# Patient Record
Sex: MALE | Race: WHITE | NOT HISPANIC OR LATINO | Employment: FULL TIME | URBAN - METROPOLITAN AREA
[De-identification: names, ages, dates, MRNs, and addresses within clinical notes are randomized per-mention and may not be internally consistent; named-entity substitution may affect disease eponyms.]

---

## 2017-01-25 ENCOUNTER — HOSPITAL ENCOUNTER (OUTPATIENT)
Dept: OTHER | Facility: HOSPITAL | Age: 52
Discharge: HOME | End: 2017-01-25
Attending: INTERNAL MEDICINE | Admitting: INTERNAL MEDICINE

## 2017-01-25 LAB
ANION GAP SERPL CALC-SCNC: 11.9 MMOL/L (ref 10–20)
APTT BLD: 28.7 SEC (ref 24–31)
BASOPHILS # BLD AUTO: 0.1 10*3/UL (ref 0–0.2)
BASOPHILS NFR BLD AUTO: 1 % (ref 0–2)
BUN SERPL-MCNC: 11 MG/DL (ref 8–20)
BUN/CREAT SERPL: 13.8 (ref 6.2–20.3)
CALCIUM SERPL-MCNC: 9.4 MG/DL (ref 8.9–10.3)
CHLORIDE SERPL-SCNC: 100 MMOL/L (ref 101–111)
CONV CO2: 27 MMOL/L (ref 22–32)
CREAT UR-MCNC: 0.8 MG/DL (ref 0.7–1.2)
DIFFERENTIAL METHOD BLD: (no result)
EOSINOPHIL # BLD AUTO: 0.3 10*3/UL (ref 0–0.3)
EOSINOPHIL # BLD AUTO: 6 % (ref 0–3)
ERYTHROCYTE [DISTWIDTH] IN BLOOD BY AUTOMATED COUNT: 13.7 % (ref 11.5–14.5)
GLUCOSE SERPL-MCNC: 308 MG/DL (ref 65–99)
HCT VFR BLD AUTO: 44.1 % (ref 40–54)
HGB BLD-MCNC: 15.3 G/DL (ref 14–18)
INR PPP: 0.7
LYMPHOCYTES # BLD AUTO: 2.2 10*3/UL (ref 0.8–4.8)
LYMPHOCYTES NFR BLD AUTO: 35 % (ref 18–42)
MCH RBC QN AUTO: 29.6 PG (ref 26–32)
MCHC RBC AUTO-ENTMCNC: 34.8 G/DL (ref 32–36)
MCV RBC AUTO: 85 FL (ref 80–94)
MONOCYTES # BLD AUTO: 0.5 10*3/UL (ref 0.1–1.3)
MONOCYTES NFR BLD AUTO: 8 % (ref 2–11)
NEUTROPHILS # BLD AUTO: 3.1 10*3/UL (ref 2.3–8.6)
NEUTROPHILS NFR BLD AUTO: 50 % (ref 50–75)
NRBC BLD AUTO-RTO: 0 /100{WBCS}
NRBC/RBC NFR BLD MANUAL: 0 10*3/UL
PLATELET # BLD AUTO: 177 10*3/UL (ref 150–450)
PMV BLD AUTO: 10.1 FL (ref 7.4–10.4)
POTASSIUM SERPL-SCNC: ABNORMAL MMOL/L (ref 3.6–5.1)
PROTHROMBIN TIME: 10.2 SEC (ref 9.6–11.7)
RBC # BLD AUTO: 5.18 10*6/UL (ref 4.6–6)
SODIUM SERPL-SCNC: 135 MMOL/L (ref 136–144)
WBC # BLD AUTO: 6.2 10*3/UL (ref 4.5–11.5)

## 2018-05-21 ENCOUNTER — HOSPITAL ENCOUNTER (OUTPATIENT)
Dept: CARDIOLOGY | Facility: HOSPITAL | Age: 53
Discharge: HOME | End: 2018-05-21
Attending: INTERNAL MEDICINE | Admitting: INTERNAL MEDICINE

## 2019-02-26 ENCOUNTER — HOSPITAL ENCOUNTER (OUTPATIENT)
Dept: OTHER | Facility: HOSPITAL | Age: 54
Setting detail: RECURRING SERIES
Discharge: HOME | End: 2019-04-27
Attending: INTERNAL MEDICINE | Admitting: INTERNAL MEDICINE

## 2019-03-04 ENCOUNTER — HOSPITAL ENCOUNTER (OUTPATIENT)
Dept: OTHER | Facility: HOSPITAL | Age: 54
Discharge: HOME | End: 2019-03-04
Attending: PSYCHIATRY & NEUROLOGY | Admitting: PSYCHIATRY & NEUROLOGY

## 2019-03-04 LAB
ALBUMIN SERPL-MCNC: 3.4 G/DL (ref 3.5–4.8)
ALPHA1 GLOB FLD ELPH-MCNC: 0.2 GM/DL (ref 0.1–0.4)
ALPHA2 GLOB SERPL ELPH-MCNC: 0.9 GM/DL (ref 0.5–1)
B-GLOBULIN SERPL ELPH-MCNC: 1.3 GM/DL (ref 0.7–1.4)
CONV TOTAL PROTEIN: 6.8 G/DL (ref 6.1–7.9)
ERYTHROCYTE [SEDIMENTATION RATE] IN BLOOD BY WESTERGREN METHOD: 28 MM/HR (ref 0–20)
GAMMA GLOB SERPL ELPH-MCNC: 0.9 GM/DL (ref 0.6–1.6)
INSULIN SERPL-ACNC: ABNORMAL U[IU]/ML
INSULIN SERPL-ACNC: ABNORMAL U[IU]/ML

## 2019-03-05 LAB — T PALLIDUM IGG SER QL: NONREACTIVE

## 2019-03-06 LAB
ANA SER QL IA: NORMAL
CHROMATIN AB SERPL-ACNC: <20 [IU]/ML (ref 0–20)

## 2019-04-04 ENCOUNTER — HOSPITAL ENCOUNTER (OUTPATIENT)
Dept: PREADMISSION TESTING | Facility: HOSPITAL | Age: 54
Discharge: HOME | End: 2019-04-04
Attending: INTERNAL MEDICINE | Admitting: INTERNAL MEDICINE

## 2019-04-04 LAB
ANION GAP SERPL CALC-SCNC: 14 MMOL/L (ref 10–20)
APTT BLD: 26.2 SEC (ref 24–31)
BASOPHILS # BLD AUTO: 0.1 10*3/UL (ref 0–0.2)
BASOPHILS NFR BLD AUTO: 1 % (ref 0–2)
BUN SERPL-MCNC: 16 MG/DL (ref 8–20)
BUN/CREAT SERPL: 17.8 (ref 6.2–20.3)
CALCIUM SERPL-MCNC: 9.5 MG/DL (ref 8.9–10.3)
CHLORIDE SERPL-SCNC: 100 MMOL/L (ref 101–111)
CONV CO2: 22 MMOL/L (ref 22–32)
CREAT UR-MCNC: 0.9 MG/DL (ref 0.7–1.2)
DIFFERENTIAL METHOD BLD: (no result)
EOSINOPHIL # BLD AUTO: 0.3 10*3/UL (ref 0–0.3)
EOSINOPHIL # BLD AUTO: 6 % (ref 0–3)
ERYTHROCYTE [DISTWIDTH] IN BLOOD BY AUTOMATED COUNT: 14.1 % (ref 11.5–14.5)
GLUCOSE SERPL-MCNC: 390 MG/DL (ref 65–99)
HCT VFR BLD AUTO: 43.4 % (ref 40–54)
HGB BLD-MCNC: 15.1 G/DL (ref 14–18)
INR PPP: 0.9
LYMPHOCYTES # BLD AUTO: 1.9 10*3/UL (ref 0.8–4.8)
LYMPHOCYTES NFR BLD AUTO: 31 % (ref 18–42)
MCH RBC QN AUTO: 30.1 PG (ref 26–32)
MCHC RBC AUTO-ENTMCNC: 34.8 G/DL (ref 32–36)
MCV RBC AUTO: 86.5 FL (ref 80–94)
MONOCYTES # BLD AUTO: 0.3 10*3/UL (ref 0.1–1.3)
MONOCYTES NFR BLD AUTO: 6 % (ref 2–11)
NEUTROPHILS # BLD AUTO: 3.4 10*3/UL (ref 2.3–8.6)
NEUTROPHILS NFR BLD AUTO: 56 % (ref 50–75)
NRBC BLD AUTO-RTO: 0 /100{WBCS}
NRBC/RBC NFR BLD MANUAL: 0 10*3/UL
PLATELET # BLD AUTO: 216 10*3/UL (ref 150–450)
PMV BLD AUTO: 10.5 FL (ref 7.4–10.4)
POTASSIUM SERPL-SCNC: 4 MMOL/L (ref 3.6–5.1)
PROTHROMBIN TIME: 9.7 SEC (ref 9.6–11.7)
RBC # BLD AUTO: 5.02 10*6/UL (ref 4.6–6)
SODIUM SERPL-SCNC: 132 MMOL/L (ref 136–144)
WBC # BLD AUTO: 6 10*3/UL (ref 4.5–11.5)

## 2019-05-19 ENCOUNTER — HOSPITAL ENCOUNTER (OUTPATIENT)
Dept: URGENT CARE | Facility: CLINIC | Age: 54
Discharge: HOME | End: 2019-05-19
Attending: FAMILY MEDICINE | Admitting: FAMILY MEDICINE

## 2019-10-29 ENCOUNTER — APPOINTMENT (OUTPATIENT)
Dept: CT IMAGING | Facility: HOSPITAL | Age: 54
End: 2019-10-29

## 2019-10-29 ENCOUNTER — HOSPITAL ENCOUNTER (INPATIENT)
Facility: HOSPITAL | Age: 54
LOS: 2 days | Discharge: HOME | End: 2019-10-31
Attending: INTERNAL MEDICINE | Admitting: INTERNAL MEDICINE

## 2019-10-29 ENCOUNTER — APPOINTMENT (OUTPATIENT)
Dept: GENERAL RADIOLOGY | Facility: HOSPITAL | Age: 54
End: 2019-10-29

## 2019-10-29 DIAGNOSIS — I63.512 ARTERIAL ISCHEMIC STROKE, MCA (MIDDLE CEREBRAL ARTERY), LEFT, ACUTE (HCC): Primary | ICD-10-CM

## 2019-10-29 DIAGNOSIS — Z92.89 HISTORY OF THROMBOLYTIC THERAPY: ICD-10-CM

## 2019-10-29 PROBLEM — R94.39 ABNORMAL CARDIOVASCULAR STRESS TEST: Status: RESOLVED | Noted: 2017-01-19 | Resolved: 2019-10-29

## 2019-10-29 PROBLEM — Z01.818 PREOPERATIVE EXAMINATION: Status: ACTIVE | Noted: 2018-05-17

## 2019-10-29 PROBLEM — R07.9 CHEST PAIN: Status: RESOLVED | Noted: 2017-01-05 | Resolved: 2019-10-29

## 2019-10-29 PROBLEM — R29.898 RUE WEAKNESS: Status: ACTIVE | Noted: 2019-10-29

## 2019-10-29 PROBLEM — E78.5 HYPERLIPIDEMIA: Status: ACTIVE | Noted: 2019-10-29

## 2019-10-29 PROBLEM — I63.9 CEREBROVASCULAR ACCIDENT (CVA) (HCC): Status: ACTIVE | Noted: 2019-02-19

## 2019-10-29 PROBLEM — S93.402A SPRAIN OF UNSPECIFIED LIGAMENT OF LEFT ANKLE, INITIAL ENCOUNTER: Status: RESOLVED | Noted: 2019-05-19 | Resolved: 2019-10-29

## 2019-10-29 PROBLEM — I71.9 AORTIC ANEURYSM (HCC): Status: ACTIVE | Noted: 2017-06-22

## 2019-10-29 PROBLEM — Q23.1 BICUSPID AORTIC VALVE: Status: ACTIVE | Noted: 2017-02-16

## 2019-10-29 PROBLEM — R47.81 SLURRED SPEECH: Status: ACTIVE | Noted: 2019-10-29

## 2019-10-29 PROBLEM — R07.9 CHEST PAIN: Status: ACTIVE | Noted: 2017-01-05

## 2019-10-29 PROBLEM — Z01.818 PREOPERATIVE EXAMINATION: Status: RESOLVED | Noted: 2018-05-17 | Resolved: 2019-10-29

## 2019-10-29 PROBLEM — I25.10 CORONARY ARTERY DISEASE: Status: ACTIVE | Noted: 2019-10-29

## 2019-10-29 PROBLEM — S93.402A SPRAIN OF UNSPECIFIED LIGAMENT OF LEFT ANKLE, INITIAL ENCOUNTER: Status: ACTIVE | Noted: 2019-05-19

## 2019-10-29 PROBLEM — S93.699A TRAUMATIC PLANTAR FASCIITIS: Status: ACTIVE | Noted: 2019-05-21

## 2019-10-29 PROBLEM — E11.9 TYPE 2 DIABETES MELLITUS: Status: ACTIVE | Noted: 2019-10-29

## 2019-10-29 PROBLEM — R94.39 ABNORMAL CARDIOVASCULAR STRESS TEST: Status: ACTIVE | Noted: 2017-01-19

## 2019-10-29 PROBLEM — R29.810 FACIAL DROOP: Status: ACTIVE | Noted: 2019-10-29

## 2019-10-29 PROBLEM — S93.699A TRAUMATIC PLANTAR FASCIITIS: Status: RESOLVED | Noted: 2019-05-21 | Resolved: 2019-10-29

## 2019-10-29 PROBLEM — I10 HYPERTENSION: Status: ACTIVE | Noted: 2019-10-29

## 2019-10-29 PROBLEM — I63.9 CEREBROVASCULAR ACCIDENT (CVA) (HCC): Status: RESOLVED | Noted: 2019-02-19 | Resolved: 2019-10-29

## 2019-10-29 LAB
ABO GROUP BLD: NORMAL
ALBUMIN SERPL-MCNC: 4 G/DL (ref 3.5–5.2)
ALBUMIN/GLOB SERPL: 1.5 G/DL
ALP SERPL-CCNC: 82 U/L (ref 39–117)
ALT SERPL W P-5'-P-CCNC: 24 U/L (ref 1–41)
ANION GAP SERPL CALCULATED.3IONS-SCNC: 14 MMOL/L (ref 5–15)
APTT PPP: 25.3 SECONDS (ref 24–31)
AST SERPL-CCNC: 17 U/L (ref 1–40)
BASOPHILS # BLD AUTO: 0.1 10*3/MM3 (ref 0–0.2)
BASOPHILS NFR BLD AUTO: 1.3 % (ref 0–1.5)
BILIRUB SERPL-MCNC: 0.5 MG/DL (ref 0.2–1.2)
BLD GP AB SCN SERPL QL: NEGATIVE
BUN BLD-MCNC: 15 MG/DL (ref 6–20)
BUN/CREAT SERPL: 19.5 (ref 7–25)
CALCIUM SPEC-SCNC: 9 MG/DL (ref 8.6–10.5)
CHLORIDE SERPL-SCNC: 103 MMOL/L (ref 98–107)
CHOLEST SERPL-MCNC: 213 MG/DL (ref 0–200)
CO2 SERPL-SCNC: 21 MMOL/L (ref 22–29)
CREAT BLD-MCNC: 0.77 MG/DL (ref 0.76–1.27)
DEPRECATED RDW RBC AUTO: 42.4 FL (ref 37–54)
EOSINOPHIL # BLD AUTO: 0.2 10*3/MM3 (ref 0–0.4)
EOSINOPHIL NFR BLD AUTO: 3.9 % (ref 0.3–6.2)
ERYTHROCYTE [DISTWIDTH] IN BLOOD BY AUTOMATED COUNT: 13.9 % (ref 12.3–15.4)
GFR SERPL CREATININE-BSD FRML MDRD: 105 ML/MIN/1.73
GLOBULIN UR ELPH-MCNC: 2.6 GM/DL
GLUCOSE BLD-MCNC: 282 MG/DL (ref 65–99)
HCT VFR BLD AUTO: 39.1 % (ref 37.5–51)
HDLC SERPL-MCNC: 24 MG/DL (ref 40–60)
HGB BLD-MCNC: 13.5 G/DL (ref 13–17.7)
HOLD SPECIMEN: NORMAL
HOLD SPECIMEN: NORMAL
INR PPP: 1 (ref 0.9–1.1)
LDLC SERPL CALC-MCNC: ABNORMAL MG/DL
LDLC/HDLC SERPL: ABNORMAL {RATIO}
LYMPHOCYTES # BLD AUTO: 1.8 10*3/MM3 (ref 0.7–3.1)
LYMPHOCYTES NFR BLD AUTO: 33.1 % (ref 19.6–45.3)
MCH RBC QN AUTO: 29.7 PG (ref 26.6–33)
MCHC RBC AUTO-ENTMCNC: 34.6 G/DL (ref 31.5–35.7)
MCV RBC AUTO: 85.7 FL (ref 79–97)
MONOCYTES # BLD AUTO: 0.3 10*3/MM3 (ref 0.1–0.9)
MONOCYTES NFR BLD AUTO: 6.2 % (ref 5–12)
NEUTROPHILS # BLD AUTO: 3 10*3/MM3 (ref 1.7–7)
NEUTROPHILS NFR BLD AUTO: 55.5 % (ref 42.7–76)
NRBC BLD AUTO-RTO: 0.1 /100 WBC (ref 0–0.2)
PLATELET # BLD AUTO: 213 10*3/MM3 (ref 140–450)
PMV BLD AUTO: 9.1 FL (ref 6–12)
POTASSIUM BLD-SCNC: 3.9 MMOL/L (ref 3.5–5.2)
PROT SERPL-MCNC: 6.6 G/DL (ref 6–8.5)
PROTHROMBIN TIME: 10.5 SECONDS (ref 9.6–11.7)
RBC # BLD AUTO: 4.56 10*6/MM3 (ref 4.14–5.8)
RH BLD: POSITIVE
SODIUM BLD-SCNC: 138 MMOL/L (ref 136–145)
T&S EXPIRATION DATE: NORMAL
TRIGL SERPL-MCNC: 775 MG/DL (ref 0–150)
TROPONIN T SERPL-MCNC: 0.01 NG/ML (ref 0–0.03)
TSH SERPL DL<=0.05 MIU/L-ACNC: 1.43 UIU/ML (ref 0.27–4.2)
VLDLC SERPL-MCNC: ABNORMAL MG/DL
WBC NRBC COR # BLD: 5.4 10*3/MM3 (ref 3.4–10.8)
WHOLE BLOOD HOLD SPECIMEN: NORMAL
WHOLE BLOOD HOLD SPECIMEN: NORMAL

## 2019-10-29 PROCEDURE — 84443 ASSAY THYROID STIM HORMONE: CPT | Performed by: NURSE PRACTITIONER

## 2019-10-29 PROCEDURE — 93005 ELECTROCARDIOGRAM TRACING: CPT | Performed by: EMERGENCY MEDICINE

## 2019-10-29 PROCEDURE — 83735 ASSAY OF MAGNESIUM: CPT | Performed by: NURSE PRACTITIONER

## 2019-10-29 PROCEDURE — 70450 CT HEAD/BRAIN W/O DYE: CPT

## 2019-10-29 PROCEDURE — 80061 LIPID PANEL: CPT | Performed by: NURSE PRACTITIONER

## 2019-10-29 PROCEDURE — 86900 BLOOD TYPING SEROLOGIC ABO: CPT | Performed by: EMERGENCY MEDICINE

## 2019-10-29 PROCEDURE — 80053 COMPREHEN METABOLIC PANEL: CPT | Performed by: NURSE PRACTITIONER

## 2019-10-29 PROCEDURE — 25010000002 ALTEPLASE PER 1 MG: Performed by: EMERGENCY MEDICINE

## 2019-10-29 PROCEDURE — 87081 CULTURE SCREEN ONLY: CPT | Performed by: NURSE PRACTITIONER

## 2019-10-29 PROCEDURE — 84484 ASSAY OF TROPONIN QUANT: CPT | Performed by: NURSE PRACTITIONER

## 2019-10-29 PROCEDURE — 70496 CT ANGIOGRAPHY HEAD: CPT

## 2019-10-29 PROCEDURE — 63710000001 INSULIN LISPRO (HUMAN) PER 5 UNITS: Performed by: NURSE PRACTITIONER

## 2019-10-29 PROCEDURE — 85025 COMPLETE CBC W/AUTO DIFF WBC: CPT | Performed by: EMERGENCY MEDICINE

## 2019-10-29 PROCEDURE — 82962 GLUCOSE BLOOD TEST: CPT

## 2019-10-29 PROCEDURE — 25010000002 KETOROLAC TROMETHAMINE PER 15 MG

## 2019-10-29 PROCEDURE — 86901 BLOOD TYPING SEROLOGIC RH(D): CPT

## 2019-10-29 PROCEDURE — 86850 RBC ANTIBODY SCREEN: CPT | Performed by: EMERGENCY MEDICINE

## 2019-10-29 PROCEDURE — 71045 X-RAY EXAM CHEST 1 VIEW: CPT

## 2019-10-29 PROCEDURE — 83721 ASSAY OF BLOOD LIPOPROTEIN: CPT | Performed by: NURSE PRACTITIONER

## 2019-10-29 PROCEDURE — 85610 PROTHROMBIN TIME: CPT | Performed by: EMERGENCY MEDICINE

## 2019-10-29 PROCEDURE — 70498 CT ANGIOGRAPHY NECK: CPT

## 2019-10-29 PROCEDURE — 3E03317 INTRODUCTION OF OTHER THROMBOLYTIC INTO PERIPHERAL VEIN, PERCUTANEOUS APPROACH: ICD-10-PCS | Performed by: PSYCHIATRY & NEUROLOGY

## 2019-10-29 PROCEDURE — 93005 ELECTROCARDIOGRAM TRACING: CPT | Performed by: NURSE PRACTITIONER

## 2019-10-29 PROCEDURE — 84100 ASSAY OF PHOSPHORUS: CPT | Performed by: NURSE PRACTITIONER

## 2019-10-29 PROCEDURE — 93005 ELECTROCARDIOGRAM TRACING: CPT | Performed by: INTERNAL MEDICINE

## 2019-10-29 PROCEDURE — 85730 THROMBOPLASTIN TIME PARTIAL: CPT | Performed by: EMERGENCY MEDICINE

## 2019-10-29 PROCEDURE — 86900 BLOOD TYPING SEROLOGIC ABO: CPT

## 2019-10-29 PROCEDURE — 86901 BLOOD TYPING SEROLOGIC RH(D): CPT | Performed by: EMERGENCY MEDICINE

## 2019-10-29 PROCEDURE — 0 IOPAMIDOL PER 1 ML: Performed by: EMERGENCY MEDICINE

## 2019-10-29 PROCEDURE — 99285 EMERGENCY DEPT VISIT HI MDM: CPT

## 2019-10-29 PROCEDURE — 84484 ASSAY OF TROPONIN QUANT: CPT | Performed by: EMERGENCY MEDICINE

## 2019-10-29 RX ORDER — BISACODYL 10 MG
10 SUPPOSITORY, RECTAL RECTAL DAILY PRN
Status: DISCONTINUED | OUTPATIENT
Start: 2019-10-29 | End: 2019-10-31 | Stop reason: HOSPADM

## 2019-10-29 RX ORDER — ONDANSETRON 2 MG/ML
4 INJECTION INTRAMUSCULAR; INTRAVENOUS EVERY 6 HOURS PRN
Status: DISCONTINUED | OUTPATIENT
Start: 2019-10-29 | End: 2019-10-31 | Stop reason: HOSPADM

## 2019-10-29 RX ORDER — ACETAMINOPHEN 325 MG/1
650 TABLET ORAL EVERY 4 HOURS PRN
Status: DISCONTINUED | OUTPATIENT
Start: 2019-10-29 | End: 2019-10-31 | Stop reason: HOSPADM

## 2019-10-29 RX ORDER — SODIUM CHLORIDE 0.9 % (FLUSH) 0.9 %
10 SYRINGE (ML) INJECTION AS NEEDED
Status: DISCONTINUED | OUTPATIENT
Start: 2019-10-29 | End: 2019-10-31 | Stop reason: HOSPADM

## 2019-10-29 RX ORDER — ACETAMINOPHEN 650 MG/1
650 SUPPOSITORY RECTAL EVERY 4 HOURS PRN
Status: DISCONTINUED | OUTPATIENT
Start: 2019-10-29 | End: 2019-10-31 | Stop reason: HOSPADM

## 2019-10-29 RX ORDER — ATORVASTATIN CALCIUM 40 MG/1
40 TABLET, FILM COATED ORAL DAILY
COMMUNITY
Start: 2017-02-16 | End: 2019-10-31 | Stop reason: HOSPADM

## 2019-10-29 RX ORDER — SODIUM CHLORIDE 9 MG/ML
100 INJECTION, SOLUTION INTRAVENOUS ONCE
Status: DISCONTINUED | OUTPATIENT
Start: 2019-10-29 | End: 2019-10-29

## 2019-10-29 RX ORDER — DEXTROSE MONOHYDRATE 25 G/50ML
25 INJECTION, SOLUTION INTRAVENOUS
Status: DISCONTINUED | OUTPATIENT
Start: 2019-10-29 | End: 2019-10-31 | Stop reason: HOSPADM

## 2019-10-29 RX ORDER — CLOPIDOGREL BISULFATE 75 MG/1
75 TABLET ORAL EVERY 24 HOURS
COMMUNITY
Start: 2017-02-16 | End: 2020-11-04

## 2019-10-29 RX ORDER — INSULIN GLARGINE 100 [IU]/ML
20 INJECTION, SOLUTION SUBCUTANEOUS NIGHTLY PRN
COMMUNITY
End: 2020-11-04

## 2019-10-29 RX ORDER — CHOLECALCIFEROL (VITAMIN D3) 25 MCG
3 CAPSULE ORAL DAILY
COMMUNITY
Start: 2017-06-22

## 2019-10-29 RX ORDER — ASPIRIN 325 MG
325 TABLET ORAL DAILY
Status: DISCONTINUED | OUTPATIENT
Start: 2019-10-30 | End: 2019-10-31

## 2019-10-29 RX ORDER — LISINOPRIL 20 MG/1
40 TABLET ORAL EVERY 24 HOURS
COMMUNITY
Start: 2017-01-05 | End: 2020-12-23

## 2019-10-29 RX ORDER — NICOTINE POLACRILEX 4 MG
15 LOZENGE BUCCAL
Status: DISCONTINUED | OUTPATIENT
Start: 2019-10-29 | End: 2019-10-31 | Stop reason: HOSPADM

## 2019-10-29 RX ORDER — SODIUM CHLORIDE 9 MG/ML
50 INJECTION, SOLUTION INTRAVENOUS CONTINUOUS
Status: DISPENSED | OUTPATIENT
Start: 2019-10-29 | End: 2019-10-30

## 2019-10-29 RX ORDER — SODIUM CHLORIDE 9 MG/ML
INJECTION, SOLUTION INTRAVENOUS
Status: DISPENSED
Start: 2019-10-29 | End: 2019-10-30

## 2019-10-29 RX ORDER — SODIUM CHLORIDE 9 MG/ML
100 INJECTION, SOLUTION INTRAVENOUS ONCE
Status: COMPLETED | OUTPATIENT
Start: 2019-10-29 | End: 2019-10-29

## 2019-10-29 RX ORDER — ONDANSETRON 4 MG/1
4 TABLET, FILM COATED ORAL EVERY 6 HOURS PRN
Status: DISCONTINUED | OUTPATIENT
Start: 2019-10-29 | End: 2019-10-31 | Stop reason: HOSPADM

## 2019-10-29 RX ORDER — LABETALOL HYDROCHLORIDE 5 MG/ML
10 INJECTION, SOLUTION INTRAVENOUS
Status: DISCONTINUED | OUTPATIENT
Start: 2019-10-29 | End: 2019-10-31 | Stop reason: HOSPADM

## 2019-10-29 RX ORDER — ALUMINA, MAGNESIA, AND SIMETHICONE 2400; 2400; 240 MG/30ML; MG/30ML; MG/30ML
15 SUSPENSION ORAL EVERY 6 HOURS PRN
Status: DISCONTINUED | OUTPATIENT
Start: 2019-10-29 | End: 2019-10-31 | Stop reason: HOSPADM

## 2019-10-29 RX ORDER — IPRATROPIUM BROMIDE AND ALBUTEROL SULFATE 2.5; .5 MG/3ML; MG/3ML
3 SOLUTION RESPIRATORY (INHALATION)
Status: DISCONTINUED | OUTPATIENT
Start: 2019-10-29 | End: 2019-10-31 | Stop reason: HOSPADM

## 2019-10-29 RX ORDER — KETOROLAC TROMETHAMINE 30 MG/ML
30 INJECTION, SOLUTION INTRAMUSCULAR; INTRAVENOUS ONCE
Status: COMPLETED | OUTPATIENT
Start: 2019-10-29 | End: 2019-10-29

## 2019-10-29 RX ORDER — NITROGLYCERIN 0.4 MG/1
0.4 TABLET SUBLINGUAL
Status: DISCONTINUED | OUTPATIENT
Start: 2019-10-29 | End: 2019-10-31 | Stop reason: HOSPADM

## 2019-10-29 RX ORDER — KETOROLAC TROMETHAMINE 30 MG/ML
INJECTION, SOLUTION INTRAMUSCULAR; INTRAVENOUS
Status: COMPLETED
Start: 2019-10-29 | End: 2019-10-29

## 2019-10-29 RX ORDER — ASPIRIN 300 MG/1
300 SUPPOSITORY RECTAL DAILY
Status: DISCONTINUED | OUTPATIENT
Start: 2019-10-30 | End: 2019-10-31

## 2019-10-29 RX ADMIN — ALTEPLASE 7.99 MG: KIT at 17:38

## 2019-10-29 RX ADMIN — INSULIN LISPRO 6 UNITS: 100 INJECTION, SOLUTION INTRAVENOUS; SUBCUTANEOUS at 21:55

## 2019-10-29 RX ADMIN — IOPAMIDOL 100 ML: 755 INJECTION, SOLUTION INTRAVENOUS at 17:26

## 2019-10-29 RX ADMIN — KETOROLAC TROMETHAMINE 30 MG: 30 INJECTION, SOLUTION INTRAMUSCULAR; INTRAVENOUS at 18:52

## 2019-10-29 RX ADMIN — ALTEPLASE 71.93 MG: KIT at 17:39

## 2019-10-29 RX ADMIN — LABETALOL 20 MG/4 ML (5 MG/ML) INTRAVENOUS SYRINGE 10 MG: at 21:56

## 2019-10-29 RX ADMIN — SODIUM CHLORIDE 100 ML/HR: 900 INJECTION INTRAVENOUS at 18:30

## 2019-10-29 RX ADMIN — KETOROLAC TROMETHAMINE 30 MG: 30 INJECTION, SOLUTION INTRAMUSCULAR at 18:52

## 2019-10-30 ENCOUNTER — HOSPITAL ENCOUNTER (INPATIENT)
Dept: CARDIOLOGY | Facility: HOSPITAL | Age: 54
Discharge: HOME | End: 2019-10-30

## 2019-10-30 ENCOUNTER — APPOINTMENT (OUTPATIENT)
Dept: MRI IMAGING | Facility: HOSPITAL | Age: 54
End: 2019-10-30

## 2019-10-30 ENCOUNTER — APPOINTMENT (OUTPATIENT)
Dept: CT IMAGING | Facility: HOSPITAL | Age: 54
End: 2019-10-30

## 2019-10-30 LAB
ALBUMIN SERPL-MCNC: 3.6 G/DL (ref 3.5–5.2)
ALBUMIN/GLOB SERPL: 1.4 G/DL
ALP SERPL-CCNC: 62 U/L (ref 39–117)
ALT SERPL W P-5'-P-CCNC: 17 U/L (ref 1–41)
ANION GAP SERPL CALCULATED.3IONS-SCNC: 12 MMOL/L (ref 5–15)
ARTICHOKE IGE QN: 77 MG/DL (ref 0–100)
ARTICHOKE IGE QN: 87 MG/DL (ref 0–100)
AST SERPL-CCNC: 15 U/L (ref 1–40)
BASOPHILS # BLD AUTO: 0 10*3/MM3 (ref 0–0.2)
BASOPHILS NFR BLD AUTO: 1 % (ref 0–1.5)
BH CV XLRA MEAS LEFT CCA RATIO VEL: 98.2 CM/SEC
BH CV XLRA MEAS LEFT DIST CCA EDV: -14.9 CM/SEC
BH CV XLRA MEAS LEFT DIST CCA PSV: -72.9 CM/SEC
BH CV XLRA MEAS LEFT DIST ICA EDV: -12 CM/SEC
BH CV XLRA MEAS LEFT DIST ICA PSV: -37 CM/SEC
BH CV XLRA MEAS LEFT ICA RATIO VEL: -48.9 CM/SEC
BH CV XLRA MEAS LEFT ICA/CCA RATIO: -0.5
BH CV XLRA MEAS LEFT PROX CCA EDV: 14.3 CM/SEC
BH CV XLRA MEAS LEFT PROX CCA PSV: 98.2 CM/SEC
BH CV XLRA MEAS LEFT PROX ECA PSV: -136 CM/SEC
BH CV XLRA MEAS LEFT PROX ICA EDV: -12.5 CM/SEC
BH CV XLRA MEAS LEFT PROX ICA PSV: -48.9 CM/SEC
BH CV XLRA MEAS LEFT PROX SCLA PSV: 174 CM/SEC
BH CV XLRA MEAS LEFT VERTEBRAL A PSV: 46.1 CM/SEC
BH CV XLRA MEAS RIGHT CCA RATIO VEL: 91.3 CM/SEC
BH CV XLRA MEAS RIGHT DIST CCA EDV: -22.7 CM/SEC
BH CV XLRA MEAS RIGHT DIST CCA PSV: -53.7 CM/SEC
BH CV XLRA MEAS RIGHT DIST ICA EDV: -15.2 CM/SEC
BH CV XLRA MEAS RIGHT DIST ICA PSV: -43.9 CM/SEC
BH CV XLRA MEAS RIGHT ICA RATIO VEL: -43.9 CM/SEC
BH CV XLRA MEAS RIGHT ICA/CCA RATIO: -0.48
BH CV XLRA MEAS RIGHT PROX CCA EDV: 14.9 CM/SEC
BH CV XLRA MEAS RIGHT PROX CCA PSV: 91.3 CM/SEC
BH CV XLRA MEAS RIGHT PROX ECA PSV: -97.1 CM/SEC
BH CV XLRA MEAS RIGHT PROX ICA EDV: -10.3 CM/SEC
BH CV XLRA MEAS RIGHT PROX ICA PSV: -33.7 CM/SEC
BH CV XLRA MEAS RIGHT PROX SCLA PSV: 120 CM/SEC
BH CV XLRA MEAS RIGHT VERTEBRAL A PSV: 40 CM/SEC
BILIRUB SERPL-MCNC: 0.5 MG/DL (ref 0.2–1.2)
BUN BLD-MCNC: 17 MG/DL (ref 6–20)
BUN/CREAT SERPL: 26.2 (ref 7–25)
CALCIUM SPEC-SCNC: 8.6 MG/DL (ref 8.6–10.5)
CHLORIDE SERPL-SCNC: 105 MMOL/L (ref 98–107)
CHOLEST SERPL-MCNC: 192 MG/DL (ref 0–200)
CO2 SERPL-SCNC: 23 MMOL/L (ref 22–29)
CREAT BLD-MCNC: 0.65 MG/DL (ref 0.76–1.27)
DEPRECATED RDW RBC AUTO: 42.9 FL (ref 37–54)
EOSINOPHIL # BLD AUTO: 0.2 10*3/MM3 (ref 0–0.4)
EOSINOPHIL NFR BLD AUTO: 5.3 % (ref 0.3–6.2)
ERYTHROCYTE [DISTWIDTH] IN BLOOD BY AUTOMATED COUNT: 13.9 % (ref 12.3–15.4)
GFR SERPL CREATININE-BSD FRML MDRD: 128 ML/MIN/1.73
GLOBULIN UR ELPH-MCNC: 2.5 GM/DL
GLUCOSE BLD-MCNC: 227 MG/DL (ref 65–99)
GLUCOSE BLDC GLUCOMTR-MCNC: 198 MG/DL (ref 70–105)
GLUCOSE BLDC GLUCOMTR-MCNC: 213 MG/DL (ref 70–105)
GLUCOSE BLDC GLUCOMTR-MCNC: 225 MG/DL (ref 70–105)
GLUCOSE BLDC GLUCOMTR-MCNC: 251 MG/DL (ref 70–105)
HBA1C MFR BLD: 10.6 % (ref 3.5–5.6)
HCT VFR BLD AUTO: 37.7 % (ref 37.5–51)
HDLC SERPL-MCNC: 25 MG/DL (ref 40–60)
HGB BLD-MCNC: 13.1 G/DL (ref 13–17.7)
LDLC SERPL CALC-MCNC: ABNORMAL MG/DL
LDLC/HDLC SERPL: ABNORMAL {RATIO}
LYMPHOCYTES # BLD AUTO: 1.5 10*3/MM3 (ref 0.7–3.1)
LYMPHOCYTES NFR BLD AUTO: 32.2 % (ref 19.6–45.3)
MAGNESIUM SERPL-MCNC: 2 MG/DL (ref 1.6–2.6)
MCH RBC QN AUTO: 30.2 PG (ref 26.6–33)
MCHC RBC AUTO-ENTMCNC: 34.8 G/DL (ref 31.5–35.7)
MCV RBC AUTO: 87 FL (ref 79–97)
MONOCYTES # BLD AUTO: 0.3 10*3/MM3 (ref 0.1–0.9)
MONOCYTES NFR BLD AUTO: 6.6 % (ref 5–12)
NEUTROPHILS # BLD AUTO: 2.5 10*3/MM3 (ref 1.7–7)
NEUTROPHILS NFR BLD AUTO: 54.9 % (ref 42.7–76)
NRBC BLD AUTO-RTO: 0.1 /100 WBC (ref 0–0.2)
PHOSPHATE SERPL-MCNC: 3.5 MG/DL (ref 2.5–4.5)
PLATELET # BLD AUTO: 193 10*3/MM3 (ref 140–450)
PMV BLD AUTO: 9.5 FL (ref 6–12)
POTASSIUM BLD-SCNC: 3.6 MMOL/L (ref 3.5–5.2)
PROT SERPL-MCNC: 6.1 G/DL (ref 6–8.5)
RBC # BLD AUTO: 4.34 10*6/MM3 (ref 4.14–5.8)
SODIUM BLD-SCNC: 140 MMOL/L (ref 136–145)
TRIGL SERPL-MCNC: 539 MG/DL (ref 0–150)
TROPONIN T SERPL-MCNC: 0.01 NG/ML (ref 0–0.03)
TROPONIN T SERPL-MCNC: 0.02 NG/ML (ref 0–0.03)
VIT B12 BLD-MCNC: 434 PG/ML (ref 211–946)
VLDLC SERPL-MCNC: ABNORMAL MG/DL
WBC NRBC COR # BLD: 4.6 10*3/MM3 (ref 3.4–10.8)

## 2019-10-30 PROCEDURE — 93880 EXTRACRANIAL BILAT STUDY: CPT

## 2019-10-30 PROCEDURE — 80053 COMPREHEN METABOLIC PANEL: CPT | Performed by: NURSE PRACTITIONER

## 2019-10-30 PROCEDURE — 99253 IP/OBS CNSLTJ NEW/EST LOW 45: CPT | Performed by: PHYSICIAN ASSISTANT

## 2019-10-30 PROCEDURE — 83036 HEMOGLOBIN GLYCOSYLATED A1C: CPT | Performed by: NURSE PRACTITIONER

## 2019-10-30 PROCEDURE — 70450 CT HEAD/BRAIN W/O DYE: CPT

## 2019-10-30 PROCEDURE — 92610 EVALUATE SWALLOWING FUNCTION: CPT

## 2019-10-30 PROCEDURE — 63710000001 INSULIN LISPRO (HUMAN) PER 5 UNITS: Performed by: NURSE PRACTITIONER

## 2019-10-30 PROCEDURE — 70551 MRI BRAIN STEM W/O DYE: CPT

## 2019-10-30 PROCEDURE — 82962 GLUCOSE BLOOD TEST: CPT

## 2019-10-30 PROCEDURE — 82607 VITAMIN B-12: CPT | Performed by: NURSE PRACTITIONER

## 2019-10-30 PROCEDURE — 82607 VITAMIN B-12: CPT | Performed by: PSYCHIATRY & NEUROLOGY

## 2019-10-30 PROCEDURE — 82746 ASSAY OF FOLIC ACID SERUM: CPT | Performed by: PSYCHIATRY & NEUROLOGY

## 2019-10-30 PROCEDURE — 84484 ASSAY OF TROPONIN QUANT: CPT | Performed by: NURSE PRACTITIONER

## 2019-10-30 PROCEDURE — 85025 COMPLETE CBC W/AUTO DIFF WBC: CPT | Performed by: NURSE PRACTITIONER

## 2019-10-30 PROCEDURE — 94660 CPAP INITIATION&MGMT: CPT

## 2019-10-30 PROCEDURE — 94799 UNLISTED PULMONARY SVC/PX: CPT

## 2019-10-30 PROCEDURE — 93306 TTE W/DOPPLER COMPLETE: CPT

## 2019-10-30 PROCEDURE — 99252 IP/OBS CONSLTJ NEW/EST SF 35: CPT | Performed by: PSYCHIATRY & NEUROLOGY

## 2019-10-30 PROCEDURE — 80061 LIPID PANEL: CPT | Performed by: NURSE PRACTITIONER

## 2019-10-30 PROCEDURE — 83721 ASSAY OF BLOOD LIPOPROTEIN: CPT | Performed by: NURSE PRACTITIONER

## 2019-10-30 RX ORDER — ATORVASTATIN CALCIUM 40 MG/1
80 TABLET, FILM COATED ORAL DAILY
Status: DISCONTINUED | OUTPATIENT
Start: 2019-10-31 | End: 2019-10-31 | Stop reason: HOSPADM

## 2019-10-30 RX ORDER — MAGNESIUM SULFATE HEPTAHYDRATE 40 MG/ML
2 INJECTION, SOLUTION INTRAVENOUS AS NEEDED
Status: DISCONTINUED | OUTPATIENT
Start: 2019-10-30 | End: 2019-10-31 | Stop reason: HOSPADM

## 2019-10-30 RX ORDER — ATORVASTATIN CALCIUM 40 MG/1
40 TABLET, FILM COATED ORAL DAILY
Status: DISCONTINUED | OUTPATIENT
Start: 2019-10-30 | End: 2019-10-30

## 2019-10-30 RX ORDER — FAMOTIDINE 10 MG/ML
20 INJECTION, SOLUTION INTRAVENOUS EVERY 12 HOURS SCHEDULED
Status: DISCONTINUED | OUTPATIENT
Start: 2019-10-30 | End: 2019-10-31 | Stop reason: HOSPADM

## 2019-10-30 RX ORDER — MAGNESIUM SULFATE 1 G/100ML
1 INJECTION INTRAVENOUS AS NEEDED
Status: DISCONTINUED | OUTPATIENT
Start: 2019-10-30 | End: 2019-10-31 | Stop reason: HOSPADM

## 2019-10-30 RX ORDER — POTASSIUM CHLORIDE 1.5 G/1.77G
40 POWDER, FOR SOLUTION ORAL AS NEEDED
Status: DISCONTINUED | OUTPATIENT
Start: 2019-10-30 | End: 2019-10-31 | Stop reason: HOSPADM

## 2019-10-30 RX ORDER — POTASSIUM CHLORIDE 20 MEQ/1
40 TABLET, EXTENDED RELEASE ORAL AS NEEDED
Status: DISCONTINUED | OUTPATIENT
Start: 2019-10-30 | End: 2019-10-31 | Stop reason: HOSPADM

## 2019-10-30 RX ADMIN — ASPIRIN 325 MG ORAL TABLET 325 MG: 325 PILL ORAL at 21:02

## 2019-10-30 RX ADMIN — ATORVASTATIN CALCIUM 40 MG: 40 TABLET, FILM COATED ORAL at 11:36

## 2019-10-30 RX ADMIN — Medication 10 ML: at 21:03

## 2019-10-30 RX ADMIN — INSULIN LISPRO 2 UNITS: 100 INJECTION, SOLUTION INTRAVENOUS; SUBCUTANEOUS at 06:23

## 2019-10-30 RX ADMIN — FAMOTIDINE 20 MG: 10 INJECTION, SOLUTION INTRAVENOUS at 21:02

## 2019-10-30 RX ADMIN — INSULIN LISPRO 4 UNITS: 100 INJECTION, SOLUTION INTRAVENOUS; SUBCUTANEOUS at 11:35

## 2019-10-30 RX ADMIN — FAMOTIDINE 20 MG: 10 INJECTION, SOLUTION INTRAVENOUS at 04:39

## 2019-10-30 RX ADMIN — ACETAMINOPHEN 650 MG: 325 TABLET ORAL at 21:02

## 2019-10-30 RX ADMIN — INSULIN LISPRO 4 UNITS: 100 INJECTION, SOLUTION INTRAVENOUS; SUBCUTANEOUS at 18:30

## 2019-10-31 ENCOUNTER — NURSE TRIAGE (OUTPATIENT)
Dept: CALL CENTER | Facility: HOSPITAL | Age: 54
End: 2019-10-31

## 2019-10-31 VITALS
WEIGHT: 198.41 LBS | TEMPERATURE: 98 F | DIASTOLIC BLOOD PRESSURE: 88 MMHG | BODY MASS INDEX: 27.78 KG/M2 | HEIGHT: 71 IN | RESPIRATION RATE: 14 BRPM | HEART RATE: 78 BPM | SYSTOLIC BLOOD PRESSURE: 147 MMHG | OXYGEN SATURATION: 96 %

## 2019-10-31 LAB
ALBUMIN SERPL-MCNC: 3.5 G/DL (ref 3.5–5.2)
ALBUMIN/GLOB SERPL: 1.3 G/DL
ALP SERPL-CCNC: 60 U/L (ref 39–117)
ALT SERPL W P-5'-P-CCNC: 16 U/L (ref 1–41)
ANION GAP SERPL CALCULATED.3IONS-SCNC: 9 MMOL/L (ref 5–15)
AST SERPL-CCNC: 13 U/L (ref 1–40)
BASOPHILS # BLD AUTO: 0 10*3/MM3 (ref 0–0.2)
BASOPHILS NFR BLD AUTO: 1 % (ref 0–1.5)
BH CV ECHO MEAS - ACS: 2.1 CM
BH CV ECHO MEAS - AO MAX PG (FULL): -0.21 MMHG
BH CV ECHO MEAS - AO MAX PG: 3.5 MMHG
BH CV ECHO MEAS - AO MEAN PG (FULL): 0.51 MMHG
BH CV ECHO MEAS - AO MEAN PG: 2.1 MMHG
BH CV ECHO MEAS - AO ROOT AREA (BSA CORRECTED): 1.7
BH CV ECHO MEAS - AO ROOT AREA: 10.2 CM^2
BH CV ECHO MEAS - AO ROOT DIAM: 3.6 CM
BH CV ECHO MEAS - AO V2 MAX: 93.5 CM/SEC
BH CV ECHO MEAS - AO V2 MEAN: 69.4 CM/SEC
BH CV ECHO MEAS - AO V2 VTI: 15.4 CM
BH CV ECHO MEAS - ASC AORTA: 3.6 CM
BH CV ECHO MEAS - AVA(I,A): 5.8 CM^2
BH CV ECHO MEAS - AVA(I,D): 5.8 CM^2
BH CV ECHO MEAS - AVA(V,A): 5.3 CM^2
BH CV ECHO MEAS - AVA(V,D): 5.3 CM^2
BH CV ECHO MEAS - BSA(HAYCOCK): 2.1 M^2
BH CV ECHO MEAS - BSA: 2.1 M^2
BH CV ECHO MEAS - BZI_BMI: 27.1 KILOGRAMS/M^2
BH CV ECHO MEAS - BZI_METRIC_HEIGHT: 180.3 CM
BH CV ECHO MEAS - BZI_METRIC_WEIGHT: 88 KG
BH CV ECHO MEAS - EDV(CUBED): 106.9 ML
BH CV ECHO MEAS - EDV(MOD-SP4): 97.4 ML
BH CV ECHO MEAS - EDV(TEICH): 104.7 ML
BH CV ECHO MEAS - EF(CUBED): 61.8 %
BH CV ECHO MEAS - EF(MOD-BP): 66 %
BH CV ECHO MEAS - EF(MOD-SP4): 65.6 %
BH CV ECHO MEAS - EF(TEICH): 53.3 %
BH CV ECHO MEAS - ESV(CUBED): 40.8 ML
BH CV ECHO MEAS - ESV(MOD-SP4): 33.5 ML
BH CV ECHO MEAS - ESV(TEICH): 48.9 ML
BH CV ECHO MEAS - FS: 27.5 %
BH CV ECHO MEAS - IVS/LVPW: 0.9
BH CV ECHO MEAS - IVSD: 1.1 CM
BH CV ECHO MEAS - LA DIMENSION(2D): 5.1 CM
BH CV ECHO MEAS - LV DIASTOLIC VOL/BSA (35-75): 46.8 ML/M^2
BH CV ECHO MEAS - LV MASS(C)D: 192.9 GRAMS
BH CV ECHO MEAS - LV MASS(C)DI: 92.7 GRAMS/M^2
BH CV ECHO MEAS - LV MAX PG: 3.7 MMHG
BH CV ECHO MEAS - LV MEAN PG: 1.6 MMHG
BH CV ECHO MEAS - LV SYSTOLIC VOL/BSA (12-30): 16.1 ML/M^2
BH CV ECHO MEAS - LV V1 MAX: 96.3 CM/SEC
BH CV ECHO MEAS - LV V1 MEAN: 55.3 CM/SEC
BH CV ECHO MEAS - LV V1 VTI: 17.3 CM
BH CV ECHO MEAS - LVIDD: 4.7 CM
BH CV ECHO MEAS - LVIDS: 3.4 CM
BH CV ECHO MEAS - LVOT AREA: 5.1 CM^2
BH CV ECHO MEAS - LVOT DIAM: 2.5 CM
BH CV ECHO MEAS - LVPWD: 1.2 CM
BH CV ECHO MEAS - MR MAX PG: 59.1 MMHG
BH CV ECHO MEAS - MR MAX VEL: 384.5 CM/SEC
BH CV ECHO MEAS - MV A MAX VEL: 58.9 CM/SEC
BH CV ECHO MEAS - MV DEC SLOPE: 419.5 CM/SEC^2
BH CV ECHO MEAS - MV DEC TIME: 0.18 SEC
BH CV ECHO MEAS - MV E MAX VEL: 76.7 CM/SEC
BH CV ECHO MEAS - MV E/A: 1.3
BH CV ECHO MEAS - MV MAX PG: 3.6 MMHG
BH CV ECHO MEAS - MV MEAN PG: 1.5 MMHG
BH CV ECHO MEAS - MV V2 MAX: 95.2 CM/SEC
BH CV ECHO MEAS - MV V2 MEAN: 58.9 CM/SEC
BH CV ECHO MEAS - MV V2 VTI: 24.7 CM
BH CV ECHO MEAS - MVA(VTI): 3.6 CM^2
BH CV ECHO MEAS - PA ACC TIME: 0.06 SEC
BH CV ECHO MEAS - PA PR(ACCEL): 54.1 MMHG
BH CV ECHO MEAS - QP/QS: 0.99
BH CV ECHO MEAS - RAP SYSTOLE: 3 MMHG
BH CV ECHO MEAS - RV MAX PG: 2.4 MMHG
BH CV ECHO MEAS - RV MEAN PG: 1.1 MMHG
BH CV ECHO MEAS - RV V1 MAX: 77 CM/SEC
BH CV ECHO MEAS - RV V1 MEAN: 47 CM/SEC
BH CV ECHO MEAS - RV V1 VTI: 13.7 CM
BH CV ECHO MEAS - RVDD: 1.7 CM
BH CV ECHO MEAS - RVOT AREA: 6.4 CM^2
BH CV ECHO MEAS - RVOT DIAM: 2.9 CM
BH CV ECHO MEAS - RVSP: 9.8 MMHG
BH CV ECHO MEAS - SI(AO): 75 ML/M^2
BH CV ECHO MEAS - SI(CUBED): 31.7 ML/M^2
BH CV ECHO MEAS - SI(LVOT): 42.5 ML/M^2
BH CV ECHO MEAS - SI(MOD-SP4): 30.7 ML/M^2
BH CV ECHO MEAS - SI(TEICH): 26.8 ML/M^2
BH CV ECHO MEAS - SV(AO): 156.1 ML
BH CV ECHO MEAS - SV(CUBED): 66.1 ML
BH CV ECHO MEAS - SV(LVOT): 88.4 ML
BH CV ECHO MEAS - SV(MOD-SP4): 63.9 ML
BH CV ECHO MEAS - SV(RVOT): 87.6 ML
BH CV ECHO MEAS - SV(TEICH): 55.8 ML
BH CV ECHO MEAS - TR MAX VEL: 130.1 CM/SEC
BILIRUB SERPL-MCNC: 0.4 MG/DL (ref 0.2–1.2)
BUN BLD-MCNC: 14 MG/DL (ref 6–20)
BUN/CREAT SERPL: 18.7 (ref 7–25)
CALCIUM SPEC-SCNC: 8.5 MG/DL (ref 8.6–10.5)
CHLORIDE SERPL-SCNC: 103 MMOL/L (ref 98–107)
CO2 SERPL-SCNC: 24 MMOL/L (ref 22–29)
CREAT BLD-MCNC: 0.75 MG/DL (ref 0.76–1.27)
DEPRECATED RDW RBC AUTO: 42 FL (ref 37–54)
EOSINOPHIL # BLD AUTO: 0.3 10*3/MM3 (ref 0–0.4)
EOSINOPHIL NFR BLD AUTO: 6.8 % (ref 0.3–6.2)
ERYTHROCYTE [DISTWIDTH] IN BLOOD BY AUTOMATED COUNT: 13.8 % (ref 12.3–15.4)
FOLATE SERPL-MCNC: 13.6 NG/ML (ref 4.78–24.2)
GFR SERPL CREATININE-BSD FRML MDRD: 108 ML/MIN/1.73
GLOBULIN UR ELPH-MCNC: 2.6 GM/DL
GLUCOSE BLD-MCNC: 243 MG/DL (ref 65–99)
GLUCOSE BLDC GLUCOMTR-MCNC: 229 MG/DL (ref 70–105)
GLUCOSE BLDC GLUCOMTR-MCNC: 249 MG/DL (ref 70–105)
HCT VFR BLD AUTO: 39.2 % (ref 37.5–51)
HGB BLD-MCNC: 13.5 G/DL (ref 13–17.7)
LV EF 2D ECHO EST: 65 %
LYMPHOCYTES # BLD AUTO: 2 10*3/MM3 (ref 0.7–3.1)
LYMPHOCYTES NFR BLD AUTO: 42.4 % (ref 19.6–45.3)
MAGNESIUM SERPL-MCNC: 1.9 MG/DL (ref 1.6–2.6)
MCH RBC QN AUTO: 30.2 PG (ref 26.6–33)
MCHC RBC AUTO-ENTMCNC: 34.6 G/DL (ref 31.5–35.7)
MCV RBC AUTO: 87.3 FL (ref 79–97)
MONOCYTES # BLD AUTO: 0.3 10*3/MM3 (ref 0.1–0.9)
MONOCYTES NFR BLD AUTO: 6.8 % (ref 5–12)
MRSA SPEC QL CULT: NORMAL
NEUTROPHILS # BLD AUTO: 2 10*3/MM3 (ref 1.7–7)
NEUTROPHILS NFR BLD AUTO: 43 % (ref 42.7–76)
NRBC BLD AUTO-RTO: 0.1 /100 WBC (ref 0–0.2)
PHOSPHATE SERPL-MCNC: 2.5 MG/DL (ref 2.5–4.5)
PLATELET # BLD AUTO: 179 10*3/MM3 (ref 140–450)
PMV BLD AUTO: 8.9 FL (ref 6–12)
POTASSIUM BLD-SCNC: 3.6 MMOL/L (ref 3.5–5.2)
PROT SERPL-MCNC: 6.1 G/DL (ref 6–8.5)
RBC # BLD AUTO: 4.49 10*6/MM3 (ref 4.14–5.8)
SODIUM BLD-SCNC: 136 MMOL/L (ref 136–145)
VIT B12 BLD-MCNC: 479 PG/ML (ref 211–946)
WBC NRBC COR # BLD: 4.7 10*3/MM3 (ref 3.4–10.8)

## 2019-10-31 PROCEDURE — 84100 ASSAY OF PHOSPHORUS: CPT | Performed by: NURSE PRACTITIONER

## 2019-10-31 PROCEDURE — 83735 ASSAY OF MAGNESIUM: CPT | Performed by: NURSE PRACTITIONER

## 2019-10-31 PROCEDURE — 92526 ORAL FUNCTION THERAPY: CPT

## 2019-10-31 PROCEDURE — 92523 SPEECH SOUND LANG COMPREHEN: CPT

## 2019-10-31 PROCEDURE — 99238 HOSP IP/OBS DSCHRG MGMT 30/<: CPT | Performed by: INTERNAL MEDICINE

## 2019-10-31 PROCEDURE — 82962 GLUCOSE BLOOD TEST: CPT

## 2019-10-31 PROCEDURE — 97161 PT EVAL LOW COMPLEX 20 MIN: CPT

## 2019-10-31 PROCEDURE — 93306 TTE W/DOPPLER COMPLETE: CPT | Performed by: INTERNAL MEDICINE

## 2019-10-31 PROCEDURE — 80053 COMPREHEN METABOLIC PANEL: CPT | Performed by: NURSE PRACTITIONER

## 2019-10-31 PROCEDURE — 99231 SBSQ HOSP IP/OBS SF/LOW 25: CPT | Performed by: PSYCHIATRY & NEUROLOGY

## 2019-10-31 PROCEDURE — 85025 COMPLETE CBC W/AUTO DIFF WBC: CPT | Performed by: NURSE PRACTITIONER

## 2019-10-31 PROCEDURE — 63710000001 INSULIN LISPRO (HUMAN) PER 5 UNITS: Performed by: INTERNAL MEDICINE

## 2019-10-31 PROCEDURE — 97116 GAIT TRAINING THERAPY: CPT

## 2019-10-31 RX ORDER — ASPIRIN 325 MG
162 TABLET ORAL DAILY
Status: DISCONTINUED | OUTPATIENT
Start: 2019-11-01 | End: 2019-10-31 | Stop reason: HOSPADM

## 2019-10-31 RX ORDER — INSULIN GLARGINE 100 [IU]/ML
20 INJECTION, SOLUTION SUBCUTANEOUS NIGHTLY
Status: DISCONTINUED | OUTPATIENT
Start: 2019-10-31 | End: 2019-10-31 | Stop reason: HOSPADM

## 2019-10-31 RX ORDER — ATORVASTATIN CALCIUM 80 MG/1
80 TABLET, FILM COATED ORAL DAILY
Qty: 30 TABLET | Refills: 2 | Status: SHIPPED | OUTPATIENT
Start: 2019-11-01 | End: 2020-11-04

## 2019-10-31 RX ORDER — CLOPIDOGREL BISULFATE 75 MG/1
75 TABLET ORAL DAILY
Status: DISCONTINUED | OUTPATIENT
Start: 2019-10-31 | End: 2019-10-31 | Stop reason: HOSPADM

## 2019-10-31 RX ADMIN — POTASSIUM CHLORIDE 40 MEQ: 1500 TABLET, EXTENDED RELEASE ORAL at 10:42

## 2019-10-31 RX ADMIN — ASPIRIN 325 MG ORAL TABLET 325 MG: 325 PILL ORAL at 10:42

## 2019-10-31 RX ADMIN — FAMOTIDINE 20 MG: 10 INJECTION, SOLUTION INTRAVENOUS at 10:42

## 2019-10-31 RX ADMIN — INSULIN LISPRO 4 UNITS: 100 INJECTION, SOLUTION INTRAVENOUS; SUBCUTANEOUS at 13:12

## 2019-10-31 RX ADMIN — ATORVASTATIN CALCIUM 80 MG: 40 TABLET, FILM COATED ORAL at 13:12

## 2019-10-31 RX ADMIN — Medication 10 ML: at 10:42

## 2019-10-31 NOTE — TELEPHONE ENCOUNTER
"Caller from [place]. D/C a few hours ago. I got my discharge papers but they didn't tell me when I could return to work. Recommended to follow up with appt and discuss with MD.    Reason for Disposition  • [1] Caller requesting NON-URGENT health information AND [2] PCP's office is the best resource    Additional Information  • Negative: [1] Caller is not with the adult (patient) AND [2] reporting urgent symptoms  • Negative: Lab result questions  • Negative: Medication questions  • Negative: Caller cannot be reached by phone  • Negative: Caller has already spoken to PCP or another triager  • Negative: RN needs further essential information from caller in order to complete triage  • Negative: Requesting regular office appointment    Answer Assessment - Initial Assessment Questions  1. REASON FOR CALL or QUESTION: \"What is your reason for calling today?\" or \"How can I best help you?\" or \"What question do you have that I can help answer?\"     Does not know when he can return to [facility]    Protocols used: INFORMATION ONLY CALL-ADULT-      "

## 2019-11-01 ENCOUNTER — READMISSION MANAGEMENT (OUTPATIENT)
Dept: CALL CENTER | Facility: HOSPITAL | Age: 54
End: 2019-11-01

## 2019-11-01 NOTE — PROGRESS NOTES
[facility] Pulmonary/Critical Care/SLEEP progress note    PATIENT NAME:  Preet Jones        :  1965           MRN:  [id number]    PRIMARY CARE PHYSICIAN:  Lonnie Contreras MD    SUBJECTIVE    CHIEF COMPLAINT:   Sudden onset of right upper extremity weakness, facial droop, slurred speech    HISTORY OF PRESENT ILLNESS:  Preet Jones is a 54 y.o. male presented to ED with the acute onset of right upper extremity weakness/paresthesia, facial droop, slurred speech that started approximately 1 hour prior to arrival.  Patient reported that prior to this episode, he was of his normal health, denies any recent illness, trauma.  He states that he worked to the preceding evening, slept for a a while, woke up with a slight headache but without any other neurologic issues, laid back down until he needed to get up to take his son to work.  At that time he drove his son to [facility], came home and fixed his supper.  He stated that the dog needed to go outside, and was barking at the door.  He took the dog out, and then noticed these symptoms start acutely.  Patient stated that he called his wife, and then proceeded to come into the emergency department.  Patient reported that he is on Plavix at home, and reported that he took an aspirin prior to arrival.  Also of note, patient has had a previous ischemic stroke with residual of slight weakness in his right upper extremity without paresthesia, and reported that he had slurred speech/dysphagia with that stroke that resolved spontaneously.  During that stroke, and 2019, patient did not receive TPA, as he was outside the window for treatment.  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  Patient reports no associated chest pain, diaphoresis, vomiting, diarrhea, constipation, blood in urine or stool.  Patient also did report  that he had an occurrence at work about a week and a half prior to this event, in which a box with heavy cardboard fell and hit him above his left eye.  Patient was seen by the nurse practitioner of [employer], employee health, and was treated with concussion precautions, but patient stated that he had no issues during that recovery.  He has since that incident returned back to his normal state of health.  Patient reported that at home, when this acute onset started, he had very significant loss of sensation in his right upper and right lower extremity, significant weakness, and very difficult with speech.  He did report a headache earlier in the day, but it time of assessment, denied headache.  He also admitted to some dizziness, but denied vertigo.  He also had some associated nausea.    In the ED, code stroke was initiated upon arrival.  Patient had a CT of the head without contrast that revealed no acute intracranial process.  Patient also had a CTA of the head and neck with contrast, with preliminary results of no acute arterial occlusion of the head or neck; official report is still pending.  On-call neurologist was contacted, and patient was deemed a candidate for TPA administration.  After risks and benefits were discussed with patient, patient was agreeable to proceed with TPA.  Initial NIH was scored as 4, and after TPA administration, patient had improved to a 2.  Additionally of note, patient states that he does have occasional palpitations at time that he reports he has a fast heart rate, but it does not last for very long.  He admits that he has been told that he has a sinus arrhythmia.  Patient reports that since his last admission with the first stroke, he has attempted to take better care of himself, eating better as well as trying to lose some weight.  Following TPA administration, patient reports that he now has increased movement and strength in both his right upper and right lower extremities, but  still does have a great amount of numbness/tingling in his right lower extremity.  He still has slurred speech, but according to patient, it has significantly improved.  Patient converses well, uses complex thoughts, and expresses them fluidly in conversation, however he does have continued slurred speech.    [facility] was contacted for admission to ICU and further evaluation and treatment.  Patient has been reported during a previous admission with probable sleep apnea, referral was made to an outpatient sleep study.  An appointment was made for sleep study, but patient did not go.  He has not officially been diagnosed with ADRIANNA, but is with risk factors.  Patient denies any chronic lung conditions, does not use inhalers or nebulizers, and does not use home oxygen.  When reviewing patient's previous medical record, including recommendations for diabetes management follow-up, patient has a pattern of medical noncompliance.    Review of patient's previous medical record includes multiple admissions this year:  1) Admission from 2/18/2019-2/20/2019, which he was diagnosed with an acute left corona radiata ischemic stroke, likely small vessel ischemic disease.  This was suspected on CT Head and confirm with MRI brain with the impression of acute lacunar infarct involving the mid left corona radiata, chronic microvascular ischemia, no acute hemorrhage or midline.  Additionally, CTA reported an approximate 50% narrowing of the left cavernous carotid.  Additionally patient treated for uncontrolled diabetes mellitus, type 2, with a reported A1c of 11.4.  Patient had been noncompliant with treatment.  2) Admission from 2/22/2019-2/23/2019 for recurrent right sided facial numbness/difficulty with speech, neurology was again consulted.  No further evolution of the stroke was noted and patient was discharged with follow up recommendations.  Patient has since followed up with Dr. Spencer, Neurologist.       INSPECT REPORT:  Completed, no  active prescriptions that patient regularly takes.  He was given a 15 day script for Tramadol 50 mg on 3/11/2019, 30 day script for Fioricet on 3/4/2019, and 5 day script for oxycodone-acetaminophen  mg on 2/2/2019.  Report printed and placed in patient's chart.    Neurologist: Dr. Spencer    10/30/19:  No new issues overnight.  Speech improved.  No CP or SOA      REVIEW OF SYSTEMS:  As above       HISTORY:  Past Medical History:   Diagnosis Date   • Abnormal cardiovascular stress test 1/19/2017   • Acute myocardial infarction (CMS/[facility]) 2019   • Aortic aneurysm (CMS/[facility]) 6/22/2017   • Bicuspid aortic valve 2/16/2017   • Coronary artery disease 10/29/2019   • History of coronary angioplasty with insertion of stent    • Hyperlipidemia    • Hypertension    • Obesity 9/2/2011   • Primary hyperparathyroidism (CMS/[facility]) 9/2/2011   • RUE weakness 10/29/2019    Previous residual from Stroke, but was not work prohibiting.   • Sinus arrhythmia    • Stroke (CMS/[facility])    • Type 2 diabetes mellitus (CMS/[facility]) 10/29/2019     Past Surgical History:   Procedure Laterality Date   • APPENDECTOMY     • BACK SURGERY     • CARDIAC CATHETERIZATION  2010   • CARDIAC CATHETERIZATION  2019   • JOINT REPLACEMENT     • KNEE ARTHROSCOPY       Family History   Problem Relation Age of Onset   • No Known Problems Mother    • No Known Problems Father      Social History     Tobacco Use   • Smoking status: Never Smoker   • Smokeless tobacco: Never Used   Substance Use Topics   • Alcohol use: No     Frequency: Never   • Drug use: No        HOME MEDICATIONS:   Prior to Admission medications    Medication Sig Start Date End Date Taking? Authorizing Provider   atorvastatin (LIPITOR) 40 MG tablet Take 40 mg by mouth Daily. 2/16/17  Yes ProviderRamsey MD   insulin glargine (LANTUS) 100 UNIT/ML injection Inject 20 Units under the skin into the appropriate area as directed At Night As Needed.   Yes ProviderRamsey MD   Cholecalciferol (VITAMIN  D-3) 25 MCG (1000 UT) capsule Take 3 capsules by mouth Daily. 6/22/17   Ramsey Nye MD   clopidogrel (PLAVIX) 75 MG tablet Take 75 mg by mouth Daily. 2/16/17   Ramsey Nye MD   lisinopril (PRINIVIL,ZESTRIL) 20 MG tablet Take 20 mg by mouth Daily. 1/5/17   Ramsey Nye MD   metFORMIN (GLUCOPHAGE) 1000 MG tablet Take 1,000 mg by mouth 2 (Two) Times a Day. 1/5/17   Ramsey Nye MD       ALLERGIES:  Amoxicillin and Morphine    OBJECTIVE    VITAL SIGNS:  Vital Signs (last 24 hours)       10/29 0700  -  10/29 2010   Most Recent    Temp (°F)   98.5     98.5 (36.9)    Heart Rate 70 -  86     70    Resp 16 -  20     16    /78 -  164/95     142/89    SpO2 (%) 95 -  100     96        Wt Readings from Last 3 Encounters:   10/31/19 90 kg (198 lb 6.6 oz)   05/21/19 91.2 kg (200 lb 16 oz)   05/19/19 91.2 kg (200 lb 16 oz)     Body mass index is 27.67 kg/m².    PHYSICAL EXAM:   Constitutional:  Well developed, well nourished, no acute distress, non-toxic appearance   Eyes:  PERRL, conjunctiva normal, EOMI   HENT:  Atraumatic, external ears normal, nose normal, oropharynx moist, no pharyngeal exudates, very slight droop on right side, no palatal droop noted. Neck- normal range of motion, no tenderness, supple, trachea midline  Respiratory:  No respiratory distress, normal breath sounds, no rales, no wheezing, non-labored respirations  Cardiovascular:  Normal rate, sinus rhythm with occasional PACs/PVCs, no murmurs, no gallops, no rubs   GI:  Soft, nondistended, normal bowel sounds, nontender, no organomegaly, no mass, no rebound, no guarding   :  No costovertebral angle tenderness   Musculoskeletal:  No edema, no tenderness, no deformities.  RLE weakness in comparison to LLE--dorsiflexion very weak but plantar flexion closer to equal.  No drift noted in any extremities.      Integument:  Well hydrated, no rash   Lymphatic:  No lymphadenopathy noted   Neurologic:  Alert & oriented x 3,  normal motor function, sensory dysfunction with decreased sensory on RLE  Psychiatric:  Speech and behavior appropriate     RESULTS REVIEW:   LABS:  Lab Results (last 24 hours)     Procedure Component Value Units Date/Time    CBC & Differential [[id number]] Collected:  10/31/19 0406    Specimen:  Blood Updated:  10/31/19 0511    Narrative:       The following orders were created for panel order CBC & Differential.  Procedure                               Abnormality         Status                     ---------                               -----------         ------                     CBC Auto Differential[[id number]]        Abnormal            Final result                 Please view results for these tests on the individual orders.    Magnesium [[id number]]  (Normal) Collected:  10/31/19 0406    Specimen:  Blood Updated:  10/31/19 0617     Magnesium 1.9 mg/dL     Phosphorus [[id number]]  (Normal) Collected:  10/31/19 0406    Specimen:  Blood Updated:  10/31/19 0619     Phosphorus 2.5 mg/dL     Comprehensive Metabolic Panel [[id number]]  (Abnormal) Collected:  10/31/19 0406    Specimen:  Blood Updated:  10/31/19 0617     Glucose 243 mg/dL      BUN 14 mg/dL      Creatinine 0.75 mg/dL      Sodium 136 mmol/L      Potassium 3.6 mmol/L      Chloride 103 mmol/L      CO2 24.0 mmol/L      Calcium 8.5 mg/dL      Total Protein 6.1 g/dL      Albumin 3.50 g/dL      ALT (SGPT) 16 U/L      AST (SGOT) 13 U/L      Alkaline Phosphatase 60 U/L      Total Bilirubin 0.4 mg/dL      eGFR Non African Amer 108 mL/min/1.73      Globulin 2.6 gm/dL      A/G Ratio 1.3 g/dL      BUN/Creatinine Ratio 18.7     Anion Gap 9.0 mmol/L     Narrative:       GFR Normal >60  Chronic Kidney Disease <60  Kidney Failure <15    CBC Auto Differential [[id number]]  (Abnormal) Collected:  10/31/19 0406    Specimen:  Blood Updated:  10/31/19 0511     WBC 4.70 10*3/mm3      RBC 4.49 10*6/mm3      Hemoglobin 13.5 g/dL      Hematocrit 39.2 %      MCV 87.3 fL      MCH  "30.2 pg      MCHC 34.6 g/dL      RDW 13.8 %      RDW-SD 42.0 fl      MPV 8.9 fL      Platelets 179 10*3/mm3      Neutrophil % 43.0 %      Lymphocyte % 42.4 %      Monocyte % 6.8 %      Eosinophil % 6.8 %      Basophil % 1.0 %      Neutrophils, Absolute 2.00 10*3/mm3      Lymphocytes, Absolute 2.00 10*3/mm3      Monocytes, Absolute 0.30 10*3/mm3      Eosinophils, Absolute 0.30 10*3/mm3      Basophils, Absolute 0.00 10*3/mm3      nRBC 0.1 /100 WBC     POC Glucose Once [[id number]]  (Abnormal) Collected:  10/31/19 0716    Specimen:  Blood Updated:  10/31/19 0720     Glucose 229 mg/dL      Comment: Serial Number: [id number]:  [id number]       POC Glucose Once [[id number]]  (Abnormal) Collected:  10/31/19 1119    Specimen:  Blood Updated:  10/31/19 1124     Glucose 249 mg/dL      Comment: Serial Number: [id number]:  [id number]             RADIOLOGY STUDIES:  No Radiology Exams Resulted Within Past 24 Hours  CTA Head and Neck-completed, and per Dr. Tyler Mejia on 10/29/19 05:38 PM, \"No acute arterial occlusion of head or neck.\"  Official report pending.    ECHOCARDIOGRAM:  Results for orders placed during the hospital encounter of 19   Adult Transthoracic Echo Complete W/ Cont if Necessary Per Protocol                             Adult Echocardiogram Report    [facility]  Cardiology Department  [address] [place] [address]      Name: THALIA ROMAN     Study Date: 2019 11:11 AM    BP: 128/88 mmHg  MRN: [id number]              Patient Location:   : 1965             Gender: Male                       Height: 71 in  Age: 53 yrs                 Account#: [id number]              Weight: 200 lb  Reason For Study: STROKE, POORLY CONTROLLED TYPE 2 DIABETES    BSA: 2.1 m2  History: STROKE, POORLY CONTROLLED TYPE 2 DIABETES  Ordering Physician: ALBERT LAUGHLIN  Referring Physician: UNKNOWN, U  Performed By: TANI    M-Mode/2-D Measurements:  LVIDd: 4.1 cm       (3.7-5.7) LVPWd: " 1.2 cm        (0.8-1.2)  LVIDs: 2.8 cm       (2.3-3.9)  ACS: 1.8 cm         (1.6-3.7) IVSd: 0.93 cm        (0.7-1.2)  LA dimension: 4.7 cm(1.9-4.0) RVDd: 2.4 cm         (0.7-2.4)  FS: 30.7 %          (21-40%)  Ao root diam: 3.8 cm (2.0-3.7)    Comments  Technically difficult study.  Mitral, tricuspid and aortic valve appear structure normal.  There is mild tricuspid regurgitation noted.  Left atrium is mildly dilated. Aortic root and right ventricle are normal in size.  LV size and contractility appears normal. EF is about 55-60%.  No pericardial effusion noted.  Bubble contrast study is not adequate.    Interpretation  BUBBLE STUDY LOOPS 2 & 3    MMode/2D Measurements & Calculations  ESV(Teich): 30.5 ml  EF(Teich): 58.8 %                      Ao root area: 11.3 cm2  Asc Aorta Diam: 3.5 cm                 LVOT diam: 2.4 cm    EDV(MOD-sp4): 118.3 ml  ESV(MOD-sp4): 47.5 ml  EF(MOD-sp4): 59.9 %    Doppler Measurements & Calculations  MV E max luciana: 76.6 cm/sec                 MV max P.5 mmHg  MV A max luciana: 58.4 cm/sec                 MV mean P.0 mmHg  MV E/A: 1.3  MV dec slope: 768.3 cm/sec2               Ao V2 max: 80.4 cm/sec  MV dec time: 0.10 sec                     Ao max P.6 mmHg                                            Ao V2 mean: 60.2 cm/sec                                            Ao mean P.6 mmHg                                            Ao V2 VTI: 15.3 cm                                            TEDDY(I,D): 4.7 cm2                                              TEDDY(V,D): 5.2 cm2  LV V1 max PG: 3.1 mmHg                    PA max P.9 mmHg  LV V1 mean P.5 mmHg  LV V1 max: 88.4 cm/sec  LV V1 mean: 54.9 cm/sec  LV V1 VTI: 15.3 cm  TR max luciana: 256.7 cm/sec  TR max P.4 mmHg  RVSP(TR): 36.4 mmHg  _______________________________________________________________________________  Electronically signed by: James Odonnell MD  on 2019 09:21 PM     I reviewed the patient's  new clinical results.      HOSPITAL MEDICATIONS:  PREVIOUSLY RECEIVED MEDICATIONS:  Medications   iopamidol (ISOVUE-370) 76 % injection 100 mL (100 mL Intravenous Given 10/29/19 1726)   alteplase (ACTIVASE) bolus from vial (7.99 mg Intravenous Given 10/29/19 1738)   alteplase (ACTIVASE) 100 mg kit (0 mg/kg × 88.8 kg Intravenous Stopped 10/29/19 1830)   sodium chloride 0.9 % infusion (100 mL/hr Intravenous New Bag 10/29/19 1830)   ketorolac (TORADOL) injection 30 mg (30 mg Intravenous Given 10/29/19 1852)     SCHEDULED MEDICATIONS:         CONTINUOUS INFUSIONS:      No current facility-administered medications for this encounter.      PRN MEDICATIONS:            ASSESSMENT & PLAN:  Acute Ischemic stroke S/P tPA administration; H/O previous left corona radiata ischemic stroke  -Code stroke protocol initiated in ED, decision for tPA administration in ED  -Ischemic Stroke orders initiated.  -CT scan head, CTA head and neck reviewed.  -Repeat CT scan of head without, 24 hours post tPA administration  -Neurology following  -PT/ST/OT evaluation & treatment  -Bilateral carotid duplex, patient with history of left sided ICA >50%    Hypertension, chronic  -Resume home medications when able to take PO medications    Palpitations with sinus arrhythmia and PVCs  -Cardiac monitoring  -EKG reviewed, repeat EKG with rhythm changes  -Consider cardiac consultation, but recommend holter monitor or outpatient follow up with cardiologist    CAD, H/O Stent with PCI & AMI  -Recent cath in 2/2019 which was negative for occlusive CAD  -Resume ASA/Plavix when able as mentioned above    Hyperlipidemia  -Lipid panel reviewed   -statin therapy when able to take po medications    Diabetes mellitus, type 2  -Previous A1c 11.4 (02/2019)--was noncompliant, recheck A1c in AM  -strict glycemic control recommended  -Accuchecks AC/HS or Q6H with sliding scale insulin, based on diet  -Hold oral diabetic medications until out of ICU.  -Diabetes Educator  Consultation    Possible ADRIANNA  -Previously referred to have outpatient sleep study, had an appointment with Dr. Seipel, but didn't complete sleep study  -Counseled on importance of testing to eliminate potential contributors to stroke risk  -Recommend outpatient sleep study and follow up.    Vitamin D Deficiency  -Resume home medication regimen when able to take PO medications    Previous Vitamin B-12 deficiency  -Recheck B12 in AM    Accuchecks with SSI  Code Status: CPR, Full Interventions  VTE Prophylaxis: SCDs  PUD Prophylaxis: Pepcid    Patient is instructed to follow-up in pulmonary office for possible sleep study

## 2019-11-01 NOTE — OUTREACH NOTE
Prep Survey      Responses   Facility patient discharged from?  [facility]   Is patient eligible?  Yes   Discharge diagnosis  Arterial ischemic stroke   Does the patient have one of the following disease processes/diagnoses(primary or secondary)?  Stroke (TIA)   Does the patient have Home health ordered?  No   Is there a DME ordered?  No   Prep survey completed?  Yes          Susie Hu RN

## 2019-11-04 ENCOUNTER — READMISSION MANAGEMENT (OUTPATIENT)
Dept: CALL CENTER | Facility: HOSPITAL | Age: 54
End: 2019-11-04

## 2019-11-05 ENCOUNTER — TELEPHONE (OUTPATIENT)
Dept: NEUROLOGY | Facility: CLINIC | Age: 54
End: 2019-11-05

## 2019-11-05 NOTE — OUTREACH NOTE
Stroke Week 1 Survey      Responses   Facility patient discharged from?  [facility]   Does the patient have one of the following disease processes/diagnoses(primary or secondary)?  Stroke (TIA)   Is there a successful TCM telephone encounter documented?  No   Week 1 attempt successful?  Yes   Call start time  0728   Call end time  0732   Discharge diagnosis  Arterial ischemic stroke   Meds reviewed with patient/caregiver?  Yes   Is the patient having any side effects they believe may be caused by any medication additions or changes?  No   Does the patient have all medications ordered at discharge?  Yes   Is the patient taking all medications as directed (includes completed medication regime)?  Yes   Does the patient have a primary care provider?   Yes   Does the patient have an appointment with their PCP within 7 days of discharge?  Yes   Has the patient kept scheduled appointments due by today?  N/A   Comments  Patient needing follow up with Neuro.  Office currently closed and will call in am to tell them he needs a f/u appt.  Explain to pt they will contact him after neuro  looks over records.   Has home health visited the patient within 72 hours of discharge?  N/A   Did the patient receive a copy of their discharge instructions?  Yes   Nursing interventions  Reviewed instructions with patient   What is the patient's perception of their health status since discharge?  Improving   Is the patient able to teach back FAST for Stroke?  Yes   Is the patient/caregiver able to teach back the risk factors for a stroke?  Diabetes, Physical inactivity and obesity, History of TIAs, HIgh red blood cell count, Illegal drug use, High Cholesterol, Smoking, High blood pressure-goal below 120/80, Carotid or other artery disease, Sleep apnea   Is the patient/caregiver able to teach back signs and symptoms related to disease process for when to call PCP?  Yes   Is the patient/caregiver able to teach back signs and symptoms related to  disease process for when to call 911?  Yes   Is the patient/caregiver able to teach back the hierarchy of who to call/visit for symptoms/problems? PCP, Specialist, Home health nurse, Urgent Care, ED, 911  Yes   Additional teach back comments  Patient states he is still having some headaches but has appt with his PCP.  Taking all medications as prescribed   Week 1 call completed?  Yes   Wrap up additional comments  Will call neuro for a f/u appt in am and they will contact pt with date and time          Latonya Veronica LPN

## 2019-11-11 ENCOUNTER — READMISSION MANAGEMENT (OUTPATIENT)
Dept: CALL CENTER | Facility: HOSPITAL | Age: 54
End: 2019-11-11

## 2019-11-11 NOTE — OUTREACH NOTE
Stroke Week 2 Survey      Responses   Facility patient discharged from?  [facility]   Does the patient have one of the following disease processes/diagnoses(primary or secondary)?  Stroke (TIA)   Week 2 attempt successful?  No   Rescheduled  Revoked   Revoke  Decline to participate [NO ANSWER, LEFT VM]          Merry Cates LPN

## 2019-11-21 PROCEDURE — 93010 ELECTROCARDIOGRAM REPORT: CPT | Performed by: INTERNAL MEDICINE

## 2020-11-04 ENCOUNTER — APPOINTMENT (OUTPATIENT)
Dept: CT IMAGING | Facility: HOSPITAL | Age: 55
End: 2020-11-04

## 2020-11-04 ENCOUNTER — HOSPITAL ENCOUNTER (INPATIENT)
Facility: HOSPITAL | Age: 55
LOS: 2 days | Discharge: HOME | End: 2020-11-06
Attending: EMERGENCY MEDICINE | Admitting: INTERNAL MEDICINE

## 2020-11-04 ENCOUNTER — APPOINTMENT (OUTPATIENT)
Dept: CARDIOLOGY | Facility: HOSPITAL | Age: 55
End: 2020-11-04

## 2020-11-04 ENCOUNTER — APPOINTMENT (OUTPATIENT)
Dept: MRI IMAGING | Facility: HOSPITAL | Age: 55
End: 2020-11-04

## 2020-11-04 ENCOUNTER — APPOINTMENT (OUTPATIENT)
Dept: GENERAL RADIOLOGY | Facility: HOSPITAL | Age: 55
End: 2020-11-04

## 2020-11-04 DIAGNOSIS — I63.9 ACUTE ISCHEMIC STROKE (HCC): Primary | ICD-10-CM

## 2020-11-04 LAB
ABO GROUP BLD: NORMAL
ALBUMIN SERPL-MCNC: 4.1 G/DL (ref 3.5–5.2)
ALBUMIN/GLOB SERPL: 1.9 G/DL
ALP SERPL-CCNC: 93 U/L (ref 39–117)
ALT SERPL W P-5'-P-CCNC: 20 U/L (ref 1–41)
ANION GAP SERPL CALCULATED.3IONS-SCNC: 11 MMOL/L (ref 5–15)
ANION GAP SERPL CALCULATED.3IONS-SCNC: 13 MMOL/L (ref 5–15)
APTT PPP: 25.3 SECONDS (ref 24–31)
AST SERPL-CCNC: 13 U/L (ref 1–40)
B PARAPERT DNA SPEC QL NAA+PROBE: NOT DETECTED
B PERT DNA SPEC QL NAA+PROBE: NOT DETECTED
BASOPHILS # BLD AUTO: 0.1 10*3/MM3 (ref 0–0.2)
BASOPHILS # BLD AUTO: 0.1 10*3/MM3 (ref 0–0.2)
BASOPHILS NFR BLD AUTO: 1.1 % (ref 0–1.5)
BASOPHILS NFR BLD AUTO: 1.1 % (ref 0–1.5)
BH CV ECHO MEAS - ACS: 2.1 CM
BH CV ECHO MEAS - AO MAX PG (FULL): 1.6 MMHG
BH CV ECHO MEAS - AO MAX PG: 4.7 MMHG
BH CV ECHO MEAS - AO MEAN PG (FULL): 1.1 MMHG
BH CV ECHO MEAS - AO MEAN PG: 2.8 MMHG
BH CV ECHO MEAS - AO ROOT AREA (BSA CORRECTED): 1.9
BH CV ECHO MEAS - AO ROOT AREA: 12.9 CM^2
BH CV ECHO MEAS - AO ROOT DIAM: 4 CM
BH CV ECHO MEAS - AO V2 MAX: 108.1 CM/SEC
BH CV ECHO MEAS - AO V2 MEAN: 79 CM/SEC
BH CV ECHO MEAS - AO V2 VTI: 25.6 CM
BH CV ECHO MEAS - AORTIC HR: 66 BPM
BH CV ECHO MEAS - AORTIC R-R: 0.91 SEC
BH CV ECHO MEAS - AVA(I,A): 3.1 CM^2
BH CV ECHO MEAS - AVA(I,D): 3.1 CM^2
BH CV ECHO MEAS - AVA(V,A): 3.6 CM^2
BH CV ECHO MEAS - AVA(V,D): 3.6 CM^2
BH CV ECHO MEAS - BSA(HAYCOCK): 2.1 M^2
BH CV ECHO MEAS - BSA: 2.1 M^2
BH CV ECHO MEAS - BZI_BMI: 27.6 KILOGRAMS/M^2
BH CV ECHO MEAS - BZI_METRIC_HEIGHT: 180.3 CM
BH CV ECHO MEAS - BZI_METRIC_WEIGHT: 89.8 KG
BH CV ECHO MEAS - CI(AO): 10.3 L/MIN/M^2
BH CV ECHO MEAS - CI(LVOT): 2.5 L/MIN/M^2
BH CV ECHO MEAS - CO(AO): 21.7 L/MIN
BH CV ECHO MEAS - CO(LVOT): 5.2 L/MIN
BH CV ECHO MEAS - EDV(CUBED): 101.7 ML
BH CV ECHO MEAS - EDV(MOD-SP4): 124.6 ML
BH CV ECHO MEAS - EDV(TEICH): 100.7 ML
BH CV ECHO MEAS - EF(CUBED): 58.2 %
BH CV ECHO MEAS - EF(MOD-BP): 55 %
BH CV ECHO MEAS - EF(MOD-SP4): 55.2 %
BH CV ECHO MEAS - EF(TEICH): 49.9 %
BH CV ECHO MEAS - ESV(CUBED): 42.5 ML
BH CV ECHO MEAS - ESV(MOD-SP4): 55.8 ML
BH CV ECHO MEAS - ESV(TEICH): 50.5 ML
BH CV ECHO MEAS - FS: 25.2 %
BH CV ECHO MEAS - IVS/LVPW: 0.97
BH CV ECHO MEAS - IVSD: 1.3 CM
BH CV ECHO MEAS - LA DIMENSION(2D): 4.7 CM
BH CV ECHO MEAS - LV DIASTOLIC VOL/BSA (35-75): 59.4 ML/M^2
BH CV ECHO MEAS - LV MASS(C)D: 228.9 GRAMS
BH CV ECHO MEAS - LV MASS(C)DI: 109 GRAMS/M^2
BH CV ECHO MEAS - LV MAX PG: 3.1 MMHG
BH CV ECHO MEAS - LV MEAN PG: 1.6 MMHG
BH CV ECHO MEAS - LV SYSTOLIC VOL/BSA (12-30): 26.6 ML/M^2
BH CV ECHO MEAS - LV V1 MAX: 87.3 CM/SEC
BH CV ECHO MEAS - LV V1 MEAN: 60.6 CM/SEC
BH CV ECHO MEAS - LV V1 VTI: 17.8 CM
BH CV ECHO MEAS - LVIDD: 4.7 CM
BH CV ECHO MEAS - LVIDS: 3.5 CM
BH CV ECHO MEAS - LVOT AREA: 4.4 CM^2
BH CV ECHO MEAS - LVOT DIAM: 2.4 CM
BH CV ECHO MEAS - LVPWD: 1.3 CM
BH CV ECHO MEAS - MR MAX PG: 37 MMHG
BH CV ECHO MEAS - MR MAX VEL: 301.3 CM/SEC
BH CV ECHO MEAS - MV A MAX VEL: 47.2 CM/SEC
BH CV ECHO MEAS - MV DEC SLOPE: 302 CM/SEC^2
BH CV ECHO MEAS - MV DEC TIME: 0.26 SEC
BH CV ECHO MEAS - MV E MAX VEL: 78.5 CM/SEC
BH CV ECHO MEAS - MV E/A: 1.7
BH CV ECHO MEAS - MV MAX PG: 4.2 MMHG
BH CV ECHO MEAS - MV MEAN PG: 1.5 MMHG
BH CV ECHO MEAS - MV V2 MAX: 102 CM/SEC
BH CV ECHO MEAS - MV V2 MEAN: 54.9 CM/SEC
BH CV ECHO MEAS - MV V2 VTI: 29.7 CM
BH CV ECHO MEAS - MVA(VTI): 2.6 CM^2
BH CV ECHO MEAS - PA MAX PG (FULL): 2.6 MMHG
BH CV ECHO MEAS - PA MAX PG: 4.2 MMHG
BH CV ECHO MEAS - PA V2 MAX: 102.1 CM/SEC
BH CV ECHO MEAS - PVA(V,A): 3 CM^2
BH CV ECHO MEAS - PVA(V,D): 3 CM^2
BH CV ECHO MEAS - QP/QS: 0.89
BH CV ECHO MEAS - RV MAX PG: 1.6 MMHG
BH CV ECHO MEAS - RV MEAN PG: 0.96 MMHG
BH CV ECHO MEAS - RV V1 MAX: 64.1 CM/SEC
BH CV ECHO MEAS - RV V1 MEAN: 46.9 CM/SEC
BH CV ECHO MEAS - RV V1 VTI: 14.6 CM
BH CV ECHO MEAS - RVDD: 2.5 CM
BH CV ECHO MEAS - RVOT AREA: 4.8 CM^2
BH CV ECHO MEAS - RVOT DIAM: 2.5 CM
BH CV ECHO MEAS - SI(AO): 156.4 ML/M^2
BH CV ECHO MEAS - SI(CUBED): 28.2 ML/M^2
BH CV ECHO MEAS - SI(LVOT): 37.2 ML/M^2
BH CV ECHO MEAS - SI(MOD-SP4): 32.8 ML/M^2
BH CV ECHO MEAS - SI(TEICH): 23.9 ML/M^2
BH CV ECHO MEAS - SV(AO): 328.5 ML
BH CV ECHO MEAS - SV(CUBED): 59.2 ML
BH CV ECHO MEAS - SV(LVOT): 78.2 ML
BH CV ECHO MEAS - SV(MOD-SP4): 68.9 ML
BH CV ECHO MEAS - SV(RVOT): 69.9 ML
BH CV ECHO MEAS - SV(TEICH): 50.2 ML
BH CV ECHO MEAS - TR MAX VEL: 235.3 CM/SEC
BILIRUB SERPL-MCNC: 0.4 MG/DL (ref 0–1.2)
BLD GP AB SCN SERPL QL: NEGATIVE
BUN SERPL-MCNC: 10 MG/DL (ref 6–20)
BUN SERPL-MCNC: 11 MG/DL (ref 6–20)
BUN/CREAT SERPL: 14.7 (ref 7–25)
BUN/CREAT SERPL: 16.4 (ref 7–25)
C PNEUM DNA NPH QL NAA+NON-PROBE: NOT DETECTED
CALCIUM SPEC-SCNC: 8.4 MG/DL (ref 8.6–10.5)
CALCIUM SPEC-SCNC: 9.5 MG/DL (ref 8.6–10.5)
CHLORIDE SERPL-SCNC: 101 MMOL/L (ref 98–107)
CHLORIDE SERPL-SCNC: 103 MMOL/L (ref 98–107)
CHOLEST SERPL-MCNC: 167 MG/DL (ref 0–200)
CO2 SERPL-SCNC: 23 MMOL/L (ref 22–29)
CO2 SERPL-SCNC: 24 MMOL/L (ref 22–29)
CREAT SERPL-MCNC: 0.61 MG/DL (ref 0.76–1.27)
CREAT SERPL-MCNC: 0.75 MG/DL (ref 0.76–1.27)
DEPRECATED RDW RBC AUTO: 39.4 FL (ref 37–54)
DEPRECATED RDW RBC AUTO: 39.8 FL (ref 37–54)
EOSINOPHIL # BLD AUTO: 0.2 10*3/MM3 (ref 0–0.4)
EOSINOPHIL # BLD AUTO: 0.3 10*3/MM3 (ref 0–0.4)
EOSINOPHIL NFR BLD AUTO: 4 % (ref 0.3–6.2)
EOSINOPHIL NFR BLD AUTO: 4.4 % (ref 0.3–6.2)
ERYTHROCYTE [DISTWIDTH] IN BLOOD BY AUTOMATED COUNT: 13.2 % (ref 12.3–15.4)
ERYTHROCYTE [DISTWIDTH] IN BLOOD BY AUTOMATED COUNT: 13.3 % (ref 12.3–15.4)
FLUAV H1 2009 PAND RNA NPH QL NAA+PROBE: NOT DETECTED
FLUAV H1 HA GENE NPH QL NAA+PROBE: NOT DETECTED
FLUAV H3 RNA NPH QL NAA+PROBE: NOT DETECTED
FLUAV SUBTYP SPEC NAA+PROBE: NOT DETECTED
FLUBV RNA ISLT QL NAA+PROBE: NOT DETECTED
GFR SERPL CREATININE-BSD FRML MDRD: 108 ML/MIN/1.73
GFR SERPL CREATININE-BSD FRML MDRD: 137 ML/MIN/1.73
GLOBULIN UR ELPH-MCNC: 2.2 GM/DL
GLUCOSE BLDC GLUCOMTR-MCNC: 256 MG/DL (ref 70–105)
GLUCOSE BLDC GLUCOMTR-MCNC: 268 MG/DL (ref 70–105)
GLUCOSE BLDC GLUCOMTR-MCNC: 326 MG/DL (ref 70–105)
GLUCOSE BLDC GLUCOMTR-MCNC: 343 MG/DL (ref 70–105)
GLUCOSE BLDC GLUCOMTR-MCNC: 351 MG/DL (ref 70–105)
GLUCOSE SERPL-MCNC: 279 MG/DL (ref 65–99)
GLUCOSE SERPL-MCNC: 389 MG/DL (ref 65–99)
HADV DNA SPEC NAA+PROBE: NOT DETECTED
HCOV 229E RNA SPEC QL NAA+PROBE: NOT DETECTED
HCOV HKU1 RNA SPEC QL NAA+PROBE: NOT DETECTED
HCOV NL63 RNA SPEC QL NAA+PROBE: NOT DETECTED
HCOV OC43 RNA SPEC QL NAA+PROBE: NOT DETECTED
HCT VFR BLD AUTO: 37.2 % (ref 37.5–51)
HCT VFR BLD AUTO: 39.7 % (ref 37.5–51)
HDLC SERPL-MCNC: 26 MG/DL (ref 40–60)
HGB BLD-MCNC: 12.9 G/DL (ref 13–17.7)
HGB BLD-MCNC: 13.8 G/DL (ref 13–17.7)
HMPV RNA NPH QL NAA+NON-PROBE: NOT DETECTED
HOLD SPECIMEN: NORMAL
HOLD SPECIMEN: NORMAL
HPIV1 RNA SPEC QL NAA+PROBE: NOT DETECTED
HPIV2 RNA SPEC QL NAA+PROBE: NOT DETECTED
HPIV3 RNA NPH QL NAA+PROBE: NOT DETECTED
HPIV4 P GENE NPH QL NAA+PROBE: NOT DETECTED
INR PPP: 0.94 (ref 0.93–1.1)
LDLC SERPL CALC-MCNC: 73 MG/DL (ref 0–100)
LDLC/HDLC SERPL: 2.1 {RATIO}
LYMPHOCYTES # BLD AUTO: 2 10*3/MM3 (ref 0.7–3.1)
LYMPHOCYTES # BLD AUTO: 2 10*3/MM3 (ref 0.7–3.1)
LYMPHOCYTES NFR BLD AUTO: 33.4 % (ref 19.6–45.3)
LYMPHOCYTES NFR BLD AUTO: 35.1 % (ref 19.6–45.3)
M PNEUMO IGG SER IA-ACNC: NOT DETECTED
MAGNESIUM SERPL-MCNC: 1.7 MG/DL (ref 1.6–2.6)
MAGNESIUM SERPL-MCNC: 1.7 MG/DL (ref 1.6–2.6)
MCH RBC QN AUTO: 29.6 PG (ref 26.6–33)
MCH RBC QN AUTO: 29.7 PG (ref 26.6–33)
MCHC RBC AUTO-ENTMCNC: 34.6 G/DL (ref 31.5–35.7)
MCHC RBC AUTO-ENTMCNC: 34.8 G/DL (ref 31.5–35.7)
MCV RBC AUTO: 85.3 FL (ref 79–97)
MCV RBC AUTO: 85.6 FL (ref 79–97)
MONOCYTES # BLD AUTO: 0.3 10*3/MM3 (ref 0.1–0.9)
MONOCYTES # BLD AUTO: 0.4 10*3/MM3 (ref 0.1–0.9)
MONOCYTES NFR BLD AUTO: 5.9 % (ref 5–12)
MONOCYTES NFR BLD AUTO: 7.4 % (ref 5–12)
NEUTROPHILS NFR BLD AUTO: 3.1 10*3/MM3 (ref 1.7–7)
NEUTROPHILS NFR BLD AUTO: 3.3 10*3/MM3 (ref 1.7–7)
NEUTROPHILS NFR BLD AUTO: 53.5 % (ref 42.7–76)
NEUTROPHILS NFR BLD AUTO: 54.1 % (ref 42.7–76)
NRBC BLD AUTO-RTO: 0.1 /100 WBC (ref 0–0.2)
NRBC BLD AUTO-RTO: 0.1 /100 WBC (ref 0–0.2)
PHOSPHATE SERPL-MCNC: 3.5 MG/DL (ref 2.5–4.5)
PHOSPHATE SERPL-MCNC: 3.6 MG/DL (ref 2.5–4.5)
PLATELET # BLD AUTO: 207 10*3/MM3 (ref 140–450)
PLATELET # BLD AUTO: 216 10*3/MM3 (ref 140–450)
PMV BLD AUTO: 8.8 FL (ref 6–12)
PMV BLD AUTO: 8.9 FL (ref 6–12)
POTASSIUM SERPL-SCNC: 3.3 MMOL/L (ref 3.5–5.2)
POTASSIUM SERPL-SCNC: 3.7 MMOL/L (ref 3.5–5.2)
PROT SERPL-MCNC: 6.3 G/DL (ref 6–8.5)
PROTHROMBIN TIME: 10.4 SECONDS (ref 9.6–11.7)
RBC # BLD AUTO: 4.35 10*6/MM3 (ref 4.14–5.8)
RBC # BLD AUTO: 4.65 10*6/MM3 (ref 4.14–5.8)
RH BLD: POSITIVE
RHINOVIRUS RNA SPEC NAA+PROBE: NOT DETECTED
RSV RNA NPH QL NAA+NON-PROBE: NOT DETECTED
SARS-COV-2 RNA NPH QL NAA+NON-PROBE: NOT DETECTED
SODIUM SERPL-SCNC: 137 MMOL/L (ref 136–145)
SODIUM SERPL-SCNC: 138 MMOL/L (ref 136–145)
T&S EXPIRATION DATE: NORMAL
TRIGL SERPL-MCNC: 432 MG/DL (ref 0–150)
TROPONIN T SERPL-MCNC: 0.01 NG/ML (ref 0–0.03)
VLDLC SERPL-MCNC: 68 MG/DL (ref 5–40)
WBC # BLD AUTO: 5.8 10*3/MM3 (ref 3.4–10.8)
WBC # BLD AUTO: 6 10*3/MM3 (ref 3.4–10.8)
WHOLE BLOOD HOLD SPECIMEN: NORMAL
WHOLE BLOOD HOLD SPECIMEN: NORMAL

## 2020-11-04 PROCEDURE — 99285 EMERGENCY DEPT VISIT HI MDM: CPT

## 2020-11-04 PROCEDURE — 99252 IP/OBS CONSLTJ NEW/EST SF 35: CPT | Performed by: PSYCHIATRY & NEUROLOGY

## 2020-11-04 PROCEDURE — 0 IOPAMIDOL PER 1 ML: Performed by: EMERGENCY MEDICINE

## 2020-11-04 PROCEDURE — 0202U NFCT DS 22 TRGT SARS-COV-2: CPT | Performed by: NURSE PRACTITIONER

## 2020-11-04 PROCEDURE — 0042T HC CT CEREBRAL PERFUSION W/WO CONTRAST: CPT

## 2020-11-04 PROCEDURE — 86901 BLOOD TYPING SEROLOGIC RH(D): CPT | Performed by: EMERGENCY MEDICINE

## 2020-11-04 PROCEDURE — 92610 EVALUATE SWALLOWING FUNCTION: CPT

## 2020-11-04 PROCEDURE — 80061 LIPID PANEL: CPT | Performed by: NURSE PRACTITIONER

## 2020-11-04 PROCEDURE — 63710000001 INSULIN LISPRO (HUMAN) PER 5 UNITS: Performed by: NURSE PRACTITIONER

## 2020-11-04 PROCEDURE — 70496 CT ANGIOGRAPHY HEAD: CPT

## 2020-11-04 PROCEDURE — 86900 BLOOD TYPING SEROLOGIC ABO: CPT | Performed by: EMERGENCY MEDICINE

## 2020-11-04 PROCEDURE — 85025 COMPLETE CBC W/AUTO DIFF WBC: CPT | Performed by: EMERGENCY MEDICINE

## 2020-11-04 PROCEDURE — 3E03317 INTRODUCTION OF OTHER THROMBOLYTIC INTO PERIPHERAL VEIN, PERCUTANEOUS APPROACH: ICD-10-PCS | Performed by: EMERGENCY MEDICINE

## 2020-11-04 PROCEDURE — 93306 TTE W/DOPPLER COMPLETE: CPT | Performed by: INTERNAL MEDICINE

## 2020-11-04 PROCEDURE — 83735 ASSAY OF MAGNESIUM: CPT | Performed by: NURSE PRACTITIONER

## 2020-11-04 PROCEDURE — 70450 CT HEAD/BRAIN W/O DYE: CPT

## 2020-11-04 PROCEDURE — 84484 ASSAY OF TROPONIN QUANT: CPT | Performed by: EMERGENCY MEDICINE

## 2020-11-04 PROCEDURE — 85610 PROTHROMBIN TIME: CPT | Performed by: EMERGENCY MEDICINE

## 2020-11-04 PROCEDURE — 93306 TTE W/DOPPLER COMPLETE: CPT

## 2020-11-04 PROCEDURE — 92523 SPEECH SOUND LANG COMPREHEN: CPT

## 2020-11-04 PROCEDURE — 80048 BASIC METABOLIC PNL TOTAL CA: CPT | Performed by: NURSE PRACTITIONER

## 2020-11-04 PROCEDURE — 82962 GLUCOSE BLOOD TEST: CPT

## 2020-11-04 PROCEDURE — 70551 MRI BRAIN STEM W/O DYE: CPT

## 2020-11-04 PROCEDURE — 85730 THROMBOPLASTIN TIME PARTIAL: CPT | Performed by: EMERGENCY MEDICINE

## 2020-11-04 PROCEDURE — 25010000002 ALTEPLASE PER 1 MG: Performed by: EMERGENCY MEDICINE

## 2020-11-04 PROCEDURE — 71045 X-RAY EXAM CHEST 1 VIEW: CPT

## 2020-11-04 PROCEDURE — 80053 COMPREHEN METABOLIC PANEL: CPT | Performed by: EMERGENCY MEDICINE

## 2020-11-04 PROCEDURE — 84100 ASSAY OF PHOSPHORUS: CPT | Performed by: NURSE PRACTITIONER

## 2020-11-04 PROCEDURE — 85025 COMPLETE CBC W/AUTO DIFF WBC: CPT | Performed by: NURSE PRACTITIONER

## 2020-11-04 PROCEDURE — 93005 ELECTROCARDIOGRAM TRACING: CPT | Performed by: EMERGENCY MEDICINE

## 2020-11-04 PROCEDURE — 83036 HEMOGLOBIN GLYCOSYLATED A1C: CPT | Performed by: NURSE PRACTITIONER

## 2020-11-04 PROCEDURE — 70498 CT ANGIOGRAPHY NECK: CPT

## 2020-11-04 PROCEDURE — 86850 RBC ANTIBODY SCREEN: CPT | Performed by: EMERGENCY MEDICINE

## 2020-11-04 PROCEDURE — 63710000001 INSULIN LISPRO (HUMAN) PER 5 UNITS: Performed by: INTERNAL MEDICINE

## 2020-11-04 RX ORDER — ONDANSETRON 2 MG/ML
4 INJECTION INTRAMUSCULAR; INTRAVENOUS EVERY 6 HOURS PRN
Status: DISCONTINUED | OUTPATIENT
Start: 2020-11-04 | End: 2020-11-06 | Stop reason: HOSPADM

## 2020-11-04 RX ORDER — SODIUM CHLORIDE 9 MG/ML
75 INJECTION, SOLUTION INTRAVENOUS CONTINUOUS
Status: DISCONTINUED | OUTPATIENT
Start: 2020-11-04 | End: 2020-11-04

## 2020-11-04 RX ORDER — PANTOPRAZOLE SODIUM 40 MG/10ML
40 INJECTION, POWDER, LYOPHILIZED, FOR SOLUTION INTRAVENOUS
Status: DISCONTINUED | OUTPATIENT
Start: 2020-11-04 | End: 2020-11-05

## 2020-11-04 RX ORDER — ATORVASTATIN CALCIUM 40 MG/1
80 TABLET, FILM COATED ORAL NIGHTLY
Status: DISCONTINUED | OUTPATIENT
Start: 2020-11-04 | End: 2020-11-06 | Stop reason: HOSPADM

## 2020-11-04 RX ORDER — INSULIN LISPRO 100 [IU]/ML
0-14 INJECTION, SOLUTION INTRAVENOUS; SUBCUTANEOUS AS NEEDED
Status: DISCONTINUED | OUTPATIENT
Start: 2020-11-04 | End: 2020-11-05

## 2020-11-04 RX ORDER — SODIUM CHLORIDE 0.9 % (FLUSH) 0.9 %
10 SYRINGE (ML) INJECTION AS NEEDED
Status: DISCONTINUED | OUTPATIENT
Start: 2020-11-04 | End: 2020-11-04 | Stop reason: SDUPTHER

## 2020-11-04 RX ORDER — INSULIN LISPRO 100 [IU]/ML
0-14 INJECTION, SOLUTION INTRAVENOUS; SUBCUTANEOUS EVERY 6 HOURS SCHEDULED
Status: DISCONTINUED | OUTPATIENT
Start: 2020-11-04 | End: 2020-11-05

## 2020-11-04 RX ORDER — ASPIRIN 325 MG
325 TABLET ORAL DAILY
Status: DISCONTINUED | OUTPATIENT
Start: 2020-11-05 | End: 2020-11-06 | Stop reason: HOSPADM

## 2020-11-04 RX ORDER — ASPIRIN 300 MG/1
300 SUPPOSITORY RECTAL DAILY
Status: DISCONTINUED | OUTPATIENT
Start: 2020-11-05 | End: 2020-11-06 | Stop reason: HOSPADM

## 2020-11-04 RX ORDER — NICOTINE POLACRILEX 4 MG
15 LOZENGE BUCCAL
Status: DISCONTINUED | OUTPATIENT
Start: 2020-11-04 | End: 2020-11-06 | Stop reason: HOSPADM

## 2020-11-04 RX ORDER — INSULIN LISPRO 100 [IU]/ML
0-7 INJECTION, SOLUTION INTRAVENOUS; SUBCUTANEOUS EVERY 6 HOURS SCHEDULED
Status: DISCONTINUED | OUTPATIENT
Start: 2020-11-04 | End: 2020-11-04

## 2020-11-04 RX ORDER — SODIUM CHLORIDE 0.9 % (FLUSH) 0.9 %
10 SYRINGE (ML) INJECTION EVERY 12 HOURS SCHEDULED
Status: DISCONTINUED | OUTPATIENT
Start: 2020-11-04 | End: 2020-11-06 | Stop reason: HOSPADM

## 2020-11-04 RX ORDER — SODIUM CHLORIDE 0.9 % (FLUSH) 0.9 %
10 SYRINGE (ML) INJECTION AS NEEDED
Status: DISCONTINUED | OUTPATIENT
Start: 2020-11-04 | End: 2020-11-06 | Stop reason: HOSPADM

## 2020-11-04 RX ORDER — SODIUM CHLORIDE 9 MG/ML
100 INJECTION, SOLUTION INTRAVENOUS ONCE
Status: COMPLETED | OUTPATIENT
Start: 2020-11-04 | End: 2020-11-04

## 2020-11-04 RX ORDER — SODIUM CHLORIDE 0.9 % (FLUSH) 0.9 %
10 SYRINGE (ML) INJECTION EVERY 12 HOURS SCHEDULED
Status: DISCONTINUED | OUTPATIENT
Start: 2020-11-04 | End: 2020-11-04 | Stop reason: SDUPTHER

## 2020-11-04 RX ORDER — ONDANSETRON 4 MG/1
4 TABLET, FILM COATED ORAL EVERY 6 HOURS PRN
Status: DISCONTINUED | OUTPATIENT
Start: 2020-11-04 | End: 2020-11-06 | Stop reason: HOSPADM

## 2020-11-04 RX ORDER — DEXTROSE MONOHYDRATE 25 G/50ML
25 INJECTION, SOLUTION INTRAVENOUS
Status: DISCONTINUED | OUTPATIENT
Start: 2020-11-04 | End: 2020-11-06 | Stop reason: HOSPADM

## 2020-11-04 RX ORDER — ACETAMINOPHEN 650 MG/1
650 SUPPOSITORY RECTAL EVERY 4 HOURS PRN
Status: DISCONTINUED | OUTPATIENT
Start: 2020-11-04 | End: 2020-11-06 | Stop reason: HOSPADM

## 2020-11-04 RX ORDER — INSULIN LISPRO 100 [IU]/ML
0-7 INJECTION, SOLUTION INTRAVENOUS; SUBCUTANEOUS AS NEEDED
Status: DISCONTINUED | OUTPATIENT
Start: 2020-11-04 | End: 2020-11-04

## 2020-11-04 RX ORDER — ACETAMINOPHEN 325 MG/1
650 TABLET ORAL EVERY 4 HOURS PRN
Status: DISCONTINUED | OUTPATIENT
Start: 2020-11-04 | End: 2020-11-06 | Stop reason: HOSPADM

## 2020-11-04 RX ADMIN — Medication 10 ML: at 21:02

## 2020-11-04 RX ADMIN — IOPAMIDOL 100 ML: 755 INJECTION, SOLUTION INTRAVENOUS at 01:40

## 2020-11-04 RX ADMIN — SODIUM CHLORIDE 100 ML/HR: 0.9 INJECTION, SOLUTION INTRAVENOUS at 02:53

## 2020-11-04 RX ADMIN — IOPAMIDOL 50 ML: 755 INJECTION, SOLUTION INTRAVENOUS at 01:23

## 2020-11-04 RX ADMIN — SODIUM CHLORIDE 10 MG/HR: 9 INJECTION, SOLUTION INTRAVENOUS at 19:41

## 2020-11-04 RX ADMIN — SODIUM CHLORIDE 5 MG/HR: 9 INJECTION, SOLUTION INTRAVENOUS at 15:10

## 2020-11-04 RX ADMIN — ATORVASTATIN CALCIUM 80 MG: 40 TABLET, FILM COATED ORAL at 21:02

## 2020-11-04 RX ADMIN — INSULIN LISPRO 10 UNITS: 100 INJECTION, SOLUTION INTRAVENOUS; SUBCUTANEOUS at 17:24

## 2020-11-04 RX ADMIN — Medication 10 ML: at 09:32

## 2020-11-04 RX ADMIN — INSULIN LISPRO 3 UNITS: 100 INJECTION, SOLUTION INTRAVENOUS; SUBCUTANEOUS at 13:00

## 2020-11-04 RX ADMIN — INSULIN LISPRO 4 UNITS: 100 INJECTION, SOLUTION INTRAVENOUS; SUBCUTANEOUS at 05:31

## 2020-11-04 RX ADMIN — ALTEPLASE 7.84 MG: KIT at 01:52

## 2020-11-04 RX ADMIN — PANTOPRAZOLE SODIUM 40 MG: 40 INJECTION, POWDER, FOR SOLUTION INTRAVENOUS at 05:34

## 2020-11-04 RX ADMIN — SODIUM CHLORIDE 75 ML/HR: 9 INJECTION, SOLUTION INTRAVENOUS at 05:19

## 2020-11-04 RX ADMIN — ALTEPLASE 70.55 MG: KIT at 01:53

## 2020-11-04 NOTE — PROGRESS NOTES
Case Management/Social Work    Patient Name:  Preet Jones  YOB: 1965  MRN: [id number]  Admit Date:  11/4/2020        Patient is a boarder in ER.  Attempted to contact patient for discharge planning and patient is currently off the unit for testing.  Will need follow up case managing.     Jaz Carlson RN    /Utilization Review  [place] [phone] office  [phone] fax  [email or web address]  [email or web address]    Hospital NPI:   Hospital Tax ID: [id number]       Electronically signed by:  Jaz Carlson RN  11/04/20 07:04 EST

## 2020-11-04 NOTE — ED NOTES
Spoke with patient's wife on the phone and updated her on patient's status.      Crystal Puga RN  11/04/20 0818

## 2020-11-04 NOTE — PLAN OF CARE
Orders received due to patient failing stroke swallow screen.  Upon evaluation, patient's facial droops appears to have since resolved.  Clinical swallow evaluation completed with trials of water by straw, applesauce, peaches and crackers.  Patient independent in feeding himself.  He takes single bites and sips with timely oral transit and no overt s/s of aspiration noted.  Recommend a regular and thin liquid diet.  ST will follow up at a meal to ensure tolerance.    Speech, language and cognitive evaluation completed later in the day.  All skills are WFL and no further skilled speech therapy is indicated at this time.  Patient has occasional baseline slurred speech and knows that if his speech becomes slurred to slow down when talking.

## 2020-11-04 NOTE — H&P
PULMONARY/ CRITICAL CARE ADMISSION H&P NOTE        Patient Name:  Preet Jones    :  1965    Medical Record:  [id number]    PRIMARY CARE PHYSICIAN     Provider, No Known    [place]  Preet Jones is a 55 y.o. 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  Cerebral imaging was negative for any large vessel occlusions, however, CT cerebral perfusion did showed a question of a small mismatch in the left corona radiata.   Initial NIH was 4 per ER report and the patient was given tPA and has had improvement in his symptoms with NIH improving to 2.     Patient is currently taking ASA, Lisinopril and VD3.   He states that he has not been taking Lipitor, Plavix or Metformin.      REVIEW OF SYSTEMS    Constitutional:  Denies fever or chills   Eyes:  Denies change in visual acuity   HENT:  Denies nasal congestion or sore throat   Respiratory:  Denies cough or shortness of breath   Cardiovascular:  Denies chest pain or edema   GI:  Denies abdominal pain, nausea, vomiting, bloody stools or diarrhea   :  Denies dysuria   Musculoskeletal:  Denies back pain or joint pain   Integument:  Denies rash   Neurologic:  Denies headache, focal weakness or sensory changes   Endocrine:  Denies polyuria or polydipsia   Psychiatric:  Denies depression or anxiety     HOME MEDICATIONS  Prior to Admission medications    Medication Sig Start Date End Date Taking? Authorizing Provider   aspirin 81 MG tablet Take 2 tablets by mouth Daily. 10/31/19  Yes Arturo Vergara Jr., MD   Cholecalciferol (VITAMIN D-3) 25 MCG (1000 UT) capsule Take 3 capsules by mouth Daily. 17  Yes ProviderRamsey MD   lisinopril (PRINIVIL,ZESTRIL)  20 MG tablet Take 20 mg by mouth Daily. 1/5/17  Yes Ramsey Nye MD   NON FORMULARY Pt states 1 more, unsure of name   Yes Ramsey Nye MD   atorvastatin (LIPITOR) 80 MG tablet Take 1 tablet by mouth Daily. 11/1/19 11/4/20  Arturo Vergraa Jr., MD   clopidogrel (PLAVIX) 75 MG tablet Take 75 mg by mouth Daily. 2/16/17 11/4/20  Ramsey Nye MD   insulin glargine (LANTUS) 100 UNIT/ML injection Inject 20 Units under the skin into the appropriate area as directed At Night As Needed.  11/4/20  Ramsey Nye MD   insulin lispro (humaLOG) 100 UNIT/ML injection Inject 0-9 Units under the skin into the appropriate area as directed 4 (Four) Times a Day With Meals & at Bedtime. 10/31/19 11/4/20  Arturo Vergara Jr., MD   metFORMIN (GLUCOPHAGE) 1000 MG tablet Take 1,000 mg by mouth 2 (Two) Times a Day. 1/5/17 11/4/20  Ramsey Nye MD       MEDICAL HISTORY    Past Medical History:   Diagnosis Date   • Abnormal cardiovascular stress test 1/19/2017   • Acute myocardial infarction (CMS/HCC) 2019   • Aortic aneurysm (CMS/HCC) 6/22/2017   • Bicuspid aortic valve 2/16/2017   • Coronary artery disease 10/29/2019   • History of coronary angioplasty with insertion of stent    • Hyperlipidemia    • Hypertension    • Obesity 9/2/2011   • Primary hyperparathyroidism (CMS/HCC) 9/2/2011   • RUE weakness 10/29/2019    Previous residual from Stroke, but was not work prohibiting.   • Sinus arrhythmia    • Stroke (CMS/HCC)    • Type 2 diabetes mellitus (CMS/HCC) 10/29/2019        SURGICAL HISTORY    Past Surgical History:   Procedure Laterality Date   • APPENDECTOMY     • BACK SURGERY     • CARDIAC CATHETERIZATION  2010   • CARDIAC CATHETERIZATION  2019   • JOINT REPLACEMENT     • KNEE ARTHROSCOPY          FAMILY HISTORY    Family History   Problem Relation Age of Onset   • No Known Problems Mother    • No Known Problems Father         SOCIAL HISTORY    Social History     Socioeconomic History   •  Marital status:      Spouse name: Not on file   • Number of children: Not on file   • Years of education: Not on file   • Highest education level: Not on file   Tobacco Use   • Smoking status: Never Smoker   • Smokeless tobacco: Never Used   Substance and Sexual Activity   • Alcohol use: No     Frequency: Never   • Drug use: No   • Sexual activity: Yes     Partners: Female        ALLERGIES    Allergies   Allergen Reactions   • Amoxicillin Rash   • Morphine GI Intolerance       PHYSICAL EXAM    tMax 24 hrs:  Temp (24hrs), Av.8 °F (37.1 °C), Min:98.8 °F (37.1 °C), Max:98.8 °F (37.1 °C)    Vitals Ranges:  Temp:  [98.8 °F (37.1 °C)] 98.8 °F (37.1 °C)  Heart Rate:  [63-79] 70  Resp:  [12-19] 16  BP: (149-163)/(88-97) 150/88  Intake and Output Last 3 Shifts:  No intake/output data recorded.    Constitutional:   Alert, no acute respiratory distress   HEENT:   Atraumatic, PERRL, conjunctiva normal, moist oral mucosa, no nasal discharge.  Trachea is midline.  Respiratory:   No respiratory distress, normal breath sounds, no rales, no wheezing   Cardiovascular:   Normal rate, normal rhythm and no murmurs.  Pulses 2+ and equal in all four extremities.    GI:   Soft, nondistended, positive bowel sounds.  :   No costovertebral angle tenderness   Extremities:   No edema, cyanosis or tenderness.  Integument:   No rashes.   Neurologic:   Alert & oriented x 3.    NIH is currently 3.    1A: Level of consciousness --> 0 = Alert; keenly responsive  1B: Ask month and age --> 0 = Both questions right  1C: 'Blink eyes' & 'squeeze hands' --> 0 = Performs both tasks  2: Horizontal extraocular movements --> 0 = Normal  3: Visual fields --> 0 = No visual loss  4: Facial palsy --> 1 = Minor paralysis (flat nasolabial fold, smile asymmetry)  5A: Left arm motor drift --> 0 = No drift for 10 seconds  5B: Right arm motor drift --> 0 = No drift for 10 seconds  6A: Left leg motor drift --> 0 = No drift for 5 seconds  6B: Right leg motor  drift --> 0 = No drift for 5 seconds  7: Limb Ataxia --> 0 = No ataxia  8: Sensation --> 1 = Mild-moderate loss: less sharp/more dull   9: Language/aphasia --> 0 = Normal; no aphasia  10: Dysarthria --> 1 = Mild-moderate dysarthria: slurring but can be understood  11: Extinction/inattention --> 0 = No abnormality    LABS    Lab Results (last 24 hours)     Procedure Component Value Units Date/Time    Magnesium [[id number]] Collected: 11/04/20 0117    Specimen: Blood Updated: 11/04/20 0408    Phosphorus [[id number]] Collected: 11/04/20 0117    Specimen: Blood Updated: 11/04/20 0408    Disney Draw [[id number]] Collected: 11/04/20 0117    Specimen: Blood Updated: 11/04/20 0230    Narrative:      The following orders were created for panel order [facility] Draw.  Procedure                               Abnormality         Status                     ---------                               -----------         ------                     Light Blue Top[[id number]]                                   Final result               Green Top (Gel)[[id number]]                                  Final result               Lavender Top[[id number]]                                     Final result               Gold Top - SST[[id number]]                                   Final result                 Please view results for these tests on the individual orders.    Light Blue Top [[id number]] Collected: 11/04/20 0117    Specimen: Blood Updated: 11/04/20 0230     Extra Tube hold for add-on     Comment: Auto resulted       Green Top (Gel) [[id number]] Collected: 11/04/20 0117    Specimen: Blood Updated: 11/04/20 0230     Extra Tube Hold for add-ons.     Comment: Auto resulted.       Lavender Top [[id number]] Collected: 11/04/20 0117    Specimen: Blood Updated: 11/04/20 0230     Extra Tube hold for add-on     Comment: Auto resulted       Gold Top - SST [[id number]] Collected: 11/04/20 0117    Specimen: Blood Updated: 11/04/20 0230     Extra Tube Hold for  add-ons.     Comment: Auto resulted.       Comprehensive Metabolic Panel [[id number]]  (Abnormal) Collected: 11/04/20 0117    Specimen: Blood Updated: 11/04/20 0144     Glucose 389 mg/dL      BUN 11 mg/dL      Creatinine 0.75 mg/dL      Sodium 138 mmol/L      Potassium 3.7 mmol/L      Chloride 101 mmol/L      CO2 24.0 mmol/L      Calcium 9.5 mg/dL      Total Protein 6.3 g/dL      Albumin 4.10 g/dL      ALT (SGPT) 20 U/L      AST (SGOT) 13 U/L      Alkaline Phosphatase 93 U/L      Total Bilirubin 0.4 mg/dL      eGFR Non African Amer 108 mL/min/1.73      Globulin 2.2 gm/dL      A/G Ratio 1.9 g/dL      BUN/Creatinine Ratio 14.7     Anion Gap 13.0 mmol/L     Narrative:      GFR Normal >60  Chronic Kidney Disease <60  Kidney Failure <15      Troponin [[id number]]  (Normal) Collected: 11/04/20 0117    Specimen: Blood Updated: 11/04/20 0144     Troponin T 0.014 ng/mL     Narrative:      Troponin T Reference Range:  <= 0.03 ng/mL-   Negative for AMI  >0.03 ng/mL-     Abnormal for myocardial necrosis.  Clinicians would have to utilize clinical acumen, EKG, Troponin and serial changes to determine if it is an Acute Myocardial Infarction or myocardial injury due to an underlying chronic condition.       Results may be falsely decreased if patient taking Biotin.      Protime-INR [[id number]]  (Normal) Collected: 11/04/20 0117    Specimen: Blood Updated: 11/04/20 0140     Protime 10.4 Seconds      INR 0.94    aPTT [[id number]]  (Normal) Collected: 11/04/20 0117    Specimen: Blood Updated: 11/04/20 0140     PTT 25.3 seconds     CBC & Differential [[id number]]  (Normal) Collected: 11/04/20 0117    Specimen: Blood Updated: 11/04/20 0125    Narrative:      The following orders were created for panel order CBC & Differential.  Procedure                               Abnormality         Status                     ---------                               -----------         ------                     CBC Auto Differential[[id number]]         Normal              Final result                 Please view results for these tests on the individual orders.    CBC Auto Differential [[id number]]  (Normal) Collected: 11/04/20 0117    Specimen: Blood Updated: 11/04/20 0125     WBC 6.00 10*3/mm3      RBC 4.65 10*6/mm3      Hemoglobin 13.8 g/dL      Hematocrit 39.7 %      MCV 85.3 fL      MCH 29.7 pg      MCHC 34.8 g/dL      RDW 13.3 %      RDW-SD 39.8 fl      MPV 8.9 fL      Platelets 216 10*3/mm3      Neutrophil % 54.1 %      Lymphocyte % 33.4 %      Monocyte % 7.4 %      Eosinophil % 4.0 %      Basophil % 1.1 %      Neutrophils, Absolute 3.30 10*3/mm3      Lymphocytes, Absolute 2.00 10*3/mm3      Monocytes, Absolute 0.40 10*3/mm3      Eosinophils, Absolute 0.20 10*3/mm3      Basophils, Absolute 0.10 10*3/mm3      nRBC 0.1 /100 WBC     POC Glucose Once [[id number]]  (Abnormal) Collected: 11/04/20 0110    Specimen: Blood Updated: 11/04/20 0111     Glucose 351 mg/dL      Comment: Serial Number: [id number]:  [id number]             MICRO:  Microbiology Results (last 10 days)     ** No results found for the last 240 hours. **          IMAGING & OTHER STUDIES    Imaging Results (Last 72 Hours)     Procedure Component Value Units Date/Time    CT Angiogram Head [[id number]] Collected: 11/04/20 0022     Updated: 11/04/20 0230    Narrative:      EXAMINATION: CT ANGIOGRAM HEAD, CT ANGIOGRAM NECK    DATE: 11/4/2020 1:21 AM     HISTORY:  Patient Data: Male, 55 years of age.  Clinical Indication : Stroke alert. Right-sided weakness and slurred speech.      COMPARISON: None available.     TECHNIQUE: CT angiography through the head and neck was performed in the axial plane using 100 mL of Isovue-370 administered intravenously. Coronal and sagittal MIP and MPR images were then created. There were no immediate complications. CT dose lowering   techniques including automated exposure control, adjustment for patient size, and/or use of iterative reconstruction were used.    NOTE:  All arterial stenosis estimates in this report are derived using NASCET criteria.       FINDINGS:         CTA OF THE NECK:    Aorta:    The visualized aortic arch and great vessel origins are patent.    Right Carotid:    The right common carotid artery is patent from its origin to its bifurcation, without hemodynamically significant stenosis or dissection. The right carotid bifurcation is patent, as is the origin of the internal carotid artery. The right internal carotid   artery is then patent through the remainder of its cervical course, with no critical stenosis or dissection.    Left Carotid:    The left common carotid artery is patent from its origin to its bifurcation, without hemodynamically significant stenosis or dissection. The left carotid bifurcation is patent, as is the origin of the internal carotid artery. The left internal carotid   artery is then patent through the remainder of its cervical course, with no critical stenosis or dissection.    Vertebral arteries:    The vertebral arteries are codominant. The vertebral arteries are without occlusion, significant stenosis, or evidence of dissection.        CTA OF THE HEAD:    Anterior circulation:    Each internal carotid artery is widely patent through the skull base and cavernous sinus to its terminus. Each middle and anterior cerebral artery is patent, with no critical arterial stenoses identified. No anterior circulation aneurysm or vascular   malformation is seen.     Posterior circulation:    Each vertebral artery is patent where visualized, from the caudal extent of the exam to the vertebrobasilar junction. The basilar artery is normal. No critical stenoses, aneurysms or vascular malformations are seen in the posterior circulation.    Venous structures:    The major dural sinuses and deep draining veins of the brain are grossly patent on this non-dedicated exam.         Impression:        CTA HEAD:  1.  No evidence of large vessel occlusion or  critical stenosis.    CTA NECK:  1.  No significant extracranial vascular stenosis, occlusion or dissection.              Daniele Powers MD  Neuroradiologist    Thank you for this referral.  This exam was interpreted by a fellowship trained neuroradiologist.       SLOT:  68      Electronically signed by:  Daniele Powers    11/4/2020 12:29 AM    CT Angiogram Neck [[id number]] Collected: 11/04/20 0022     Updated: 11/04/20 0230    Narrative:      EXAMINATION: CT ANGIOGRAM HEAD, CT ANGIOGRAM NECK    DATE: 11/4/2020 1:21 AM     HISTORY:  Patient Data: Male, 55 years of age.  Clinical Indication : Stroke alert. Right-sided weakness and slurred speech.      COMPARISON: None available.     TECHNIQUE: CT angiography through the head and neck was performed in the axial plane using 100 mL of Isovue-370 administered intravenously. Coronal and sagittal MIP and MPR images were then created. There were no immediate complications. CT dose lowering   techniques including automated exposure control, adjustment for patient size, and/or use of iterative reconstruction were used.    NOTE: All arterial stenosis estimates in this report are derived using NASCET criteria.       FINDINGS:         CTA OF THE NECK:    Aorta:    The visualized aortic arch and great vessel origins are patent.    Right Carotid:    The right common carotid artery is patent from its origin to its bifurcation, without hemodynamically significant stenosis or dissection. The right carotid bifurcation is patent, as is the origin of the internal carotid artery. The right internal carotid   artery is then patent through the remainder of its cervical course, with no critical stenosis or dissection.    Left Carotid:    The left common carotid artery is patent from its origin to its bifurcation, without hemodynamically significant stenosis or dissection. The left carotid bifurcation is patent, as is the origin of the internal carotid artery. The left internal carotid    artery is then patent through the remainder of its cervical course, with no critical stenosis or dissection.    Vertebral arteries:    The vertebral arteries are codominant. The vertebral arteries are without occlusion, significant stenosis, or evidence of dissection.        CTA OF THE HEAD:    Anterior circulation:    Each internal carotid artery is widely patent through the skull base and cavernous sinus to its terminus. Each middle and anterior cerebral artery is patent, with no critical arterial stenoses identified. No anterior circulation aneurysm or vascular   malformation is seen.     Posterior circulation:    Each vertebral artery is patent where visualized, from the caudal extent of the exam to the vertebrobasilar junction. The basilar artery is normal. No critical stenoses, aneurysms or vascular malformations are seen in the posterior circulation.    Venous structures:    The major dural sinuses and deep draining veins of the brain are grossly patent on this non-dedicated exam.         Impression:        CTA HEAD:  1.  No evidence of large vessel occlusion or critical stenosis.    CTA NECK:  1.  No significant extracranial vascular stenosis, occlusion or dissection.              Daniele Powers MD  Neuroradiologist    Thank you for this referral.  This exam was interpreted by a fellowship trained neuroradiologist.       SLOT:  68      Electronically signed by:  Daniele Powers    11/4/2020 12:29 AM    CT Cerebral Perfusion With & Without Contrast [[id number]] Collected: 11/03/20 2354     Updated: 11/04/20 0202    Narrative:      EXAMINATION: CT CEREBRAL PERFUSION W WO CONTRAST    DATE: 11/4/2020 1:21 AM     HISTORY:  Patient Data: Male, 55 years of age.  Clinical Indication : Right-sided weakness and slurred speech. Stroke alert.      COMPARISON: None available.     TECHNIQUE: CT perfusion through the head was performed in the axial plane using 50 mL of Isovue-370 administered intravenously, according to  institutional protocol, with postprocessing. CT dose lowering techniques including automated exposure control,   adjustment for patient size, and/or use of iterative reconstruction were used.        FINDINGS:    Mildly limited due to global hypoperfusion versus technical factors related to timing of the contrast bolus.  There is a questionable small area of relatively increased Tmax and TTP without corresponding abnormalities on CBF or CBV in the left parietal   periventricular white matter/corona radiata, which could represent an artifact or small area of viable ischemic penumbra.  This is estimated at 4 cc in size.           Impression:        Questionable small mismatched perfusion defect in the left corona radiata. MRI can be considered to exclude a small vessel lacunar infarct, if clinically warranted.                Daniele Powers MD  Neuroradiologist    Thank you for this referral.  This exam was interpreted by a fellowship trained neuroradiologist.       SLOT:  68      Electronically signed by:  Daniele Powers    11/3/2020 11:59 PM    XR Chest 1 View [[id number]] Resulted: 11/04/20 0158     Updated: 11/04/20 0201    CT Head Without Contrast Stroke Protocol [[id number]] Collected: 11/03/20 2324     Updated: 11/04/20 0131    Narrative:      EXAMINATION: CT HEAD WO CONTRAST STROKE PROTOCOL    DATE: 11/4/2020 1:15 AM     HISTORY:  Patient Data: Male, 55 years of age.  Clinical Indication : Right-sided weakness and slurred speech. Stroke alert.      COMPARISON: 10/29/2019     TECHNIQUE: Thin section noncontrast axial images were obtained through the head. Coronal reformatted images were then created. CT dose lowering techniques including automated exposure control, adjustment for patient size, and/or use of iterative   reconstruction were used.     FINDINGS:    Ventricles and Extra-axial Spaces: Mildly enlarged due to volume loss.     Parenchyma: Old lacunar infarct in the left corona radiata, unchanged. Patchy  periventricular white matter low-attenuation, most likely from small vessel ischemic disease.   No CT evidence of large-vessel territorial infarct.   No mass effect or midline   shift.     Intracranial Hemorrhage: None.     Bones/Extracranial soft tissues: No depressed calvarial fracture.     Visualized Sinuses/Mastoids: Clear.          Impression:         1.  No acute intracranial abnormality.  2.  Volume loss and mild microvascular ischemic changes.  3.  Old lacunar infarct in the left corona radiata, unchanged.             THESE RESULTS WERE DISCUSSED WITH DR. LANDON ON 11/3/2020 AT 11:29 PM, MDT.       Daniele Powers MD  Neuroradiologist    Thank you for this referral.  This exam was interpreted by a fellowship trained neuroradiologist.       SLOT:  68      Electronically signed by:  Daniele Powers    11/3/2020 11:30 PM          ECHOCARDIOGRAM:  Results for orders placed during the hospital encounter of 10/29/19   Adult Transthoracic Echo Complete W/ Cont if Necessary Per Protocol    Narrative · Estimated EF = 65%.  · Left ventricular systolic function is normal.  · Left atrial cavity size is moderately dilated.  · Mild mitral valve regurgitation is present             ASSESSMENT/PLAN  Acute ischemic stroke  -Facial droop, right sided arm and leg weakness and dysarthria  -Status post tPA  -Neurology consulted  -Repeat CT scan 24 hours post tPA  -MRI ordered  -Hemoglobin A1C and lipid panel pending  -Ischemic CVA with tPA order set protocol initiated    H/o CVA  -Residual deficits included limb and dysarthria  -Pt not taking his statin    T2DM  -No longer taking Metformin or insulin - unclear reasons  -Hemoglobin A1C pending  -SSI    CAD  -Previous stent  -On ASA  -Previously on Brilinta switched to Plavix but patient reports he was taken off of Plavix due to side effects    HTN  -Takes Lisinopril  -Blood pressure parameters per order set for CVA with tPA administration    HLD  -Not on statin - unclear reasons  why patient not taking   -Lipid panel pending    PUD: Protonix  Insulin:   Sliding scale  VTE:   SCDs  Nutrition:  NPO              EDWINA Christian    Attending physician statement:  Patient remains critically ill.  Total critical care time spent is 32 minutes which does not include any time for procedures.  Critical care time is exclusive of time spent by the nurse practitioner.  Above note scribed by nurse practitioner for me and later reviewed by me for accuracy . I've examined the patient and reviewed all labs and images.  Continue to monitor closely.  Status post TPA.  Neurology work-up in progress.  We will start Cardene infusion to keep systolic blood pressure less than 130.  I have directly participated in the evaluation and management of this patient.  Robert Alicea MD

## 2020-11-04 NOTE — THERAPY EVALUATION
Acute Care - Speech Language Pathology Initial Evaluation   [facility]     Patient Name: Preet Jones  : 1965  MRN: [id number]  Today's Date: 2020               Admit Date: 2020     Visit Dx:    ICD-10-CM ICD-9-CM   1. Acute ischemic stroke (CMS/HCC)  I63.9 434.91     Patient Active Problem List   Diagnosis   • Arterial ischemic stroke, MCA (middle cerebral artery), left, acute (CMS/[facility])   • Aortic aneurysm (CMS/[facility])   • Bicuspid aortic valve   • Coronary artery disease   • Hyperlipidemia   • Hypertension   • Obesity   • Primary hyperparathyroidism (CMS/[facility])   • Type 2 diabetes mellitus (CMS/[facility])   • RUE weakness   • Facial droop, left sided   • Slurred speech   • Acute ischemic stroke (CMS/[facility])     Past Medical History:   Diagnosis Date   • Abnormal cardiovascular stress test 2017   • Acute myocardial infarction (CMS/[facility])    • Aortic aneurysm (CMS/HCC) 2017   • Bicuspid aortic valve 2017   • Coronary artery disease 10/29/2019   • History of coronary angioplasty with insertion of stent    • Hyperlipidemia    • Hypertension    • Obesity 2011   • Primary hyperparathyroidism (CMS/HCC) 2011   • RUE weakness 10/29/2019    Previous residual from Stroke, but was not work prohibiting.   • Sinus arrhythmia    • Stroke (CMS/[facility])    • Type 2 diabetes mellitus (CMS/HCC) 10/29/2019     Past Surgical History:   Procedure Laterality Date   • APPENDECTOMY     • BACK SURGERY     • CARDIAC CATHETERIZATION     • CARDIAC CATHETERIZATION     • JOINT REPLACEMENT     • KNEE ARTHROSCOPY          SLP EVALUATION (last 72 hours)      SLP SLC Evaluation     Row Name 20 1500       Communication Assessment/Intervention    Document Type  evaluation  -MM    Subjective Information  no complaints  -MM    Patient Observations  alert;cooperative;agree to therapy  -MM    Patient/Family/Caregiver Comments/Observations  PAtient reports he has slurred speech at baseline since previous stroke that  comes and goes.  He knows to slow his speech when it does occur  -MM    Care Plan Review  care plan/treatment goals reviewed  -MM    Patient Effort  good  -MM    Comment  PPE: gloves, mask, goggles  -MM    Symptoms Noted During/After Treatment  none  -MM       General Information    Patient Profile Reviewed  yes  -MM    Pertinent History Of Current Problem  55 y.o. 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 Initial NIH was 4.  Patient given tPA and NIH imrpved to 2.  MRI shows 5mm focal acute or subacute ischemic insult within the left partietal lobe, without hemorrhagic transformation.  -MM    Precautions/Limitations, Vision  WFL  -MM    Precautions/Limitations, Hearing  WFL  -MM    Prior Level of Function-Communication  WFL  -MM    Plans/Goals Discussed with  patient  -MM    Barriers to Rehab  none identified  -MM    Patient's Goals for Discharge  return to home;return to work;return to all previous roles/activities  -MM       Comprehension Assessment/Intervention    Comprehension Assessment/Intervention  Auditory Comprehension  -MM       Auditory Comprehension Assessment/Intervention    Auditory Comprehension (Communication)  WFL  -MM    Able to Identify Objects/Pictures (Communication)  WFL  -MM    Answers Questions (Communication)  concrete  -MM    Able to Follow Commands (Communication)  body part  -MM    Narrative Discourse  WFL  -MM       Expression Assessment/Intervention    Expression Assessment/Intervention  verbal expression  -MM       Verbal Expression Assessment/Intervention    Verbal Expression  WFL  -MM    Verbal Expression, Comment  No anomias, paraphasias or word finding deficits noted  -MM        Oral Motor Structure and Function    Oral Motor Structure and Function  WNL  -MM       Oral Musculature and Cranial Nerve Assessment    Oral Motor General Assessment  WFL  -MM       Motor Speech Assessment/Intervention    Motor Speech Function  WNL  -MM       Cognitive Assessment Intervention- SLP    Cognitive Function (Cognition)  WFL  -MM    Cognition, Comment  Patient answered simple and complex y/n questions, time and money mangagement questions and medication management questions with 100% accuracy.  Patient able to read short passage and answer questiosn related to it.  -MM       SLP Clinical Impressions    SLP Diagnosis  --  -MM    Functional Impact  no impact on function  -MM    Plan for Continued Treatment (SLP)  No deficits requiring skilled ST services for speech or cognition  -MM       Recommendations    Therapy Frequency (SLP SLC)  evaluation only  -MM    Anticipated Discharge Disposition (SLP)  home  -MM       SLP Discharge Summary    Discharge Destination  --  -MM      User Key  (r) = Recorded By, (t) = Taken By, (c) = Cosigned By    Initials Name Effective Dates    Yanna Suárez SLP 03/01/19 -              EDUCATION  The patient has been educated in the following areas:     informed patient that once he returns to work if issues arise that he can be seen for outpatient therapy.    SLP Recommendation and Plan           Anticipated Discharge Disposition (SLP): home     Therapy Frequency (Swallow): evaluation only  Plan for Continued Treatment (SLP): No deficits requiring skilled ST services for speech or cognition                       User Key  (r) = Recorded By, (t) = Taken By, (c) = Cosigned By    Initials Name Provider Type    Yanna Suárez SLP Speech and Language Pathologist                  Time Calculation:         Therapy Charges for Today     Code Description Service Date Service Provider Modifiers Qty    [id number]  ST EVAL ORAL PHARYNG SWALLOW 4 11/4/2020 [place]  FANY Raines GN 1                     FANY Murrell  11/4/2020

## 2020-11-04 NOTE — ED NOTES
Pt in MRI machine having imaging done, unable to assess patient at this time.      Crystal Puga RN  11/04/20 [id number]

## 2020-11-04 NOTE — THERAPY EVALUATION
Acute Care - Speech Language Pathology   Swallow Initial Evaluation  [facility]     Patient Name: Preet Jones  : 1965  MRN: [id number]  Today's Date: 2020               Admit Date: 2020    Visit Dx:     ICD-10-CM ICD-9-CM   1. Acute ischemic stroke (CMS/HCC)  I63.9 434.91     Patient Active Problem List   Diagnosis   • Arterial ischemic stroke, MCA (middle cerebral artery), left, acute (CMS/[facility])   • Aortic aneurysm (CMS/HCC)   • Bicuspid aortic valve   • Coronary artery disease   • Hyperlipidemia   • Hypertension   • Obesity   • Primary hyperparathyroidism (CMS/HCC)   • Type 2 diabetes mellitus (CMS/[facility])   • RUE weakness   • Facial droop, left sided   • Slurred speech   • Acute ischemic stroke (CMS/[facility])     Past Medical History:   Diagnosis Date   • Abnormal cardiovascular stress test 2017   • Acute myocardial infarction (CMS/[facility])    • Aortic aneurysm (CMS/HCC) 2017   • Bicuspid aortic valve 2017   • Coronary artery disease 10/29/2019   • History of coronary angioplasty with insertion of stent    • Hyperlipidemia    • Hypertension    • Obesity 2011   • Primary hyperparathyroidism (CMS/HCC) 2011   • RUE weakness 10/29/2019    Previous residual from Stroke, but was not work prohibiting.   • Sinus arrhythmia    • Stroke (CMS/[facility])    • Type 2 diabetes mellitus (CMS/HCC) 10/29/2019     Past Surgical History:   Procedure Laterality Date   • APPENDECTOMY     • BACK SURGERY     • CARDIAC CATHETERIZATION     • CARDIAC CATHETERIZATION     • JOINT REPLACEMENT     • KNEE ARTHROSCOPY          SWALLOW EVALUATION (last 72 hours)      SLP Adult Swallow Evaluation     Row Name 20 0900       Rehab Evaluation    Document Type  evaluation  -MM    Subjective Information  no complaints  -MM    Patient Observations  alert;cooperative;agree to therapy  -MM    Care Plan Review  care plan/treatment goals reviewed  -MM    Patient Effort  good  -MM    Comment  PPE: gloves, mask,  goggles  -MM    Symptoms Noted During/After Treatment  none  -MM       General Information    Patient Profile Reviewed  yes  -MM    Pertinent History Of Current Problem  55 y.o. 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 Initial NIH was 4.  Patient given tPA and NIH imrpved to 2.  MRI shows 5mm focal acute or subacute ischemic insult within the left partietal lobe, without hemorrhagic transformation.  -MM    Current Method of Nutrition  NPO failed swallow screen  -MM    Prior Level of Function-Swallowing  no diet consistency restrictions  -MM    Plans/Goals Discussed with  patient  -MM       Oral Motor Structure and Function    Dentition Assessment  natural, present and adequate  -MM    Secretion Management  WNL/WFL  -MM    Mucosal Quality  moist, healthy  -MM    Volitional Swallow  WFL  -MM    Volitional Cough  WFL  -MM       Oral Musculature and Cranial Nerve Assessment    Oral Motor General Assessment  WFL  -MM    Oral Motor, Comment  No noticeable weakness or facial asymmetry at time of the evaluation  -MM       General Eating/Swallowing Observations    Respiratory Support Currently in Use  room air  -MM    Eating/Swallowing Skills  self-fed  -MM    Positioning During Eating  upright 90 degree;upright in bed  -MM    Utensils Used  spoon;straw  -MM    Consistencies Trialed  regular textures;chopped;pureed;thin liquids  -MM       Respiratory    Respiratory Status  WFL  -MM       Clinical Swallow Eval    Oral Prep Phase  WFL  -MM    Oral Transit  WFL  -MM    Oral Residue  WFL  -MM    Pharyngeal Phase  no overt signs/symptoms of pharyngeal impairment  -MM    Esophageal Phase  unremarkable  -MM    Clinical  Swallow Evaluation Summary  Patient given trials of water by straw, applesauce, peaches and crackers.  Patient independent in feeding himself small bites and sips.  Functional mastication evident with no oral residue noted.  No overt s/s of aspiration throughout the evaluation.  Patient reports no difficulties with any consistency given.  -MM       Clinical Impression    Daily Summary of Progress (SLP)  progress toward functional goals is good  -MM    SLP Swallowing Diagnosis  swallow WFL  -MM    Functional Impact  no impact on function  -MM    Rehab Potential/Prognosis, Swallowing  good, to achieve stated therapy goals  -MM    Swallow Criteria for Skilled Therapeutic Interventions Met  demonstrates skilled criteria  -MM       Recommendations    Therapy Frequency (Swallow)  PRN  -MM    Predicted Duration Therapy Intervention (Days)  until discharge  -MM    SLP Diet Recommendation  regular textures;thin liquids  -MM    Recommended Precautions and Strategies  upright posture during/after eating;small bites of food and sips of liquid  -MM    Oral Care Recommendations  Oral Care BID/PRN  -MM    SLP Rec. for Method of Medication Administration  as tolerated  -MM    Monitor for Signs of Aspiration  yes;notify SLP if any concerns;cough  -MM       Swallow Goals (SLP)    Oral Nutrition/Hydration Goal Selection (SLP)  oral nutrition/hydration, SLP goal 1;oral nutrition/hydration, SLP goal 2  -MM       Oral Nutrition/Hydration Goal 1 (SLP)    Oral Nutrition/Hydration Goal 1, SLP  Patient will be seen at a meal within 24-48 hours to assess tolerance of current diet with further recommendations to be given as indicated  -MM    Time Frame (Oral Nutrition/Hydration Goal 1, SLP)  2 days  -MM       Oral Nutrition/Hydration Goal 2 (SLP)    Oral Nutrition/Hydration Goal 2, SLP  Patient will tolerate safest and least restrictive diet.  -MM    Time Frame (Oral Nutrition/Hydration Goal 2, SLP)  by discharge  -MM      User Key  (r) =  Recorded By, (t) = Taken By, (c) = Cosigned By    Initials Name Effective Dates    Yanna Suárez SLP 03/01/19 -           EDUCATION  The patient has been educated in the following areas:   safe swallow strategies.    SLP Recommendation and Plan  SLP Swallowing Diagnosis: swallow WFL  SLP Diet Recommendation: regular textures, thin liquids  Recommended Precautions and Strategies: upright posture during/after eating, small bites of food and sips of liquid  SLP Rec. for Method of Medication Administration: as tolerated     Monitor for Signs of Aspiration: yes, notify SLP if any concerns, cough     Swallow Criteria for Skilled Therapeutic Interventions Met: demonstrates skilled criteria     Rehab Potential/Prognosis, Swallowing: good, to achieve stated therapy goals  Therapy Frequency (Swallow): PRN  Predicted Duration Therapy Intervention (Days): until discharge     Daily Summary of Progress (SLP): progress toward functional goals is good                        SLP GOALS     Row Name 11/04/20 0900       Oral Nutrition/Hydration Goal 1 (SLP)    Oral Nutrition/Hydration Goal 1, SLP  Patient will be seen at a meal within 24-48 hours to assess tolerance of current diet with further recommendations to be given as indicated  -MM    Time Frame (Oral Nutrition/Hydration Goal 1, SLP)  2 days  -MM       Oral Nutrition/Hydration Goal 2 (SLP)    Oral Nutrition/Hydration Goal 2, SLP  Patient will tolerate safest and least restrictive diet.  -MM    Time Frame (Oral Nutrition/Hydration Goal 2, SLP)  by discharge  -MM      User Key  (r) = Recorded By, (t) = Taken By, (c) = Cosigned By    Initials Name Provider Type    Yanna Sáurez SLP Speech and Language Pathologist             Time Calculation:                FANY Murrell  11/4/2020

## 2020-11-04 NOTE — ED PROVIDER NOTES
Subjective   History of Present Illness  Slurred speech, facial droop and some right arm and leg weakness  55-year-old male developed some left facial droop and some right arm and leg weakness and slurred speech that started about 1 hour prior to arrival while he was at work on night shift.  He had a similar episode with a stroke he had in 2019.  He reports no headache or chest or abdominal pain or trauma.  He denies any use of anticoagulants or recent surgeries.  Review of Systems   HENT: Negative.    Eyes: Negative.    Respiratory: Negative.    Cardiovascular: Negative.    Gastrointestinal: Negative.    Genitourinary: Negative.    Musculoskeletal: Negative.    Skin: Negative.    Neurological: Positive for facial asymmetry, speech difficulty and weakness.   Hematological: Negative.    Psychiatric/Behavioral: Negative.        Past Medical History:   Diagnosis Date   • Abnormal cardiovascular stress test 1/19/2017   • Acute myocardial infarction (CMS/[facility]) 2019   • Aortic aneurysm (CMS/HCC) 6/22/2017   • Bicuspid aortic valve 2/16/2017   • Coronary artery disease 10/29/2019   • History of coronary angioplasty with insertion of stent    • Hyperlipidemia    • Hypertension    • Obesity 9/2/2011   • Primary hyperparathyroidism (CMS/HCC) 9/2/2011   • RUE weakness 10/29/2019    Previous residual from Stroke, but was not work prohibiting.   • Sinus arrhythmia    • Stroke (CMS/[facility])    • Type 2 diabetes mellitus (CMS/[facility]) 10/29/2019       Allergies   Allergen Reactions   • Amoxicillin Rash   • Morphine GI Intolerance       Past Surgical History:   Procedure Laterality Date   • APPENDECTOMY     • BACK SURGERY     • CARDIAC CATHETERIZATION  2010   • CARDIAC CATHETERIZATION  2019   • JOINT REPLACEMENT     • KNEE ARTHROSCOPY         Family History   Problem Relation Age of Onset   • No Known Problems Mother    • No Known Problems Father        Social History     Socioeconomic History   • Marital status:      Spouse name:  "Not on file   • Number of children: Not on file   • Years of education: Not on file   • Highest education level: Not on file   Tobacco Use   • Smoking status: Never Smoker   • Smokeless tobacco: Never Used   Substance and Sexual Activity   • Alcohol use: No     Frequency: Never   • Drug use: No   • Sexual activity: Yes     Partners: Female       Prior to Admission medications    Medication Sig Start Date End Date Taking? Authorizing Provider   aspirin 81 MG tablet Take 2 tablets by mouth Daily. 10/31/19   Arturo Vergara Jr., MD   atorvastatin (LIPITOR) 80 MG tablet Take 1 tablet by mouth Daily. 11/1/19   Arturo Vergara Jr., MD   Cholecalciferol (VITAMIN D-3) 25 MCG (1000 UT) capsule Take 3 capsules by mouth Daily. 6/22/17   Ramsey Nye MD   clopidogrel (PLAVIX) 75 MG tablet Take 75 mg by mouth Daily. 2/16/17   Ramsey Nye MD   insulin glargine (LANTUS) 100 UNIT/ML injection Inject 20 Units under the skin into the appropriate area as directed At Night As Needed.    Ramsey Nye MD   insulin lispro (humaLOG) 100 UNIT/ML injection Inject 0-9 Units under the skin into the appropriate area as directed 4 (Four) Times a Day With Meals & at Bedtime. 10/31/19   Arturo Vergara Jr., MD   lisinopril (PRINIVIL,ZESTRIL) 20 MG tablet Take 20 mg by mouth Daily. 1/5/17   Ramsey Nye MD   metFORMIN (GLUCOPHAGE) 1000 MG tablet Take 1,000 mg by mouth 2 (Two) Times a Day. 1/5/17   Ramsey Nye MD     /79   Pulse 76   Temp 98.8 °F (37.1 °C) (Oral)   Resp 16   Ht 180.3 cm (71\")   Wt 87.1 kg (192 lb)   SpO2 95%   BMI 26.78 kg/m²   I examined the patient using the appropriate personal protective equipment.        Objective   Physical Exam  General: Well-developed well-appearing, no acute distress, alert and appropriate  Eyes: Pupils round and equal, sclera nonicteric  HEENT: Mucous membranes moist, no mucosal swelling  Neck: Supple, no nuchal rigidity, no " lymphadenopathy  Respirations: Respirations nonlabored, equal breath sounds bilaterally, clear lungs  Heart regular rate and rhythm, no murmurs rubs or gallops,   Abdomen soft nontender nondistended,  Extremities no clubbing cyanosis or edema,   Neuro some left facial droop, speech is slightly slurred, there is some right pronator drift and right leg drift  Psych oriented, pleasant affect  Skin no rash, brisk cap refill  Procedures           ED Course  ED Course as of Nov 04 0725 Wed Nov 04, 2020 0724 Patient is not having any respiratory symptoms or recent illness, no leukocytosis.  His Covid screen was negative.  He was not started on pneumonia treatment the emergency room due to lack of symptoms consistent with pneumonia   XR Chest 1 View [SH]      ED Course User Index  [SH] Fritz Tidwell MD            Results for orders placed or performed during the hospital encounter of 11/04/20   Respiratory Panel PCR w/COVID-19(SARS-CoV-2) [facility]IA/[facility]I In-House, NP Swab in UTM/VTM, 3-4 HR TAT - Swab, Nasopharynx    Specimen: Nasopharynx; Swab   Result Value Ref Range    ADENOVIRUS, PCR Not Detected Not Detected    Coronavirus 229E Not Detected Not Detected    Coronavirus HKU1 Not Detected Not Detected    Coronavirus NL63 Not Detected Not Detected    Coronavirus OC43 Not Detected Not Detected    COVID19 Not Detected Not Detected - Ref. Range    Human Metapneumovirus Not Detected Not Detected    Human Rhinovirus/Enterovirus Not Detected Not Detected    Influenza A PCR Not Detected Not Detected    Influenza A H1 Not Detected Not Detected    Influenza A H1 2009 PCR Not Detected Not Detected    Influenza A H3 Not Detected Not Detected    Influenza B PCR Not Detected Not Detected    Parainfluenza Virus 1 Not Detected Not Detected    Parainfluenza Virus 2 Not Detected Not Detected    Parainfluenza Virus 3 Not Detected Not Detected    Parainfluenza Virus 4 Not Detected Not Detected    RSV, PCR Not Detected  Not Detected    Bordetella pertussis pcr Not Detected Not Detected    Bordetella parapertussis PCR Not Detected Not Detected    Chlamydophila pneumoniae PCR Not Detected Not Detected    Mycoplasma pneumo by PCR Not Detected Not Detected   Comprehensive Metabolic Panel    Specimen: Blood   Result Value Ref Range    Glucose 389 (H) 65 - 99 mg/dL    BUN 11 6 - 20 mg/dL    Creatinine 0.75 (L) 0.76 - 1.27 mg/dL    Sodium 138 136 - 145 mmol/L    Potassium 3.7 3.5 - 5.2 mmol/L    Chloride 101 98 - 107 mmol/L    CO2 24.0 22.0 - 29.0 mmol/L    Calcium 9.5 8.6 - 10.5 mg/dL    Total Protein 6.3 6.0 - 8.5 g/dL    Albumin 4.10 3.50 - 5.20 g/dL    ALT (SGPT) 20 1 - 41 U/L    AST (SGOT) 13 1 - 40 U/L    Alkaline Phosphatase 93 39 - 117 U/L    Total Bilirubin 0.4 0.0 - 1.2 mg/dL    eGFR Non African Amer 108 >60 mL/min/1.73    Globulin 2.2 gm/dL    A/G Ratio 1.9 g/dL    BUN/Creatinine Ratio 14.7 7.0 - 25.0    Anion Gap 13.0 5.0 - 15.0 mmol/L   Protime-INR    Specimen: Blood   Result Value Ref Range    Protime 10.4 9.6 - 11.7 Seconds    INR 0.94 0.93 - 1.10   aPTT    Specimen: Blood   Result Value Ref Range    PTT 25.3 24.0 - 31.0 seconds   Troponin    Specimen: Blood   Result Value Ref Range    Troponin T 0.014 0.000 - 0.030 ng/mL   CBC Auto Differential    Specimen: Blood   Result Value Ref Range    WBC 6.00 3.40 - 10.80 10*3/mm3    RBC 4.65 4.14 - 5.80 10*6/mm3    Hemoglobin 13.8 13.0 - 17.7 g/dL    Hematocrit 39.7 37.5 - 51.0 %    MCV 85.3 79.0 - 97.0 fL    MCH 29.7 26.6 - 33.0 pg    MCHC 34.8 31.5 - 35.7 g/dL    RDW 13.3 12.3 - 15.4 %    RDW-SD 39.8 37.0 - 54.0 fl    MPV 8.9 6.0 - 12.0 fL    Platelets 216 140 - 450 10*3/mm3    Neutrophil % 54.1 42.7 - 76.0 %    Lymphocyte % 33.4 19.6 - 45.3 %    Monocyte % 7.4 5.0 - 12.0 %    Eosinophil % 4.0 0.3 - 6.2 %    Basophil % 1.1 0.0 - 1.5 %    Neutrophils, Absolute 3.30 1.70 - 7.00 10*3/mm3    Lymphocytes, Absolute 2.00 0.70 - 3.10 10*3/mm3    Monocytes, Absolute 0.40 0.10 - 0.90  10*3/mm3    Eosinophils, Absolute 0.20 0.00 - 0.40 10*3/mm3    Basophils, Absolute 0.10 0.00 - 0.20 10*3/mm3    nRBC 0.1 0.0 - 0.2 /100 WBC   Basic Metabolic Panel    Specimen: Blood   Result Value Ref Range    Glucose 279 (H) 65 - 99 mg/dL    BUN 10 6 - 20 mg/dL    Creatinine 0.61 (L) 0.76 - 1.27 mg/dL    Sodium 137 136 - 145 mmol/L    Potassium 3.3 (L) 3.5 - 5.2 mmol/L    Chloride 103 98 - 107 mmol/L    CO2 23.0 22.0 - 29.0 mmol/L    Calcium 8.4 (L) 8.6 - 10.5 mg/dL    eGFR Non African Amer 137 >60 mL/min/1.73    BUN/Creatinine Ratio 16.4 7.0 - 25.0    Anion Gap 11.0 5.0 - 15.0 mmol/L   Phosphorus    Specimen: Blood   Result Value Ref Range    Phosphorus 3.6 2.5 - 4.5 mg/dL   Magnesium    Specimen: Blood   Result Value Ref Range    Magnesium 1.7 1.6 - 2.6 mg/dL   CBC Auto Differential    Specimen: Blood   Result Value Ref Range    WBC 5.80 3.40 - 10.80 10*3/mm3    RBC 4.35 4.14 - 5.80 10*6/mm3    Hemoglobin 12.9 (L) 13.0 - 17.7 g/dL    Hematocrit 37.2 (L) 37.5 - 51.0 %    MCV 85.6 79.0 - 97.0 fL    MCH 29.6 26.6 - 33.0 pg    MCHC 34.6 31.5 - 35.7 g/dL    RDW 13.2 12.3 - 15.4 %    RDW-SD 39.4 37.0 - 54.0 fl    MPV 8.8 6.0 - 12.0 fL    Platelets 207 140 - 450 10*3/mm3    Neutrophil % 53.5 42.7 - 76.0 %    Lymphocyte % 35.1 19.6 - 45.3 %    Monocyte % 5.9 5.0 - 12.0 %    Eosinophil % 4.4 0.3 - 6.2 %    Basophil % 1.1 0.0 - 1.5 %    Neutrophils, Absolute 3.10 1.70 - 7.00 10*3/mm3    Lymphocytes, Absolute 2.00 0.70 - 3.10 10*3/mm3    Monocytes, Absolute 0.30 0.10 - 0.90 10*3/mm3    Eosinophils, Absolute 0.30 0.00 - 0.40 10*3/mm3    Basophils, Absolute 0.10 0.00 - 0.20 10*3/mm3    nRBC 0.1 0.0 - 0.2 /100 WBC   Lipid Panel    Specimen: Blood   Result Value Ref Range    Total Cholesterol 167 0 - 200 mg/dL    Triglycerides 432 (H) 0 - 150 mg/dL    HDL Cholesterol 26 (L) 40 - 60 mg/dL    LDL Cholesterol  73 0 - 100 mg/dL    VLDL Cholesterol 68 (H) 5 - 40 mg/dL    LDL/HDL Ratio 2.10    POC Glucose Once    Specimen: Blood    Result Value Ref Range    Glucose 351 (H) 70 - 105 mg/dL   POC Glucose Once    Specimen: Blood   Result Value Ref Range    Glucose 256 (H) 70 - 105 mg/dL   ECG 12 Lead   Result Value Ref Range    QT Interval 388 ms   Type & Screen    Specimen: Blood   Result Value Ref Range    ABO Type O     RH type Positive     Antibody Screen Negative     T&S Expiration Date 11/7/2020 11:59:59 PM    Light Blue Top   Result Value Ref Range    Extra Tube hold for add-on    Green Top (Gel)   Result Value Ref Range    Extra Tube Hold for add-ons.    Lavender Top   Result Value Ref Range    Extra Tube hold for add-on    Gold Top - SST   Result Value Ref Range    Extra Tube Hold for add-ons.      Ct Angiogram Head    Result Date: 11/4/2020  CTA HEAD: 1.  No evidence of large vessel occlusion or critical stenosis. CTA NECK: 1.  No significant extracranial vascular stenosis, occlusion or dissection. Daniele Powers MD Neuroradiologist Thank you for this referral.  This exam was interpreted by a fellowship trained neuroradiologist.   SLOT:  68  Electronically signed by:  Daniele Powers  11/4/2020 12:29 AM    Ct Angiogram Neck    Result Date: 11/4/2020  CTA HEAD: 1.  No evidence of large vessel occlusion or critical stenosis. CTA NECK: 1.  No significant extracranial vascular stenosis, occlusion or dissection. Daniele Powers MD Neuroradiologist Thank you for this referral.  This exam was interpreted by a fellowship trained neuroradiologist.   SLOT:  68  Electronically signed by:  Daniele Powers  11/4/2020 12:29 AM    Xr Chest 1 View    Result Date: 11/4/2020  Diffuse alveolar opacities consistent with pneumonia.  Electronically Signed By-Tyler Mejia On:11/4/2020 7:11 AM This report was finalized on [id number] by  Tyler Mejia, .    Ct Head Without Contrast Stroke Protocol    Result Date: 11/3/2020   1.  No acute intracranial abnormality. 2.  Volume loss and mild microvascular ischemic changes. 3.  Old lacunar infarct in the left  corona radiata, unchanged.  THESE RESULTS WERE DISCUSSED WITH DR. LANDON ON 11/3/2020 AT 11:29 PM, MDT.  Daniele Powers MD Neuroradiologist Thank you for this referral.  This exam was interpreted by a fellowship trained neuroradiologist.   SLOT:  68  Electronically signed by:  Daniele Powers  11/3/2020 11:30 PM    Ct Cerebral Perfusion With & Without Contrast    Result Date: 11/3/2020  Questionable small mismatched perfusion defect in the left corona radiata. MRI can be considered to exclude a small vessel lacunar infarct, if clinically warranted. Daniele Powers MD Neuroradiologist Thank you for this referral.  This exam was interpreted by a fellowship trained neuroradiologist.   SLOT:  68  Electronically signed by:  Daniele Powers  11/3/2020 11:59 PM      My EKG interpretation sinus rhythm, PAC, nonspecific T wave abnormality, rate of 76                                MDM  Patient presents with acute ischemic stroke symptoms NIH of 8.  Code stroke was initiated and patient found to be a TPA candidate given his time of onset and negative CT scan of his head.  Case discussed with Dr. Marcial who agrees with TPA administration.  Risks and benefits were discussed with the patient and patient is agreeable to the plan of TPA administration.  He does have history of the same and wishes to receive the same treatment as 1 year ago.  He did have some neurologic improvement during the emergency room course.  CTA shows no large vessel occlusion.  Case discussed with the hospitalist service and patient admitted for further care to the ICU.  Final diagnoses:   Acute ischemic stroke (CMS/[facility])     Critical care time 45 minutes       Fritz Landon MD  11/04/20 0243       Fritz Landon MD  11/04/20 0792

## 2020-11-04 NOTE — CONSULTS
Chief Complaint:   Evaluate for stroke.     HPI:  The patient is a 55 year old gentleman with multiple medical problems including CAD, s/p MI, s/p coronary angioplasty with stent in situ, HLD, HTN, primary hyperparathyroidism, DM II, history of stroke about a year ago who was in usual health until yesterday evening.  He developed slurred speech, left facial droop, right arm and leg weakness.  He was brought to ER of [facility].  A code stroke was called.  The patient was thought to a candidate for tPA.  He underwent emergent CT/CTA of Head and Neck.  He was deemed to a candidate for tPA therefore, tPA was given.  His condition has improved significantly.  He denies double vision, speech problems, bowel and bladder incontinence.     ROS: Constitutional: weakness +, CP +, no SOA, no B/B problems. No skin rash, stroke +.      PMH:  Past Medical History:   Diagnosis Date   • Abnormal cardiovascular stress test 1/19/2017   • Acute myocardial infarction (CMS/[facility]) 2019   • Aortic aneurysm (CMS/[facility]) 6/22/2017   • Bicuspid aortic valve 2/16/2017   • Coronary artery disease 10/29/2019   • History of coronary angioplasty with insertion of stent    • Hyperlipidemia    • Hypertension    • Obesity 9/2/2011   • Primary hyperparathyroidism (CMS/HCC) 9/2/2011   • RUE weakness 10/29/2019    Previous residual from Stroke, but was not work prohibiting.   • Sinus arrhythmia    • Stroke (CMS/[facility])    • Type 2 diabetes mellitus (CMS/HCC) 10/29/2019     Social History     Socioeconomic History   • Marital status:      Spouse name: Not on file   • Number of children: Not on file   • Years of education: Not on file   • Highest education level: Not on file   Tobacco Use   • Smoking status: Never Smoker   • Smokeless tobacco: Never Used   Substance and Sexual Activity   • Alcohol use: No     Frequency: Never   • Drug use: No   • Sexual activity: Yes     Partners: Female     Past Surgical History:   Procedure Laterality Date   • APPENDECTOMY      • BACK SURGERY     • CARDIAC CATHETERIZATION  2010   • CARDIAC CATHETERIZATION  2019   • JOINT REPLACEMENT     • KNEE ARTHROSCOPY       Family History   Problem Relation Age of Onset   • No Known Problems Mother    • No Known Problems Father        Labs:  Lab Results (last 24 hours)     Procedure Component Value Units Date/Time    Respiratory Panel PCR w/COVID-19(SARS-CoV-2) TRACIE/AIDA/IDANIA/PAD/COR/MAD/FRANKI In-House, NP Swab in UTM/VTM, 3-4 HR TAT - Swab, Nasopharynx [[id number]]  (Normal) Collected: 11/04/20 0449    Specimen: Swab from Nasopharynx Updated: 11/04/20 0544     ADENOVIRUS, PCR Not Detected     Coronavirus 229E Not Detected     Coronavirus HKU1 Not Detected     Coronavirus NL63 Not Detected     Coronavirus OC43 Not Detected     COVID19 Not Detected     Human Metapneumovirus Not Detected     Human Rhinovirus/Enterovirus Not Detected     Influenza A PCR Not Detected     Influenza A H1 Not Detected     Influenza A H1 2009 PCR Not Detected     Influenza A H3 Not Detected     Influenza B PCR Not Detected     Parainfluenza Virus 1 Not Detected     Parainfluenza Virus 2 Not Detected     Parainfluenza Virus 3 Not Detected     Parainfluenza Virus 4 Not Detected     RSV, PCR Not Detected     Bordetella pertussis pcr Not Detected     Bordetella parapertussis PCR Not Detected     Chlamydophila pneumoniae PCR Not Detected     Mycoplasma pneumo by PCR Not Detected    Narrative:      Fact sheet for providers: https://docs.[employer]/wp-content/uploads/JRV5850-4600-AT8.1-EUA-Provider-Fact-Sheet-3.pdf    Fact sheet for patients: https://docs.[employer]/wp-content/uploads/BWY8588-9853-EO0.1-EUA-Patient-Fact-Sheet-1.pdf    Basic Metabolic Panel [[id number]]  (Abnormal) Collected: 11/04/20 0448    Specimen: Blood Updated: 11/04/20 0532     Glucose 279 mg/dL      BUN 10 mg/dL      Creatinine 0.61 mg/dL      Sodium 137 mmol/L      Potassium 3.3 mmol/L      Chloride 103 mmol/L      CO2 23.0 mmol/L      Calcium 8.4 mg/dL       eGFR Non African Amer 137 mL/min/1.73      BUN/Creatinine Ratio 16.4     Anion Gap 11.0 mmol/L     Narrative:      GFR Normal >60  Chronic Kidney Disease <60  Kidney Failure <15      Phosphorus [[id number]]  (Normal) Collected: 11/04/20 0448    Specimen: Blood Updated: 11/04/20 0532     Phosphorus 3.6 mg/dL     Magnesium [[id number]]  (Normal) Collected: 11/04/20 0448    Specimen: Blood Updated: 11/04/20 0532     Magnesium 1.7 mg/dL     Lipid Panel [[id number]]  (Abnormal) Collected: 11/04/20 0448    Specimen: Blood Updated: 11/04/20 0532     Total Cholesterol 167 mg/dL      Triglycerides 432 mg/dL      HDL Cholesterol 26 mg/dL      LDL Cholesterol  73 mg/dL      VLDL Cholesterol 68 mg/dL      LDL/HDL Ratio 2.10    Narrative:      Cholesterol Reference Ranges  (U.S. Department of Health and Human Services ATP III Classifications)    Desirable          <200 mg/dL  Borderline High    200-239 mg/dL  High Risk          >240 mg/dL      Triglyceride Reference Ranges  (U.S. Department of Health and Human Services ATP III Classifications)    Normal           <150 mg/dL  Borderline High  150-199 mg/dL  High             200-499 mg/dL  Very High        >500 mg/dL    HDL Reference Ranges  (U.S. Department of Health and Human Services ATP III Classifcations)    Low     <40 mg/dl (major risk factor for CHD)  High    >60 mg/dl ('negative' risk factor for CHD)        LDL Reference Ranges  (U.S. Department of Health and Human Services ATP III Classifcations)    Optimal          <100 mg/dL  Near Optimal     100-129 mg/dL  Borderline High  130-159 mg/dL  High             160-189 mg/dL  Very High        >189 mg/dL    POC Glucose Once [[id number]]  (Abnormal) Collected: 11/04/20 0527    Specimen: Blood Updated: 11/04/20 0528     Glucose 256 mg/dL      Comment: Serial Number: [id number]:  [id number]       CBC & Differential [[id number]]  (Abnormal) Collected: 11/04/20 0448    Specimen: Blood Updated: 11/04/20 0455    Narrative:      The  following orders were created for panel order CBC & Differential.  Procedure                               Abnormality         Status                     ---------                               -----------         ------                     CBC Auto Differential[[id number]]        Abnormal            Final result                 Please view results for these tests on the individual orders.    CBC Auto Differential [[id number]]  (Abnormal) Collected: 11/04/20 0448    Specimen: Blood Updated: 11/04/20 0455     WBC 5.80 10*3/mm3      RBC 4.35 10*6/mm3      Hemoglobin 12.9 g/dL      Hematocrit 37.2 %      MCV 85.6 fL      MCH 29.6 pg      MCHC 34.6 g/dL      RDW 13.2 %      RDW-SD 39.4 fl      MPV 8.8 fL      Platelets 207 10*3/mm3      Neutrophil % 53.5 %      Lymphocyte % 35.1 %      Monocyte % 5.9 %      Eosinophil % 4.4 %      Basophil % 1.1 %      Neutrophils, Absolute 3.10 10*3/mm3      Lymphocytes, Absolute 2.00 10*3/mm3      Monocytes, Absolute 0.30 10*3/mm3      Eosinophils, Absolute 0.30 10*3/mm3      Basophils, Absolute 0.10 10*3/mm3      nRBC 0.1 /100 WBC     Magnesium [[id number]] Collected: 11/04/20 0117    Specimen: Blood Updated: 11/04/20 0408    Phosphorus [[id number]] Collected: 11/04/20 0117    Specimen: Blood Updated: 11/04/20 0408    [address] Draw [[id number]] Collected: 11/04/20 0117    Specimen: Blood Updated: 11/04/20 0230    Narrative:      The following orders were created for panel order [address] Draw.  Procedure                               Abnormality         Status                     ---------                               -----------         ------                     Light Blue Top[[id number]]                                   Final result               Green Top (Gel)[[id number]]                                  Final result               Lavender Top[[id number]]                                     Final result               Gold Top - SST[[id number]]                                   Final  result                 Please view results for these tests on the individual orders.    Light Blue Top [[id number]] Collected: 11/04/20 0117    Specimen: Blood Updated: 11/04/20 0230     Extra Tube hold for add-on     Comment: Auto resulted       Green Top (Gel) [[id number]] Collected: 11/04/20 0117    Specimen: Blood Updated: 11/04/20 0230     Extra Tube Hold for add-ons.     Comment: Auto resulted.       Lavender Top [[id number]] Collected: 11/04/20 0117    Specimen: Blood Updated: 11/04/20 0230     Extra Tube hold for add-on     Comment: Auto resulted       Gold Top - SST [[id number]] Collected: 11/04/20 0117    Specimen: Blood Updated: 11/04/20 0230     Extra Tube Hold for add-ons.     Comment: Auto resulted.       Comprehensive Metabolic Panel [[id number]]  (Abnormal) Collected: 11/04/20 0117    Specimen: Blood Updated: 11/04/20 0144     Glucose 389 mg/dL      BUN 11 mg/dL      Creatinine 0.75 mg/dL      Sodium 138 mmol/L      Potassium 3.7 mmol/L      Chloride 101 mmol/L      CO2 24.0 mmol/L      Calcium 9.5 mg/dL      Total Protein 6.3 g/dL      Albumin 4.10 g/dL      ALT (SGPT) 20 U/L      AST (SGOT) 13 U/L      Alkaline Phosphatase 93 U/L      Total Bilirubin 0.4 mg/dL      eGFR Non African Amer 108 mL/min/1.73      Globulin 2.2 gm/dL      A/G Ratio 1.9 g/dL      BUN/Creatinine Ratio 14.7     Anion Gap 13.0 mmol/L     Narrative:      GFR Normal >60  Chronic Kidney Disease <60  Kidney Failure <15      Troponin [[id number]]  (Normal) Collected: 11/04/20 0117    Specimen: Blood Updated: 11/04/20 0144     Troponin T 0.014 ng/mL     Narrative:      Troponin T Reference Range:  <= 0.03 ng/mL-   Negative for AMI  >0.03 ng/mL-     Abnormal for myocardial necrosis.  Clinicians would have to utilize clinical acumen, EKG, Troponin and serial changes to determine if it is an Acute Myocardial Infarction or myocardial injury due to an underlying chronic condition.       Results may be falsely decreased if patient taking  Biotin.      Protime-INR [[id number]]  (Normal) Collected: 11/04/20 0117    Specimen: Blood Updated: 11/04/20 0140     Protime 10.4 Seconds      INR 0.94    aPTT [[id number]]  (Normal) Collected: 11/04/20 0117    Specimen: Blood Updated: 11/04/20 0140     PTT 25.3 seconds     CBC & Differential [[id number]]  (Normal) Collected: 11/04/20 0117    Specimen: Blood Updated: 11/04/20 0125    Narrative:      The following orders were created for panel order CBC & Differential.  Procedure                               Abnormality         Status                     ---------                               -----------         ------                     CBC Auto Differential[[id number]]        Normal              Final result                 Please view results for these tests on the individual orders.    CBC Auto Differential [[id number]]  (Normal) Collected: 11/04/20 0117    Specimen: Blood Updated: 11/04/20 0125     WBC 6.00 10*3/mm3      RBC 4.65 10*6/mm3      Hemoglobin 13.8 g/dL      Hematocrit 39.7 %      MCV 85.3 fL      MCH 29.7 pg      MCHC 34.8 g/dL      RDW 13.3 %      RDW-SD 39.8 fl      MPV 8.9 fL      Platelets 216 10*3/mm3      Neutrophil % 54.1 %      Lymphocyte % 33.4 %      Monocyte % 7.4 %      Eosinophil % 4.0 %      Basophil % 1.1 %      Neutrophils, Absolute 3.30 10*3/mm3      Lymphocytes, Absolute 2.00 10*3/mm3      Monocytes, Absolute 0.40 10*3/mm3      Eosinophils, Absolute 0.20 10*3/mm3      Basophils, Absolute 0.10 10*3/mm3      nRBC 0.1 /100 WBC     POC Glucose Once [[id number]]  (Abnormal) Collected: 11/04/20 0110    Specimen: Blood Updated: 11/04/20 0111     Glucose 351 mg/dL      Comment: Serial Number: [id number]:  [id number]               Medications:  Schedule Meds  [START ON 11/5/2020] aspirin, 325 mg, Oral, Daily    Or  [START ON 11/5/2020] aspirin, 300 mg, Rectal, Daily  atorvastatin, 80 mg, Oral, Nightly  insulin lispro, 0-7 Units, Subcutaneous, Q6H  pantoprazole, 40 mg, Intravenous, Q  AM  sodium chloride, 10 mL, Intravenous, Q12H          MED'S PRN  •  acetaminophen **OR** acetaminophen  •  dextrose  •  dextrose  •  glucagon (human recombinant)  •  insulin lispro **AND** insulin lispro  •  ondansetron **OR** ondansetron  •  sodium chloride    Vitals:  Vitals:    11/04/20 0952   BP: 147/88   Pulse: 77   Resp: 21   Temp:    SpO2: 97%         Physical Exam:  The patient is lying in bed in no apparent distress.  Head NC, Neck supple. Lungs CTA,  CV  S1-S2 no murmur.  Abdomen soft.  Ext no edema.  Neurologic Exam:  The patient is awake, alert, follow commands, oriented x 3.  Speech is fluent with good comprehension.  CN VFFC, EOMI, no facial droop. Motor 5/5.  Sensory light touch intact. Reflexes absent, plantar mute.  Cerebellum coordinated movements noted.  Gait is deferred secondary to patient's condition.      Impression:  The patient is a 55 year old gentleman with multiple medical problems including DM II, s/p stroke in the past, HLD, HTN who developed sudden onset of speech problems, right sided weakness.  He thought to have left sided acute stroke and thought to be a candidate for tPA which was given to him.  His condition has improved.  MRI of Brain showed small left MCA distributed acute stroke.      Recomendations:  Stroke work up in progress.  Will obtain blood work, 2-D Echo. The patient is advised to keep BP < 130/80, HbA1C < 6.5, LDL < 70, Vitamin B12  > 500.  Will follow.

## 2020-11-04 NOTE — PROGRESS NOTES
Discharge Planning Assessment   [facility]     Patient Name: Preet Jones  MRN: [id number]  Today's Date: 11/4/2020    Admit Date: 11/4/2020    Discharge Needs Assessment     Row Name 11/04/20 0918       Living Environment    Lives With  spouse    Current Living Arrangements  home/apartment/condo    Primary Care Provided by  self    Provides Primary Care For  no one    Family Caregiver if Needed  spouse    Able to Return to Prior Arrangements  yes       Resource/Environmental Concerns    Resource/Environmental Concerns  none    Transportation Concerns  car, none       Transition Planning    Patient/Family Anticipates Transition to  home with family;home with help/services    Patient/Family Anticipated Services at Transition      Transportation Anticipated  family or friend will provide       Discharge Needs Assessment    Readmission Within the Last 30 Days  no previous admission in last 30 days    Equipment Currently Used at Home  none    Concerns to be Addressed  denies needs/concerns at this time    Anticipated Changes Related to Illness  inability to care for self        Discharge Plan     Row Name 11/04/20 0919       Plan    Plan  Pending PT/OT eval    Patient/Family in Agreement with Plan  yes    Plan Comments  Spoke to patient via telephone.  Patient is a boarder in ER at this time awaiting inpatient bed.  Patient states he lives with spouse.  Reports he still drives and is typically independent with ADLs.  Denies any issues obtaining medications at this time.  PCP confirmed.  Pharmacy: [facility] in [place]  Discussed deficits with stroke.  Patient states he is unable to get out of bed at this time secondary to TPA administration.  Discussed PT/OT eval and possible needs depending on evaluation.  Patient verbalized understanding.  Will require follow up pending PT/OT eval.          Demographic Summary     Row Name 11/04/20 0917       General Information    Admission Type  inpatient    Arrived From   emergency department    Referral Source  admission list    Reason for Consult  discharge planning     Used During This Interaction  no        Functional Status     Row Name 11/04/20 0918       Functional Status    Usual Activity Tolerance  good       Functional Status, IADL    Medications  independent    Meal Preparation  independent    Housekeeping  independent    Laundry  independent    Shopping  independent        Phone communication only - no physical contact with patient or family.    Jaz CarlsonRN    /Utilization Review  [place] [phone] office  [phone] fax  [email or web address]  [facility] NPI:   Hospital Tax ID: [id number]

## 2020-11-04 NOTE — CONSULTS
"Diabetes Education  Assessment/Teaching    Patient Name:  Preet Jones  YOB: 1965  MRN: [id number]  Admit Date:  11/4/2020      Assessment Date:  11/4/2020    Most Recent Value   General Information    Referral From:  Blood glucose, MD order [MD consult per stroke protocol and admission blood sugar 351.]   Height  180.3 cm (71\")   Weight  89.8 kg (198 lb)   Weight Method  Stated   Pregnancy Assessment   Diabetes History   What type of diabetes do you have?  Type 2   Length of Diabetes Diagnosis  6 - 10 years [Patient stated that he was diagnosed 5-6 years ago.]   Current DM knowledge  fair   Have you had diabetes education/teaching in the past?  yes   When and where was your diabetes education?  Inpatient hospitalization on 10/30/2019 at [facility]   Do you test your blood sugar at home?  no   Have you had high blood sugar? (>140mg/dl)  yes   How often do you have high blood sugar?  unknown   When was your last high blood sugar?  Admissison blood sugar 351   Education Preferences   What areas of diabetes would you like to learn about?  diabetes complications, testing my blood sugar at home, avoiding high blood sugar, medications for diabetes   Nutrition Information   Assessment Topics   Taking Medication - Assessment  Needs education   Problem Solving - Assessment  Needs education   Reducing Risk - Assessment  Needs education   Monitoring - Assessment  Needs education   DM Goals   Problem Solving - Goal  Today   Reducing Risk - Goal  Today   Monitoring - Goal  Today            Most Recent Value   DM Education Needs   Meter  Meter provided   Meter Type  One Touch [One Touch Verio Flex given to patient with return demonstration completed by patient using the lancing device, inserting the test strip into the meter, and applying blood to the test strip. Blood sugar 264.]   Frequency of Testing  Daily [Log book given to patient with discussion on checking blood sugar daily varying the times before meals and " at bedtime.]   Medication  Other (comment) [Patient currently not on any diabetes meds. He was taken off of Metforminn due to recall and has not taken Lantus for about 6 months.]   Reducing Risks  A1C testing [A1c ordered but not resulted.  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  He further stated that he heard that people that work 3rd shift are at higher risk for diabetes and sleep problems. Patient stated that this is his 3rd stroke and he knows he needs to do something.  He wants to try to get on day shift. Patient stated when he was on insulin he had problems remembering to give the shots.  Asked patient if his wife could help him remember and patient stated that she could. Only sliding scale insulin ordered at this time. Will follow to see if insulin ordered. Prescriptions started in discharge orders for lancets, test strips, and alcohol wipes. Patient has no further questions or concerns related to diabetes at this time.        Electronically signed by:  Halima Goldberg RN  11/04/20 15:39 EST

## 2020-11-05 ENCOUNTER — APPOINTMENT (OUTPATIENT)
Dept: CT IMAGING | Facility: HOSPITAL | Age: 55
End: 2020-11-05

## 2020-11-05 PROBLEM — I25.10 CORONARY ARTERY DISEASE: Chronic | Status: ACTIVE | Noted: 2019-10-29

## 2020-11-05 PROBLEM — E55.9 VITAMIN D DEFICIENCY: Chronic | Status: ACTIVE | Noted: 2020-11-05

## 2020-11-05 PROBLEM — E11.9 TYPE 2 DIABETES MELLITUS: Chronic | Status: ACTIVE | Noted: 2019-10-29

## 2020-11-05 PROBLEM — E87.6 HYPOKALEMIA: Status: ACTIVE | Noted: 2020-11-05

## 2020-11-05 PROBLEM — I10 HYPERTENSION: Chronic | Status: ACTIVE | Noted: 2019-10-29

## 2020-11-05 PROBLEM — E78.5 HYPERLIPIDEMIA: Chronic | Status: ACTIVE | Noted: 2019-10-29

## 2020-11-05 LAB
ANION GAP SERPL CALCULATED.3IONS-SCNC: 10 MMOL/L (ref 5–15)
BASOPHILS # BLD AUTO: 0.1 10*3/MM3 (ref 0–0.2)
BASOPHILS NFR BLD AUTO: 0.8 % (ref 0–1.5)
BUN SERPL-MCNC: 10 MG/DL (ref 6–20)
BUN/CREAT SERPL: 13 (ref 7–25)
CALCIUM SPEC-SCNC: 8.5 MG/DL (ref 8.6–10.5)
CHLORIDE SERPL-SCNC: 103 MMOL/L (ref 98–107)
CO2 SERPL-SCNC: 25 MMOL/L (ref 22–29)
CREAT SERPL-MCNC: 0.77 MG/DL (ref 0.76–1.27)
DEPRECATED RDW RBC AUTO: 38.9 FL (ref 37–54)
EOSINOPHIL # BLD AUTO: 0.3 10*3/MM3 (ref 0–0.4)
EOSINOPHIL NFR BLD AUTO: 3.7 % (ref 0.3–6.2)
ERYTHROCYTE [DISTWIDTH] IN BLOOD BY AUTOMATED COUNT: 13.1 % (ref 12.3–15.4)
GFR SERPL CREATININE-BSD FRML MDRD: 105 ML/MIN/1.73
GLUCOSE BLDC GLUCOMTR-MCNC: 222 MG/DL (ref 70–105)
GLUCOSE BLDC GLUCOMTR-MCNC: 253 MG/DL (ref 70–105)
GLUCOSE BLDC GLUCOMTR-MCNC: 272 MG/DL (ref 70–105)
GLUCOSE BLDC GLUCOMTR-MCNC: 285 MG/DL (ref 70–105)
GLUCOSE BLDC GLUCOMTR-MCNC: 299 MG/DL (ref 70–105)
GLUCOSE BLDC GLUCOMTR-MCNC: 342 MG/DL (ref 70–105)
GLUCOSE SERPL-MCNC: 241 MG/DL (ref 65–99)
HBA1C MFR BLD: 11.4 % (ref 3.5–5.6)
HCT VFR BLD AUTO: 39.3 % (ref 37.5–51)
HGB BLD-MCNC: 13.7 G/DL (ref 13–17.7)
LYMPHOCYTES # BLD AUTO: 1.9 10*3/MM3 (ref 0.7–3.1)
LYMPHOCYTES NFR BLD AUTO: 27.4 % (ref 19.6–45.3)
MAGNESIUM SERPL-MCNC: 1.9 MG/DL (ref 1.6–2.6)
MCH RBC QN AUTO: 29.7 PG (ref 26.6–33)
MCHC RBC AUTO-ENTMCNC: 34.9 G/DL (ref 31.5–35.7)
MCV RBC AUTO: 85.1 FL (ref 79–97)
MONOCYTES # BLD AUTO: 0.5 10*3/MM3 (ref 0.1–0.9)
MONOCYTES NFR BLD AUTO: 6.6 % (ref 5–12)
NEUTROPHILS NFR BLD AUTO: 4.3 10*3/MM3 (ref 1.7–7)
NEUTROPHILS NFR BLD AUTO: 61.5 % (ref 42.7–76)
NRBC BLD AUTO-RTO: 0.1 /100 WBC (ref 0–0.2)
PHOSPHATE SERPL-MCNC: 2.7 MG/DL (ref 2.5–4.5)
PLATELET # BLD AUTO: 209 10*3/MM3 (ref 140–450)
PMV BLD AUTO: 9 FL (ref 6–12)
POTASSIUM SERPL-SCNC: 3.2 MMOL/L (ref 3.5–5.2)
RBC # BLD AUTO: 4.62 10*6/MM3 (ref 4.14–5.8)
SODIUM SERPL-SCNC: 138 MMOL/L (ref 136–145)
TSH SERPL DL<=0.05 MIU/L-ACNC: 1.87 UIU/ML (ref 0.27–4.2)
VIT B12 BLD-MCNC: 387 PG/ML (ref 211–946)
WBC # BLD AUTO: 7 10*3/MM3 (ref 3.4–10.8)

## 2020-11-05 PROCEDURE — 63710000001 INSULIN LISPRO (HUMAN) PER 5 UNITS: Performed by: INTERNAL MEDICINE

## 2020-11-05 PROCEDURE — 82962 GLUCOSE BLOOD TEST: CPT

## 2020-11-05 PROCEDURE — 82607 VITAMIN B-12: CPT | Performed by: PSYCHIATRY & NEUROLOGY

## 2020-11-05 PROCEDURE — 99231 SBSQ HOSP IP/OBS SF/LOW 25: CPT | Performed by: PSYCHIATRY & NEUROLOGY

## 2020-11-05 PROCEDURE — 97116 GAIT TRAINING THERAPY: CPT

## 2020-11-05 PROCEDURE — 70450 CT HEAD/BRAIN W/O DYE: CPT

## 2020-11-05 PROCEDURE — 99222 1ST HOSP IP/OBS MODERATE 55: CPT | Performed by: NURSE PRACTITIONER

## 2020-11-05 PROCEDURE — 84443 ASSAY THYROID STIM HORMONE: CPT | Performed by: PSYCHIATRY & NEUROLOGY

## 2020-11-05 PROCEDURE — 63710000001 INSULIN GLARGINE PER 5 UNITS: Performed by: INTERNAL MEDICINE

## 2020-11-05 PROCEDURE — 97161 PT EVAL LOW COMPLEX 20 MIN: CPT

## 2020-11-05 PROCEDURE — 92526 ORAL FUNCTION THERAPY: CPT

## 2020-11-05 PROCEDURE — 80048 BASIC METABOLIC PNL TOTAL CA: CPT | Performed by: NURSE PRACTITIONER

## 2020-11-05 PROCEDURE — 63710000001 INSULIN LISPRO (HUMAN) PER 5 UNITS: Performed by: NURSE PRACTITIONER

## 2020-11-05 PROCEDURE — 84100 ASSAY OF PHOSPHORUS: CPT | Performed by: NURSE PRACTITIONER

## 2020-11-05 PROCEDURE — 83735 ASSAY OF MAGNESIUM: CPT | Performed by: NURSE PRACTITIONER

## 2020-11-05 PROCEDURE — 97165 OT EVAL LOW COMPLEX 30 MIN: CPT

## 2020-11-05 PROCEDURE — 85025 COMPLETE CBC W/AUTO DIFF WBC: CPT | Performed by: NURSE PRACTITIONER

## 2020-11-05 RX ORDER — INSULIN LISPRO 100 [IU]/ML
0-24 INJECTION, SOLUTION INTRAVENOUS; SUBCUTANEOUS AS NEEDED
Status: DISCONTINUED | OUTPATIENT
Start: 2020-11-05 | End: 2020-11-06 | Stop reason: HOSPADM

## 2020-11-05 RX ORDER — INSULIN LISPRO 100 [IU]/ML
0-24 INJECTION, SOLUTION INTRAVENOUS; SUBCUTANEOUS
Status: DISCONTINUED | OUTPATIENT
Start: 2020-11-05 | End: 2020-11-06 | Stop reason: HOSPADM

## 2020-11-05 RX ORDER — POTASSIUM CHLORIDE 1.5 G/1.77G
40 POWDER, FOR SOLUTION ORAL AS NEEDED
Status: DISCONTINUED | OUTPATIENT
Start: 2020-11-05 | End: 2020-11-06 | Stop reason: HOSPADM

## 2020-11-05 RX ORDER — INSULIN GLARGINE 100 [IU]/ML
20 INJECTION, SOLUTION SUBCUTANEOUS NIGHTLY
Status: DISCONTINUED | OUTPATIENT
Start: 2020-11-05 | End: 2020-11-06 | Stop reason: HOSPADM

## 2020-11-05 RX ORDER — POTASSIUM CHLORIDE 20 MEQ/1
40 TABLET, EXTENDED RELEASE ORAL AS NEEDED
Status: DISCONTINUED | OUTPATIENT
Start: 2020-11-05 | End: 2020-11-05 | Stop reason: SDUPTHER

## 2020-11-05 RX ORDER — MAGNESIUM SULFATE HEPTAHYDRATE 40 MG/ML
2 INJECTION, SOLUTION INTRAVENOUS AS NEEDED
Status: DISCONTINUED | OUTPATIENT
Start: 2020-11-05 | End: 2020-11-06 | Stop reason: HOSPADM

## 2020-11-05 RX ORDER — POTASSIUM CHLORIDE 7.45 MG/ML
10 INJECTION INTRAVENOUS
Status: DISCONTINUED | OUTPATIENT
Start: 2020-11-05 | End: 2020-11-06 | Stop reason: HOSPADM

## 2020-11-05 RX ORDER — POTASSIUM CHLORIDE 20 MEQ/1
40 TABLET, EXTENDED RELEASE ORAL AS NEEDED
Status: DISCONTINUED | OUTPATIENT
Start: 2020-11-05 | End: 2020-11-06 | Stop reason: HOSPADM

## 2020-11-05 RX ORDER — MAGNESIUM SULFATE 1 G/100ML
1 INJECTION INTRAVENOUS AS NEEDED
Status: DISCONTINUED | OUTPATIENT
Start: 2020-11-05 | End: 2020-11-06 | Stop reason: HOSPADM

## 2020-11-05 RX ORDER — LISINOPRIL 20 MG/1
20 TABLET ORAL
Status: DISCONTINUED | OUTPATIENT
Start: 2020-11-05 | End: 2020-11-06 | Stop reason: HOSPADM

## 2020-11-05 RX ADMIN — ASPIRIN 325 MG ORAL TABLET 325 MG: 325 PILL ORAL at 08:49

## 2020-11-05 RX ADMIN — Medication 10 ML: at 08:49

## 2020-11-05 RX ADMIN — PANTOPRAZOLE SODIUM 40 MG: 40 INJECTION, POWDER, FOR SOLUTION INTRAVENOUS at 05:47

## 2020-11-05 RX ADMIN — SODIUM CHLORIDE 10 MG/HR: 9 INJECTION, SOLUTION INTRAVENOUS at 06:34

## 2020-11-05 RX ADMIN — INSULIN LISPRO 10 UNITS: 100 INJECTION, SOLUTION INTRAVENOUS; SUBCUTANEOUS at 00:12

## 2020-11-05 RX ADMIN — Medication 10 ML: at 21:10

## 2020-11-05 RX ADMIN — SODIUM CHLORIDE 10 MG/HR: 9 INJECTION, SOLUTION INTRAVENOUS at 01:14

## 2020-11-05 RX ADMIN — INSULIN LISPRO 12 UNITS: 100 INJECTION, SOLUTION INTRAVENOUS; SUBCUTANEOUS at 12:41

## 2020-11-05 RX ADMIN — INSULIN LISPRO 12 UNITS: 100 INJECTION, SOLUTION INTRAVENOUS; SUBCUTANEOUS at 18:07

## 2020-11-05 RX ADMIN — ATORVASTATIN CALCIUM 80 MG: 40 TABLET, FILM COATED ORAL at 21:10

## 2020-11-05 RX ADMIN — LISINOPRIL 20 MG: 20 TABLET ORAL at 12:41

## 2020-11-05 RX ADMIN — INSULIN LISPRO 8 UNITS: 100 INJECTION, SOLUTION INTRAVENOUS; SUBCUTANEOUS at 05:47

## 2020-11-05 RX ADMIN — INSULIN GLARGINE 20 UNITS: 100 INJECTION, SOLUTION SUBCUTANEOUS at 21:11

## 2020-11-05 NOTE — THERAPY EVALUATION
Patient Name: Preet Jones  : 1965    MRN: [id number]                              Today's Date: 2020       Admit Date: 2020    Visit Dx:     ICD-10-CM ICD-9-CM   1. Acute ischemic stroke (CMS/[facility])  I63.9 434.91     Patient Active Problem List   Diagnosis   • Arterial ischemic stroke, MCA (middle cerebral artery), left, acute (CMS/[facility])   • Aortic aneurysm (CMS/[facility])   • Bicuspid aortic valve   • Coronary artery disease   • Hyperlipidemia   • Hypertension   • Obesity   • Primary hyperparathyroidism (CMS/[facility])   • Type 2 diabetes mellitus (CMS/HCC)   • RUE weakness   • Facial droop, left sided   • Slurred speech   • Acute ischemic stroke (CMS/[facility])   • Vitamin D deficiency   • Hypokalemia     Past Medical History:   Diagnosis Date   • Abnormal cardiovascular stress test 2017   • Acute myocardial infarction (CMS/[facility])    • Aortic aneurysm (CMS/HCC) 2017   • Bicuspid aortic valve 2017   • Coronary artery disease 10/29/2019   • History of coronary angioplasty with insertion of stent    • Hyperlipidemia    • Hypertension    • Obesity 2011   • Primary hyperparathyroidism (CMS/HCC) 2011   • RUE weakness 10/29/2019    Previous residual from Stroke, but was not work prohibiting.   • Sinus arrhythmia    • Stroke (CMS/[facility])    • Type 2 diabetes mellitus (CMS/[facility]) 10/29/2019     Past Surgical History:   Procedure Laterality Date   • APPENDECTOMY     • BACK SURGERY     • CARDIAC CATHETERIZATION     • CARDIAC CATHETERIZATION     • JOINT REPLACEMENT     • KNEE ARTHROSCOPY       General Information     Row Name 20 1145          Physical Therapy Time and Intention    Document Type  evaluation  -CM     Mode of Treatment  physical therapy  -     Row Name 20 1145          General Information    Patient Profile Reviewed  yes  -CM     Prior Level of Function  independent:;driving;work;community mobility;gait works as   -CM     Existing  Precautions/Restrictions  no known precautions/restrictions  -     Barriers to Rehab  none identified  -CM     Row Name 11/05/20 1145          Living Environment    Lives With  spouse  -CM     Row Name 11/05/20 1145          Home Main Entrance    Number of Stairs, Main Entrance  other (see comments) twelve steps to enter home from basement garage  -CM     Stair Railings, Main Entrance  railings safe and in good condition  -CM     Row Name 11/05/20 1145          Stairs Within Home, Primary    Number of Stairs, Within Home, Primary  none  -CM     Row Name 11/05/20 1145          Cognition    Orientation Status (Cognition)  oriented x 4 pleasant; no dysarthria noted w/ speech  -CM     Row Name 11/05/20 1145          Safety Issues, Functional Mobility    Impairments Affecting Function (Mobility)  strength  -CM       User Key  (r) = Recorded By, (t) = Taken By, (c) = Cosigned By    Initials Name Provider Type    Susie Andrade, PT Physical Therapist        Mobility     Row Name 11/05/20 1148          Bed Mobility    Bed Mobility  bed mobility (all) activities  -CM     All Activities, [place] (Bed Mobility)  independent  -CM     Row Name 11/05/20 1148          Sit-Stand Transfer    Sit-Stand [place] (Transfers)  independent  -CM     Row Name 11/05/20 1148          Gait/Stairs (Locomotion)    [place] Level (Gait)  independent  -CM     Distance in Feet (Gait)  400 ft w/o significant gait deviations. Mild decrease in stance on RLE.  -CM       User Key  (r) = Recorded By, (t) = Taken By, (c) = Cosigned By    Initials Name Provider Type    Susie Andrade, PT Physical Therapist        Obj/Interventions     Row Name 11/05/20 1149          Range of Motion Comprehensive    General Range of Motion  no range of motion deficits identified  -     Row Name 11/05/20 1149          Strength Comprehensive (MMT)    General Manual Muscle Testing (MMT) Assessment  lower extremity strength deficits  identified;upper extremity strength deficits identified  -CM     Comment, General Manual Muscle Testing (MMT) Assessment  RUE 4-/5; RLE 4-/5; LUE and LLE 5/5  -CM     Row Name 11/05/20 1149          Motor Skills    Motor Skills  coordination;muscle tone  -CM     Coordination  WNL no ataxia noted w/ gait.  -CM     Muscle Tone  hypotonia;mild impairment;right;upper extremity(s);lower extremity(s)  -CM     Row Name 11/05/20 1149          Balance    Balance Assessment  sitting static balance;sitting dynamic balance;standing static balance;standing dynamic balance  -CM     Static Sitting Balance  WNL;unsupported;sitting, edge of bed  -CM     Dynamic Sitting Balance  WNL;unsupported;sitting, edge of bed  -CM     Static Standing Balance  WNL;unsupported;standing  -CM     Dynamic Standing Balance  WNL;unsupported;standing  -CM     Row Name 11/05/20 1149          Sensory Assessment (Somatosensory)    Sensory Assessment (Somatosensory)  sensation intact;other (see comments) no asymmetry or deficits noted  -CM       User Key  (r) = Recorded By, (t) = Taken By, (c) = Cosigned By    Initials Name Provider Type    Susie Andrade, PT Physical Therapist        Goals/Plan     Row Name 11/05/20 1157          Gait Training Goal 1 (PT)    Activity/Assistive Device (Gait Training Goal 1, PT)  gait (walking locomotion)  -CM     [place] Level (Gait Training Goal 1, PT)  independent  -CM     Distance (Gait Training Goal 1, PT)  400 ft, no gait deviations, no loss of balance w/ moderate disturbances in balance given.  -CM     Time Frame (Gait Training Goal 1, PT)  2 weeks  -CM     Row Name 11/05/20 1157          ROM Goal 1 (PT)    ROM Goal 1 (PT)  Completes 25 reps of arom for RUE and RLE w/o mm fasciculation  -CM     Time Frame (ROM Goal 1, PT)  2 weeks  -CM       User Key  (r) = Recorded By, (t) = Taken By, (c) = Cosigned By    Initials Name Provider Type    Susie Andrade, PT Physical Therapist        Clinical Impression      Row Name 11/05/20 1151          Pain    Additional Documentation  Pain Scale: Numbers Pre/Post-Treatment (Group)  -CM     Row Name 11/05/20 1151          Pain Scale: Numbers Pre/Post-Treatment    Pretreatment Pain Rating  0/10 - no pain  -CM     Posttreatment Pain Rating  0/10 - no pain  -CM     Pain Intervention(s)  Repositioned;Emotional support  -     Row Name 11/05/20 1200 11/05/20 1151       Plan of Care Review    Plan of Care Reviewed With  --  patient  -CM    Outcome Summary  54 yo male who was admitted after coworkers noted acute confusion, dysarthria, ataxia. MRI (+) for acute 5 mm L parietal infarct. Was given tPA in ER. Hx of previous L corona radiata infect, for which he also received tPA. Had very little functional loss following first stroke. 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 Today, able to amb 400 ft w/ only slight decrease in stance on RLE. Does show significant R sided weakness (4-/5). Recommend OP PT at d/c to address functional deficits and weakness. Will follow 2xwk to address post cva weakness. PPE: gloves, mask, goggles.  -CM  54 yo male who was admitted after coworkers noted acute confusion, dysarthria, ataxia. MRI (+) for acute 5mm L parietal infarct. Was given tPA in ER.  Hx of previous L corona radiata infarct. Had very little functional loss following first stroke, for which he received tPA. 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 Pt is safe for home w/ family, but he will need OP PT to address R sided weakness which has occurred following this stroke. Will follow 2xwk to address post cva weakness. PPE: gloves, mask, goggles.  -    Row Name 11/05/20 1151          Therapy Assessment/Plan (PT)    Rehab Potential (PT)  good, to achieve stated therapy goals  -CM     Criteria for Skilled Interventions Met (PT)  yes;meets criteria;skilled treatment is necessary  -CM     Predicted Duration of Therapy Intervention (PT)  until d/c  -     Row Name 11/05/20 1151          Vital  Signs    Pre Systolic BP Rehab  150  -CM     Pre Treatment Diastolic BP  61  -CM     Pretreatment Heart Rate (beats/min)  98  -CM     Intratreatment Heart Rate (beats/min)  114  -CM     Posttreatment Heart Rate (beats/min)  101  -CM     Pre SpO2 (%)  94  -CM     O2 Delivery Pre Treatment  room air  -CM     Intra SpO2 (%)  95  -CM     O2 Delivery Intra Treatment  room air  -CM     Post SpO2 (%)  96  -CM     O2 Delivery Post Treatment  room air  -CM     Row Name 11/05/20 1151          Positioning and Restraints    Pre-Treatment Position  in bed  -CM     Post Treatment Position  chair  -CM     In Chair  notified nsg;sitting;call light within reach;encouraged to call for assist  -CM       User Key  (r) = Recorded By, (t) = Taken By, (c) = Cosigned By    Initials Name Provider Type    Susie Andrade, PT Physical Therapist        Outcome Measures     Row Name 11/05/20 1158          Modified [place] Scale    Pre-Stroke Modified [place] Scale  0 - No Symptoms at all.  -CM     Modified [place] Scale  1 - No significant disability despite symptoms.  Able to carry out all usual duties and activities.  -     Row Name 11/05/20 1158          Functional Assessment    Outcome Measure Options  Modified [place]  -CM       User Key  (r) = Recorded By, (t) = Taken By, (c) = Cosigned By    Initials Name Provider Type    Susie Andrade, PT Physical Therapist        Physical Therapy Education                 Title: PT OT SLP Therapies (Done)     Topic: Physical Therapy (Done)     Point: Mobility training (Done)     Learning Progress Summary           Patient Eager, E,TB, VU,DU by  at 11/5/2020 1159                               User Key     Initials Effective Dates Name Provider Type Discipline    [place] 03/01/19 -  Susie Pradhan, PT Physical Therapist PT              PT Recommendation and Plan  Planned Therapy Interventions (PT): balance training, patient/family education, neuromuscular re-education, motor coordination  training, postural re-education, strengthening, gait training  Plan of Care Reviewed With: patient  Outcome Summary: 56 yo male who was admitted after coworkers noted acute confusion, dysarthria, ataxia. MRI (+) for acute 5 mm L parietal infarct. Was given tPA in ER. Hx of previous L corona radiata infect, for which he also received tPA. Had very little functional loss following first stroke. 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 Today, able to amb 400 ft w/ only slight decrease in stance on RLE. Does show significant R sided weakness (4-/5). Recommend OP PT at d/c to address functional deficits and weakness. Will follow 2xwk to address post cva weakness. PPE: gloves, mask, goggles.     Time Calculation:   PT Charges     Row Name 11/05/20 1204             Time Calculation    Start Time  0921  -CM      Stop Time  0949  -CM      Time Calculation (min)  28 min  -CM      PT Received On  11/05/20  -CM      PT - Next Appointment  11/07/20  -CM      PT Goal Re-Cert Due Date  11/19/20  -CM         Time Calculation- PT    Total Timed Code Minutes- PT  8 minute(s)  -CM        User Key  (r) = Recorded By, (t) = Taken By, (c) = Cosigned By    Initials Name Provider Type    Susie Andrade, PT Physical Therapist        Therapy Charges for Today     Code Description Service Date Service Provider Modifiers Qty    37750248943 HC PT EVAL LOW COMPLEXITY 3 11/5/2020 Susie Pradhan, PT GP 1    13452145191 HC GAIT TRAINING EA 15 MIN 11/5/2020 Susie Pradhan, PT GP 1          PT G-Codes  Outcome Measure Options: Modified [place]  Modified [place] Scale: 1 - No significant disability despite symptoms.  Able to carry out all usual duties and activities.    Susie Pradhan PT  11/5/2020

## 2020-11-05 NOTE — PROGRESS NOTES
Cc:  Follow up for stroke.       S:  The patient is lying in bed.  Almost at baseline.     O: vitals stable.  MRI of Brain showed left small parietal stroke. O/E The patient is awake, alert, oriented x 3. Speech is fluent good comprehension, follow commands.  CN EOMI, no facial droop. Motor 5/5.      A:  Acute left MCA stroke, s/p tPA.  Significant improvement almost at baseline.     P:  Will continue same care. Will continue ASA. Will follow.

## 2020-11-05 NOTE — PLAN OF CARE
Problem: Adult Inpatient Plan of Care  Goal: Plan of Care Review  11/5/2020 1202 by Susie Pradhan, PT  Flowsheets  Taken 11/5/2020 1200  Outcome Summary: 54 yo male who was admitted after coworkers noted acute confusion, dysarthria, ataxia. MRI (+) for acute 5 mm L parietal infarct. Was given tPA in ER. Hx of previous L corona radiata infect, for which he also received tPA. Had very little functional loss following first stroke. 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 Today, able to amb 400 ft w/ only slight decrease in stance on RLE. Does show significant R sided weakness (4-/5). Recommend OP PT at d/c to address functional deficits and weakness. Will follow 2xwk to address post cva weakness. PPE: gloves, mask, goggles.  Taken 11/5/2020 1151  Plan of Care Reviewed With: patient  1

## 2020-11-05 NOTE — PLAN OF CARE
Problem: Adult Inpatient Plan of Care  Goal: Plan of Care Review  Recent Flowsheet Documentation  Taken 11/5/2020 4448 by Amirah Miranda, OT  Outcome Summary: 56 yo male who was admitted after coworkers noted acute confusion, dysarthria, ataxia. MRI (+) for acute 5 mm L parietal infarct. Was given tPA in ER. Hx of previous L corona radiata infect, for which he also received tPA with mild speech deficits.  He appears to be functioning back to baseline level of function.  No coordination, sensation, or strength deficits noted.  No further OT needs at this time.  PPE: mask, shield, gloves.

## 2020-11-05 NOTE — CONSULTS
[facility]      Patient Name: Preet Jones  : 1965  MRN: [id number]  Primary Care Physician: Popeye, No Known  Date of admission: 2020    Patient Care Team:  Provider, No Known as PCP - General          Subjective   History Present Illness     Chief Complaint:   Chief Complaint   Patient presents with   • Extremity Weakness     Note taken from intensivist with additional editing:  Preet Jones is a 55 y.o. 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 Code stroke was called in the ED.  CT cerebral perfusion  showed a question of a small mismatch in the left corona radiata. Patient was given tPA and has had improvement in his symptoms. Patient is currently taking ASA, Lisinopril and VD3.   He states that he has not been taking Lipitor, Plavix or Metformin. Patient was admitted to ICU for further care and management.     2020 Patient is now stable for downgrade.  Hospitalist were consulted for medical management. Patient states he just feels tired. He feels like his symptoms have improved and his slurred speech worsens when he talks fast. He denies any chest pain, shortness of breath, or any other complains at this time.       Review of Systems   Constitution: Positive for malaise/fatigue.   HENT: Negative.    Cardiovascular: Negative.    Respiratory: Negative.    Skin: Negative.    Musculoskeletal: Negative.    Gastrointestinal: Negative.    Genitourinary: Negative.    Neurological:        Slurred speech    Psychiatric/Behavioral: Negative.            Personal History     Past Medical History:   Past Medical History:   Diagnosis  Date   • Abnormal cardiovascular stress test 1/19/2017   • Acute myocardial infarction (CMS/[facility]) 2019   • Aortic aneurysm (CMS/[facility]) 6/22/2017   • Bicuspid aortic valve 2/16/2017   • Coronary artery disease 10/29/2019   • History of coronary angioplasty with insertion of stent    • Hyperlipidemia    • Hypertension    • Obesity 9/2/2011   • Primary hyperparathyroidism (CMS/[facility]) 9/2/2011   • RUE weakness 10/29/2019    Previous residual from Stroke, but was not work prohibiting.   • Sinus arrhythmia    • Stroke (CMS/[facility])    • Type 2 diabetes mellitus (CMS/[facility]) 10/29/2019       Surgical History:      Past Surgical History:   Procedure Laterality Date   • APPENDECTOMY     • BACK SURGERY     • CARDIAC CATHETERIZATION  2010   • CARDIAC CATHETERIZATION  2019   • JOINT REPLACEMENT     • KNEE ARTHROSCOPY             Family History: family history includes No Known Problems in his father and mother. Otherwise pertinent FHx was reviewed and unremarkable.     Social History:  reports that he has never smoked. He has never used smokeless tobacco. He reports that he does not drink alcohol or use drugs.      Medications:  Prior to Admission medications    Medication Sig Start Date End Date Taking? Authorizing Provider   aspirin 81 MG tablet Take 2 tablets by mouth Daily. 10/31/19  Yes Arturo Vergara Jr., MD   Cholecalciferol (VITAMIN D-3) 25 MCG (1000 UT) capsule Take 3 capsules by mouth Daily. 6/22/17  Yes Ramsey Nye MD   lisinopril (PRINIVIL,ZESTRIL) 20 MG tablet Take 20 mg by mouth Daily. 1/5/17  Yes Ramsey Nye MD   NON FORMULARY Pt states 1 more, unsure of name   Yes Ramsey Nye MD       Allergies:    Allergies   Allergen Reactions   • Amoxicillin Rash   • Morphine GI Intolerance       Objective   Objective     Vital Signs  Temp:  [96.9 °F (36.1 °C)-98.4 °F (36.9 °C)] 97.6 °F (36.4 °C)  Heart Rate:  [] 88  Resp:  [14-24] 14  BP: (121-167)/() 151/78  SpO2:  [90 %-97 %] 90 %  on   ;    Device (Oxygen Therapy): room air  Body mass index is 27.37 kg/m².    Physical Exam  Vitals signs reviewed.   Constitutional:       Appearance: Normal appearance. He is normal weight.   HENT:      Head: Normocephalic and atraumatic.      Nose: Nose normal.      Mouth/Throat:      Mouth: Mucous membranes are moist.      Pharynx: Oropharynx is clear.   Eyes:      Extraocular Movements: Extraocular movements intact.      Conjunctiva/sclera: Conjunctivae normal.      Pupils: Pupils are equal, round, and reactive to light.   Neck:      Musculoskeletal: Normal range of motion.   Cardiovascular:      Rate and Rhythm: Normal rate and regular rhythm.      Pulses: Normal pulses.      Heart sounds: Normal heart sounds.      Comments: S1, S2 audible   Pulmonary:      Effort: Pulmonary effort is normal.      Breath sounds: Normal breath sounds.      Comments: On room air   Abdominal:      General: Abdomen is flat. Bowel sounds are normal.      Palpations: Abdomen is soft.   Musculoskeletal: Normal range of motion.   Skin:     General: Skin is warm and dry.   Neurological:      General: No focal deficit present.      Mental Status: He is alert and oriented to person, place, and time. Mental status is at baseline.   Psychiatric:         Mood and Affect: Mood normal.         Behavior: Behavior normal.         Thought Content: Thought content normal.         Judgment: Judgment normal.         Results Review:  I have personally reviewed most recent cardiac tracings, lab results and radiology images and interpretations and agree with findings.    Results from last 7 days   Lab Units 11/05/20  0546  11/04/20  0117   WBC 10*3/mm3 7.00   < > 6.00   HEMOGLOBIN g/dL 13.7   < > 13.8   HEMATOCRIT % 39.3   < > 39.7   PLATELETS 10*3/mm3 209   < > 216   INR   --   --  0.94    < > = values in this interval not displayed.     Results from last 7 days   Lab Units 11/05/20  0546  11/04/20  0117   SODIUM mmol/L 138   < > 138   POTASSIUM mmol/L  3.2*   < > 3.7   CHLORIDE mmol/L 103   < > 101   CO2 mmol/L 25.0   < > 24.0   BUN mg/dL 10   < > 11   CREATININE mg/dL 0.77   < > 0.75*   GLUCOSE mg/dL 241*   < > 389*   CALCIUM mg/dL 8.5*   < > 9.5   ALT (SGPT) U/L  --   --  20   AST (SGOT) U/L  --   --  13   TROPONIN T ng/mL  --   --  0.014    < > = values in this interval not displayed.     Estimated Creatinine Clearance: 134.8 mL/min (by C-G formula based on SCr of 0.77 mg/dL).  Brief Urine Lab Results     None          Microbiology Results (last 10 days)     Procedure Component Value - Date/Time    Respiratory Panel PCR w/COVID-19(SARS-CoV-2) TRACIE/AIDA/IDANIA/PAD/COR/MAD/FRANKI In-House, NP Swab in UTM/VTM, 3-4 HR TAT - Swab, Nasopharynx [[id number]]  (Normal) Collected: 11/04/20 0449    Lab Status: Final result Specimen: Swab from Nasopharynx Updated: 11/04/20 0544     ADENOVIRUS, PCR Not Detected     Coronavirus 229E Not Detected     Coronavirus HKU1 Not Detected     Coronavirus NL63 Not Detected     Coronavirus OC43 Not Detected     COVID19 Not Detected     Human Metapneumovirus Not Detected     Human Rhinovirus/Enterovirus Not Detected     Influenza A PCR Not Detected     Influenza A H1 Not Detected     Influenza A H1 2009 PCR Not Detected     Influenza A H3 Not Detected     Influenza B PCR Not Detected     Parainfluenza Virus 1 Not Detected     Parainfluenza Virus 2 Not Detected     Parainfluenza Virus 3 Not Detected     Parainfluenza Virus 4 Not Detected     RSV, PCR Not Detected     Bordetella pertussis pcr Not Detected     Bordetella parapertussis PCR Not Detected     Chlamydophila pneumoniae PCR Not Detected     Mycoplasma pneumo by PCR Not Detected    Narrative:      Fact sheet for providers: https://docs.[employer]/wp-content/uploads/NQP8072-8372-VK7.1-EUA-Provider-Fact-Sheet-3.pdf    Fact sheet for patients: https://docs.[employer]/wp-content/uploads/DZZ3641-3611-EE2.1-EUA-Patient-Fact-Sheet-1.pdf          ECG/EMG Results (most recent)     Procedure  Component Value Units Date/Time    ECG 12 Lead [[id number]] Collected: 11/04/20 0139     Updated: 11/04/20 0143     QT Interval 388 ms     Narrative:      HEART RATE= 76  bpm  RR Interval= 808  ms  DC Interval= 196  ms  P Horizontal Axis= -37  deg  P Front Axis= 38  deg  QRSD Interval= 90  ms  QT Interval= 388  ms  QRS Axis= -31  deg  T Wave Axis= 58  deg  - ABNORMAL ECG -  Sinus rhythm  Atrial premature complex  Left axis deviation  Nonspecific T abnrm, anterolateral leads  Electronically Signed By:   Date and Time of Study: 2020-11-04 01:39:17    Adult Transthoracic Echo Complete W/ Cont if Necessary Per Protocol [[id number]] Collected: 11/04/20 1333     Updated: 11/04/20 1541     BSA 2.1 m^2      RVIDd 2.5 cm      IVSd 1.3 cm      LVIDd 4.7 cm      LVIDs 3.5 cm      LVPWd 1.3 cm      IVS/LVPW 0.97     FS 25.2 %      EDV(Teich) 100.7 ml      ESV(Teich) 50.5 ml      EF(Teich) 49.9 %      EDV(cubed) 101.7 ml      ESV(cubed) 42.5 ml      EF(cubed) 58.2 %      LV mass(C)d 228.9 grams      LV mass(C)dI 109.0 grams/m^2      SV(Teich) 50.2 ml      SI(Teich) 23.9 ml/m^2      SV(cubed) 59.2 ml      SI(cubed) 28.2 ml/m^2      Ao root diam 4.0 cm      Ao root area 12.9 cm^2      ACS 2.1 cm      LVOT diam 2.4 cm      LVOT area 4.4 cm^2      RVOT diam 2.5 cm      RVOT area 4.8 cm^2      EDV(MOD-sp4) 124.6 ml      ESV(MOD-sp4) 55.8 ml      EF(MOD-sp4) 55.2 %      SV(MOD-sp4) 68.9 ml      SI(MOD-sp4) 32.8 ml/m^2      Ao root area (BSA corrected) 1.9     LV [place] Vol (BSA corrected) 59.4 ml/m^2      LV Sys Vol (BSA corrected) 26.6 ml/m^2      Aortic R-R 0.91 sec      Aortic HR 66.0 BPM      MV E max luciana 78.5 cm/sec      MV A max luciana 47.2 cm/sec      MV E/A 1.7     MV V2 max 102.0 cm/sec      MV max PG 4.2 mmHg      MV V2 mean 54.9 cm/sec      MV mean PG 1.5 mmHg      MV V2 VTI 29.7 cm      MVA(VTI) 2.6 cm^2      MV dec slope 302.0 cm/sec^2      MV dec time 0.26 sec      Ao pk luciana 108.1 cm/sec      Ao max PG 4.7 mmHg      Ao max PG  (full) 1.6 mmHg      Ao V2 mean 79.0 cm/sec      Ao mean PG 2.8 mmHg      Ao mean PG (full) 1.1 mmHg      Ao V2 VTI 25.6 cm      TEDDY(I,A) 3.1 cm^2      TEDDY(I,D) 3.1 cm^2      TEDDY(V,A) 3.6 cm^2      TEDDY(V,D) 3.6 cm^2      LV V1 max PG 3.1 mmHg      LV V1 mean PG 1.6 mmHg      LV V1 max 87.3 cm/sec      LV V1 mean 60.6 cm/sec      LV V1 VTI 17.8 cm      MR max luciana 301.3 cm/sec      MR max PG 37.0 mmHg      CO(Ao) 21.7 l/min      CI(Ao) 10.3 l/min/m^2      SV(Ao) 328.5 ml      SI(Ao) 156.4 ml/m^2      CO(LVOT) 5.2 l/min      CI(LVOT) 2.5 l/min/m^2      SV(LVOT) 78.2 ml      SV(RVOT) 69.9 ml      SI(LVOT) 37.2 ml/m^2      PA V2 max 102.1 cm/sec      PA max PG 4.2 mmHg      PA max PG (full) 2.6 mmHg       CV ECHO AMANDA - PVA(V,A) 3.0 cm^2       CV ECHO AMANDA - PVA(V,D) 3.0 cm^2      RV V1 max PG 1.6 mmHg      RV V1 mean PG 0.96 mmHg      RV V1 max 64.1 cm/sec      RV V1 mean 46.9 cm/sec      RV V1 VTI 14.6 cm      TR max luciana 235.3 cm/sec      Qp/Qs 0.89      CV ECHO AMANDA - BZI_BMI 27.6 kilograms/m^2       CV ECHO AMANDA - BSA([facility]) 2.1 m^2       CV ECHO AMANDA - BZI_METRIC_WEIGHT 89.8 kg       CV ECHO AMANDA - BZI_METRIC_HEIGHT 180.3 cm      EF(MOD-bp) 55.0 %      LA dimension(2D) 4.7 cm     Narrative:      Normal LV size with mild LV dysfunction with possible apical hypokinesis,   estimated LV ejection fraction of   50%  Normal RV size  Normal atrial size  Aortic valve is thickened, has adequate cusp separation.    Mitral valve, tricuspid valve appears structurally normal, trace MR TR   seen.  Calculated RV systolic pressure of 32 mm/hg  No pericardial effusion seen.  Proximal aorta appears normal in size.          Results for orders placed during the hospital encounter of 10/29/19   Duplex Carotid Ultrasound CAR    Narrative · Proximal right internal carotid artery plaque without significant   stenosis.  · Proximal left internal carotid artery plaque without significant   stenosis.          Results for orders  placed during the hospital encounter of 11/04/20   Adult Transthoracic Echo Complete W/ Cont if Necessary Per Protocol    Narrative Normal LV size with mild LV dysfunction with possible apical hypokinesis,   estimated LV ejection fraction of   50%  Normal RV size  Normal atrial size  Aortic valve is thickened, has adequate cusp separation.    Mitral valve, tricuspid valve appears structurally normal, trace MR TR   seen.  Calculated RV systolic pressure of 32 mm/hg  No pericardial effusion seen.  Proximal aorta appears normal in size.       Ct Angiogram Head    Result Date: 11/4/2020  CTA HEAD: 1.  No evidence of large vessel occlusion or critical stenosis. CTA NECK: 1.  No significant extracranial vascular stenosis, occlusion or dissection. Daniele Powers MD Neuroradiologist Thank you for this referral.  This exam was interpreted by a fellowship trained neuroradiologist.   SLOT:  68  Electronically signed by:  Daniele Powers  11/4/2020 12:29 AM    Ct Head Without Contrast    Result Date: 11/5/2020  1. No acute intracranial abnormality visible by CT. 2. Chronic lacunar infarct in the left corona radiata.  This examination was interpreted by Misha Mckeon M.D. Electronically signed by:  Misha Mckeon M.D.  11/5/2020 12:42 AM    Ct Angiogram Neck    Result Date: 11/4/2020  CTA HEAD: 1.  No evidence of large vessel occlusion or critical stenosis. CTA NECK: 1.  No significant extracranial vascular stenosis, occlusion or dissection. Daniele Powers MD Neuroradiologist Thank you for this referral.  This exam was interpreted by a fellowship trained neuroradiologist.   SLOT:  68  Electronically signed by:  Daniele Powers  11/4/2020 12:29 AM    Mri Brain Without Contrast    Result Date: 11/4/2020   1. 5 mm focal acute or subacute ischemic insult within the left parietal lobe, without hemorrhagic transformation. 2. Old infarcts within the periventricular left frontal-parietal lobe, chronic lacunar infarct within  the left basal ganglia. 3. Scattered mild chronic microvascular disease changes.   Electronically Signed By-Dr. Meliza Zabala MD On:11/4/2020 8:35 AM This report was finalized on [id number] by Dr. Meliza Zabala MD.    Xr Chest 1 View    Result Date: 11/4/2020  Diffuse alveolar opacities consistent with pneumonia.  Electronically Signed By-Tyler Mejia On:11/4/2020 7:11 AM This report was finalized on [id number] by  Tyler Mejia, .    Ct Head Without Contrast Stroke Protocol    Result Date: 11/3/2020   1.  No acute intracranial abnormality. 2.  Volume loss and mild microvascular ischemic changes. 3.  Old lacunar infarct in the left corona radiata, unchanged.  THESE RESULTS WERE DISCUSSED WITH DR. LANDON ON 11/3/2020 AT 11:29 PM, MDT.  Daniele Powers MD Neuroradiologist Thank you for this referral.  This exam was interpreted by a fellowship trained neuroradiologist.   SLOT:  68  Electronically signed by:  Daniele Powers  11/3/2020 11:30 PM    Ct Cerebral Perfusion With & Without Contrast    Result Date: 11/3/2020  Questionable small mismatched perfusion defect in the left corona radiata. MRI can be considered to exclude a small vessel lacunar infarct, if clinically warranted. Daniele Powers MD Neuroradiologist Thank you for this referral.  This exam was interpreted by a fellowship trained neuroradiologist.   SLOT:  68  Electronically signed by:  Daniele Powers  11/3/2020 11:59 PM        Estimated Creatinine Clearance: 134.8 mL/min (by C-G formula based on SCr of 0.77 mg/dL).    Assessment/Plan   Assessment/Plan       Active Hospital Problems    Diagnosis  POA   • **Acute ischemic stroke (CMS/HCC) [I63.9]  Yes     Priority: High   • Vitamin D deficiency [E55.9]  Yes   • Coronary artery disease [I25.10]  Yes   • Hypertension [I10]  Yes   • Hyperlipidemia [E78.5]  Yes   • Type 2 diabetes mellitus (CMS/HCC) [E11.9]  Yes      Resolved Hospital Problems   No resolved problems to display.     Acute ischemic  stroke  -Facial droop, right sided arm and leg weakness and dysarthria- improvement   -Status post tPA with improved symptoms  -Repeat CT scan reviewed and without new findings   -MRI brain reviewed   -Hemoglobin A1C pending   - Lipid panel reviewed  - Ischemic CVA with tPA order set protocol initiated  - Neurochecks   - PT/OT/ST following   - Neurology following     Acute hypokalemia  - K 3.2, monitor  - Electrolyte protocol ordered      H/o CVA  -Residual deficits included limb and dysarthria  -Patient reported he was not taking statin  - Statin resumed     Type II Diabetes Mellitus   -No longer taking Metformin or insulin - unclear reasons  -Hemoglobin A1C pending  -Continue SSI (high dose) and add Lantus      CAD  -Previous stent  -On ASA  -Previously on Brilinta switched to Plavix but patient reports he was taken off of Plavix due to side effects     Essential HTN  - Moderately controlled   -Monitor blood pressure  - Neurology advised to keep BP < 130/80   - Continue home lisinopril      HLD  -Lipid panel reviewed   - Continue statin     Vitamin D deficiency  - On vitamin D at home            VTE Prophylaxis -   Mechanical Order History:      Ordered        11/04/20 1128  Place Sequential Compression Device  Once         11/04/20 1128  Maintain Sequential Compression Device  Continuous         11/04/20 0403  Place Sequential Compression Device  Once         11/04/20 0403  Maintain Sequential Compression Device  Continuous                 Pharmalogical Order History:     None          CODE STATUS:    Code Status and Medical Interventions:   Ordered at: 11/04/20 0359     Code Status:    CPR     Medical Interventions (Level of Support Prior to Arrest):    Full       This patient has been examined wearing appropriate Personal Protective Equipment. 11/05/20      I discussed the patient's findings and my recommendations with patient and nursing staff.      Signature: Electronically signed by EDWINA Chavez,  11/05/20, 11:22 AM EST.    [facility] Hospitalist Team

## 2020-11-05 NOTE — PROGRESS NOTES
Continued Stay Note  [facility]     Patient Name: Preet Jones  MRN: [id number]  Today's Date: 11/5/2020    Admit Date: 11/4/2020    Discharge Plan     Row Name 11/05/20 1412       Plan    Plan  D/C Plan : Home with outpt PT/OT at [facility] . Outpt [facility] called and orders sent to [phone]    Provided Post Acute Provider List?  Yes    Post Acute Provider List  Outpatient Therapy    Delivered To  Patient    Method of Delivery  In person    Patient/Family in Agreement with Plan  yes        Discharge Codes    No documentation.       Expected Discharge Date and Time     Expected Discharge Date Expected Discharge Time    Nov 9, 2020         Met with patient in room wearing PPE: mask, face shield/goggles,     Maintained distance greater than six feet and spent less than 15 minutes in the room.        Martita Mederos RN

## 2020-11-05 NOTE — THERAPY DISCHARGE NOTE
Acute Care - Speech Language Pathology Discharge Summary   [facility]       Patient Name: Preet Jones  : 1965  MRN: [id number]    Today's Date: 2020                   Admit Date: 2020      SLP Recommendation and Plan    Visit Dx:    ICD-10-CM ICD-9-CM   1. Acute ischemic stroke (CMS/[facility])  I63.9 434.91       PPE: gloves, mask, goggles    Patient seen for a meal assessment.  He is eating mashed potates, green breans and pot roast.  Patient exhibits functional oral and pharyngeal stages of swallow.  Patient reports no difficulty eating or drinking. He reports no complaints with his speech either.  At this time no further skilled ST services indicated at this and patient will be discharged from caseload        SLP GOALS     Row Name 20 1300       Oral Nutrition/Hydration Goal 1 (SLP)    Oral Nutrition/Hydration Goal 1, SLP  Patient will be seen at a meal within 24-48 hours to assess tolerance of current diet with further recommendations to be given as indicated  -MM    Time Frame (Oral Nutrition/Hydration Goal 1, SLP)  2 days  -MM    Barriers (Oral Nutrition/Hydration Goal 1, SLP)  Patient seen at lunch.  He was in bed 90 degrees feeding himself.  Patient takes single bites and sips with no s/s of aspiration  -MM    Progress/Outcomes (Oral Nutrition/Hydration Goal 1, SLP)  goal met  -MM       Oral Nutrition/Hydration Goal 2 (SLP)    Oral Nutrition/Hydration Goal 2, SLP  Patient will tolerate safest and least restrictive diet.  -MM    Time Frame (Oral Nutrition/Hydration Goal 2, SLP)  by discharge  -MM    Barriers (Oral Nutrition/Hydration Goal 2, SLP)  No overt s/s of aspiration noted during the meal  -MM    Progress/Outcomes (Oral Nutrition/Hydration Goal 2, SLP)  goal met  -MM      User Key  (r) = Recorded By, (t) = Taken By, (c) = Cosigned By    Initials Name Provider Type    Yanna Suárez SLP Speech and Language Pathologist            Therapy Charges for Today     Code Description  Service Date Service Provider Modifiers Qty    [id number]  ST EVAL ORAL PHARYNG SWALLOW 4 11/4/2020 Yanna Olivier, SLP GN 1    [id number]  ST EVAL SPEECH AND PROD W LANG  5 11/4/2020 Yanna Olivier SLP GN 1            SLP Discharge Summary  Anticipated Discharge Disposition (SLP): home      FANY Murrell  11/5/2020

## 2020-11-05 NOTE — PAYOR COMM NOTE
"AUTHORIZATION PENDING:   PLEASE CALL OR FAX DETERMINATION TO CONTACT BELOW. THANK YOU.        Deedee Lea RN MSN  /UR  [facility]  [phone] office  [phone] fax  [email or web address]    [facility]  NPI: [id number]  Tax: 192-        Thalia Roman (55 y.o. Male)     Date of Birth Social Security Number Address Home Phone MRN    1965  [id number] [place] [address] [phone] [id number]    Protestant Marital Status          None        Admission Date Admission Type Admitting Provider Attending Provider Department, Room/Bed    11/4/20 Emergency Robert Alicea MD Khan, Zaka Urrehman, MD [facility] INTENSIVE CARE UNIT, 2314/1    Discharge Date Discharge Disposition Discharge Destination                       Attending Provider: Robert Alicea MD    Allergies: Amoxicillin, Morphine    Isolation: None   Infection: None   Code Status: CPR    Ht: 180.3 cm (71\")   Wt: 89 kg (196 lb 3.4 oz)    Admission Cmt: None   Principal Problem: None                Active Insurance as of 11/4/2020     Primary Coverage     Payor Plan Insurance Group Employer/Plan Group    Formerly [facility] PPO [id number]     Payor Plan Address Payor Plan Phone Number Payor Plan Fax Number Effective Dates    [address] [phone]  6/1/2020 - None Entered    [place] [address]       Subscriber Name Subscriber Birth Date Member ID       THALIA ROMAN 1965 [id number]                 Emergency Contacts      (Rel.) Home Phone Work Phone Mobile Phone    JETT ROMAN (Spouse) [phone] -- --        11/04/20 0239  Inpatient Admission Once    Completed   Level of Care: Critical Care    Diagnosis: Acute ischemic stroke (CMS/HCC) [927925]    Admitting Physician: FIORDALIZA SALAZAR [[id number]]    Certification: I certify that inpatient hospital services are medically necessary for " greater than 2 midnights.            DX:  Acute ischemic stroke (CMS/[facility]) ICD-10-CM: I63.9      Criteria Review: [facility]   Stroke: Ischemic (M-83)  Admission is indicated for 1 or more of the following(1)(2)(3)(4)(5):  Acute ischemic stroke[A][B]                History & Physical      Robert Alicea MD at 20 0413          PULMONARY/ CRITICAL CARE ADMISSION H&P NOTE        Patient Name:  Preet Jones    :  1965    Medical Record:  [id number]    PRIMARY CARE PHYSICIAN     Provider, No Known    [place]  Preet Jones is a 55 y.o. 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  Cerebral imaging was negative for any large vessel occlusions, however, CT cerebral perfusion did showed a question of a small mismatch in the left corona radiata.   Initial NIH was 4 per ER report and the patient was given tPA and has had improvement in his symptoms with NIH improving to 2.     Patient is currently taking ASA, Lisinopril and VD3.   He states that he has not been taking Lipitor, Plavix or Metformin.      REVIEW OF SYSTEMS    Constitutional:  Denies fever or chills   Eyes:  Denies change in visual acuity   HENT:  Denies nasal congestion or sore throat   Respiratory:  Denies cough or shortness of breath   Cardiovascular:  Denies chest pain or edema   GI:  Denies abdominal pain, nausea, vomiting, bloody stools or diarrhea   :  Denies dysuria   Musculoskeletal:  Denies back pain or joint pain   Integument:  Denies rash   Neurologic:  Denies headache, focal weakness or sensory changes   Endocrine:  Denies polyuria or polydipsia   Psychiatric:  Denies depression or anxiety     HOME MEDICATIONS  Prior to  Admission medications    Medication Sig Start Date End Date Taking? Authorizing Provider   aspirin 81 MG tablet Take 2 tablets by mouth Daily. 10/31/19  Yes Arturo Vergara Jr., MD   Cholecalciferol (VITAMIN D-3) 25 MCG (1000 UT) capsule Take 3 capsules by mouth Daily. 6/22/17  Yes Ramsey Nye MD   lisinopril (PRINIVIL,ZESTRIL) 20 MG tablet Take 20 mg by mouth Daily. 1/5/17  Yes Ramsey Nye MD   NON FORMULARY Pt states 1 more, unsure of name   Yes Ramsey Nye MD   atorvastatin (LIPITOR) 80 MG tablet Take 1 tablet by mouth Daily. 11/1/19 11/4/20  Arturo Vergara Jr., MD   clopidogrel (PLAVIX) 75 MG tablet Take 75 mg by mouth Daily. 2/16/17 11/4/20  Ramsey Nye MD   insulin glargine (LANTUS) 100 UNIT/ML injection Inject 20 Units under the skin into the appropriate area as directed At Night As Needed.  11/4/20  Ramsey Nye MD   insulin lispro (humaLOG) 100 UNIT/ML injection Inject 0-9 Units under the skin into the appropriate area as directed 4 (Four) Times a Day With Meals & at Bedtime. 10/31/19 11/4/20  Arturo Vergara Jr., MD   metFORMIN (GLUCOPHAGE) 1000 MG tablet Take 1,000 mg by mouth 2 (Two) Times a Day. 1/5/17 11/4/20  Ramsey Nye MD       MEDICAL HISTORY    Past Medical History:   Diagnosis Date   • Abnormal cardiovascular stress test 1/19/2017   • Acute myocardial infarction (CMS/[facility]) 2019   • Aortic aneurysm (CMS/[facility]) 6/22/2017   • Bicuspid aortic valve 2/16/2017   • Coronary artery disease 10/29/2019   • History of coronary angioplasty with insertion of stent    • Hyperlipidemia    • Hypertension    • Obesity 9/2/2011   • Primary hyperparathyroidism (CMS/HCC) 9/2/2011   • RUE weakness 10/29/2019    Previous residual from Stroke, but was not work prohibiting.   • Sinus arrhythmia    • Stroke (CMS/[facility])    • Type 2 diabetes mellitus (CMS/[facility]) 10/29/2019        SURGICAL HISTORY    Past Surgical History:   Procedure Laterality Date   • APPENDECTOMY      • BACK SURGERY     • CARDIAC CATHETERIZATION     • CARDIAC CATHETERIZATION  2019   • JOINT REPLACEMENT     • KNEE ARTHROSCOPY          FAMILY HISTORY    Family History   Problem Relation Age of Onset   • No Known Problems Mother    • No Known Problems Father         SOCIAL HISTORY    Social History     Socioeconomic History   • Marital status:      Spouse name: Not on file   • Number of children: Not on file   • Years of education: Not on file   • Highest education level: Not on file   Tobacco Use   • Smoking status: Never Smoker   • Smokeless tobacco: Never Used   Substance and Sexual Activity   • Alcohol use: No     Frequency: Never   • Drug use: No   • Sexual activity: Yes     Partners: Female        ALLERGIES    Allergies   Allergen Reactions   • Amoxicillin Rash   • Morphine GI Intolerance       PHYSICAL EXAM    tMax 24 hrs:  Temp (24hrs), Av.8 °F (37.1 °C), Min:98.8 °F (37.1 °C), Max:98.8 °F (37.1 °C)    Vitals Ranges:  Temp:  [98.8 °F (37.1 °C)] 98.8 °F (37.1 °C)  Heart Rate:  [63-79] 70  Resp:  [12-19] 16  BP: (149-163)/(88-97) 150/88  Intake and Output Last 3 Shifts:  No intake/output data recorded.    Constitutional:   Alert, no acute respiratory distress   HEENT:   Atraumatic, PERRL, conjunctiva normal, moist oral mucosa, no nasal discharge.  Trachea is midline.  Respiratory:   No respiratory distress, normal breath sounds, no rales, no wheezing   Cardiovascular:   Normal rate, normal rhythm and no murmurs.  Pulses 2+ and equal in all four extremities.    GI:   Soft, nondistended, positive bowel sounds.  :   No costovertebral angle tenderness   Extremities:   No edema, cyanosis or tenderness.  Integument:   No rashes.   Neurologic:   Alert & oriented x 3.    NIH is currently 3.    1A: Level of consciousness --> 0 = Alert; keenly responsive  1B: Ask month and age --> 0 = Both questions right  1C: 'Blink eyes' & 'squeeze hands' --> 0 = Performs both tasks  2: Horizontal extraocular  movements --> 0 = Normal  3: Visual fields --> 0 = No visual loss  4: Facial palsy --> 1 = Minor paralysis (flat nasolabial fold, smile asymmetry)  5A: Left arm motor drift --> 0 = No drift for 10 seconds  5B: Right arm motor drift --> 0 = No drift for 10 seconds  6A: Left leg motor drift --> 0 = No drift for 5 seconds  6B: Right leg motor drift --> 0 = No drift for 5 seconds  7: Limb Ataxia --> 0 = No ataxia  8: Sensation --> 1 = Mild-moderate loss: less sharp/more dull   9: Language/aphasia --> 0 = Normal; no aphasia  10: Dysarthria --> 1 = Mild-moderate dysarthria: slurring but can be understood  11: Extinction/inattention --> 0 = No abnormality    LABS    Lab Results (last 24 hours)     Procedure Component Value Units Date/Time    Magnesium [[id number]] Collected: 11/04/20 0117    Specimen: Blood Updated: 11/04/20 0408    Phosphorus [[id number]] Collected: 11/04/20 0117    Specimen: Blood Updated: 11/04/20 0408    [place] Draw [[id number]] Collected: 11/04/20 0117    Specimen: Blood Updated: 11/04/20 0230    Narrative:      The following orders were created for panel order [place] Draw.  Procedure                               Abnormality         Status                     ---------                               -----------         ------                     Light Blue Top[[id number]]                                   Final result               Green Top (Gel)[[id number]]                                  Final result               Lavender Top[[id number]]                                     Final result               Gold Top - SST[[id number]]                                   Final result                 Please view results for these tests on the individual orders.    Light Blue Top [[id number]] Collected: 11/04/20 0117    Specimen: Blood Updated: 11/04/20 0230     Extra Tube hold for add-on     Comment: Auto resulted       Green Top (Gel) [[id number]] Collected: 11/04/20 0117    Specimen: Blood Updated: 11/04/20 0230      Extra Tube Hold for add-ons.     Comment: Auto resulted.       Lavender Top [[id number]] Collected: 11/04/20 0117    Specimen: Blood Updated: 11/04/20 0230     Extra Tube hold for add-on     Comment: Auto resulted       Gold Top - SST [[id number]] Collected: 11/04/20 0117    Specimen: Blood Updated: 11/04/20 0230     Extra Tube Hold for add-ons.     Comment: Auto resulted.       Comprehensive Metabolic Panel [[id number]]  (Abnormal) Collected: 11/04/20 0117    Specimen: Blood Updated: 11/04/20 0144     Glucose 389 mg/dL      BUN 11 mg/dL      Creatinine 0.75 mg/dL      Sodium 138 mmol/L      Potassium 3.7 mmol/L      Chloride 101 mmol/L      CO2 24.0 mmol/L      Calcium 9.5 mg/dL      Total Protein 6.3 g/dL      Albumin 4.10 g/dL      ALT (SGPT) 20 U/L      AST (SGOT) 13 U/L      Alkaline Phosphatase 93 U/L      Total Bilirubin 0.4 mg/dL      eGFR Non African Amer 108 mL/min/1.73      Globulin 2.2 gm/dL      A/G Ratio 1.9 g/dL      BUN/Creatinine Ratio 14.7     Anion Gap 13.0 mmol/L     Narrative:      GFR Normal >60  Chronic Kidney Disease <60  Kidney Failure <15      Troponin [[id number]]  (Normal) Collected: 11/04/20 0117    Specimen: Blood Updated: 11/04/20 0144     Troponin T 0.014 ng/mL     Narrative:      Troponin T Reference Range:  <= 0.03 ng/mL-   Negative for AMI  >0.03 ng/mL-     Abnormal for myocardial necrosis.  Clinicians would have to utilize clinical acumen, EKG, Troponin and serial changes to determine if it is an Acute Myocardial Infarction or myocardial injury due to an underlying chronic condition.       Results may be falsely decreased if patient taking Biotin.      Protime-INR [[id number]]  (Normal) Collected: 11/04/20 0117    Specimen: Blood Updated: 11/04/20 0140     Protime 10.4 Seconds      INR 0.94    aPTT [[id number]]  (Normal) Collected: 11/04/20 0117    Specimen: Blood Updated: 11/04/20 0140     PTT 25.3 seconds     CBC & Differential [[id number]]  (Normal) Collected: 11/04/20 0117     Specimen: Blood Updated: 11/04/20 0125    Narrative:      The following orders were created for panel order CBC & Differential.  Procedure                               Abnormality         Status                     ---------                               -----------         ------                     CBC Auto Differential[[id number]]        Normal              Final result                 Please view results for these tests on the individual orders.    CBC Auto Differential [[id number]]  (Normal) Collected: 11/04/20 0117    Specimen: Blood Updated: 11/04/20 0125     WBC 6.00 10*3/mm3      RBC 4.65 10*6/mm3      Hemoglobin 13.8 g/dL      Hematocrit 39.7 %      MCV 85.3 fL      MCH 29.7 pg      MCHC 34.8 g/dL      RDW 13.3 %      RDW-SD 39.8 fl      MPV 8.9 fL      Platelets 216 10*3/mm3      Neutrophil % 54.1 %      Lymphocyte % 33.4 %      Monocyte % 7.4 %      Eosinophil % 4.0 %      Basophil % 1.1 %      Neutrophils, Absolute 3.30 10*3/mm3      Lymphocytes, Absolute 2.00 10*3/mm3      Monocytes, Absolute 0.40 10*3/mm3      Eosinophils, Absolute 0.20 10*3/mm3      Basophils, Absolute 0.10 10*3/mm3      nRBC 0.1 /100 WBC     POC Glucose Once [[id number]]  (Abnormal) Collected: 11/04/20 0110    Specimen: Blood Updated: 11/04/20 0111     Glucose 351 mg/dL      Comment: Serial Number: [id number]:  [id number]             MICRO:  Microbiology Results (last 10 days)     ** No results found for the last 240 hours. **          IMAGING & OTHER STUDIES    Imaging Results (Last 72 Hours)     Procedure Component Value Units Date/Time    CT Angiogram Head [[id number]] Collected: 11/04/20 0022     Updated: 11/04/20 0230    Narrative:      EXAMINATION: CT ANGIOGRAM HEAD, CT ANGIOGRAM NECK    DATE: 11/4/2020 1:21 AM     HISTORY:  Patient Data: Male, 55 years of age.  Clinical Indication : Stroke alert. Right-sided weakness and slurred speech.      COMPARISON: None available.     TECHNIQUE: CT angiography through the head and  neck was performed in the axial plane using 100 mL of Isovue-370 administered intravenously. Coronal and sagittal MIP and MPR images were then created. There were no immediate complications. CT dose lowering   techniques including automated exposure control, adjustment for patient size, and/or use of iterative reconstruction were used.    NOTE: All arterial stenosis estimates in this report are derived using NASCET criteria.       FINDINGS:         CTA OF THE NECK:    Aorta:    The visualized aortic arch and great vessel origins are patent.    Right Carotid:    The right common carotid artery is patent from its origin to its bifurcation, without hemodynamically significant stenosis or dissection. The right carotid bifurcation is patent, as is the origin of the internal carotid artery. The right internal carotid   artery is then patent through the remainder of its cervical course, with no critical stenosis or dissection.    Left Carotid:    The left common carotid artery is patent from its origin to its bifurcation, without hemodynamically significant stenosis or dissection. The left carotid bifurcation is patent, as is the origin of the internal carotid artery. The left internal carotid   artery is then patent through the remainder of its cervical course, with no critical stenosis or dissection.    Vertebral arteries:    The vertebral arteries are codominant. The vertebral arteries are without occlusion, significant stenosis, or evidence of dissection.        CTA OF THE HEAD:    Anterior circulation:    Each internal carotid artery is widely patent through the skull base and cavernous sinus to its terminus. Each middle and anterior cerebral artery is patent, with no critical arterial stenoses identified. No anterior circulation aneurysm or vascular   malformation is seen.     Posterior circulation:    Each vertebral artery is patent where visualized, from the caudal extent of the exam to the vertebrobasilar  junction. The basilar artery is normal. No critical stenoses, aneurysms or vascular malformations are seen in the posterior circulation.    Venous structures:    The major dural sinuses and deep draining veins of the brain are grossly patent on this non-dedicated exam.         Impression:        CTA HEAD:  1.  No evidence of large vessel occlusion or critical stenosis.    CTA NECK:  1.  No significant extracranial vascular stenosis, occlusion or dissection.              Daniele Powers MD  Neuroradiologist    Thank you for this referral.  This exam was interpreted by a fellowship trained neuroradiologist.       SLOT:  68      Electronically signed by:  Daniele Powers    11/4/2020 12:29 AM    CT Angiogram Neck [[id number]] Collected: 11/04/20 0022     Updated: 11/04/20 0230    Narrative:      EXAMINATION: CT ANGIOGRAM HEAD, CT ANGIOGRAM NECK    DATE: 11/4/2020 1:21 AM     HISTORY:  Patient Data: Male, 55 years of age.  Clinical Indication : Stroke alert. Right-sided weakness and slurred speech.      COMPARISON: None available.     TECHNIQUE: CT angiography through the head and neck was performed in the axial plane using 100 mL of Isovue-370 administered intravenously. Coronal and sagittal MIP and MPR images were then created. There were no immediate complications. CT dose lowering   techniques including automated exposure control, adjustment for patient size, and/or use of iterative reconstruction were used.    NOTE: All arterial stenosis estimates in this report are derived using NASCET criteria.       FINDINGS:         CTA OF THE NECK:    Aorta:    The visualized aortic arch and great vessel origins are patent.    Right Carotid:    The right common carotid artery is patent from its origin to its bifurcation, without hemodynamically significant stenosis or dissection. The right carotid bifurcation is patent, as is the origin of the internal carotid artery. The right internal carotid   artery is then patent through  the remainder of its cervical course, with no critical stenosis or dissection.    Left Carotid:    The left common carotid artery is patent from its origin to its bifurcation, without hemodynamically significant stenosis or dissection. The left carotid bifurcation is patent, as is the origin of the internal carotid artery. The left internal carotid   artery is then patent through the remainder of its cervical course, with no critical stenosis or dissection.    Vertebral arteries:    The vertebral arteries are codominant. The vertebral arteries are without occlusion, significant stenosis, or evidence of dissection.        CTA OF THE HEAD:    Anterior circulation:    Each internal carotid artery is widely patent through the skull base and cavernous sinus to its terminus. Each middle and anterior cerebral artery is patent, with no critical arterial stenoses identified. No anterior circulation aneurysm or vascular   malformation is seen.     Posterior circulation:    Each vertebral artery is patent where visualized, from the caudal extent of the exam to the vertebrobasilar junction. The basilar artery is normal. No critical stenoses, aneurysms or vascular malformations are seen in the posterior circulation.    Venous structures:    The major dural sinuses and deep draining veins of the brain are grossly patent on this non-dedicated exam.         Impression:        CTA HEAD:  1.  No evidence of large vessel occlusion or critical stenosis.    CTA NECK:  1.  No significant extracranial vascular stenosis, occlusion or dissection.              Daniele Powers MD  Neuroradiologist    Thank you for this referral.  This exam was interpreted by a fellowship trained neuroradiologist.       SLOT:  68      Electronically signed by:  Daniele Powers    11/4/2020 12:29 AM    CT Cerebral Perfusion With & Without Contrast [[id number]] Collected: 11/03/20 2354     Updated: 11/04/20 0202    Narrative:      EXAMINATION: CT CEREBRAL  PERFUSION W WO CONTRAST    DATE: 11/4/2020 1:21 AM     HISTORY:  Patient Data: Male, 55 years of age.  Clinical Indication : Right-sided weakness and slurred speech. Stroke alert.      COMPARISON: None available.     TECHNIQUE: CT perfusion through the head was performed in the axial plane using 50 mL of Isovue-370 administered intravenously, according to institutional protocol, with postprocessing. CT dose lowering techniques including automated exposure control,   adjustment for patient size, and/or use of iterative reconstruction were used.        FINDINGS:    Mildly limited due to global hypoperfusion versus technical factors related to timing of the contrast bolus.  There is a questionable small area of relatively increased Tmax and TTP without corresponding abnormalities on CBF or CBV in the left parietal   periventricular white matter/corona radiata, which could represent an artifact or small area of viable ischemic penumbra.  This is estimated at 4 cc in size.           Impression:        Questionable small mismatched perfusion defect in the left corona radiata. MRI can be considered to exclude a small vessel lacunar infarct, if clinically warranted.                Daniele Powers MD  Neuroradiologist    Thank you for this referral.  This exam was interpreted by a fellowship trained neuroradiologist.       SLOT:  68      Electronically signed by:  Daniele Powers    11/3/2020 11:59 PM    XR Chest 1 View [[id number]] Resulted: 11/04/20 0158     Updated: 11/04/20 0201    CT Head Without Contrast Stroke Protocol [[id number]] Collected: 11/03/20 2324     Updated: 11/04/20 0131    Narrative:      EXAMINATION: CT HEAD WO CONTRAST STROKE PROTOCOL    DATE: 11/4/2020 1:15 AM     HISTORY:  Patient Data: Male, 55 years of age.  Clinical Indication : Right-sided weakness and slurred speech. Stroke alert.      COMPARISON: 10/29/2019     TECHNIQUE: Thin section noncontrast axial images were obtained through the head.  Coronal reformatted images were then created. CT dose lowering techniques including automated exposure control, adjustment for patient size, and/or use of iterative   reconstruction were used.     FINDINGS:    Ventricles and Extra-axial Spaces: Mildly enlarged due to volume loss.     Parenchyma: Old lacunar infarct in the left corona radiata, unchanged. Patchy periventricular white matter low-attenuation, most likely from small vessel ischemic disease.   No CT evidence of large-vessel territorial infarct.   No mass effect or midline   shift.     Intracranial Hemorrhage: None.     Bones/Extracranial soft tissues: No depressed calvarial fracture.     Visualized Sinuses/Mastoids: Clear.          Impression:         1.  No acute intracranial abnormality.  2.  Volume loss and mild microvascular ischemic changes.  3.  Old lacunar infarct in the left corona radiata, unchanged.             THESE RESULTS WERE DISCUSSED WITH DR. LANDON ON 11/3/2020 AT 11:29 PM, MDT.       Daniele Powers MD  Neuroradiologist    Thank you for this referral.  This exam was interpreted by a fellowship trained neuroradiologist.       SLOT:  68      Electronically signed by:  Daniele Powers    11/3/2020 11:30 PM          ECHOCARDIOGRAM:  Results for orders placed during the hospital encounter of 10/29/19   Adult Transthoracic Echo Complete W/ Cont if Necessary Per Protocol    Narrative · Estimated EF = 65%.  · Left ventricular systolic function is normal.  · Left atrial cavity size is moderately dilated.  · Mild mitral valve regurgitation is present             ASSESSMENT/PLAN  Acute ischemic stroke  -Facial droop, right sided arm and leg weakness and dysarthria  -Status post tPA  -Neurology consulted  -Repeat CT scan 24 hours post tPA  -MRI ordered  -Hemoglobin A1C and lipid panel pending  -Ischemic CVA with tPA order set protocol initiated    H/o CVA  -Residual deficits included limb and dysarthria  -Pt not taking his statin    T2DM  -No  longer taking Metformin or insulin - unclear reasons  -Hemoglobin A1C pending  -SSI    CAD  -Previous stent  -On ASA  -Previously on Brilinta switched to Plavix but patient reports he was taken off of Plavix due to side effects    HTN  -Takes Lisinopril  -Blood pressure parameters per order set for CVA with tPA administration    HLD  -Not on statin - unclear reasons why patient not taking   -Lipid panel pending    PUD: Protonix  Insulin:   Sliding scale  VTE:   SCDs  Nutrition:  NPO              EDWINA Christian    Attending physician statement:  Patient remains critically ill.  Total critical care time spent is 32 minutes which does not include any time for procedures.  Critical care time is exclusive of time spent by the nurse practitioner.  Above note scribed by nurse practitioner for me and later reviewed by me for accuracy . I've examined the patient and reviewed all labs and images.  Continue to monitor closely.  Status post TPA.  Neurology work-up in progress.  We will start Cardene infusion to keep systolic blood pressure less than 130.  I have directly participated in the evaluation and management of this patient.  Robert Alicea MD        Electronically signed by Robert Alicea MD at 11/04/20 1440          Emergency Department Notes      Fritz Tidwell MD at 11/04/20 0114          Subjective   History of Present Illness  Slurred speech, facial droop and some right arm and leg weakness  55-year-old male developed some left facial droop and some right arm and leg weakness and slurred speech that started about 1 hour prior to arrival while he was at work on night shift.  He had a similar episode with a stroke he had in 2019.  He reports no headache or chest or abdominal pain or trauma.  He denies any use of anticoagulants or recent surgeries.  Review of Systems   HENT: Negative.    Eyes: Negative.    Respiratory: Negative.    Cardiovascular: Negative.    Gastrointestinal: Negative.     Genitourinary: Negative.    Musculoskeletal: Negative.    Skin: Negative.    Neurological: Positive for facial asymmetry, speech difficulty and weakness.   Hematological: Negative.    Psychiatric/Behavioral: Negative.        Past Medical History:   Diagnosis Date   • Abnormal cardiovascular stress test 1/19/2017   • Acute myocardial infarction (CMS/[facility]) 2019   • Aortic aneurysm (CMS/[facility]) 6/22/2017   • Bicuspid aortic valve 2/16/2017   • Coronary artery disease 10/29/2019   • History of coronary angioplasty with insertion of stent    • Hyperlipidemia    • Hypertension    • Obesity 9/2/2011   • Primary hyperparathyroidism (CMS/[facility]) 9/2/2011   • RUE weakness 10/29/2019    Previous residual from Stroke, but was not work prohibiting.   • Sinus arrhythmia    • Stroke (CMS/[facility])    • Type 2 diabetes mellitus (CMS/[facility]) 10/29/2019       Allergies   Allergen Reactions   • Amoxicillin Rash   • Morphine GI Intolerance       Past Surgical History:   Procedure Laterality Date   • APPENDECTOMY     • BACK SURGERY     • CARDIAC CATHETERIZATION  2010   • CARDIAC CATHETERIZATION  2019   • JOINT REPLACEMENT     • KNEE ARTHROSCOPY         Family History   Problem Relation Age of Onset   • No Known Problems Mother    • No Known Problems Father        Social History     Socioeconomic History   • Marital status:      Spouse name: Not on file   • Number of children: Not on file   • Years of education: Not on file   • Highest education level: Not on file   Tobacco Use   • Smoking status: Never Smoker   • Smokeless tobacco: Never Used   Substance and Sexual Activity   • Alcohol use: No     Frequency: Never   • Drug use: No   • Sexual activity: Yes     Partners: Female       Prior to Admission medications    Medication Sig Start Date End Date Taking? Authorizing Provider   aspirin 81 MG tablet Take 2 tablets by mouth Daily. 10/31/19   Arturo Vergara Jr., MD   atorvastatin (LIPITOR) 80 MG tablet Take 1 tablet by mouth Daily. 11/1/19    "Arturo Vergara Jr., MD   Cholecalciferol (VITAMIN D-3) 25 MCG (1000 UT) capsule Take 3 capsules by mouth Daily. 6/22/17   Ramsey Nye MD   clopidogrel (PLAVIX) 75 MG tablet Take 75 mg by mouth Daily. 2/16/17   Ramsey Nye MD   insulin glargine (LANTUS) 100 UNIT/ML injection Inject 20 Units under the skin into the appropriate area as directed At Night As Needed.    Ramsey Nye MD   insulin lispro (humaLOG) 100 UNIT/ML injection Inject 0-9 Units under the skin into the appropriate area as directed 4 (Four) Times a Day With Meals & at Bedtime. 10/31/19   Arturo Vergara Jr., MD   lisinopril (PRINIVIL,ZESTRIL) 20 MG tablet Take 20 mg by mouth Daily. 1/5/17   Ramsey Nye MD   metFORMIN (GLUCOPHAGE) 1000 MG tablet Take 1,000 mg by mouth 2 (Two) Times a Day. 1/5/17   Ramsey Nye MD     /79   Pulse 76   Temp 98.8 °F (37.1 °C) (Oral)   Resp 16   Ht 180.3 cm (71\")   Wt 87.1 kg (192 lb)   SpO2 95%   BMI 26.78 kg/m²   I examined the patient using the appropriate personal protective equipment.        Objective   Physical Exam  General: Well-developed well-appearing, no acute distress, alert and appropriate  Eyes: Pupils round and equal, sclera nonicteric  HEENT: Mucous membranes moist, no mucosal swelling  Neck: Supple, no nuchal rigidity, no lymphadenopathy  Respirations: Respirations nonlabored, equal breath sounds bilaterally, clear lungs  Heart regular rate and rhythm, no murmurs rubs or gallops,   Abdomen soft nontender nondistended,  Extremities no clubbing cyanosis or edema,   Neuro some left facial droop, speech is slightly slurred, there is some right pronator drift and right leg drift  Psych oriented, pleasant affect  Skin no rash, brisk cap refill  Procedures          ED Course  ED Course as of Nov 04 0725 Wed Nov 04, 2020 0724 Patient is not having any respiratory symptoms or recent illness, no leukocytosis.  His Covid screen was negative.  He was " not started on pneumonia treatment the emergency room due to lack of symptoms consistent with pneumonia   XR Chest 1 View [SH]      ED Course User Index  [SH] Fritz Tidwell MD            Results for orders placed or performed during the hospital encounter of 11/04/20   Respiratory Panel PCR w/COVID-19(SARS-CoV-2) TRACIE/AIDA/IDANIA/PAD/COR/MAD/FRANKI In-House, NP Swab in UTM/VTM, 3-4 HR TAT - Swab, Nasopharynx    Specimen: Nasopharynx; Swab   Result Value Ref Range    ADENOVIRUS, PCR Not Detected Not Detected    Coronavirus 229E Not Detected Not Detected    Coronavirus HKU1 Not Detected Not Detected    Coronavirus NL63 Not Detected Not Detected    Coronavirus OC43 Not Detected Not Detected    COVID19 Not Detected Not Detected - Ref. Range    Human Metapneumovirus Not Detected Not Detected    Human Rhinovirus/Enterovirus Not Detected Not Detected    Influenza A PCR Not Detected Not Detected    Influenza A H1 Not Detected Not Detected    Influenza A H1 2009 PCR Not Detected Not Detected    Influenza A H3 Not Detected Not Detected    Influenza B PCR Not Detected Not Detected    Parainfluenza Virus 1 Not Detected Not Detected    Parainfluenza Virus 2 Not Detected Not Detected    Parainfluenza Virus 3 Not Detected Not Detected    Parainfluenza Virus 4 Not Detected Not Detected    RSV, PCR Not Detected Not Detected    Bordetella pertussis pcr Not Detected Not Detected    Bordetella parapertussis PCR Not Detected Not Detected    Chlamydophila pneumoniae PCR Not Detected Not Detected    Mycoplasma pneumo by PCR Not Detected Not Detected   Comprehensive Metabolic Panel    Specimen: Blood   Result Value Ref Range    Glucose 389 (H) 65 - 99 mg/dL    BUN 11 6 - 20 mg/dL    Creatinine 0.75 (L) 0.76 - 1.27 mg/dL    Sodium 138 136 - 145 mmol/L    Potassium 3.7 3.5 - 5.2 mmol/L    Chloride 101 98 - 107 mmol/L    CO2 24.0 22.0 - 29.0 mmol/L    Calcium 9.5 8.6 - 10.5 mg/dL    Total Protein 6.3 6.0 - 8.5 g/dL    Albumin 4.10 3.50 - 5.20  g/dL    ALT (SGPT) 20 1 - 41 U/L    AST (SGOT) 13 1 - 40 U/L    Alkaline Phosphatase 93 39 - 117 U/L    Total Bilirubin 0.4 0.0 - 1.2 mg/dL    eGFR Non African Amer 108 >60 mL/min/1.73    Globulin 2.2 gm/dL    A/G Ratio 1.9 g/dL    BUN/Creatinine Ratio 14.7 7.0 - 25.0    Anion Gap 13.0 5.0 - 15.0 mmol/L   Protime-INR    Specimen: Blood   Result Value Ref Range    Protime 10.4 9.6 - 11.7 Seconds    INR 0.94 0.93 - 1.10   aPTT    Specimen: Blood   Result Value Ref Range    PTT 25.3 24.0 - 31.0 seconds   Troponin    Specimen: Blood   Result Value Ref Range    Troponin T 0.014 0.000 - 0.030 ng/mL   CBC Auto Differential    Specimen: Blood   Result Value Ref Range    WBC 6.00 3.40 - 10.80 10*3/mm3    RBC 4.65 4.14 - 5.80 10*6/mm3    Hemoglobin 13.8 13.0 - 17.7 g/dL    Hematocrit 39.7 37.5 - 51.0 %    MCV 85.3 79.0 - 97.0 fL    MCH 29.7 26.6 - 33.0 pg    MCHC 34.8 31.5 - 35.7 g/dL    RDW 13.3 12.3 - 15.4 %    RDW-SD 39.8 37.0 - 54.0 fl    MPV 8.9 6.0 - 12.0 fL    Platelets 216 140 - 450 10*3/mm3    Neutrophil % 54.1 42.7 - 76.0 %    Lymphocyte % 33.4 19.6 - 45.3 %    Monocyte % 7.4 5.0 - 12.0 %    Eosinophil % 4.0 0.3 - 6.2 %    Basophil % 1.1 0.0 - 1.5 %    Neutrophils, Absolute 3.30 1.70 - 7.00 10*3/mm3    Lymphocytes, Absolute 2.00 0.70 - 3.10 10*3/mm3    Monocytes, Absolute 0.40 0.10 - 0.90 10*3/mm3    Eosinophils, Absolute 0.20 0.00 - 0.40 10*3/mm3    Basophils, Absolute 0.10 0.00 - 0.20 10*3/mm3    nRBC 0.1 0.0 - 0.2 /100 WBC   Basic Metabolic Panel    Specimen: Blood   Result Value Ref Range    Glucose 279 (H) 65 - 99 mg/dL    BUN 10 6 - 20 mg/dL    Creatinine 0.61 (L) 0.76 - 1.27 mg/dL    Sodium 137 136 - 145 mmol/L    Potassium 3.3 (L) 3.5 - 5.2 mmol/L    Chloride 103 98 - 107 mmol/L    CO2 23.0 22.0 - 29.0 mmol/L    Calcium 8.4 (L) 8.6 - 10.5 mg/dL    eGFR Non African Amer 137 >60 mL/min/1.73    BUN/Creatinine Ratio 16.4 7.0 - 25.0    Anion Gap 11.0 5.0 - 15.0 mmol/L   Phosphorus    Specimen: Blood   Result  Value Ref Range    Phosphorus 3.6 2.5 - 4.5 mg/dL   Magnesium    Specimen: Blood   Result Value Ref Range    Magnesium 1.7 1.6 - 2.6 mg/dL   CBC Auto Differential    Specimen: Blood   Result Value Ref Range    WBC 5.80 3.40 - 10.80 10*3/mm3    RBC 4.35 4.14 - 5.80 10*6/mm3    Hemoglobin 12.9 (L) 13.0 - 17.7 g/dL    Hematocrit 37.2 (L) 37.5 - 51.0 %    MCV 85.6 79.0 - 97.0 fL    MCH 29.6 26.6 - 33.0 pg    MCHC 34.6 31.5 - 35.7 g/dL    RDW 13.2 12.3 - 15.4 %    RDW-SD 39.4 37.0 - 54.0 fl    MPV 8.8 6.0 - 12.0 fL    Platelets 207 140 - 450 10*3/mm3    Neutrophil % 53.5 42.7 - 76.0 %    Lymphocyte % 35.1 19.6 - 45.3 %    Monocyte % 5.9 5.0 - 12.0 %    Eosinophil % 4.4 0.3 - 6.2 %    Basophil % 1.1 0.0 - 1.5 %    Neutrophils, Absolute 3.10 1.70 - 7.00 10*3/mm3    Lymphocytes, Absolute 2.00 0.70 - 3.10 10*3/mm3    Monocytes, Absolute 0.30 0.10 - 0.90 10*3/mm3    Eosinophils, Absolute 0.30 0.00 - 0.40 10*3/mm3    Basophils, Absolute 0.10 0.00 - 0.20 10*3/mm3    nRBC 0.1 0.0 - 0.2 /100 WBC   Lipid Panel    Specimen: Blood   Result Value Ref Range    Total Cholesterol 167 0 - 200 mg/dL    Triglycerides 432 (H) 0 - 150 mg/dL    HDL Cholesterol 26 (L) 40 - 60 mg/dL    LDL Cholesterol  73 0 - 100 mg/dL    VLDL Cholesterol 68 (H) 5 - 40 mg/dL    LDL/HDL Ratio 2.10    POC Glucose Once    Specimen: Blood   Result Value Ref Range    Glucose 351 (H) 70 - 105 mg/dL   POC Glucose Once    Specimen: Blood   Result Value Ref Range    Glucose 256 (H) 70 - 105 mg/dL   ECG 12 Lead   Result Value Ref Range    QT Interval 388 ms   Type & Screen    Specimen: Blood   Result Value Ref Range    ABO Type O     RH type Positive     Antibody Screen Negative     T&S Expiration Date 11/7/2020 11:59:59 PM    Light Blue Top   Result Value Ref Range    Extra Tube hold for add-on    Green Top (Gel)   Result Value Ref Range    Extra Tube Hold for add-ons.    Lavender Top   Result Value Ref Range    Extra Tube hold for add-on    Gold Top - SST   Result  Value Ref Range    Extra Tube Hold for add-ons.      Ct Angiogram Head    Result Date: 11/4/2020  CTA HEAD: 1.  No evidence of large vessel occlusion or critical stenosis. CTA NECK: 1.  No significant extracranial vascular stenosis, occlusion or dissection. Daniele Powers MD Neuroradiologist Thank you for this referral.  This exam was interpreted by a fellowship trained neuroradiologist.   SLOT:  68  Electronically signed by:  Daniele Powers  11/4/2020 12:29 AM    Ct Angiogram Neck    Result Date: 11/4/2020  CTA HEAD: 1.  No evidence of large vessel occlusion or critical stenosis. CTA NECK: 1.  No significant extracranial vascular stenosis, occlusion or dissection. Daniele Powers MD Neuroradiologist Thank you for this referral.  This exam was interpreted by a fellowship trained neuroradiologist.   SLOT:  68  Electronically signed by:  Daniele Powers  11/4/2020 12:29 AM    Xr Chest 1 View    Result Date: 11/4/2020  Diffuse alveolar opacities consistent with pneumonia.  Electronically Signed By-Tyler Mejia On:11/4/2020 7:11 AM This report was finalized on [id number] by  Tyler Mejia, .    Ct Head Without Contrast Stroke Protocol    Result Date: 11/3/2020   1.  No acute intracranial abnormality. 2.  Volume loss and mild microvascular ischemic changes. 3.  Old lacunar infarct in the left corona radiata, unchanged.  THESE RESULTS WERE DISCUSSED WITH DR. LANDON ON 11/3/2020 AT 11:29 PM, MDT.  Daniele Powers MD Neuroradiologist Thank you for this referral.  This exam was interpreted by a fellowship trained neuroradiologist.   SLOT:  68  Electronically signed by:  Daniele Powers  11/3/2020 11:30 PM    Ct Cerebral Perfusion With & Without Contrast    Result Date: 11/3/2020  Questionable small mismatched perfusion defect in the left corona radiata. MRI can be considered to exclude a small vessel lacunar infarct, if clinically warranted. Daniele Powers MD Neuroradiologist Thank you for this referral.  This exam  was interpreted by a fellowship trained neuroradiologist.   SLOT:  68  Electronically signed by:  Daniele Silveriokhris  11/3/2020 11:59 PM      My EKG interpretation sinus rhythm, PAC, nonspecific T wave abnormality, rate of 76                                MDM  Patient presents with acute ischemic stroke symptoms NIH of 8.  Code stroke was initiated and patient found to be a TPA candidate given his time of onset and negative CT scan of his head.  Case discussed with Dr. Marcial who agrees with TPA administration.  Risks and benefits were discussed with the patient and patient is agreeable to the plan of TPA administration.  He does have history of the same and wishes to receive the same treatment as 1 year ago.  He did have some neurologic improvement during the emergency room course.  CTA shows no large vessel occlusion.  Case discussed with the hospitalist service and patient admitted for further care to the ICU.  Final diagnoses:   Acute ischemic stroke (CMS/[facility])     Critical care time 45 minutes       Fritz Tidwell MD  11/04/20 0243       Fritz Tidwell MD  11/04/20 0726      Electronically signed by Fritz Tidwell MD at 11/04/20 0726     Crystal Puga RN at 11/04/20 0813        Spoke with patient's wife on the phone and updated her on patient's status.      Crystal Puga RN  11/04/20 0818      Electronically signed by Crystal Puga RN at 11/04/20 0818     Crystal Puga RN at 11/04/20 0820        Pt in MRI machine having imaging done, unable to assess patient at this time.      Crystal Puga RN  11/04/20 0820      Electronically signed by Crystal Puga RN at 11/04/20 0820     Jadyn Pedraza at 11/04/20 1115        Urine output of 460cc     Jadyn Pedraza  11/04/20 1116      Electronically signed by Jadyn Pedraza at 11/04/20 1116         Physician Progress Notes (last 48 hours) (Notes from 11/03/20 0907 through 11/05/20 0907)    No notes of this type exist for this  encounter.          Consult Notes (last 48 hours) (Notes from 11/03/20 0907 through 11/05/20 0907)      Sybil Montano MD at 11/04/20 1038      Consult Orders    1. Inpatient Neurology Consult Stroke [[id number]] ordered by Daniele Armendariz APRN at 11/04/20 0403               Chief Complaint:   Evaluate for stroke.     HPI:  The patient is a 55 year old gentleman with multiple medical problems including CAD, s/p MI, s/p coronary angioplasty with stent in situ, HLD, HTN, primary hyperparathyroidism, DM II, history of stroke about a year ago who was in usual health until yesterday evening.  He developed slurred speech, left facial droop, right arm and leg weakness.  He was brought to ER of [facility].  A code stroke was called.  The patient was thought to a candidate for tPA.  He underwent emergent CT/CTA of Head and Neck.  He was deemed to a candidate for tPA therefore, tPA was given.  His condition has improved significantly.  He denies double vision, speech problems, bowel and bladder incontinence.     ROS: Constitutional: weakness +, CP +, no SOA, no B/B problems. No skin rash, stroke +.      PMH:  Past Medical History:   Diagnosis Date   • Abnormal cardiovascular stress test 1/19/2017   • Acute myocardial infarction (CMS/HCC) 2019   • Aortic aneurysm (CMS/HCC) 6/22/2017   • Bicuspid aortic valve 2/16/2017   • Coronary artery disease 10/29/2019   • History of coronary angioplasty with insertion of stent    • Hyperlipidemia    • Hypertension    • Obesity 9/2/2011   • Primary hyperparathyroidism (CMS/HCC) 9/2/2011   • RUE weakness 10/29/2019    Previous residual from Stroke, but was not work prohibiting.   • Sinus arrhythmia    • Stroke (CMS/HCC)    • Type 2 diabetes mellitus (CMS/HCC) 10/29/2019     Social History     Socioeconomic History   • Marital status:      Spouse name: Not on file   • Number of children: Not on file   • Years of education: Not on file   • Highest education level: Not on file   Tobacco Use    • Smoking status: Never Smoker   • Smokeless tobacco: Never Used   Substance and Sexual Activity   • Alcohol use: No     Frequency: Never   • Drug use: No   • Sexual activity: Yes     Partners: Female     Past Surgical History:   Procedure Laterality Date   • APPENDECTOMY     • BACK SURGERY     • CARDIAC CATHETERIZATION  2010   • CARDIAC CATHETERIZATION  2019   • JOINT REPLACEMENT     • KNEE ARTHROSCOPY       Family History   Problem Relation Age of Onset   • No Known Problems Mother    • No Known Problems Father        Labs:  Lab Results (last 24 hours)     Procedure Component Value Units Date/Time    Respiratory Panel PCR w/COVID-19(SARS-CoV-2) TRACIE/AIDA/IDANIA/PAD/COR/MAD/FRANKI In-House, NP Swab in UTM/VTM, 3-4 HR TAT - Swab, Nasopharynx [[id number]]  (Normal) Collected: 11/04/20 0449    Specimen: Swab from Nasopharynx Updated: 11/04/20 0544     ADENOVIRUS, PCR Not Detected     Coronavirus 229E Not Detected     Coronavirus HKU1 Not Detected     Coronavirus NL63 Not Detected     Coronavirus OC43 Not Detected     COVID19 Not Detected     Human Metapneumovirus Not Detected     Human Rhinovirus/Enterovirus Not Detected     Influenza A PCR Not Detected     Influenza A H1 Not Detected     Influenza A H1 2009 PCR Not Detected     Influenza A H3 Not Detected     Influenza B PCR Not Detected     Parainfluenza Virus 1 Not Detected     Parainfluenza Virus 2 Not Detected     Parainfluenza Virus 3 Not Detected     Parainfluenza Virus 4 Not Detected     RSV, PCR Not Detected     Bordetella pertussis pcr Not Detected     Bordetella parapertussis PCR Not Detected     Chlamydophila pneumoniae PCR Not Detected     Mycoplasma pneumo by PCR Not Detected    Narrative:      Fact sheet for providers: https://docs.[employer]/wp-content/uploads/IFJ1388-0477-AB5.1-EUA-Provider-Fact-Sheet-3.pdf    Fact sheet for patients: https://docs.[employer]/wp-content/uploads/SNX5110-2479-GA2.1-EUA-Patient-Fact-Sheet-1.pdf    Basic Metabolic Panel  [[id number]]  (Abnormal) Collected: 11/04/20 0448    Specimen: Blood Updated: 11/04/20 0532     Glucose 279 mg/dL      BUN 10 mg/dL      Creatinine 0.61 mg/dL      Sodium 137 mmol/L      Potassium 3.3 mmol/L      Chloride 103 mmol/L      CO2 23.0 mmol/L      Calcium 8.4 mg/dL      eGFR Non African Amer 137 mL/min/1.73      BUN/Creatinine Ratio 16.4     Anion Gap 11.0 mmol/L     Narrative:      GFR Normal >60  Chronic Kidney Disease <60  Kidney Failure <15      Phosphorus [[id number]]  (Normal) Collected: 11/04/20 0448    Specimen: Blood Updated: 11/04/20 0532     Phosphorus 3.6 mg/dL     Magnesium [[id number]]  (Normal) Collected: 11/04/20 0448    Specimen: Blood Updated: 11/04/20 0532     Magnesium 1.7 mg/dL     Lipid Panel [[id number]]  (Abnormal) Collected: 11/04/20 0448    Specimen: Blood Updated: 11/04/20 0532     Total Cholesterol 167 mg/dL      Triglycerides 432 mg/dL      HDL Cholesterol 26 mg/dL      LDL Cholesterol  73 mg/dL      VLDL Cholesterol 68 mg/dL      LDL/HDL Ratio 2.10    Narrative:      Cholesterol Reference Ranges  (U.S. Department of Health and Human Services ATP III Classifications)    Desirable          <200 mg/dL  Borderline High    200-239 mg/dL  High Risk          >240 mg/dL      Triglyceride Reference Ranges  (U.S. Department of Health and Human Services ATP III Classifications)    Normal           <150 mg/dL  Borderline High  150-199 mg/dL  High             200-499 mg/dL  Very High        >500 mg/dL    HDL Reference Ranges  (U.S. Department of Health and Human Services ATP III Classifcations)    Low     <40 mg/dl (major risk factor for CHD)  High    >60 mg/dl ('negative' risk factor for CHD)        LDL Reference Ranges  (U.S. Department of Health and Human Services ATP III Classifcations)    Optimal          <100 mg/dL  Near Optimal     100-129 mg/dL  Borderline High  130-159 mg/dL  High             160-189 mg/dL  Very High        >189 mg/dL    POC Glucose Once [[id number]]  (Abnormal)  Collected: 11/04/20 0527    Specimen: Blood Updated: 11/04/20 0528     Glucose 256 mg/dL      Comment: Serial Number: [id number]:  [id number]       CBC & Differential [[id number]]  (Abnormal) Collected: 11/04/20 0448    Specimen: Blood Updated: 11/04/20 0455    Narrative:      The following orders were created for panel order CBC & Differential.  Procedure                               Abnormality         Status                     ---------                               -----------         ------                     CBC Auto Differential[[id number]]        Abnormal            Final result                 Please view results for these tests on the individual orders.    CBC Auto Differential [[id number]]  (Abnormal) Collected: 11/04/20 0448    Specimen: Blood Updated: 11/04/20 0455     WBC 5.80 10*3/mm3      RBC 4.35 10*6/mm3      Hemoglobin 12.9 g/dL      Hematocrit 37.2 %      MCV 85.6 fL      MCH 29.6 pg      MCHC 34.6 g/dL      RDW 13.2 %      RDW-SD 39.4 fl      MPV 8.8 fL      Platelets 207 10*3/mm3      Neutrophil % 53.5 %      Lymphocyte % 35.1 %      Monocyte % 5.9 %      Eosinophil % 4.4 %      Basophil % 1.1 %      Neutrophils, Absolute 3.10 10*3/mm3      Lymphocytes, Absolute 2.00 10*3/mm3      Monocytes, Absolute 0.30 10*3/mm3      Eosinophils, Absolute 0.30 10*3/mm3      Basophils, Absolute 0.10 10*3/mm3      nRBC 0.1 /100 WBC     Magnesium [[id number]] Collected: 11/04/20 0117    Specimen: Blood Updated: 11/04/20 0408    Phosphorus [[id number]] Collected: 11/04/20 0117    Specimen: Blood Updated: 11/04/20 0408    Marceline Draw [[id number]] Collected: 11/04/20 0117    Specimen: Blood Updated: 11/04/20 0230    Narrative:      The following orders were created for panel order Marceline Draw.  Procedure                               Abnormality         Status                     ---------                               -----------         ------                     Light Blue Top[[id number]]                                    Final result               Green Top (Gel)[[id number]]                                  Final result               Lavender Top[[id number]]                                     Final result               Gold Top - SST[[id number]]                                   Final result                 Please view results for these tests on the individual orders.    Light Blue Top [[id number]] Collected: 11/04/20 0117    Specimen: Blood Updated: 11/04/20 0230     Extra Tube hold for add-on     Comment: Auto resulted       Green Top (Gel) [[id number]] Collected: 11/04/20 0117    Specimen: Blood Updated: 11/04/20 0230     Extra Tube Hold for add-ons.     Comment: Auto resulted.       Lavender Top [[id number]] Collected: 11/04/20 0117    Specimen: Blood Updated: 11/04/20 0230     Extra Tube hold for add-on     Comment: Auto resulted       Gold Top - SST [[id number]] Collected: 11/04/20 0117    Specimen: Blood Updated: 11/04/20 0230     Extra Tube Hold for add-ons.     Comment: Auto resulted.       Comprehensive Metabolic Panel [[id number]]  (Abnormal) Collected: 11/04/20 0117    Specimen: Blood Updated: 11/04/20 0144     Glucose 389 mg/dL      BUN 11 mg/dL      Creatinine 0.75 mg/dL      Sodium 138 mmol/L      Potassium 3.7 mmol/L      Chloride 101 mmol/L      CO2 24.0 mmol/L      Calcium 9.5 mg/dL      Total Protein 6.3 g/dL      Albumin 4.10 g/dL      ALT (SGPT) 20 U/L      AST (SGOT) 13 U/L      Alkaline Phosphatase 93 U/L      Total Bilirubin 0.4 mg/dL      eGFR Non African Amer 108 mL/min/1.73      Globulin 2.2 gm/dL      A/G Ratio 1.9 g/dL      BUN/Creatinine Ratio 14.7     Anion Gap 13.0 mmol/L     Narrative:      GFR Normal >60  Chronic Kidney Disease <60  Kidney Failure <15      Troponin [[id number]]  (Normal) Collected: 11/04/20 0117    Specimen: Blood Updated: 11/04/20 0144     Troponin T 0.014 ng/mL     Narrative:      Troponin T Reference Range:  <= 0.03 ng/mL-   Negative for AMI  >0.03 ng/mL-     Abnormal for  myocardial necrosis.  Clinicians would have to utilize clinical acumen, EKG, Troponin and serial changes to determine if it is an Acute Myocardial Infarction or myocardial injury due to an underlying chronic condition.       Results may be falsely decreased if patient taking Biotin.      Protime-INR [[id number]]  (Normal) Collected: 11/04/20 0117    Specimen: Blood Updated: 11/04/20 0140     Protime 10.4 Seconds      INR 0.94    aPTT [[id number]]  (Normal) Collected: 11/04/20 0117    Specimen: Blood Updated: 11/04/20 0140     PTT 25.3 seconds     CBC & Differential [[id number]]  (Normal) Collected: 11/04/20 0117    Specimen: Blood Updated: 11/04/20 0125    Narrative:      The following orders were created for panel order CBC & Differential.  Procedure                               Abnormality         Status                     ---------                               -----------         ------                     CBC Auto Differential[[id number]]        Normal              Final result                 Please view results for these tests on the individual orders.    CBC Auto Differential [[id number]]  (Normal) Collected: 11/04/20 0117    Specimen: Blood Updated: 11/04/20 0125     WBC 6.00 10*3/mm3      RBC 4.65 10*6/mm3      Hemoglobin 13.8 g/dL      Hematocrit 39.7 %      MCV 85.3 fL      MCH 29.7 pg      MCHC 34.8 g/dL      RDW 13.3 %      RDW-SD 39.8 fl      MPV 8.9 fL      Platelets 216 10*3/mm3      Neutrophil % 54.1 %      Lymphocyte % 33.4 %      Monocyte % 7.4 %      Eosinophil % 4.0 %      Basophil % 1.1 %      Neutrophils, Absolute 3.30 10*3/mm3      Lymphocytes, Absolute 2.00 10*3/mm3      Monocytes, Absolute 0.40 10*3/mm3      Eosinophils, Absolute 0.20 10*3/mm3      Basophils, Absolute 0.10 10*3/mm3      nRBC 0.1 /100 WBC     POC Glucose Once [[id number]]  (Abnormal) Collected: 11/04/20 0110    Specimen: Blood Updated: 11/04/20 0111     Glucose 351 mg/dL      Comment: Serial Number: [id number]:   [id number]               Medications:  Schedule Meds  [START ON 11/5/2020] aspirin, 325 mg, Oral, Daily    Or  [START ON 11/5/2020] aspirin, 300 mg, Rectal, Daily  atorvastatin, 80 mg, Oral, Nightly  insulin lispro, 0-7 Units, Subcutaneous, Q6H  pantoprazole, 40 mg, Intravenous, Q AM  sodium chloride, 10 mL, Intravenous, Q12H          MED'S PRN  •  acetaminophen **OR** acetaminophen  •  dextrose  •  dextrose  •  glucagon (human recombinant)  •  insulin lispro **AND** insulin lispro  •  ondansetron **OR** ondansetron  •  sodium chloride    Vitals:  Vitals:    11/04/20 0952   BP: 147/88   Pulse: 77   Resp: 21   Temp:    SpO2: 97%         Physical Exam:  The patient is lying in bed in no apparent distress.  Head NC, Neck supple. Lungs CTA,  CV  S1-S2 no murmur.  Abdomen soft.  Ext no edema.  Neurologic Exam:  The patient is awake, alert, follow commands, oriented x 3.  Speech is fluent with good comprehension.  CN VFFC, EOMI, no facial droop. Motor 5/5.  Sensory light touch intact. Reflexes absent, plantar mute.  Cerebellum coordinated movements noted.  Gait is deferred secondary to patient's condition.      Impression:  The patient is a 55 year old gentleman with multiple medical problems including DM II, s/p stroke in the past, HLD, HTN who developed sudden onset of speech problems, right sided weakness.  He thought to have left sided acute stroke and thought to be a candidate for tPA which was given to him.  His condition has improved.  MRI of Brain showed small left MCA distributed acute stroke.      Recomendations:  Stroke work up in progress.  Will obtain blood work, 2-D Echo. The patient is advised to keep BP < 130/80, HbA1C < 6.5, LDL < 70, Vitamin B12  > 500.  Will follow.        Electronically signed by Sybil Montano MD at 11/04/20 7357

## 2020-11-05 NOTE — PROGRESS NOTES
PULMONARY/ CRITICAL CARE PROGRESS NOTE        Patient Name:  Preet Jones    :  1965    Medical Record:  [id number]    PRIMARY CARE PHYSICIAN     Provider, Emilee Known    JASPREET  Preet Jones is a 55 y.o. 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  Cerebral imaging was negative for any large vessel occlusions, however, CT cerebral perfusion did showed a question of a small mismatch in the left corona radiata.   Initial NIH was 4 per ER report and the patient was given tPA and has had improvement in his symptoms with NIH improving to 2.     Patient is currently taking ASA, Lisinopril and VD3.   He states that he has not been taking Lipitor, Plavix or Metformin.      :  No new issues overnight.  No CP.  No worsening headaches     REVIEW OF SYSTEMS    As above     HOME MEDICATIONS  Prior to Admission medications    Medication Sig Start Date End Date Taking? Authorizing Provider   aspirin 81 MG tablet Take 2 tablets by mouth Daily. 10/31/19  Yes Arturo Vergara Jr., MD   Cholecalciferol (VITAMIN D-3) 25 MCG (1000 UT) capsule Take 3 capsules by mouth Daily. 17  Yes Ramsey Nye MD   lisinopril (PRINIVIL,ZESTRIL) 20 MG tablet Take 20 mg by mouth Daily. 17  Yes Ramsey Nye MD   NON FORMULARY Pt states 1 more, unsure of name   Yes Ramsey Nye MD   atorvastatin (LIPITOR) 80 MG tablet Take 1 tablet by mouth Daily. 19  Arturo Vergara Jr., MD   clopidogrel (PLAVIX) 75 MG tablet Take 75 mg by mouth Daily. 17  Ramsey Nye MD   insulin glargine (LANTUS) 100 UNIT/ML injection Inject 20 Units under the skin into the appropriate area as  directed At Night As Needed.  11/4/20  Ramsey Nye MD   insulin lispro (humaLOG) 100 UNIT/ML injection Inject 0-9 Units under the skin into the appropriate area as directed 4 (Four) Times a Day With Meals & at Bedtime. 10/31/19 11/4/20  Arturo Vergara Jr., MD   metFORMIN (GLUCOPHAGE) 1000 MG tablet Take 1,000 mg by mouth 2 (Two) Times a Day. 1/5/17 11/4/20  ProviderRamsey MD       MEDICAL HISTORY    Past Medical History:   Diagnosis Date   • Abnormal cardiovascular stress test 1/19/2017   • Acute myocardial infarction (CMS/[facility]) 2019   • Aortic aneurysm (CMS/[facility]) 6/22/2017   • Bicuspid aortic valve 2/16/2017   • Coronary artery disease 10/29/2019   • History of coronary angioplasty with insertion of stent    • Hyperlipidemia    • Hypertension    • Obesity 9/2/2011   • Primary hyperparathyroidism (CMS/[facility]) 9/2/2011   • RUE weakness 10/29/2019    Previous residual from Stroke, but was not work prohibiting.   • Sinus arrhythmia    • Stroke (CMS/[facility])    • Type 2 diabetes mellitus (CMS/[facility]) 10/29/2019        SURGICAL HISTORY    Past Surgical History:   Procedure Laterality Date   • APPENDECTOMY     • BACK SURGERY     • CARDIAC CATHETERIZATION  2010   • CARDIAC CATHETERIZATION  2019   • JOINT REPLACEMENT     • KNEE ARTHROSCOPY          FAMILY HISTORY    Family History   Problem Relation Age of Onset   • No Known Problems Mother    • No Known Problems Father         SOCIAL HISTORY    Social History     Socioeconomic History   • Marital status:      Spouse name: Not on file   • Number of children: Not on file   • Years of education: Not on file   • Highest education level: Not on file   Tobacco Use   • Smoking status: Never Smoker   • Smokeless tobacco: Never Used   Substance and Sexual Activity   • Alcohol use: No     Frequency: Never   • Drug use: No   • Sexual activity: Yes     Partners: Female        ALLERGIES    Allergies   Allergen Reactions   • Amoxicillin Rash   • Morphine GI Intolerance        PHYSICAL EXAM    tMax 24 hrs:  Temp (24hrs), Av.8 °F (36.6 °C), Min:96.9 °F (36.1 °C), Max:98.4 °F (36.9 °C)    Vitals Ranges:  Temp:  [96.9 °F (36.1 °C)-98.4 °F (36.9 °C)] 97.6 °F (36.4 °C)  Heart Rate:  [] 88  Resp:  [14-24] 14  BP: (121-167)/() 151/78  Intake and Output Last 3 Shifts:  I/O last 3 completed shifts:  In: 1498 [P.O.:240; I.V.:1258]  Out: 1450 [Urine:1450]    Constitutional:   Alert, no acute respiratory distress   HEENT:   Atraumatic, PERRL, conjunctiva normal, moist oral mucosa, no nasal discharge.  Trachea is midline.  Respiratory:   No respiratory distress, normal breath sounds, no rales, no wheezing   Cardiovascular:   Normal rate, normal rhythm and no murmurs.  Pulses 2+ and equal in all four extremities.    GI:   Soft, nondistended, positive bowel sounds.  :   No costovertebral angle tenderness   Extremities:   No edema, cyanosis or tenderness.  Integument:   No rashes.   Neurologic:   Alert & oriented x 3.  Some weakness to right side of arm with     LABS    Lab Results (last 24 hours)     Procedure Component Value Units Date/Time    POC Glucose Once [[id number]]  (Abnormal) Collected: 20    Specimen: Blood Updated: 20     Glucose 222 mg/dL      Comment: Serial Number: [id number]:  [id number]       Phosphorus [[id number]]  (Normal) Collected: 20    Specimen: Blood Updated: 20     Phosphorus 2.7 mg/dL     TSH [[id number]]  (Normal) Collected: 20    Specimen: Blood Updated: 2051     TSH 1.870 uIU/mL     Basic Metabolic Panel [[id number]]  (Abnormal) Collected: 20    Specimen: Blood Updated: 20     Glucose 241 mg/dL      BUN 10 mg/dL      Creatinine 0.77 mg/dL      Sodium 138 mmol/L      Potassium 3.2 mmol/L      Comment: Slight hemolysis detected by analyzer. Results may be affected.        Chloride 103 mmol/L      CO2 25.0 mmol/L      Calcium 8.5 mg/dL      eGFR Non African Amer  105 mL/min/1.73      BUN/Creatinine Ratio 13.0     Anion Gap 10.0 mmol/L     Narrative:      GFR Normal >60  Chronic Kidney Disease <60  Kidney Failure <15      Magnesium [[id number]]  (Normal) Collected: 11/05/20 0546    Specimen: Blood Updated: 11/05/20 0647     Magnesium 1.9 mg/dL     CBC & Differential [[id number]]  (Normal) Collected: 11/05/20 0546    Specimen: Blood Updated: 11/05/20 0626    Narrative:      The following orders were created for panel order CBC & Differential.  Procedure                               Abnormality         Status                     ---------                               -----------         ------                     CBC Auto Differential[[id number]]        Normal              Final result                 Please view results for these tests on the individual orders.    CBC Auto Differential [[id number]]  (Normal) Collected: 11/05/20 0546    Specimen: Blood Updated: 11/05/20 0626     WBC 7.00 10*3/mm3      RBC 4.62 10*6/mm3      Hemoglobin 13.7 g/dL      Hematocrit 39.3 %      MCV 85.1 fL      MCH 29.7 pg      MCHC 34.9 g/dL      RDW 13.1 %      RDW-SD 38.9 fl      MPV 9.0 fL      Platelets 209 10*3/mm3      Neutrophil % 61.5 %      Lymphocyte % 27.4 %      Monocyte % 6.6 %      Eosinophil % 3.7 %      Basophil % 0.8 %      Neutrophils, Absolute 4.30 10*3/mm3      Lymphocytes, Absolute 1.90 10*3/mm3      Monocytes, Absolute 0.50 10*3/mm3      Eosinophils, Absolute 0.30 10*3/mm3      Basophils, Absolute 0.10 10*3/mm3      nRBC 0.1 /100 WBC     Vitamin B12 [[id number]] Collected: 11/05/20 0546    Specimen: Blood Updated: 11/05/20 0622    Hemoglobin A1c [[id number]] Collected: 11/04/20 0448    Specimen: Blood Updated: 11/05/20 0542    POC Glucose Once [[id number]]  (Abnormal) Collected: 11/05/20 0533    Specimen: Blood Updated: 11/05/20 0534     Glucose 253 mg/dL      Comment: Serial Number: [id number]:  [id number]       POC Glucose Once [[id number]]  (Abnormal) Collected: 11/05/20  0007    Specimen: Blood Updated: 11/05/20 0012     Glucose 342 mg/dL      Comment: Serial Number: [id number]:  [id number]       Phosphorus [[id number]]  (Normal) Collected: 11/04/20 0117    Specimen: Blood Updated: 11/04/20 2111     Phosphorus 3.5 mg/dL     Magnesium [[id number]]  (Normal) Collected: 11/04/20 0117    Specimen: Blood Updated: 11/04/20 2111     Magnesium 1.7 mg/dL     POC Glucose Once [[id number]]  (Abnormal) Collected: 11/04/20 2052    Specimen: Blood Updated: 11/04/20 2053     Glucose 343 mg/dL      Comment: Serial Number: [id number]:  [id number]       POC Glucose Once [[id number]]  (Abnormal) Collected: 11/04/20 1708    Specimen: Blood Updated: 11/04/20 1710     Glucose 326 mg/dL      Comment: Serial Number: [id number]:  [id number]       POC Glucose Once [[id number]]  (Abnormal) Collected: 11/04/20 1239    Specimen: Blood Updated: 11/04/20 1241     Glucose 268 mg/dL      Comment: Serial Number: [id number]:  [id number]             MICRO:  Microbiology Results (last 10 days)     Procedure Component Value - Date/Time    Respiratory Panel PCR w/COVID-19(SARS-CoV-2) TRACIE/AIDA/IDANIA/[facility]ANKI In-House, NP Swab in UTM/VTM, 3-4 HR TAT - Swab, Nasopharynx [[id number]]  (Normal) Collected: 11/04/20 0449    Lab Status: Final result Specimen: Swab from Nasopharynx Updated: 11/04/20 0544     ADENOVIRUS, PCR Not Detected     Coronavirus 229E Not Detected     Coronavirus HKU1 Not Detected     Coronavirus NL63 Not Detected     Coronavirus OC43 Not Detected     COVID19 Not Detected     Human Metapneumovirus Not Detected     Human Rhinovirus/Enterovirus Not Detected     Influenza A PCR Not Detected     Influenza A H1 Not Detected     Influenza A H1 2009 PCR Not Detected     Influenza A H3 Not Detected     Influenza B PCR Not Detected     Parainfluenza Virus 1 Not Detected     Parainfluenza Virus 2 Not Detected     Parainfluenza Virus 3 Not Detected     Parainfluenza Virus 4 Not Detected     RSV,  PCR Not Detected     Bordetella pertussis pcr Not Detected     Bordetella parapertussis PCR Not Detected     Chlamydophila pneumoniae PCR Not Detected     Mycoplasma pneumo by PCR Not Detected    Narrative:      Fact sheet for providers: https://docs.[employer]/wp-content/uploads/MXJ1839-4025-TP6.1-EUA-Provider-Fact-Sheet-3.pdf    Fact sheet for patients: https://docs.[employer]/wp-content/uploads/LTZ4289-9877-ZJ4.1-EUA-Patient-Fact-Sheet-1.pdf          IMAGING & OTHER STUDIES    Imaging Results (Last 72 Hours)     Procedure Component Value Units Date/Time    CT Head Without Contrast [[id number]] Collected: 11/05/20 0039     Updated: 11/05/20 0244    Narrative:      EXAMINATION: CT HEAD WO CONTRAST    DATE: 11/5/2020 2:20 AM     INDICATION: Follow-up stroke, 24 hours status post TPA     COMPARISON: MRI and CTA head and neck from yesterday.     TECHNIQUE: Thin section noncontrast axial images were obtained through the head. Coronal reformats were created.  CT dose lowering techniques were used, to include: automated exposure control, adjustment for patient size, and or use of iterative   reconstruction.     FINDINGS:     Intracranial contents:    No evidence of acute territorial infarct. No intracranial hemorrhage. No mass effect. Chronic lacunar infarct in the left frontal corona radiata. Mild chronic small vessel ischemic changes in the white matter.    Bones and extracranial soft tissues:     No fracture or focal osseous lesion in the calvarium or skull base. Paranasal sinuses are clear where visualized. Mastoid air cells are clear. Orbits are unremarkable.         Impression:        1. No acute intracranial abnormality visible by CT.  2. Chronic lacunar infarct in the left corona radiata.         This examination was interpreted by Misha Mckeon M.D.     Electronically signed by:  Misha Mckeon M.D.    11/5/2020 12:42 AM    MRI Brain Without Contrast [[id number]] Collected: 11/04/20 0832     Updated:  11/04/20 0837    Narrative:      MRI BRAIN WO CONTRAST-     Date of Exam: 11/4/2020 7:00 AM     Indication: Stroke, follow up; I63.9-Cerebral infarction, unspecified  .  Slurred speech beginning last night. TPA was a . HISTORY of  stroke in 2019.     Comparison: CT angiography of the head, CT perfusion examination, and  noncontrast CT head 11/04/2020. MRI brain 10/30/2019.     Technique: Multiplanar multisequence images of the brain were performed  without contrast according to routine brain MRI protocol.     FINDINGS:  5 mm focus of restricted diffusion in the left parietal lobe, system  with acute or subacute ischemic insult. No hemorrhagic transformation is  seen. Chronic lacunar infarct within the left basal ganglia measures 4  mm. Encephalomalacia with old infarct within the left frontal parietal  lobe in the periventricular distribution measures 12 x 8 mm, unchanged  from prior.     Normal ventricular configuration. Major vascular flow voids appear  preserved. No mass lesion, mass effect or midline shift is identified.  The pituitary gland is not enlarged. Imaged cervical spinal cord is  within normal limits. Scattered FLAIR and T2 signal intensity changes  within the deep white matter of the brain are nonspecific but favored to  reflect changes of chronic microvascular disease. Paranasal sinuses and  mastoid air cells are clear.          Impression:         1. 5 mm focal acute or subacute ischemic insult within the left parietal  lobe, without hemorrhagic transformation.  2. Old infarcts within the periventricular left frontal-parietal lobe,  chronic lacunar infarct within the left basal ganglia.  3. Scattered mild chronic microvascular disease changes.        Electronically Signed By-Dr. Meliza Zabala MD On:11/4/2020 8:35 AM  This report was finalized on [id number] by Dr. Meliza Zabala MD.    XR Chest 1 View [[id number]] Collected: 11/04/20 0711     Updated: 11/04/20 0714    Narrative:       Examination: XR CHEST 1 VW-     Date of Exam: 11/4/2020 1:58 AM     Indication: Acute Stroke Protocol (onset < 12 hrs).     Comparison: 10/29/2019     Technique: 1 view of the chest      Findings:  The heart size is borderline enlarged. The pulmonary vascular markings  are normal. There are bilateral alveolar airspace opacities greatest in  the left lower lobe but seen diffusely and likely secondary to  pneumonia.       Impression:      Diffuse alveolar opacities consistent with pneumonia.     Electronically Signed By-Tyler Mejia On:11/4/2020 7:11 AM  This report was finalized on [id number] by  Tyler Mejia, .    CT Angiogram Head [[id number]] Collected: 11/04/20 0022     Updated: 11/04/20 0230    Narrative:      EXAMINATION: CT ANGIOGRAM HEAD, CT ANGIOGRAM NECK    DATE: 11/4/2020 1:21 AM     HISTORY:  Patient Data: Male, 55 years of age.  Clinical Indication : Stroke alert. Right-sided weakness and slurred speech.      COMPARISON: None available.     TECHNIQUE: CT angiography through the head and neck was performed in the axial plane using 100 mL of Isovue-370 administered intravenously. Coronal and sagittal MIP and MPR images were then created. There were no immediate complications. CT dose lowering   techniques including automated exposure control, adjustment for patient size, and/or use of iterative reconstruction were used.    NOTE: All arterial stenosis estimates in this report are derived using NASCET criteria.       FINDINGS:         CTA OF THE NECK:    Aorta:    The visualized aortic arch and great vessel origins are patent.    Right Carotid:    The right common carotid artery is patent from its origin to its bifurcation, without hemodynamically significant stenosis or dissection. The right carotid bifurcation is patent, as is the origin of the internal carotid artery. The right internal carotid   artery is then patent through the remainder of its cervical course, with no critical stenosis or  dissection.    Left Carotid:    The left common carotid artery is patent from its origin to its bifurcation, without hemodynamically significant stenosis or dissection. The left carotid bifurcation is patent, as is the origin of the internal carotid artery. The left internal carotid   artery is then patent through the remainder of its cervical course, with no critical stenosis or dissection.    Vertebral arteries:    The vertebral arteries are codominant. The vertebral arteries are without occlusion, significant stenosis, or evidence of dissection.        CTA OF THE HEAD:    Anterior circulation:    Each internal carotid artery is widely patent through the skull base and cavernous sinus to its terminus. Each middle and anterior cerebral artery is patent, with no critical arterial stenoses identified. No anterior circulation aneurysm or vascular   malformation is seen.     Posterior circulation:    Each vertebral artery is patent where visualized, from the caudal extent of the exam to the vertebrobasilar junction. The basilar artery is normal. No critical stenoses, aneurysms or vascular malformations are seen in the posterior circulation.    Venous structures:    The major dural sinuses and deep draining veins of the brain are grossly patent on this non-dedicated exam.         Impression:        CTA HEAD:  1.  No evidence of large vessel occlusion or critical stenosis.    CTA NECK:  1.  No significant extracranial vascular stenosis, occlusion or dissection.              Daniele Powers MD  Neuroradiologist    Thank you for this referral.  This exam was interpreted by a fellowship trained neuroradiologist.       SLOT:  68      Electronically signed by:  Daniele Powers    11/4/2020 12:29 AM    CT Angiogram Neck [[id number]] Collected: 11/04/20 0022     Updated: 11/04/20 0230    Narrative:      EXAMINATION: CT ANGIOGRAM HEAD, CT ANGIOGRAM NECK    DATE: 11/4/2020 1:21 AM     HISTORY:  Patient Data: Male, 55 years of  age.  Clinical Indication : Stroke alert. Right-sided weakness and slurred speech.      COMPARISON: None available.     TECHNIQUE: CT angiography through the head and neck was performed in the axial plane using 100 mL of Isovue-370 administered intravenously. Coronal and sagittal MIP and MPR images were then created. There were no immediate complications. CT dose lowering   techniques including automated exposure control, adjustment for patient size, and/or use of iterative reconstruction were used.    NOTE: All arterial stenosis estimates in this report are derived using NASCET criteria.       FINDINGS:         CTA OF THE NECK:    Aorta:    The visualized aortic arch and great vessel origins are patent.    Right Carotid:    The right common carotid artery is patent from its origin to its bifurcation, without hemodynamically significant stenosis or dissection. The right carotid bifurcation is patent, as is the origin of the internal carotid artery. The right internal carotid   artery is then patent through the remainder of its cervical course, with no critical stenosis or dissection.    Left Carotid:    The left common carotid artery is patent from its origin to its bifurcation, without hemodynamically significant stenosis or dissection. The left carotid bifurcation is patent, as is the origin of the internal carotid artery. The left internal carotid   artery is then patent through the remainder of its cervical course, with no critical stenosis or dissection.    Vertebral arteries:    The vertebral arteries are codominant. The vertebral arteries are without occlusion, significant stenosis, or evidence of dissection.        CTA OF THE HEAD:    Anterior circulation:    Each internal carotid artery is widely patent through the skull base and cavernous sinus to its terminus. Each middle and anterior cerebral artery is patent, with no critical arterial stenoses identified. No anterior circulation aneurysm or vascular    malformation is seen.     Posterior circulation:    Each vertebral artery is patent where visualized, from the caudal extent of the exam to the vertebrobasilar junction. The basilar artery is normal. No critical stenoses, aneurysms or vascular malformations are seen in the posterior circulation.    Venous structures:    The major dural sinuses and deep draining veins of the brain are grossly patent on this non-dedicated exam.         Impression:        CTA HEAD:  1.  No evidence of large vessel occlusion or critical stenosis.    CTA NECK:  1.  No significant extracranial vascular stenosis, occlusion or dissection.              Daniele Powers MD  Neuroradiologist    Thank you for this referral.  This exam was interpreted by a fellowship trained neuroradiologist.       SLOT:  68      Electronically signed by:  Daniele Powers    11/4/2020 12:29 AM    CT Cerebral Perfusion With & Without Contrast [[id number]] Collected: 11/03/20 2354     Updated: 11/04/20 0202    Narrative:      EXAMINATION: CT CEREBRAL PERFUSION W WO CONTRAST    DATE: 11/4/2020 1:21 AM     HISTORY:  Patient Data: Male, 55 years of age.  Clinical Indication : Right-sided weakness and slurred speech. Stroke alert.      COMPARISON: None available.     TECHNIQUE: CT perfusion through the head was performed in the axial plane using 50 mL of Isovue-370 administered intravenously, according to institutional protocol, with postprocessing. CT dose lowering techniques including automated exposure control,   adjustment for patient size, and/or use of iterative reconstruction were used.        FINDINGS:    Mildly limited due to global hypoperfusion versus technical factors related to timing of the contrast bolus.  There is a questionable small area of relatively increased Tmax and TTP without corresponding abnormalities on CBF or CBV in the left parietal   periventricular white matter/corona radiata, which could represent an artifact or small area of viable  ischemic penumbra.  This is estimated at 4 cc in size.           Impression:        Questionable small mismatched perfusion defect in the left corona radiata. MRI can be considered to exclude a small vessel lacunar infarct, if clinically warranted.                Daniele Powers MD  Neuroradiologist    Thank you for this referral.  This exam was interpreted by a fellowship trained neuroradiologist.       SLOT:  68      Electronically signed by:  Daniele Powers    11/3/2020 11:59 PM    CT Head Without Contrast Stroke Protocol [[id number]] Collected: 11/03/20 2324     Updated: 11/04/20 0131    Narrative:      EXAMINATION: CT HEAD WO CONTRAST STROKE PROTOCOL    DATE: 11/4/2020 1:15 AM     HISTORY:  Patient Data: Male, 55 years of age.  Clinical Indication : Right-sided weakness and slurred speech. Stroke alert.      COMPARISON: 10/29/2019     TECHNIQUE: Thin section noncontrast axial images were obtained through the head. Coronal reformatted images were then created. CT dose lowering techniques including automated exposure control, adjustment for patient size, and/or use of iterative   reconstruction were used.     FINDINGS:    Ventricles and Extra-axial Spaces: Mildly enlarged due to volume loss.     Parenchyma: Old lacunar infarct in the left corona radiata, unchanged. Patchy periventricular white matter low-attenuation, most likely from small vessel ischemic disease.   No CT evidence of large-vessel territorial infarct.   No mass effect or midline   shift.     Intracranial Hemorrhage: None.     Bones/Extracranial soft tissues: No depressed calvarial fracture.     Visualized Sinuses/Mastoids: Clear.          Impression:         1.  No acute intracranial abnormality.  2.  Volume loss and mild microvascular ischemic changes.  3.  Old lacunar infarct in the left corona radiata, unchanged.             THESE RESULTS WERE DISCUSSED WITH DR. LANDON ON 11/3/2020 AT 11:29 PM, MDT.       Daniele Powers  MD  Neuroradiologist    Thank you for this referral.  This exam was interpreted by a fellowship trained neuroradiologist.       SLOT:  68      Electronically signed by:  Daniele Powers    11/3/2020 11:30 PM          ECHOCARDIOGRAM:  Results for orders placed during the hospital encounter of 11/04/20   Adult Transthoracic Echo Complete W/ Cont if Necessary Per Protocol    Narrative Normal LV size with mild LV dysfunction with possible apical hypokinesis,   estimated LV ejection fraction of   50%  Normal RV size  Normal atrial size  Aortic valve is thickened, has adequate cusp separation.    Mitral valve, tricuspid valve appears structurally normal, trace MR TR   seen.  Calculated RV systolic pressure of 32 mm/hg  No pericardial effusion seen.  Proximal aorta appears normal in size.          ASSESSMENT/PLAN  Acute ischemic stroke  -Facial droop, right sided arm and leg weakness and dysarthria  -Status post tPA with improved symptoms  -Neurology following  -Repeat CT scan reviewed and without new findings   -MRI brain reviewed   -Hemoglobin A1C pending   -lipid panel pending   -Ischemic CVA with tPA order set protocol initiated    H/o CVA  -Residual deficits included limb and dysarthria  -Pt not taking his statin.  Resumed     T2DM  -No longer taking Metformin or insulin - unclear reasons  -Hemoglobin A1C pending  -SSI (high dose) and add Lantus     CAD  -Previous stent  -On ASA  -Previously on Brilinta switched to Plavix but patient reports he was taken off of Plavix due to side effects    HTN  -Cardene gtt  -resume home medications    HLD  -resume statin   -Lipid panel reviewed     PUD: Protonix D/C  Insulin:   Sliding scale  VTE:   SCDs  Nutrition:  Diet ordered      Note scribed by me for Dr. Alvarado  Transfer patient out of ICU.  Consult hospitalist.  We will sign off once out of ICU.    Attending physician statement:  Above note scribed by nurse practitioner for me and later reviewed for accuracy. I've examined  the patient and reviewed all labs and images.   I have directly participated in the evaluation and management of this patient.  Lonnie Alvarado MD

## 2020-11-05 NOTE — THERAPY EVALUATION
Patient Name: Preet Jones  : 1965    MRN: [id number]                              Today's Date: 2020       Admit Date: 2020    Visit Dx:     ICD-10-CM ICD-9-CM   1. Acute ischemic stroke (CMS/HCC)  I63.9 434.91     Patient Active Problem List   Diagnosis   • Arterial ischemic stroke, MCA (middle cerebral artery), left, acute (CMS/HCC)   • Aortic aneurysm (CMS/[facility])   • Bicuspid aortic valve   • Coronary artery disease   • Hyperlipidemia   • Hypertension   • Obesity   • Primary hyperparathyroidism (CMS/HCC)   • Type 2 diabetes mellitus (CMS/HCC)   • RUE weakness   • Facial droop, left sided   • Slurred speech   • Acute ischemic stroke (CMS/[facility])   • Vitamin D deficiency   • Hypokalemia     Past Medical History:   Diagnosis Date   • Abnormal cardiovascular stress test 2017   • Acute myocardial infarction (CMS/[facility])    • Aortic aneurysm (CMS/HCC) 2017   • Bicuspid aortic valve 2017   • Coronary artery disease 10/29/2019   • History of coronary angioplasty with insertion of stent    • Hyperlipidemia    • Hypertension    • Obesity 2011   • Primary hyperparathyroidism (CMS/HCC) 2011   • RUE weakness 10/29/2019    Previous residual from Stroke, but was not work prohibiting.   • Sinus arrhythmia    • Stroke (CMS/[facility])    • Type 2 diabetes mellitus (CMS/HCC) 10/29/2019     Past Surgical History:   Procedure Laterality Date   • APPENDECTOMY     • BACK SURGERY     • CARDIAC CATHETERIZATION     • CARDIAC CATHETERIZATION     • JOINT REPLACEMENT     • KNEE ARTHROSCOPY       General Information     Row Name 20 1637          OT Time and Intention    Document Type  evaluation  -SR     Row Name 20 1637          General Information    Existing Precautions/Restrictions  no known precautions/restrictions  -SR     Row Name 20 1637          Occupational Profile    Reason for Services/Referral (Occupational Profile)  Pt admitted for slurred speech and R sided weakness.   tPA was administered.  He reports resolution of symptoms.  -SR     Successful Occupations (Occupational Profile)  Pt lives at home with family, independent, and works full time.  -SR     Row Name 11/05/20 1637          Living Environment    Lives With  spouse;child(edelmira), adult  -SR     Row Name 11/05/20 1637          Cognition    Orientation Status (Cognition)  oriented x 4  -SR       User Key  (r) = Recorded By, (t) = Taken By, (c) = Cosigned By    Initials Name Provider Type    SR Amirah Miranda, JACOB Occupational Therapist        Mobility/ADL's     Row Name 11/05/20 1640          Bed Mobility    Bed Mobility  bed mobility (all) activities  -SR     All Activities, [place] (Bed Mobility)  independent  -SR     Row Name 11/05/20 1640          Transfers    Sit-Stand [place] (Transfers)  independent  -SR     Row Name 11/05/20 1640          Functional Mobility    Functional Mobility- Ind. Level  independent  -SR     Row Name 11/05/20 1640          Activities of Daily Living    BADL Assessment/Intervention  lower body dressing  -SR     Row Name 11/05/20 1640          Lower Body Dressing Assessment/Training    [place] Level (Lower Body Dressing)  socks;independent  -SR       User Key  (r) = Recorded By, (t) = Taken By, (c) = Cosigned By    Initials Name Provider Type    SR Amirah Miranda, JACOB Occupational Therapist        Obj/Interventions     Row Name 11/05/20 1641          Range of Motion Comprehensive    General Range of Motion  no range of motion deficits identified  -SR     Row Name 11/05/20 1641          Strength Comprehensive (MMT)    General Manual Muscle Testing (MMT) Assessment  no strength deficits identified  -     Row Name 11/05/20 1641          Balance    Static Sitting Balance  WFL  -SR     Dynamic Sitting Balance  WFL  -SR     Static Standing Balance  WFL  -SR     Dynamic Standing Balance  WFL  -SR       User Key  (r) = Recorded By, (t) = Taken By, (c) = Cosigned By     Initials Name Provider Type    Amirah Bustamante, OT Occupational Therapist        Goals/Plan    No documentation.       Clinical Impression     Row Name 11/05/20 1648          Pain Assessment    Additional Documentation  Pain Scale: FACES Pre/Post-Treatment (Group)  -SR     Row Name 11/05/20 1648          Pain Scale: Numbers Pre/Post-Treatment    Pretreatment Pain Rating  0/10 - no pain  -SR     Posttreatment Pain Rating  0/10 - no pain  -SR     Row Name 11/05/20 1648          Plan of Care Review    Outcome Summary  54 yo male who was admitted after coworkers noted acute confusion, dysarthria, ataxia. MRI (+) for acute 5 mm L parietal infarct. Was given tPA in ER. Hx of previous L corona radiata infect, for which he also received tPA with mild speech deficits.  He appears to be functioning back to baseline level of function.  No coordination, sensation, or strength deficits noted.  No further OT needs at this time.  PPE: mask, shield, gloves.  -     Row Name 11/05/20 1648          Therapy Plan Review/Discharge Plan (OT)    Anticipated Discharge Disposition (OT)  home  -SR       User Key  (r) = Recorded By, (t) = Taken By, (c) = Cosigned By    Initials Name Provider Type    SR Amirah Miranda OT Occupational Therapist        Outcome Measures     Row Name 11/05/20 1637          Modified [place] Scale    Pre-Stroke Modified [place] Scale  0 - No Symptoms at all.  -SR     Modified [place] Scale  1 - No significant disability despite symptoms.  Able to carry out all usual duties and activities.  -     Row Name 11/05/20 163          Functional Assessment    Outcome Measure Options  Modified [place]  -SR       User Key  (r) = Recorded By, (t) = Taken By, (c) = Cosigned By    Initials Name Provider Type    Amirah Bustamante, OT Occupational Therapist        Occupational Therapy Education                 Title: PT OT SLP Therapies (Done)     Topic: Occupational Therapy (Done)     Point: Body  mechanics (Done)     Description:   Instruct learner(s) on proper positioning and spine alignment during self-care, functional mobility activities and/or exercises.              Learning Progress Summary           Patient Acceptance, E,TB, VU by  at 11/5/2020 1655    Comment: Importance of quickly coming to the hospital for stroke like symptoms                               User Key     Initials Effective Dates Name Provider Type Discipline     03/01/19 -  Amirah Miranda OT Occupational Therapist OT              OT Recommendation and Plan     Plan of Care Review  Outcome Summary: 54 yo male who was admitted after coworkers noted acute confusion, dysarthria, ataxia. MRI (+) for acute 5 mm L parietal infarct. Was given tPA in ER. Hx of previous L corona radiata infect, for which he also received tPA with mild speech deficits.  He appears to be functioning back to baseline level of function.  No coordination, sensation, or strength deficits noted.  No further OT needs at this time.  PPE: mask, shield, gloves.     Time Calculation:   Time Calculation- OT     Row Name 11/05/20 1656             Time Calculation- OT    OT Start Time  1558  -SR      OT Stop Time  1612  -SR      OT Time Calculation (min)  14 min  -SR      Total Timed Code Minutes- OT  0 minute(s)  -SR      OT Non-Billable Time (min)  14 min  -SR      OT Received On  11/05/20  -        User Key  (r) = Recorded By, (t) = Taken By, (c) = Cosigned By    Initials Name Provider Type     Amirah Miranda OT Occupational Therapist        Therapy Charges for Today     Code Description Service Date Service Provider Modifiers Qty    [id number] HC OT EVAL LOW COMPLEXITY 3 11/5/2020 Amirah Miranda OT GO 1               Amirah Miranda OT  11/5/2020

## 2020-11-06 VITALS
BODY MASS INDEX: 28.15 KG/M2 | HEART RATE: 65 BPM | SYSTOLIC BLOOD PRESSURE: 130 MMHG | RESPIRATION RATE: 19 BRPM | HEIGHT: 71 IN | DIASTOLIC BLOOD PRESSURE: 81 MMHG | WEIGHT: 201.06 LBS | TEMPERATURE: 98.3 F | OXYGEN SATURATION: 92 %

## 2020-11-06 LAB
ALBUMIN SERPL-MCNC: 3.2 G/DL (ref 3.5–5.2)
ALBUMIN/GLOB SERPL: 1.5 G/DL
ALP SERPL-CCNC: 79 U/L (ref 39–117)
ALT SERPL W P-5'-P-CCNC: 15 U/L (ref 1–41)
ANION GAP SERPL CALCULATED.3IONS-SCNC: 8 MMOL/L (ref 5–15)
AST SERPL-CCNC: 10 U/L (ref 1–40)
BASOPHILS # BLD AUTO: 0.1 10*3/MM3 (ref 0–0.2)
BASOPHILS NFR BLD AUTO: 1.2 % (ref 0–1.5)
BILIRUB SERPL-MCNC: 0.4 MG/DL (ref 0–1.2)
BUN SERPL-MCNC: 13 MG/DL (ref 6–20)
BUN/CREAT SERPL: 17.1 (ref 7–25)
CALCIUM SPEC-SCNC: 8.2 MG/DL (ref 8.6–10.5)
CHLORIDE SERPL-SCNC: 104 MMOL/L (ref 98–107)
CO2 SERPL-SCNC: 25 MMOL/L (ref 22–29)
CREAT SERPL-MCNC: 0.76 MG/DL (ref 0.76–1.27)
DEPRECATED RDW RBC AUTO: 40.7 FL (ref 37–54)
EOSINOPHIL # BLD AUTO: 0.4 10*3/MM3 (ref 0–0.4)
EOSINOPHIL NFR BLD AUTO: 5.9 % (ref 0.3–6.2)
ERYTHROCYTE [DISTWIDTH] IN BLOOD BY AUTOMATED COUNT: 13.3 % (ref 12.3–15.4)
GFR SERPL CREATININE-BSD FRML MDRD: 106 ML/MIN/1.73
GLOBULIN UR ELPH-MCNC: 2.2 GM/DL
GLUCOSE BLDC GLUCOMTR-MCNC: 221 MG/DL (ref 70–105)
GLUCOSE BLDC GLUCOMTR-MCNC: 226 MG/DL (ref 70–105)
GLUCOSE SERPL-MCNC: 241 MG/DL (ref 65–99)
HCT VFR BLD AUTO: 37.3 % (ref 37.5–51)
HGB BLD-MCNC: 12.8 G/DL (ref 13–17.7)
LYMPHOCYTES # BLD AUTO: 2.2 10*3/MM3 (ref 0.7–3.1)
LYMPHOCYTES NFR BLD AUTO: 32.3 % (ref 19.6–45.3)
MAGNESIUM SERPL-MCNC: 1.9 MG/DL (ref 1.6–2.6)
MCH RBC QN AUTO: 29.8 PG (ref 26.6–33)
MCHC RBC AUTO-ENTMCNC: 34.2 G/DL (ref 31.5–35.7)
MCV RBC AUTO: 87 FL (ref 79–97)
MONOCYTES # BLD AUTO: 0.6 10*3/MM3 (ref 0.1–0.9)
MONOCYTES NFR BLD AUTO: 9.4 % (ref 5–12)
NEUTROPHILS NFR BLD AUTO: 3.5 10*3/MM3 (ref 1.7–7)
NEUTROPHILS NFR BLD AUTO: 51.2 % (ref 42.7–76)
NRBC BLD AUTO-RTO: 0.1 /100 WBC (ref 0–0.2)
PHOSPHATE SERPL-MCNC: 2.6 MG/DL (ref 2.5–4.5)
PLATELET # BLD AUTO: 206 10*3/MM3 (ref 140–450)
PMV BLD AUTO: 9.5 FL (ref 6–12)
POTASSIUM SERPL-SCNC: 3.4 MMOL/L (ref 3.5–5.2)
PROT SERPL-MCNC: 5.4 G/DL (ref 6–8.5)
QT INTERVAL: 388 MS
RBC # BLD AUTO: 4.29 10*6/MM3 (ref 4.14–5.8)
SODIUM SERPL-SCNC: 137 MMOL/L (ref 136–145)
WBC # BLD AUTO: 6.8 10*3/MM3 (ref 3.4–10.8)

## 2020-11-06 PROCEDURE — 63710000001 INSULIN LISPRO (HUMAN) PER 5 UNITS: Performed by: NURSE PRACTITIONER

## 2020-11-06 PROCEDURE — 90686 IIV4 VACC NO PRSV 0.5 ML IM: CPT | Performed by: NURSE PRACTITIONER

## 2020-11-06 PROCEDURE — 99239 HOSP IP/OBS DSCHRG MGMT >30: CPT | Performed by: INTERNAL MEDICINE

## 2020-11-06 PROCEDURE — 25010000002 INFLUENZA VAC SPLIT QUAD 0.5 ML SUSPENSION PREFILLED SYRINGE: Performed by: NURSE PRACTITIONER

## 2020-11-06 PROCEDURE — 80053 COMPREHEN METABOLIC PANEL: CPT | Performed by: NURSE PRACTITIONER

## 2020-11-06 PROCEDURE — 83735 ASSAY OF MAGNESIUM: CPT | Performed by: NURSE PRACTITIONER

## 2020-11-06 PROCEDURE — 85025 COMPLETE CBC W/AUTO DIFF WBC: CPT | Performed by: NURSE PRACTITIONER

## 2020-11-06 PROCEDURE — 84100 ASSAY OF PHOSPHORUS: CPT | Performed by: NURSE PRACTITIONER

## 2020-11-06 PROCEDURE — G0008 ADMIN INFLUENZA VIRUS VAC: HCPCS | Performed by: NURSE PRACTITIONER

## 2020-11-06 PROCEDURE — 82962 GLUCOSE BLOOD TEST: CPT

## 2020-11-06 PROCEDURE — 99231 SBSQ HOSP IP/OBS SF/LOW 25: CPT | Performed by: PSYCHIATRY & NEUROLOGY

## 2020-11-06 RX ORDER — ASPIRIN 325 MG
325 TABLET ORAL DAILY
Qty: 30 TABLET | Refills: 0 | Status: SHIPPED | OUTPATIENT
Start: 2020-11-07 | End: 2020-11-06 | Stop reason: HOSPADM

## 2020-11-06 RX ORDER — CLOPIDOGREL BISULFATE 75 MG/1
75 TABLET ORAL DAILY
Status: DISCONTINUED | OUTPATIENT
Start: 2020-11-06 | End: 2020-11-06 | Stop reason: HOSPADM

## 2020-11-06 RX ORDER — ASPIRIN 81 MG/1
81 TABLET ORAL DAILY
Qty: 30 TABLET | Refills: 0 | Status: SHIPPED | OUTPATIENT
Start: 2020-11-06 | End: 2020-12-10

## 2020-11-06 RX ORDER — LANCETS 30 GAUGE
1 EACH MISCELLANEOUS
Qty: 200 EACH | Refills: 0 | Status: SHIPPED | OUTPATIENT
Start: 2020-11-06 | End: 2020-12-30

## 2020-11-06 RX ORDER — BLOOD SUGAR DIAGNOSTIC
1 STRIP MISCELLANEOUS
Qty: 200 EACH | Refills: 0 | Status: SHIPPED | OUTPATIENT
Start: 2020-11-06 | End: 2020-12-30

## 2020-11-06 RX ORDER — AMLODIPINE BESYLATE 5 MG/1
5 TABLET ORAL
Status: DISCONTINUED | OUTPATIENT
Start: 2020-11-06 | End: 2020-11-06 | Stop reason: HOSPADM

## 2020-11-06 RX ORDER — CLOPIDOGREL BISULFATE 75 MG/1
75 TABLET ORAL DAILY
Qty: 30 TABLET | Refills: 6 | Status: SHIPPED | OUTPATIENT
Start: 2020-11-06 | End: 2021-02-04 | Stop reason: SDUPTHER

## 2020-11-06 RX ORDER — ATORVASTATIN CALCIUM 80 MG/1
80 TABLET, FILM COATED ORAL NIGHTLY
Qty: 30 TABLET | Refills: 0 | Status: SHIPPED | OUTPATIENT
Start: 2020-11-06 | End: 2021-02-04 | Stop reason: SDUPTHER

## 2020-11-06 RX ORDER — PEN NEEDLE, DIABETIC 30 GX5/16"
15 NEEDLE, DISPOSABLE MISCELLANEOUS 2 TIMES DAILY
Qty: 100 EACH | Refills: 0 | Status: SHIPPED | OUTPATIENT
Start: 2020-11-06 | End: 2020-12-30

## 2020-11-06 RX ADMIN — Medication 10 ML: at 09:03

## 2020-11-06 RX ADMIN — INFLUENZA VIRUS VACCINE 0.5 ML: 15; 15; 15; 15 SUSPENSION INTRAMUSCULAR at 12:33

## 2020-11-06 RX ADMIN — INSULIN LISPRO 8 UNITS: 100 INJECTION, SOLUTION INTRAVENOUS; SUBCUTANEOUS at 09:03

## 2020-11-06 RX ADMIN — INSULIN LISPRO 8 UNITS: 100 INJECTION, SOLUTION INTRAVENOUS; SUBCUTANEOUS at 12:27

## 2020-11-06 RX ADMIN — AMLODIPINE BESYLATE 5 MG: 5 TABLET ORAL at 12:27

## 2020-11-06 RX ADMIN — POTASSIUM CHLORIDE 40 MEQ: 1500 TABLET, EXTENDED RELEASE ORAL at 09:02

## 2020-11-06 RX ADMIN — CLOPIDOGREL BISULFATE 75 MG: 75 TABLET ORAL at 12:27

## 2020-11-06 RX ADMIN — ASPIRIN 325 MG ORAL TABLET 325 MG: 325 PILL ORAL at 09:02

## 2020-11-06 RX ADMIN — LISINOPRIL 20 MG: 20 TABLET ORAL at 09:02

## 2020-11-06 NOTE — PLAN OF CARE
Goal Outcome Evaluation:  Plan of Care Reviewed With: patient  Progress: improving   Patients symptoms improving, outpatient therapy set up per , discharging today to home.

## 2020-11-06 NOTE — PLAN OF CARE
Goal Outcome Evaluation:  Plan of Care Reviewed With: patient   Ad mesfin, All needs met. V/s Stable. RN will continue to monitor pt.

## 2020-11-06 NOTE — THERAPY TREATMENT NOTE
Patient Name: Preet Jones  : 1965    MRN: [id number]                              Today's Date: 2020       Admit Date: 2020        Outcome Measures     Row Name 20 1237          Modified Valery Scale    Modified [place] Scale  1 - No significant disability despite symptoms.  Able to carry out all usual duties and activities.  -[place]       User Key  (r) = Recorded By, (t) = Taken By, (c) = Cosigned By    Initials Name Provider Type    Meliza Nath PTA Physical Therapy Assistant          PT Recommendation and Plan      Pt not seen by PT services this date 2*/2 anticipated d/c home. Charting purposes for Modified [place] only, as evaluated at last therapy session.      Time Calculation:         PT G-Codes  Outcome Measure Options: Modified [place]  Modified Valery Scale: 1 - No significant disability despite symptoms.  Able to carry out all usual duties and activities.    Meliza Guerrero PTA  2020

## 2020-11-06 NOTE — DISCHARGE SUMMARY
Date of Admission: 11/4/2020    Date of Discharge:  11/6/2020    Length of stay:  LOS: 2 days     Presenting Problem:   Acute ischemic stroke (CMS/[facility]) [I63.9]      Principal and Active Diagnosis During Hospital Stay:     Active Hospital Problems    Diagnosis  POA   • **Acute ischemic stroke (CMS/[facility]) [I63.9]  Yes   • Vitamin D deficiency [E55.9]  Yes   • Hypokalemia [E87.6]  Yes   • Coronary artery disease [I25.10]  Yes   • Hypertension [I10]  Yes   • Hyperlipidemia [E78.5]  Yes   • Type 2 diabetes mellitus (CMS/[facility]) [E11.9]  Yes      Resolved Hospital Problems   No resolved problems to display.       -Facial droop, right sided arm and leg weakness and dysarthria- improvement near baseline now  -Status post tPA   -Repeat CT scan reviewed and without new findings   -MRI brain reviewed   -Hemoglobin A1C 11.4  - on statin and aspirin at d/c   - PT/OT/ST outpt  - Neurology follow up      Acute hypokalemia  - stable and improved     H/o CVA  -Residual deficits included limb and dysarthria  -Patient reported he was not taking statin  - Statin      Type II Diabetes Mellitus   - uncontrolled  -Hemoglobin A1C11.4  -home on levemir and metformin      CAD  -Previous stent  -On ASA  -Previously on Brilinta switched to Plavix but patient reports he was taken off of Plavix due to side effects     Essential HTN  - Moderately controlled   -Monitor blood pressure  - Neurology advised to keep BP < 130/80   - Continue home lisinopril      HLD  -Lipid panel reviewed   - Continue statin      Vitamin D deficiency  - On vitamin D at home    Hospital Course  Patient is a 55 y.o. male presented with above was given tPA. Sx improved and he was back to near baseline. PT rec outpt therapy, he was cleared for d/c by specialist but will need close outpt follow up.       Procedures Performed:as noted above         Consults:   Consults     Date and Time Order Name Status Description    11/5/2020 0941 Inpatient Hospitalist Consult      11/4/2020  0403 Inpatient Neurology Consult Stroke Completed     11/4/2020 0237 Intensivist (on-call MD unless specified) Completed     11/4/2020 0113 Inpatient Neurology Consult Stroke Completed     11/4/2020 0113 Inpatient Neurology Consult Stroke Completed           Pertinent Test Results:     Lab Results (last 72 hours)     Procedure Component Value Units Date/Time    POC Glucose Once [[id number]]  (Abnormal) Collected: 11/06/20 1121    Specimen: Blood Updated: 11/06/20 1123     Glucose 221 mg/dL      Comment: Serial Number: [id number]:  [id number]       POC Glucose Once [[id number]]  (Abnormal) Collected: 11/06/20 0721    Specimen: Blood Updated: 11/06/20 0725     Glucose 226 mg/dL      Comment: Serial Number: [id number]:  [id number]       Phosphorus [[id number]]  (Normal) Collected: 11/06/20 0447    Specimen: Blood Updated: 11/06/20 0631     Phosphorus 2.6 mg/dL     Comprehensive Metabolic Panel [[id number]]  (Abnormal) Collected: 11/06/20 0447    Specimen: Blood Updated: 11/06/20 0631     Glucose 241 mg/dL      BUN 13 mg/dL      Creatinine 0.76 mg/dL      Sodium 137 mmol/L      Potassium 3.4 mmol/L      Chloride 104 mmol/L      CO2 25.0 mmol/L      Calcium 8.2 mg/dL      Total Protein 5.4 g/dL      Albumin 3.20 g/dL      ALT (SGPT) 15 U/L      AST (SGOT) 10 U/L      Alkaline Phosphatase 79 U/L      Total Bilirubin 0.4 mg/dL      eGFR Non African Amer 106 mL/min/1.73      Globulin 2.2 gm/dL      A/G Ratio 1.5 g/dL      BUN/Creatinine Ratio 17.1     Anion Gap 8.0 mmol/L     Narrative:      GFR Normal >60  Chronic Kidney Disease <60  Kidney Failure <15      Magnesium [[id number]]  (Normal) Collected: 11/06/20 0447    Specimen: Blood Updated: 11/06/20 0630     Magnesium 1.9 mg/dL     CBC & Differential [[id number]]  (Abnormal) Collected: 11/06/20 0447    Specimen: Blood Updated: 11/06/20 0550    Narrative:      The following orders were created for panel order CBC & Differential.  Procedure                                Abnormality         Status                     ---------                               -----------         ------                     CBC Auto Differential[[id number]]        Abnormal            Final result                 Please view results for these tests on the individual orders.    CBC Auto Differential [[id number]]  (Abnormal) Collected: 11/06/20 0447    Specimen: Blood Updated: 11/06/20 0550     WBC 6.80 10*3/mm3      RBC 4.29 10*6/mm3      Hemoglobin 12.8 g/dL      Hematocrit 37.3 %      MCV 87.0 fL      MCH 29.8 pg      MCHC 34.2 g/dL      RDW 13.3 %      RDW-SD 40.7 fl      MPV 9.5 fL      Platelets 206 10*3/mm3      Neutrophil % 51.2 %      Lymphocyte % 32.3 %      Monocyte % 9.4 %      Eosinophil % 5.9 %      Basophil % 1.2 %      Neutrophils, Absolute 3.50 10*3/mm3      Lymphocytes, Absolute 2.20 10*3/mm3      Monocytes, Absolute 0.60 10*3/mm3      Eosinophils, Absolute 0.40 10*3/mm3      Basophils, Absolute 0.10 10*3/mm3      nRBC 0.1 /100 WBC     POC Glucose Once [[id number]]  (Abnormal) Collected: 11/05/20 1943    Specimen: Blood Updated: 11/05/20 1944     Glucose 272 mg/dL      Comment: Serial Number: [id number]:  [id number]       POC Glucose Once [[id number]]  (Abnormal) Collected: 11/05/20 1632    Specimen: Blood Updated: 11/05/20 1634     Glucose 299 mg/dL      Comment: Serial Number: [id number]:  [id number]       POC Glucose Once [[id number]]  (Abnormal) Collected: 11/05/20 1129    Specimen: Blood Updated: 11/05/20 1133     Glucose 285 mg/dL      Comment: Serial Number: [id number]:  [id number]       Hemoglobin A1c [[id number]]  (Abnormal) Collected: 11/04/20 0448    Specimen: Blood Updated: 11/05/20 1104     Hemoglobin A1C 11.4 %     Narrative:      Hemoglobin A1C Reference Range:    <5.7 %        Normal  5.7-6.4 %     Increased risk for diabetes  > 6.4 %        Diabetes       These guidelines have been recommended by the American Diabetic Association for Hgb A1c.      The  following 2010 guidelines have been recommended by the American Diabetes Association for Hemoglobin A1c.    HBA1c 5.7-6.4% Increased risk for future diabetes (pre-diabetes)  HBA1c     >6.4% Diabetes      Vitamin B12 [[id number]]  (Normal) Collected: 11/05/20 0546    Specimen: Blood Updated: 11/05/20 1055     Vitamin B-12 387 pg/mL     Narrative:      Results may be falsely increased if patient taking Biotin.      POC Glucose Once [[id number]]  (Abnormal) Collected: 11/05/20 0728    Specimen: Blood Updated: 11/05/20 0729     Glucose 222 mg/dL      Comment: Serial Number: [id number]:  [id number]       Phosphorus [[id number]]  (Normal) Collected: 11/05/20 0546    Specimen: Blood Updated: 11/05/20 0653     Phosphorus 2.7 mg/dL     TSH [[id number]]  (Normal) Collected: 11/05/20 0546    Specimen: Blood Updated: 11/05/20 0651     TSH 1.870 uIU/mL     Basic Metabolic Panel [[id number]]  (Abnormal) Collected: 11/05/20 0546    Specimen: Blood Updated: 11/05/20 0647     Glucose 241 mg/dL      BUN 10 mg/dL      Creatinine 0.77 mg/dL      Sodium 138 mmol/L      Potassium 3.2 mmol/L      Comment: Slight hemolysis detected by analyzer. Results may be affected.        Chloride 103 mmol/L      CO2 25.0 mmol/L      Calcium 8.5 mg/dL      eGFR Non African Amer 105 mL/min/1.73      BUN/Creatinine Ratio 13.0     Anion Gap 10.0 mmol/L     Narrative:      GFR Normal >60  Chronic Kidney Disease <60  Kidney Failure <15      Magnesium [[id number]]  (Normal) Collected: 11/05/20 0546    Specimen: Blood Updated: 11/05/20 0647     Magnesium 1.9 mg/dL     CBC & Differential [[id number]]  (Normal) Collected: 11/05/20 0546    Specimen: Blood Updated: 11/05/20 0626    Narrative:      The following orders were created for panel order CBC & Differential.  Procedure                               Abnormality         Status                     ---------                               -----------         ------                     CBC Auto  Differential[[id number]]        Normal              Final result                 Please view results for these tests on the individual orders.    CBC Auto Differential [[id number]]  (Normal) Collected: 11/05/20 0546    Specimen: Blood Updated: 11/05/20 0626     WBC 7.00 10*3/mm3      RBC 4.62 10*6/mm3      Hemoglobin 13.7 g/dL      Hematocrit 39.3 %      MCV 85.1 fL      MCH 29.7 pg      MCHC 34.9 g/dL      RDW 13.1 %      RDW-SD 38.9 fl      MPV 9.0 fL      Platelets 209 10*3/mm3      Neutrophil % 61.5 %      Lymphocyte % 27.4 %      Monocyte % 6.6 %      Eosinophil % 3.7 %      Basophil % 0.8 %      Neutrophils, Absolute 4.30 10*3/mm3      Lymphocytes, Absolute 1.90 10*3/mm3      Monocytes, Absolute 0.50 10*3/mm3      Eosinophils, Absolute 0.30 10*3/mm3      Basophils, Absolute 0.10 10*3/mm3      nRBC 0.1 /100 WBC     POC Glucose Once [[id number]]  (Abnormal) Collected: 11/05/20 0533    Specimen: Blood Updated: 11/05/20 0534     Glucose 253 mg/dL      Comment: Serial Number: [id number]:  [id number]       POC Glucose Once [[id number]]  (Abnormal) Collected: 11/05/20 0007    Specimen: Blood Updated: 11/05/20 0012     Glucose 342 mg/dL      Comment: Serial Number: [id number]:  [id number]       Phosphorus [[id number]]  (Normal) Collected: 11/04/20 0117    Specimen: Blood Updated: 11/04/20 2111     Phosphorus 3.5 mg/dL     Magnesium [[id number]]  (Normal) Collected: 11/04/20 0117    Specimen: Blood Updated: 11/04/20 2111     Magnesium 1.7 mg/dL     POC Glucose Once [[id number]]  (Abnormal) Collected: 11/04/20 2052    Specimen: Blood Updated: 11/04/20 2053     Glucose 343 mg/dL      Comment: Serial Number: [id number]:  [id number]       POC Glucose Once [[id number]]  (Abnormal) Collected: 11/04/20 1708    Specimen: Blood Updated: 11/04/20 1710     Glucose 326 mg/dL      Comment: Serial Number: [id number]:  [id number]       POC Glucose Once [[id number]]  (Abnormal) Collected: 11/04/20 1239    Specimen:  Blood Updated: 11/04/20 1241     Glucose 268 mg/dL      Comment: Serial Number: [id number]:  [id number]       Respiratory Panel PCR w/COVID-19(SARS-CoV-2) TRACIE/AIDA/IDANIA/PAD/COR/MAD/FRANKI In-House, NP Swab in UTM/VTM, 3-4 HR TAT - Swab, Nasopharynx [[id number]]  (Normal) Collected: 11/04/20 0449    Specimen: Swab from Nasopharynx Updated: 11/04/20 0544     ADENOVIRUS, PCR Not Detected     Coronavirus 229E Not Detected     Coronavirus HKU1 Not Detected     Coronavirus NL63 Not Detected     Coronavirus OC43 Not Detected     COVID19 Not Detected     Human Metapneumovirus Not Detected     Human Rhinovirus/Enterovirus Not Detected     Influenza A PCR Not Detected     Influenza A H1 Not Detected     Influenza A H1 2009 PCR Not Detected     Influenza A H3 Not Detected     Influenza B PCR Not Detected     Parainfluenza Virus 1 Not Detected     Parainfluenza Virus 2 Not Detected     Parainfluenza Virus 3 Not Detected     Parainfluenza Virus 4 Not Detected     RSV, PCR Not Detected     Bordetella pertussis pcr Not Detected     Bordetella parapertussis PCR Not Detected     Chlamydophila pneumoniae PCR Not Detected     Mycoplasma pneumo by PCR Not Detected    Narrative:      Fact sheet for providers: https://docs.[employer]/wp-content/uploads/IXK2519-1504-XB6.1-EUA-Provider-Fact-Sheet-3.pdf    Fact sheet for patients: https://docs.[employer]/wp-content/uploads/ANF8635-6114-LK8.1-EUA-Patient-Fact-Sheet-1.pdf    Basic Metabolic Panel [[id number]]  (Abnormal) Collected: 11/04/20 0448    Specimen: Blood Updated: 11/04/20 0532     Glucose 279 mg/dL      BUN 10 mg/dL      Creatinine 0.61 mg/dL      Sodium 137 mmol/L      Potassium 3.3 mmol/L      Chloride 103 mmol/L      CO2 23.0 mmol/L      Calcium 8.4 mg/dL      eGFR Non African Amer 137 mL/min/1.73      BUN/Creatinine Ratio 16.4     Anion Gap 11.0 mmol/L     Narrative:      GFR Normal >60  Chronic Kidney Disease <60  Kidney Failure <15      Phosphorus [[id number]]  (Normal)  Collected: 11/04/20 0448    Specimen: Blood Updated: 11/04/20 0532     Phosphorus 3.6 mg/dL     Magnesium [[id number]]  (Normal) Collected: 11/04/20 0448    Specimen: Blood Updated: 11/04/20 0532     Magnesium 1.7 mg/dL     Lipid Panel [[id number]]  (Abnormal) Collected: 11/04/20 0448    Specimen: Blood Updated: 11/04/20 0532     Total Cholesterol 167 mg/dL      Triglycerides 432 mg/dL      HDL Cholesterol 26 mg/dL      LDL Cholesterol  73 mg/dL      VLDL Cholesterol 68 mg/dL      LDL/HDL Ratio 2.10    Narrative:      Cholesterol Reference Ranges  (U.S. Department of Health and Human Services ATP III Classifications)    Desirable          <200 mg/dL  Borderline High    200-239 mg/dL  High Risk          >240 mg/dL      Triglyceride Reference Ranges  (U.S. Department of Health and Human Services ATP III Classifications)    Normal           <150 mg/dL  Borderline High  150-199 mg/dL  High             200-499 mg/dL  Very High        >500 mg/dL    HDL Reference Ranges  (U.S. Department of Health and Human Services ATP III Classifcations)    Low     <40 mg/dl (major risk factor for CHD)  High    >60 mg/dl ('negative' risk factor for CHD)        LDL Reference Ranges  (U.S. Department of Health and Human Services ATP III Classifcations)    Optimal          <100 mg/dL  Near Optimal     100-129 mg/dL  Borderline High  130-159 mg/dL  High             160-189 mg/dL  Very High        >189 mg/dL    POC Glucose Once [[id number]]  (Abnormal) Collected: 11/04/20 0527    Specimen: Blood Updated: 11/04/20 0528     Glucose 256 mg/dL      Comment: Serial Number: [id number]:  [id number]       CBC & Differential [[id number]]  (Abnormal) Collected: 11/04/20 0448    Specimen: Blood Updated: 11/04/20 0455    Narrative:      The following orders were created for panel order CBC & Differential.  Procedure                               Abnormality         Status                     ---------                               -----------          ------                     CBC Auto Differential[[id number]]        Abnormal            Final result                 Please view results for these tests on the individual orders.    CBC Auto Differential [[id number]]  (Abnormal) Collected: 11/04/20 0448    Specimen: Blood Updated: 11/04/20 0455     WBC 5.80 10*3/mm3      RBC 4.35 10*6/mm3      Hemoglobin 12.9 g/dL      Hematocrit 37.2 %      MCV 85.6 fL      MCH 29.6 pg      MCHC 34.6 g/dL      RDW 13.2 %      RDW-SD 39.4 fl      MPV 8.8 fL      Platelets 207 10*3/mm3      Neutrophil % 53.5 %      Lymphocyte % 35.1 %      Monocyte % 5.9 %      Eosinophil % 4.4 %      Basophil % 1.1 %      Neutrophils, Absolute 3.10 10*3/mm3      Lymphocytes, Absolute 2.00 10*3/mm3      Monocytes, Absolute 0.30 10*3/mm3      Eosinophils, Absolute 0.30 10*3/mm3      Basophils, Absolute 0.10 10*3/mm3      nRBC 0.1 /100 WBC     [place] Draw [[id number]] Collected: 11/04/20 0117    Specimen: Blood Updated: 11/04/20 0230    Narrative:      The following orders were created for panel order [place] Draw.  Procedure                               Abnormality         Status                     ---------                               -----------         ------                     Light Blue Top[[id number]]                                   Final result               Green Top (Gel)[[id number]]                                  Final result               Lavender Top[[id number]]                                     Final result               Gold Top - SST[[id number]]                                   Final result                 Please view results for these tests on the individual orders.    Light Blue Top [[id number]] Collected: 11/04/20 0117    Specimen: Blood Updated: 11/04/20 0230     Extra Tube hold for add-on     Comment: Auto resulted       Green Top (Gel) [[id number]] Collected: 11/04/20 0117    Specimen: Blood Updated: 11/04/20 0230     Extra Tube Hold for add-ons.     Comment: Auto resulted.        Lavender Top [[id number]] Collected: 11/04/20 0117    Specimen: Blood Updated: 11/04/20 0230     Extra Tube hold for add-on     Comment: Auto resulted       Gold Top - SST [[id number]] Collected: 11/04/20 0117    Specimen: Blood Updated: 11/04/20 0230     Extra Tube Hold for add-ons.     Comment: Auto resulted.       Comprehensive Metabolic Panel [[id number]]  (Abnormal) Collected: 11/04/20 0117    Specimen: Blood Updated: 11/04/20 0144     Glucose 389 mg/dL      BUN 11 mg/dL      Creatinine 0.75 mg/dL      Sodium 138 mmol/L      Potassium 3.7 mmol/L      Chloride 101 mmol/L      CO2 24.0 mmol/L      Calcium 9.5 mg/dL      Total Protein 6.3 g/dL      Albumin 4.10 g/dL      ALT (SGPT) 20 U/L      AST (SGOT) 13 U/L      Alkaline Phosphatase 93 U/L      Total Bilirubin 0.4 mg/dL      eGFR Non African Amer 108 mL/min/1.73      Globulin 2.2 gm/dL      A/G Ratio 1.9 g/dL      BUN/Creatinine Ratio 14.7     Anion Gap 13.0 mmol/L     Narrative:      GFR Normal >60  Chronic Kidney Disease <60  Kidney Failure <15      Troponin [[id number]]  (Normal) Collected: 11/04/20 0117    Specimen: Blood Updated: 11/04/20 0144     Troponin T 0.014 ng/mL     Narrative:      Troponin T Reference Range:  <= 0.03 ng/mL-   Negative for AMI  >0.03 ng/mL-     Abnormal for myocardial necrosis.  Clinicians would have to utilize clinical acumen, EKG, Troponin and serial changes to determine if it is an Acute Myocardial Infarction or myocardial injury due to an underlying chronic condition.       Results may be falsely decreased if patient taking Biotin.      Protime-INR [[id number]]  (Normal) Collected: 11/04/20 0117    Specimen: Blood Updated: 11/04/20 0140     Protime 10.4 Seconds      INR 0.94    aPTT [[id number]]  (Normal) Collected: 11/04/20 0117    Specimen: Blood Updated: 11/04/20 0140     PTT 25.3 seconds     CBC & Differential [[id number]]  (Normal) Collected: 11/04/20 0117    Specimen: Blood Updated: 11/04/20 0125    Narrative:      The  following orders were created for panel order CBC & Differential.  Procedure                               Abnormality         Status                     ---------                               -----------         ------                     CBC Auto Differential[[id number]]        Normal              Final result                 Please view results for these tests on the individual orders.    CBC Auto Differential [[id number]]  (Normal) Collected: 11/04/20 0117    Specimen: Blood Updated: 11/04/20 0125     WBC 6.00 10*3/mm3      RBC 4.65 10*6/mm3      Hemoglobin 13.8 g/dL      Hematocrit 39.7 %      MCV 85.3 fL      MCH 29.7 pg      MCHC 34.8 g/dL      RDW 13.3 %      RDW-SD 39.8 fl      MPV 8.9 fL      Platelets 216 10*3/mm3      Neutrophil % 54.1 %      Lymphocyte % 33.4 %      Monocyte % 7.4 %      Eosinophil % 4.0 %      Basophil % 1.1 %      Neutrophils, Absolute 3.30 10*3/mm3      Lymphocytes, Absolute 2.00 10*3/mm3      Monocytes, Absolute 0.40 10*3/mm3      Eosinophils, Absolute 0.20 10*3/mm3      Basophils, Absolute 0.10 10*3/mm3      nRBC 0.1 /100 WBC     POC Glucose Once [[id number]]  (Abnormal) Collected: 11/04/20 0110    Specimen: Blood Updated: 11/04/20 0111     Glucose 351 mg/dL      Comment: Serial Number: [id number]:  [id number]                  Microbiology Results (last 10 days)     Procedure Component Value - Date/Time    Respiratory Panel PCR w/COVID-19(SARS-CoV-2) TRACIE/AIDA/IDANIA/PAD/COR/MAD/FRANKI In-House, NP Swab in UTM/VTM, 3-4 HR TAT - Swab, Nasopharynx [[id number]]  (Normal) Collected: 11/04/20 0449    Lab Status: Final result Specimen: Swab from Nasopharynx Updated: 11/04/20 0544     ADENOVIRUS, PCR Not Detected     Coronavirus 229E Not Detected     Coronavirus HKU1 Not Detected     Coronavirus NL63 Not Detected     Coronavirus OC43 Not Detected     COVID19 Not Detected     Human Metapneumovirus Not Detected     Human Rhinovirus/Enterovirus Not Detected     Influenza A PCR Not Detected      Influenza A H1 Not Detected     Influenza A H1 2009 PCR Not Detected     Influenza A H3 Not Detected     Influenza B PCR Not Detected     Parainfluenza Virus 1 Not Detected     Parainfluenza Virus 2 Not Detected     Parainfluenza Virus 3 Not Detected     Parainfluenza Virus 4 Not Detected     RSV, PCR Not Detected     Bordetella pertussis pcr Not Detected     Bordetella parapertussis PCR Not Detected     Chlamydophila pneumoniae PCR Not Detected     Mycoplasma pneumo by PCR Not Detected    Narrative:      Fact sheet for providers: https://docs.[employer]/wp-content/uploads/VSL3877-1767-TH8.1-EUA-Provider-Fact-Sheet-3.pdf    Fact sheet for patients: https://docs.[employer]/wp-content/uploads/FQO1320-8745-FM2.1-EUA-Patient-Fact-Sheet-1.pdf            Results for orders placed during the hospital encounter of 11/04/20   Adult Transthoracic Echo Complete W/ Cont if Necessary Per Protocol    Narrative Normal LV size with mild LV dysfunction with possible apical hypokinesis,   estimated LV ejection fraction of   50%  Normal RV size  Normal atrial size  Aortic valve is thickened, has adequate cusp separation.    Mitral valve, tricuspid valve appears structurally normal, trace MR TR   seen.  Calculated RV systolic pressure of 32 mm/hg  No pericardial effusion seen.  Proximal aorta appears normal in size.       Imaging Results (All)     Procedure Component Value Units Date/Time    CT Head Without Contrast [[id number]] Collected: 11/05/20 0039     Updated: 11/05/20 0244    Narrative:      EXAMINATION: CT HEAD WO CONTRAST    DATE: 11/5/2020 2:20 AM     INDICATION: Follow-up stroke, 24 hours status post TPA     COMPARISON: MRI and CTA head and neck from yesterday.     TECHNIQUE: Thin section noncontrast axial images were obtained through the head. Coronal reformats were created.  CT dose lowering techniques were used, to include: automated exposure control, adjustment for patient size, and or use of iterative    reconstruction.     FINDINGS:     Intracranial contents:    No evidence of acute territorial infarct. No intracranial hemorrhage. No mass effect. Chronic lacunar infarct in the left frontal corona radiata. Mild chronic small vessel ischemic changes in the white matter.    Bones and extracranial soft tissues:     No fracture or focal osseous lesion in the calvarium or skull base. Paranasal sinuses are clear where visualized. Mastoid air cells are clear. Orbits are unremarkable.         Impression:        1. No acute intracranial abnormality visible by CT.  2. Chronic lacunar infarct in the left corona radiata.         This examination was interpreted by Misha Mckeon M.D.     Electronically signed by:  Misha Mckeon M.D.    11/5/2020 12:42 AM    MRI Brain Without Contrast [[id number]] Collected: 11/04/20 0832     Updated: 11/04/20 0837    Narrative:      MRI BRAIN WO CONTRAST-     Date of Exam: 11/4/2020 7:00 AM     Indication: Stroke, follow up; I63.9-Cerebral infarction, unspecified  .  Slurred speech beginning last night. TPA was a . HISTORY of  stroke in 2019.     Comparison: CT angiography of the head, CT perfusion examination, and  noncontrast CT head 11/04/2020. MRI brain 10/30/2019.     Technique: Multiplanar multisequence images of the brain were performed  without contrast according to routine brain MRI protocol.     FINDINGS:  5 mm focus of restricted diffusion in the left parietal lobe, system  with acute or subacute ischemic insult. No hemorrhagic transformation is  seen. Chronic lacunar infarct within the left basal ganglia measures 4  mm. Encephalomalacia with old infarct within the left frontal parietal  lobe in the periventricular distribution measures 12 x 8 mm, unchanged  from prior.     Normal ventricular configuration. Major vascular flow voids appear  preserved. No mass lesion, mass effect or midline shift is identified.  The pituitary gland is not enlarged. Imaged cervical  spinal cord is  within normal limits. Scattered FLAIR and T2 signal intensity changes  within the deep white matter of the brain are nonspecific but favored to  reflect changes of chronic microvascular disease. Paranasal sinuses and  mastoid air cells are clear.          Impression:         1. 5 mm focal acute or subacute ischemic insult within the left parietal  lobe, without hemorrhagic transformation.  2. Old infarcts within the periventricular left frontal-parietal lobe,  chronic lacunar infarct within the left basal ganglia.  3. Scattered mild chronic microvascular disease changes.        Electronically Signed By-Dr. Meliza Zabala MD On:11/4/2020 8:35 AM  This report was finalized on [id number] by Dr. Meliza Zabala MD.    XR Chest 1 View [[id number]] Collected: 11/04/20 0711     Updated: 11/04/20 0714    Narrative:      Examination: XR CHEST 1 VW-     Date of Exam: 11/4/2020 1:58 AM     Indication: Acute Stroke Protocol (onset < 12 hrs).     Comparison: 10/29/2019     Technique: 1 view of the chest      Findings:  The heart size is borderline enlarged. The pulmonary vascular markings  are normal. There are bilateral alveolar airspace opacities greatest in  the left lower lobe but seen diffusely and likely secondary to  pneumonia.       Impression:      Diffuse alveolar opacities consistent with pneumonia.     Electronically Signed By-Tyler Mejia On:11/4/2020 7:11 AM  This report was finalized on [id number] by  Tyler Mejia, .    CT Angiogram Head [[id number]] Collected: 11/04/20 0022     Updated: 11/04/20 0230    Narrative:      EXAMINATION: CT ANGIOGRAM HEAD, CT ANGIOGRAM NECK    DATE: 11/4/2020 1:21 AM     HISTORY:  Patient Data: Male, 55 years of age.  Clinical Indication : Stroke alert. Right-sided weakness and slurred speech.      COMPARISON: None available.     TECHNIQUE: CT angiography through the head and neck was performed in the axial plane using 100 mL of Isovue-370 administered intravenously.  Coronal and sagittal MIP and MPR images were then created. There were no immediate complications. CT dose lowering   techniques including automated exposure control, adjustment for patient size, and/or use of iterative reconstruction were used.    NOTE: All arterial stenosis estimates in this report are derived using NASCET criteria.       FINDINGS:         CTA OF THE NECK:    Aorta:    The visualized aortic arch and great vessel origins are patent.    Right Carotid:    The right common carotid artery is patent from its origin to its bifurcation, without hemodynamically significant stenosis or dissection. The right carotid bifurcation is patent, as is the origin of the internal carotid artery. The right internal carotid   artery is then patent through the remainder of its cervical course, with no critical stenosis or dissection.    Left Carotid:    The left common carotid artery is patent from its origin to its bifurcation, without hemodynamically significant stenosis or dissection. The left carotid bifurcation is patent, as is the origin of the internal carotid artery. The left internal carotid   artery is then patent through the remainder of its cervical course, with no critical stenosis or dissection.    Vertebral arteries:    The vertebral arteries are codominant. The vertebral arteries are without occlusion, significant stenosis, or evidence of dissection.        CTA OF THE HEAD:    Anterior circulation:    Each internal carotid artery is widely patent through the skull base and cavernous sinus to its terminus. Each middle and anterior cerebral artery is patent, with no critical arterial stenoses identified. No anterior circulation aneurysm or vascular   malformation is seen.     Posterior circulation:    Each vertebral artery is patent where visualized, from the caudal extent of the exam to the vertebrobasilar junction. The basilar artery is normal. No critical stenoses, aneurysms or vascular malformations are  seen in the posterior circulation.    Venous structures:    The major dural sinuses and deep draining veins of the brain are grossly patent on this non-dedicated exam.         Impression:        CTA HEAD:  1.  No evidence of large vessel occlusion or critical stenosis.    CTA NECK:  1.  No significant extracranial vascular stenosis, occlusion or dissection.              Daniele Powers MD  Neuroradiologist    Thank you for this referral.  This exam was interpreted by a fellowship trained neuroradiologist.       SLOT:  68      Electronically signed by:  Daniele Powers    11/4/2020 12:29 AM    CT Angiogram Neck [[id number]] Collected: 11/04/20 0022     Updated: 11/04/20 0230    Narrative:      EXAMINATION: CT ANGIOGRAM HEAD, CT ANGIOGRAM NECK    DATE: 11/4/2020 1:21 AM     HISTORY:  Patient Data: Male, 55 years of age.  Clinical Indication : Stroke alert. Right-sided weakness and slurred speech.      COMPARISON: None available.     TECHNIQUE: CT angiography through the head and neck was performed in the axial plane using 100 mL of Isovue-370 administered intravenously. Coronal and sagittal MIP and MPR images were then created. There were no immediate complications. CT dose lowering   techniques including automated exposure control, adjustment for patient size, and/or use of iterative reconstruction were used.    NOTE: All arterial stenosis estimates in this report are derived using NASCET criteria.       FINDINGS:         CTA OF THE NECK:    Aorta:    The visualized aortic arch and great vessel origins are patent.    Right Carotid:    The right common carotid artery is patent from its origin to its bifurcation, without hemodynamically significant stenosis or dissection. The right carotid bifurcation is patent, as is the origin of the internal carotid artery. The right internal carotid   artery is then patent through the remainder of its cervical course, with no critical stenosis or dissection.    Left Carotid:    The  left common carotid artery is patent from its origin to its bifurcation, without hemodynamically significant stenosis or dissection. The left carotid bifurcation is patent, as is the origin of the internal carotid artery. The left internal carotid   artery is then patent through the remainder of its cervical course, with no critical stenosis or dissection.    Vertebral arteries:    The vertebral arteries are codominant. The vertebral arteries are without occlusion, significant stenosis, or evidence of dissection.        CTA OF THE HEAD:    Anterior circulation:    Each internal carotid artery is widely patent through the skull base and cavernous sinus to its terminus. Each middle and anterior cerebral artery is patent, with no critical arterial stenoses identified. No anterior circulation aneurysm or vascular   malformation is seen.     Posterior circulation:    Each vertebral artery is patent where visualized, from the caudal extent of the exam to the vertebrobasilar junction. The basilar artery is normal. No critical stenoses, aneurysms or vascular malformations are seen in the posterior circulation.    Venous structures:    The major dural sinuses and deep draining veins of the brain are grossly patent on this non-dedicated exam.         Impression:        CTA HEAD:  1.  No evidence of large vessel occlusion or critical stenosis.    CTA NECK:  1.  No significant extracranial vascular stenosis, occlusion or dissection.              Daniele Powers MD  Neuroradiologist    Thank you for this referral.  This exam was interpreted by a fellowship trained neuroradiologist.       SLOT:  68      Electronically signed by:  Daniele Powers    11/4/2020 12:29 AM    CT Cerebral Perfusion With & Without Contrast [[id number]] Collected: 11/03/20 2354     Updated: 11/04/20 0202    Narrative:      EXAMINATION: CT CEREBRAL PERFUSION W WO CONTRAST    DATE: 11/4/2020 1:21 AM     HISTORY:  Patient Data: Male, 55 years of  age.  Clinical Indication : Right-sided weakness and slurred speech. Stroke alert.      COMPARISON: None available.     TECHNIQUE: CT perfusion through the head was performed in the axial plane using 50 mL of Isovue-370 administered intravenously, according to institutional protocol, with postprocessing. CT dose lowering techniques including automated exposure control,   adjustment for patient size, and/or use of iterative reconstruction were used.        FINDINGS:    Mildly limited due to global hypoperfusion versus technical factors related to timing of the contrast bolus.  There is a questionable small area of relatively increased Tmax and TTP without corresponding abnormalities on CBF or CBV in the left parietal   periventricular white matter/corona radiata, which could represent an artifact or small area of viable ischemic penumbra.  This is estimated at 4 cc in size.           Impression:        Questionable small mismatched perfusion defect in the left corona radiata. MRI can be considered to exclude a small vessel lacunar infarct, if clinically warranted.                Daniele Powers MD  Neuroradiologist    Thank you for this referral.  This exam was interpreted by a fellowship trained neuroradiologist.       SLOT:  68      Electronically signed by:  Daniele Powers    11/3/2020 11:59 PM    CT Head Without Contrast Stroke Protocol [[id number]] Collected: 11/03/20 2324     Updated: 11/04/20 0131    Narrative:      EXAMINATION: CT HEAD WO CONTRAST STROKE PROTOCOL    DATE: 11/4/2020 1:15 AM     HISTORY:  Patient Data: Male, 55 years of age.  Clinical Indication : Right-sided weakness and slurred speech. Stroke alert.      COMPARISON: 10/29/2019     TECHNIQUE: Thin section noncontrast axial images were obtained through the head. Coronal reformatted images were then created. CT dose lowering techniques including automated exposure control, adjustment for patient size, and/or use of iterative   reconstruction  were used.     FINDINGS:    Ventricles and Extra-axial Spaces: Mildly enlarged due to volume loss.     Parenchyma: Old lacunar infarct in the left corona radiata, unchanged. Patchy periventricular white matter low-attenuation, most likely from small vessel ischemic disease.   No CT evidence of large-vessel territorial infarct.   No mass effect or midline   shift.     Intracranial Hemorrhage: None.     Bones/Extracranial soft tissues: No depressed calvarial fracture.     Visualized Sinuses/Mastoids: Clear.          Impression:         1.  No acute intracranial abnormality.  2.  Volume loss and mild microvascular ischemic changes.  3.  Old lacunar infarct in the left corona radiata, unchanged.             THESE RESULTS WERE DISCUSSED WITH DR. LANDON ON 11/3/2020 AT 11:29 PM, MDT.       Daniele Powers MD  Neuroradiologist    Thank you for this referral.  This exam was interpreted by a fellowship trained neuroradiologist.       SLOT:  68      Electronically signed by:  Daniele Powers    11/3/2020 11:30 PM            Condition on Discharge:  Stable     Vital Signs  Temp:  [97.4 °F (36.3 °C)-98.5 °F (36.9 °C)] 98.3 °F (36.8 °C)  Heart Rate:  [65-77] 65  Resp:  [18-20] 19  BP: (116-134)/(76-81) 130/81    Physical Exam:  Physical Exam  Eyes:      Pupils: Pupils are equal, round, and reactive to light.   Cardiovascular:      Rate and Rhythm: Normal rate.   Pulmonary:      Effort: No respiratory distress.   Abdominal:      General: There is no distension.   Neurological:      Mental Status: He is alert.   Psychiatric:         Mood and Affect: Mood normal.         Discharge Disposition  Home or Self Care    Discharge Medications     Discharge Medications      New Medications      Instructions Start Date   atorvastatin 80 MG tablet  Commonly known as: LIPITOR   80 mg, Oral, Nightly      insulin detemir 100 UNIT/ML injection  Commonly known as: LEVEMIR   15 Units, Subcutaneous, 2 Times Daily      Isopropyl Alcohol Wipes 70 %  "misc   1 each, Apply externally, 4 Times Daily Before Meals & Nightly      metFORMIN 500 MG tablet  Commonly known as: Glucophage   500 mg, Oral, 2 Times Daily With Meals      OneTouch Delica Plus Nagvos50N misc   1 each, Does not apply, 4 Times Daily Before Meals & Nightly      OneTouch Verio test strip  Generic drug: glucose blood   1 each, Other, 4 Times Daily Before Meals & Nightly, Use as instructed      Pen Needles 3/16\" 31G X 5 MM misc   15 Units, Does not apply, 2 times daily         Changes to Medications      Instructions Start Date   aspirin 325 MG tablet  What changed:   · medication strength  · how much to take   325 mg, Oral, Daily   Start Date: November 7, 2020        Continue These Medications      Instructions Start Date   lisinopril 20 MG tablet  Commonly known as: PRINIVIL,ZESTRIL   20 mg, Oral, Every 24 Hours      NON FORMULARY   Pt states 1 more, unsure of name       Vitamin D-3 25 MCG (1000 UT) capsule   3 capsules, Oral, Daily             Discharge Diet:   Diet Instructions     Diet: Consistent Carbohydrate      Discharge Diet: Consistent Carbohydrate          Activity at Discharge:   Activity Instructions     Gradually Increase Activity Until at Pre-Hospitalization Level            Follow-up Appointments  No future appointments.  Additional Instructions for the Follow-ups that You Need to Schedule     Ambulatory Referral to Physical Therapy Evaluate and treat   As directed      Pt is s/p stroke sirh for outpt PT/OT    Order Comments: Pt is s/p stroke sirh for outpt PT/OT     Specialty needed: Evaluate and treat         Discharge Follow-up with PCP   As directed       Currently Documented PCP:    Provider, No Known    PCP Phone Number:    None     Follow Up Details: one week         Discharge Follow-up with Specified Provider: neurology; 2 Weeks   As directed      To: neurology    Follow Up: 2 Weeks               Test Results Pending at Discharge       Risk for Readmission (LACE) Score: 8 " (11/6/2020  6:00 AM)      This patient has been examined wearing appropriate Personal Protective Equipment . 11/06/20      Time: Discharge 34 min with face-to-face history exam, writing of prescriptions, and documenting discharge data including care coordination with the nursing staff.      Ignacio Erickson DO  11/06/20  11:41 EST

## 2020-11-06 NOTE — PROGRESS NOTES
Continued Stay Note  [facility]OE[facility]     Patient Name: Preet Jones  MRN: [id number]  Today's Date: 11/6/2020    Admit Date: 11/4/2020    Discharge Plan     Row Name 11/06/20 0900       Plan    Plan Comments  D/C Barriers: Confirmed stroke 11/4, patient will return home when medically stable and attend outpt PT/OT at [facility].            Expected Discharge Date and Time     Expected Discharge Date Expected Discharge Time    Nov 7, 2020             Florence Olivier RN

## 2020-11-06 NOTE — PROGRESS NOTES
Cc:  Follow up of stroke.       S:  The patient is doing well.     O:  Vitals stable. O/E the patient is awake, alert, follow commands.  Speech is good.  CN EOMI, no facial droop. Motor 5/5.      A:  Left MCA stroke. S/p tPA responded well, weakness resolved.     P:  Will continue current care.  Will continue ASA and start Plavix.

## 2020-11-07 ENCOUNTER — READMISSION MANAGEMENT (OUTPATIENT)
Dept: CALL CENTER | Facility: HOSPITAL | Age: 55
End: 2020-11-07

## 2020-11-07 NOTE — OUTREACH NOTE
Prep Survey      Responses   [facility] facility patient discharged from?  [facility]   Is LACE score < 7 ?  No   Eligibility  Readm Mgmt   Discharge diagnosis  Acute ischemic stroke   Does the patient have one of the following disease processes/diagnoses(primary or secondary)?  Stroke (TIA)   Does the patient have Home health ordered?  No   Is there a DME ordered?  No   Comments regarding appointments  see AVS   Medication alerts for this patient  see AVS   Prep survey completed?  Yes          Stella Sue RN

## 2020-11-09 NOTE — PROGRESS NOTES
Case Management Discharge Note           Provided Post Acute Provider List?: Yes  Post Acute Provider List: Outpatient Therapy  Delivered To: Patient  Method of Delivery: In person    Selected Continued Care - Discharged on 11/6/2020 Admission date: 11/4/2020 - Discharge disposition: Home or Self Care                 Final Discharge Disposition Code: 01 - home or self-care

## 2020-11-11 ENCOUNTER — READMISSION MANAGEMENT (OUTPATIENT)
Dept: CALL CENTER | Facility: HOSPITAL | Age: 55
End: 2020-11-11

## 2020-11-11 NOTE — OUTREACH NOTE
Stroke Week 1 Survey      Responses   [facility] facility patient discharged from?  [facility]   Does the patient have one of the following disease processes/diagnoses(primary or secondary)?  Stroke (TIA)   Week 1 attempt successful?  No   Unsuccessful attempts  Attempt 1          Misha Gomes RN

## 2020-11-12 ENCOUNTER — TELEPHONE (OUTPATIENT)
Dept: NEUROLOGY | Facility: CLINIC | Age: 55
End: 2020-11-12

## 2020-11-12 NOTE — TELEPHONE ENCOUNTER
Caller: THALIA    Relationship to patient: SELF    Best call back number: [phone]    New or established patient?  [x] New  [] Established    Date of discharge: 11-    Facility discharged from: [facility]    Diagnosis/Symptoms: STROKE    Length of stay (If applicable): 2 DAYS    Specialty Only: Did you see a [facility] health provider?    [x] Yes  [] No  If so, who?      Sybil Montano MD   Physician   Neurology

## 2020-11-13 ENCOUNTER — READMISSION MANAGEMENT (OUTPATIENT)
Dept: CALL CENTER | Facility: HOSPITAL | Age: 55
End: 2020-11-13

## 2020-11-13 NOTE — OUTREACH NOTE
Stroke Week 1 Survey      Responses   [facility] patient discharged from?  [facility]   Does the patient have one of the following disease processes/diagnoses(primary or secondary)?  Stroke (TIA)   Week 1 attempt successful?  Yes   Call start time  1620   Call end time  1627   Discharge diagnosis  Acute ischemic stroke   Meds reviewed with patient/caregiver?  Yes   Is the patient having any side effects they believe may be caused by any medication additions or changes?  No   Does the patient have all medications ordered at discharge?  Yes   Is the patient taking all medications as directed (includes completed medication regime)?  Yes   Does the patient have a primary care provider?   Yes   Does the patient have an appointment with their PCP within 7 days of discharge?  Yes   Has the patient kept scheduled appointments due by today?  Yes   Comments  Pt not happy that his Neuro appt was not able to be until Feb 2021. He is calling two other Neuro practices to inquire about timeframe of appts. Waiting on call back to make those appts.    Psychosocial issues?  No   Does the patient require any assistance with activities of daily living such as eating, bathing, dressing, walking, etc.?  No   Does the patient have any residual symptoms from stroke/TIA?  Yes   Residual symptoms comments  low energy, slight speech slur, right arm/leg weakness, hesitation with movement. Balance is slightly off, PT working with it. Gait is slightly off, but walking independently.    Does the patient understand the diet ordered at discharge?  Yes   Comments  outpatient PT/OT. Glucose slightly high 280, pt reports.    Did the patient receive a copy of their discharge instructions?  Yes   Nursing interventions  Reviewed instructions with patient   What is the patient's perception of their health status since discharge?  Improving   Nursing interventions  Nurse provided patient education   Is the patient/caregiver able to teach back the risk  factors for a stroke?  High blood pressure-goal below 120/80, Diabetes   Is the patient/caregiver able to teach back signs and symptoms related to disease process for when to call PCP?  Yes   Is the patient/caregiver able to teach back signs and symptoms related to disease process for when to call 911?  Yes   If the patient is a current smoker, are they able to teach back resources for cessation?  Not a smoker   Is the patient/caregiver able to teach back the hierarchy of who to call/visit for symptoms/problems? PCP, Specialist, Home health nurse, Urgent Care, ED, 911  Yes   Week 1 call completed?  Yes          Kimberly Almonte, JENNIFER

## 2020-11-23 ENCOUNTER — READMISSION MANAGEMENT (OUTPATIENT)
Dept: CALL CENTER | Facility: HOSPITAL | Age: 55
End: 2020-11-23

## 2020-11-23 NOTE — OUTREACH NOTE
Stroke Week 2 Survey      Responses   [facility] facility patient discharged from?  [facility]   Does the patient have one of the following disease processes/diagnoses(primary or secondary)?  Stroke (TIA)   Week 2 attempt successful?  No   Unsuccessful attempts  Attempt 1          Enedina Haywood RN

## 2020-12-10 RX ORDER — CALCIUM CARBONATE/VITAMIN D3 600 MG-20
325 TABLET ORAL DAILY
Status: ON HOLD | COMMUNITY
Start: 2020-11-06 | End: 2021-01-20

## 2020-12-10 NOTE — PROGRESS NOTES
Subjective: stroke    Patient ID: Preet Jones is a 55 y.o. male.    CHIEF COMPLAINT:stroke    H/O CVA  Patient was admitted on 11-4-2020 previous night he had slurred speech and facial droop, right sided arm and leg weakness and dysarthria. Work up included CT no new findings, MRI was reviewed, Hemoglobin A1C 11.4 on statin and Asprin and Plavix.      History of Present Illness:   Mr. Jones is a 55-year-old  male who presented alone for a follow-up after history of a recent stroke.  He reports that he has had actually 3 strokes: one in February 2019, one in October 2019, and the most recent one November 5, 2020 in which she received TPA at [facility].  He states that he is currently in physical therapy, occupational therapy, and has completed speech therapy.  He reports all symptoms have resolved except for some minimal weakness left face and some trouble with speech speed.  He is currently on amlodipine and atorvastatin.  He reports that his A1c has been ranging at a level of 10 and that his target level is 6. He reports that he is eating a healthy heart diet and is working on controlling A1C.  He states that he has been taking a 325 mg aspirin +81 mg aspirin and Plavix.    The patient states that he has been having some headaches after this last stroke.  He states that he is currently following up with his primary care for this to be evaluated.  He also reports that he snores and his wife has told him that he has breathing problems at night.  He  has not had a sleep test.  The following portions of the patient's history were reviewed and updated as appropriate: allergies, current medications, past family history, past medical history, past social history, past surgical history and problem list.      Family History   Problem Relation Age of Onset   • Heart disease Mother    • Parkinsonism Father    • Cancer Father        Past Medical History:   Diagnosis Date   • Abnormal cardiovascular stress test  1/19/2017   • Acute myocardial infarction (CMS/[facility]) 2019   • Aortic aneurysm (CMS/[facility]) 6/22/2017   • Bicuspid aortic valve 2/16/2017   • Coronary artery disease 10/29/2019   • History of coronary angioplasty with insertion of stent    • Hyperlipidemia    • Hypertension    • Obesity 9/2/2011   • Primary hyperparathyroidism (CMS/[facility]) 9/2/2011   • RUE weakness 10/29/2019    Previous residual from Stroke, but was not work prohibiting.   • Sinus arrhythmia    • Stroke (CMS/[facility])    • Type 2 diabetes mellitus (CMS/[facility]) 10/29/2019       Social History     Socioeconomic History   • Marital status:      Spouse name: Not on file   • Number of children: Not on file   • Years of education: Not on file   • Highest education level: Not on file   Tobacco Use   • Smoking status: Never Smoker   • Smokeless tobacco: Never Used   Substance and Sexual Activity   • Alcohol use: No     Frequency: Never   • Drug use: No   • Sexual activity: Yes     Partners: Female         Current Outpatient Medications:   •  amitriptyline (ELAVIL) 25 MG tablet, Take 25 mg by mouth every night at bedtime., Disp: , Rfl:   •  amLODIPine (NORVASC) 5 MG tablet, , Disp: , Rfl:   •  atorvastatin (LIPITOR) 80 MG tablet, Take 1 tablet by mouth Every Night., Disp: 30 tablet, Rfl: 0  •  Cholecalciferol (VITAMIN D-3) 25 MCG (1000 UT) capsule, Take 3 capsules by mouth Daily., Disp: , Rfl:   •  clopidogrel (PLAVIX) 75 MG tablet, Take 1 tablet by mouth Daily., Disp: 30 tablet, Rfl: 6  •  [employer] Aspirin 325 MG tablet, Take 325 mg by mouth Daily., Disp: , Rfl:   •  glucose blood (OneTouch Verio) test strip, 1 each by Other route 4 (Four) Times a Day Before Meals & at Bedtime. Use as instructed, Disp: 200 each, Rfl: 0  •  insulin detemir (LEVEMIR) 100 UNIT/ML injection, Inject 15 Units under the skin into the appropriate area as directed 2 (Two) Times a Day. (Patient taking differently: Inject 18 Units under the skin into the appropriate area as directed 2 (Two) Times  "a Day.), Disp: 3 pen, Rfl: 1  •  Insulin Pen Needle (Pen Needles 3/16\") 31G X 5 MM misc, 15 Units 2 (two) times a day., Disp: 100 each, Rfl: 0  •  Isopropyl Alcohol Wipes 70 % misc, Apply 1 each topically 4 (Four) Times a Day Before Meals & at Bedtime., Disp: 200 each, Rfl: 0  •  Lancets (OneTouch Delica Plus [id number]) misc, 1 each 4 (Four) Times a Day Before Meals & at Bedtime., Disp: 200 each, Rfl: 0  •  lisinopril (PRINIVIL,ZESTRIL) 20 MG tablet, Take 40 mg by mouth Daily., Disp: , Rfl:   •  metFORMIN (Glucophage) 500 MG tablet, Take 1 tablet by mouth 2 (Two) Times a Day With Meals., Disp: 60 tablet, Rfl: 0  •  metoprolol succinate XL (TOPROL-XL) 50 MG 24 hr tablet, Take 50 mg by mouth Daily., Disp: , Rfl:   •  NON FORMULARY, Pt states 1 more, unsure of name, Disp: , Rfl:     Review of Systems   Constitutional: Positive for fatigue. Negative for fever.   HENT: Negative for ear discharge, ear pain, facial swelling, hearing loss and trouble swallowing.    Eyes: Negative for pain, discharge and itching.   Respiratory: Positive for shortness of breath. Negative for wheezing.    Cardiovascular: Negative for chest pain.   Gastrointestinal: Negative for abdominal distention and nausea.   Endocrine: Positive for cold intolerance. Negative for heat intolerance.   Genitourinary: Negative for urgency.   Musculoskeletal: Negative for back pain and neck pain.   Neurological: Positive for dizziness, light-headedness and headaches.   Psychiatric/Behavioral: Positive for agitation and sleep disturbance. Negative for confusion.        I have reviewed ROS completed by medical assistant.     Objective:    Neurologic Exam     Mental Status   Oriented to person, place, and time.   Oriented to person.   Oriented to place.   Oriented to time.   Registration: recalls 3 of 3 objects. Recall at 5 minutes: recalls 3 of 3 objects. Follows 3 step commands.   Attention: normal. Concentration: normal.   Speech: speech is normal   Level of " consciousness: alert  Knowledge: good and consistent with education.   Able to name object. Able to read. Able to repeat. Able to write. Normal comprehension.     Cranial Nerves     CN II   Visual fields full to confrontation.   Visual acuity: normal  Right visual field deficit: none  Left visual field deficit: none     CN III, IV, VI   Pupils are equal, round, and reactive to light.  Extraocular motions are normal.   Right pupil: Shape: regular. Reactivity: brisk. Consensual response: intact. Accommodation: intact.   Left pupil: Shape: regular. Reactivity: brisk. Consensual response: intact. Accommodation: intact.   CN III: no CN III palsy  CN VI: no CN VI palsy  Nystagmus: none   Diplopia: none  Ophthalmoparesis: none  Upgaze: normal  Downgaze: normal  Conjugate gaze: present  Vestibulo-ocular reflex: present    CN V   Facial sensation intact.     CN VII   Facial expression full, symmetric.   Left facial weakness: peripheral    CN VIII   CN VIII normal.     CN IX, X   CN IX normal.   CN X normal.   Palate: symmetric  Right gag reflex: normal  Left gag reflex: normal    CN XI   CN XI normal.   Right sternocleidomastoid strength: normal  Left sternocleidomastoid strength: normal  Right trapezius strength: normal  Left trapezius strength: normal    CN XII   CN XII normal.   Tongue: not atrophic    Motor Exam   Muscle bulk: normal  Overall muscle tone: normal  Right arm tone: normal  Left arm tone: normal  Right arm pronator drift: absent  Left arm pronator drift: absent  Right leg tone: normal  Left leg tone: normal    Strength   Strength 5/5 throughout.     Sensory Exam   Light touch normal.   Vibration normal.   Proprioception normal.   Pinprick normal.   Graphesthesia: normal  Stereognosis: normal    Gait, Coordination, and Reflexes     Coordination   Romberg: negative  Finger to nose coordination: normal  Heel to shin coordination: normal  Tandem walking coordination: abnormal    Tremor   Resting tremor:  absent  Intention tremor: absent  Action tremor: absent    Reflexes   Right brachioradialis: 2+  Left brachioradialis: 2+  Right biceps: 2+  Left biceps: 2+  Right triceps: 2+  Left triceps: 2+  Right patellar: 2+  Left patellar: 2+  Right achilles: 2+  Left achilles: 2+  Right : 2+  Left : 2+  Right plantar: normal  Left plantar: normal  Right Hartman: absent  Left Hartman: absent  Right ankle clonus: absent  Left ankle clonus: absent  Right pendular knee jerk: absent  Left pendular knee jerk: absent         Physical Exam  Vitals signs reviewed.   Constitutional:       Appearance: Normal appearance. He is normal weight.   HENT:      Head: Normocephalic and atraumatic.      Right Ear: Tympanic membrane normal.      Left Ear: Tympanic membrane normal.      Nose: Nose normal.      Mouth/Throat:      Mouth: Mucous membranes are moist.   Eyes:      Extraocular Movements: EOM normal.      Pupils: Pupils are equal, round, and reactive to light.   Neck:      Musculoskeletal: Normal range of motion.   Cardiovascular:      Rate and Rhythm: Normal rate and regular rhythm.      Pulses: Normal pulses.      Heart sounds: Normal heart sounds.   Pulmonary:      Effort: Pulmonary effort is normal.      Breath sounds: Normal breath sounds.   Abdominal:      General: Abdomen is flat. Bowel sounds are normal.   Musculoskeletal: Normal range of motion.   Skin:     General: Skin is warm and dry.   Neurological:      Mental Status: He is alert and oriented to person, place, and time.      Coordination: Finger-Nose-Finger Test, Heel to Shin Test and Romberg Test normal.      Gait: Tandem walk abnormal.      Deep Tendon Reflexes: Strength normal.      Reflex Scores:       Tricep reflexes are 2+ on the right side and 2+ on the left side.       Bicep reflexes are 2+ on the right side and 2+ on the left side.       Brachioradialis reflexes are 2+ on the right side and 2+ on the left side.       Patellar reflexes are 2+ on the right  side and 2+ on the left side.       Achilles reflexes are 2+ on the right side and 2+ on the left side.  Psychiatric:         Mood and Affect: Mood normal.         Speech: Speech normal.         Behavior: Behavior is cooperative.          EPWORTH SLEEPINESS SCALE  Sitting and reading  3  WatchingTV  3  Sitting, inactive, in a public place  0  As a passenger in a car for 1 hour w/o a break  3  Lying down to rest in the afternoon  3  Sitting and talking to someone  0  Sitting quietly after a lunch  3  In a car, while stopped for traffic or a light  0  Total 15        Assessment/Plan:    Diagnoses and all orders for this visit:    1. Arterial ischemic stroke, MCA (middle cerebral artery), left, acute (CMS/[facility]) (Primary)  Assessment & Plan:  Discontinue 81 mg ASA,  Continue 325 mg ASA and Plavix 75 mg      2. Snoring  Assessment & Plan:  Refer for Sleep Evaluation     Orders:  -     Ambulatory Referral to Sleep Medicine    3. Hyperlipidemia, unspecified hyperlipidemia type  Assessment & Plan:  Continue statin and target A1C <7  Continue Healthy heart Diet      4. Hypertension, unspecified type  Assessment & Plan:  Continue meds, Target BP no greater than 130/90      5. Type 2 diabetes mellitus with hyperglycemia, unspecified whether long term insulin use (CMS/[facility])        This document has been electronically signed by Paula CRESPO on December 15, 2020 09:50 EST

## 2020-12-15 ENCOUNTER — OFFICE VISIT (OUTPATIENT)
Dept: NEUROLOGY | Facility: CLINIC | Age: 55
End: 2020-12-15

## 2020-12-15 VITALS
SYSTOLIC BLOOD PRESSURE: 150 MMHG | HEART RATE: 68 BPM | WEIGHT: 200 LBS | DIASTOLIC BLOOD PRESSURE: 88 MMHG | HEIGHT: 71 IN | TEMPERATURE: 97.1 F | BODY MASS INDEX: 28 KG/M2

## 2020-12-15 DIAGNOSIS — I63.512 ARTERIAL ISCHEMIC STROKE, MCA (MIDDLE CEREBRAL ARTERY), LEFT, ACUTE (HCC): Primary | ICD-10-CM

## 2020-12-15 DIAGNOSIS — E78.5 HYPERLIPIDEMIA, UNSPECIFIED HYPERLIPIDEMIA TYPE: ICD-10-CM

## 2020-12-15 DIAGNOSIS — I10 HYPERTENSION, UNSPECIFIED TYPE: ICD-10-CM

## 2020-12-15 DIAGNOSIS — E11.65 TYPE 2 DIABETES MELLITUS WITH HYPERGLYCEMIA, UNSPECIFIED WHETHER LONG TERM INSULIN USE (HCC): ICD-10-CM

## 2020-12-15 DIAGNOSIS — R06.83 SNORING: ICD-10-CM

## 2020-12-15 PROBLEM — Z87.898 HISTORY OF SNORING: Status: ACTIVE | Noted: 2020-12-15

## 2020-12-15 PROCEDURE — 99204 OFFICE O/P NEW MOD 45 MIN: CPT | Performed by: NURSE PRACTITIONER

## 2020-12-15 RX ORDER — METOPROLOL SUCCINATE 50 MG/1
50 TABLET, EXTENDED RELEASE ORAL DAILY
COMMUNITY
Start: 2020-11-18 | End: 2021-02-04 | Stop reason: SDUPTHER

## 2020-12-15 RX ORDER — AMLODIPINE BESYLATE 5 MG/1
10 TABLET ORAL DAILY
COMMUNITY
Start: 2020-12-14 | End: 2020-12-31 | Stop reason: HOSPADM

## 2020-12-15 RX ORDER — LISINOPRIL 40 MG/1
40 TABLET ORAL DAILY
COMMUNITY
Start: 2020-11-30 | End: 2020-12-15

## 2020-12-15 RX ORDER — AMITRIPTYLINE HYDROCHLORIDE 25 MG/1
25 TABLET, FILM COATED ORAL
COMMUNITY
Start: 2020-11-30 | End: 2021-02-04 | Stop reason: ALTCHOICE

## 2020-12-15 NOTE — PROGRESS NOTES
Subjective:     Patient ID: Preet Jones is a 55 y.o. male.    CHIEF COMPLAINT:    History of Present Illness    The following portions of the patient's history were reviewed and updated as appropriate: allergies, current medications, past family history, past medical history, past social history, past surgical history and problem list.      Family History   Problem Relation Age of Onset   • No Known Problems Mother    • No Known Problems Father        Past Medical History:   Diagnosis Date   • Abnormal cardiovascular stress test 1/19/2017   • Acute myocardial infarction (CMS/[facility]) 2019   • Aortic aneurysm (CMS/[facility]) 6/22/2017   • Bicuspid aortic valve 2/16/2017   • Coronary artery disease 10/29/2019   • History of coronary angioplasty with insertion of stent    • Hyperlipidemia    • Hypertension    • Obesity 9/2/2011   • Primary hyperparathyroidism (CMS/[facility]) 9/2/2011   • RUE weakness 10/29/2019    Previous residual from Stroke, but was not work prohibiting.   • Sinus arrhythmia    • Stroke (CMS/[facility])    • Type 2 diabetes mellitus (CMS/[facility]) 10/29/2019       Social History     Socioeconomic History   • Marital status:      Spouse name: Not on file   • Number of children: Not on file   • Years of education: Not on file   • Highest education level: Not on file   Tobacco Use   • Smoking status: Never Smoker   • Smokeless tobacco: Never Used   Substance and Sexual Activity   • Alcohol use: No     Frequency: Never   • Drug use: No   • Sexual activity: Yes     Partners: Female         Current Outpatient Medications:   •  atorvastatin (LIPITOR) 80 MG tablet, Take 1 tablet by mouth Every Night., Disp: 30 tablet, Rfl: 0  •  Cholecalciferol (VITAMIN D-3) 25 MCG (1000 UT) capsule, Take 3 capsules by mouth Daily., Disp: , Rfl:   •  clopidogrel (PLAVIX) 75 MG tablet, Take 1 tablet by mouth Daily., Disp: 30 tablet, Rfl: 6  •  [employer] Aspirin 325 MG tablet, Take 325 mg by mouth Daily., Disp: , Rfl:   •  glucose blood (OneTouch  "Verio) test strip, 1 each by Other route 4 (Four) Times a Day Before Meals & at Bedtime. Use as instructed, Disp: 200 each, Rfl: 0  •  insulin detemir (LEVEMIR) 100 UNIT/ML injection, Inject 15 Units under the skin into the appropriate area as directed 2 (Two) Times a Day., Disp: 3 pen, Rfl: 1  •  Insulin Pen Needle (Pen Needles 3/16\") 31G X 5 MM misc, 15 Units 2 (two) times a day., Disp: 100 each, Rfl: 0  •  Isopropyl Alcohol Wipes 70 % misc, Apply 1 each topically 4 (Four) Times a Day Before Meals & at Bedtime., Disp: 200 each, Rfl: 0  •  Lancets (OneTouch Delica Plus [id number]) misc, 1 each 4 (Four) Times a Day Before Meals & at Bedtime., Disp: 200 each, Rfl: 0  •  lisinopril (PRINIVIL,ZESTRIL) 20 MG tablet, Take 20 mg by mouth Daily., Disp: , Rfl:   •  metFORMIN (Glucophage) 500 MG tablet, Take 1 tablet by mouth 2 (Two) Times a Day With Meals., Disp: 60 tablet, Rfl: 0  •  NON FORMULARY, Pt states 1 more, unsure of name, Disp: , Rfl:     Review of Systems     I have reviewed ROS completed by medical assistant.     Objective:    Neurologic Exam    Physical Exam    Assessment/Plan:    There are no diagnoses linked to this encounter.      This document has been electronically signed by Paula CRESPO on December 15, 2020 07:38 EST  "

## 2020-12-17 ENCOUNTER — OFFICE VISIT (OUTPATIENT)
Dept: CARDIOLOGY | Facility: CLINIC | Age: 55
End: 2020-12-17

## 2020-12-17 VITALS
DIASTOLIC BLOOD PRESSURE: 90 MMHG | HEIGHT: 70 IN | WEIGHT: 200 LBS | OXYGEN SATURATION: 98 % | HEART RATE: 79 BPM | BODY MASS INDEX: 28.63 KG/M2 | SYSTOLIC BLOOD PRESSURE: 162 MMHG

## 2020-12-17 DIAGNOSIS — E11.65 TYPE 2 DIABETES MELLITUS WITH HYPERGLYCEMIA, UNSPECIFIED WHETHER LONG TERM INSULIN USE (HCC): Chronic | ICD-10-CM

## 2020-12-17 DIAGNOSIS — I10 ESSENTIAL HYPERTENSION: Chronic | ICD-10-CM

## 2020-12-17 DIAGNOSIS — I63.512 ARTERIAL ISCHEMIC STROKE, MCA (MIDDLE CEREBRAL ARTERY), LEFT, ACUTE (HCC): ICD-10-CM

## 2020-12-17 DIAGNOSIS — I25.119 CORONARY ARTERY DISEASE INVOLVING NATIVE CORONARY ARTERY OF NATIVE HEART WITH ANGINA PECTORIS (HCC): Primary | Chronic | ICD-10-CM

## 2020-12-17 DIAGNOSIS — E78.2 MIXED HYPERLIPIDEMIA: Chronic | ICD-10-CM

## 2020-12-17 PROCEDURE — 99214 OFFICE O/P EST MOD 30 MIN: CPT | Performed by: INTERNAL MEDICINE

## 2020-12-17 NOTE — PROGRESS NOTES
Subjective:     Encounter Date:12/17/2020      Patient ID: Preet Jones is a 55 y.o. male.    Chief Complaint HTN - SOB - CVA, Chest pain    :54 -year-old white male patient with known history of hypertension history of CAD Comes back for follow-up   patient was diagnosed with CVA in 2019  Patient had another CVA recently admitted to the hospital and was given TPA  Patient was not seen for almost2 years  His  blood sugar is uncontrolled  Patient recentlyRestartedOn Plavix and also statins  His blood pressure is uncontrolled we will start hydrochlorothiazide 12.5 mg every day  Patient is having headaches chest pain shortness of breath on and off no exacerbation or relieving factors for the last few weeks  I suggested patient to get pharmacological stress Myoview in the near future  Echocardiogram November 2020 EF around 50% questionable apical hypokinesis  I will also order 3 to 4-week monitor to evaluate for any atrial fibrillation     Patient underwent stress Myoview 2017 which showed lateral wall ischemia  Patient underwent cardiac catheterization which showed severe 99% disease of the medium caliber marginal branch and underwent PCI January 2017           Cardiac catheterization was done April 2019   left main no stenosis LAD less than 20% stenosis non dominant RCA less than 10-20% stenosis   dominant circumflex artery marginal branch previously placed stent was open mid circumflex artery up to 40-50%   has problems with elevated triglycerides and uncontrolled diabetes mellitus so I advised him to follow up with Endocrinology       History of Present Illness    The following portions of the patient's history were reviewed and updated as appropriate: Allergies current medications past family history past medical history past social history past surgical history problem list and review of systems  Past Medical History:   Diagnosis Date   • Abnormal cardiovascular stress test 1/19/2017   • Acute myocardial  "infarction (CMS/[facility]) 2019   • Aortic aneurysm (CMS/[facility]) 6/22/2017   • Bicuspid aortic valve 2/16/2017   • Coronary artery disease 10/29/2019   • History of coronary angioplasty with insertion of stent    • Hyperlipidemia    • Hypertension    • Obesity 9/2/2011   • Primary hyperparathyroidism (CMS/[facility]) 9/2/2011   • RUE weakness 10/29/2019    Previous residual from Stroke, but was not work prohibiting.   • Sinus arrhythmia    • Stroke (CMS/[facility])    • Type 2 diabetes mellitus (CMS/[facility]) 10/29/2019     Past Surgical History:   Procedure Laterality Date   • APPENDECTOMY     • BACK SURGERY     • CARDIAC CATHETERIZATION  2010   • CARDIAC CATHETERIZATION  2019   • JOINT REPLACEMENT     • KNEE ARTHROSCOPY       /90 (BP Location: Left arm, Patient Position: Sitting, Cuff Size: Adult)   Pulse 79   Ht 177.8 cm (70\")   Wt 90.7 kg (200 lb)   SpO2 98%   BMI 28.70 kg/m²   Family History   Problem Relation Age of Onset   • Heart disease Mother    • Parkinsonism Father    • Cancer Father        Current Outpatient Medications:   •  amLODIPine (NORVASC) 5 MG tablet, , Disp: , Rfl:   •  atorvastatin (LIPITOR) 80 MG tablet, Take 1 tablet by mouth Every Night., Disp: 30 tablet, Rfl: 0  •  Cholecalciferol (VITAMIN D-3) 25 MCG (1000 UT) capsule, Take 3 capsules by mouth Daily., Disp: , Rfl:   •  clopidogrel (PLAVIX) 75 MG tablet, Take 1 tablet by mouth Daily., Disp: 30 tablet, Rfl: 6  •  [employer] Aspirin 325 MG tablet, Take 325 mg by mouth Daily., Disp: , Rfl:   •  glucose blood (OneTouch Verio) test strip, 1 each by Other route 4 (Four) Times a Day Before Meals & at Bedtime. Use as instructed, Disp: 200 each, Rfl: 0  •  insulin detemir (LEVEMIR) 100 UNIT/ML injection, Inject 15 Units under the skin into the appropriate area as directed 2 (Two) Times a Day. (Patient taking differently: Inject 18 Units under the skin into the appropriate area as directed 2 (Two) Times a Day.), Disp: 3 pen, Rfl: 1  •  Insulin Pen Needle (Pen Needles " "3/16\") 31G X 5 MM misc, 15 Units 2 (two) times a day., Disp: 100 each, Rfl: 0  •  Isopropyl Alcohol Wipes 70 % misc, Apply 1 each topically 4 (Four) Times a Day Before Meals & at Bedtime., Disp: 200 each, Rfl: 0  •  Lancets (OneTouch Delica Plus [id number]) misc, 1 each 4 (Four) Times a Day Before Meals & at Bedtime., Disp: 200 each, Rfl: 0  •  lisinopril (PRINIVIL,ZESTRIL) 20 MG tablet, Take 40 mg by mouth Daily., Disp: , Rfl:   •  metFORMIN (Glucophage) 500 MG tablet, Take 1 tablet by mouth 2 (Two) Times a Day With Meals., Disp: 60 tablet, Rfl: 0  •  metoprolol succinate XL (TOPROL-XL) 50 MG 24 hr tablet, Take 50 mg by mouth Daily., Disp: , Rfl:   •  amitriptyline (ELAVIL) 25 MG tablet, Take 25 mg by mouth every night at bedtime., Disp: , Rfl:   •  NON FORMULARY, Pt states 1 more, unsure of name, Disp: , Rfl:   Social History     Socioeconomic History   • Marital status:      Spouse name: Not on file   • Number of children: Not on file   • Years of education: Not on file   • Highest education level: Not on file   Tobacco Use   • Smoking status: Never Smoker   • Smokeless tobacco: Never Used   Substance and Sexual Activity   • Alcohol use: No     Frequency: Never   • Drug use: No   • Sexual activity: Yes     Partners: Female     Allergies   Allergen Reactions   • Amoxicillin Rash   • Morphine GI Intolerance     Review of Systems   Constitution: Positive for malaise/fatigue. Negative for fever.   HENT: Negative for congestion and hearing loss.    Eyes: Positive for visual disturbance. Negative for double vision.   Cardiovascular: Positive for chest pain, claudication, dyspnea on exertion and leg swelling. Negative for syncope.   Respiratory: Positive for cough and shortness of breath.    Endocrine: Negative for cold intolerance.   Skin: Negative for color change and rash.   Musculoskeletal: Positive for arthritis and joint pain.   Gastrointestinal: Negative for abdominal pain and heartburn.   Genitourinary: " Negative for hematuria.   Neurological: Positive for excessive daytime sleepiness and dizziness.   Psychiatric/Behavioral: Negative for depression. The patient is not nervous/anxious.    All other systems reviewed and are negative.             Objective:     Constitutional:       Appearance: Well-developed.   Eyes:      General: No scleral icterus.     Conjunctiva/sclera: Conjunctivae normal.   HENT:      Head: Normocephalic and atraumatic.    Mouth/Throat:      Mouth: No oral lesions.      Pharynx: Uvula midline.   Neck:      Musculoskeletal: Neck supple.      Thyroid: No thyromegaly.      Vascular: No carotid bruit or JVD.      Trachea: Trachea normal.   Pulmonary:      Effort: Pulmonary effort is normal.      Breath sounds: Normal breath sounds.   Cardiovascular:      Normal rate. Regular rhythm.      No gallop.   Pulses:     Intact distal pulses.   Abdominal:      General: Bowel sounds are normal.      Palpations: Abdomen is soft.   Musculoskeletal: Normal range of motion.   Skin:     General: Skin is warm. There is no cyanosis.   Neurological:      Mental Status: Alert and oriented to person, place, and time.      Comments: No focal deficits   Psychiatric:         Behavior: Behavior is cooperative.         Procedures    Lab Review:       Assessment:          Diagnosis Plan   1. Coronary artery disease involving native coronary artery of native heart with angina pectoris (CMS/HCC)     2. Mixed hyperlipidemia     3. Essential hypertension     4. Arterial ischemic stroke, MCA (middle cerebral artery), left, acute (CMS/HCC)     5. Type 2 diabetes mellitus with hyperglycemia, unspecified whether long term insulin use (CMS/[facility])            Plan:       History of CAD status post PCI with recurrent symptoms we will schedule stress Myoview  Patient had problems with CVA recurrent with multiple risk factors diabetes hypertension dyslipidemia  Patient was advised to, Evaluated by endocrinology  Event monitor to evaluate  for any atrial fibrillation  Echocardiogram results were reviewed  Uncontrolled hypertension we will start low-dose hydrochlorothiazide  Follow-up labs

## 2020-12-23 ENCOUNTER — OFFICE VISIT (OUTPATIENT)
Dept: NEUROLOGY | Facility: CLINIC | Age: 55
End: 2020-12-23

## 2020-12-23 VITALS
TEMPERATURE: 97.7 F | HEART RATE: 81 BPM | DIASTOLIC BLOOD PRESSURE: 99 MMHG | HEIGHT: 70 IN | WEIGHT: 200.2 LBS | SYSTOLIC BLOOD PRESSURE: 190 MMHG | BODY MASS INDEX: 28.66 KG/M2

## 2020-12-23 DIAGNOSIS — Z86.73 HISTORY OF STROKE: ICD-10-CM

## 2020-12-23 DIAGNOSIS — G47.33 OBSTRUCTIVE SLEEP APNEA: Primary | ICD-10-CM

## 2020-12-23 PROBLEM — J30.2 SEASONAL ALLERGIC RHINITIS: Status: ACTIVE | Noted: 2020-12-23

## 2020-12-23 PROBLEM — E78.1 HYPERTRIGLYCERIDEMIA: Status: ACTIVE | Noted: 2020-12-23

## 2020-12-23 PROBLEM — I25.10 CORONARY ARTERIOSCLEROSIS: Status: ACTIVE | Noted: 2020-12-23

## 2020-12-23 PROBLEM — R01.1 HEART MURMUR: Status: ACTIVE | Noted: 2020-12-23

## 2020-12-23 PROCEDURE — 99213 OFFICE O/P EST LOW 20 MIN: CPT | Performed by: PSYCHIATRY & NEUROLOGY

## 2020-12-23 RX ORDER — LISINOPRIL 40 MG/1
40 TABLET ORAL DAILY
COMMUNITY
Start: 2020-12-23 | End: 2022-07-21 | Stop reason: HOSPADM

## 2020-12-23 RX ORDER — ZOLPIDEM TARTRATE 10 MG/1
TABLET ORAL
Qty: 1 TABLET | Refills: 0 | Status: SHIPPED | OUTPATIENT
Start: 2020-12-23 | End: 2020-12-30

## 2020-12-23 RX ORDER — HYDROCHLOROTHIAZIDE 12.5 MG/1
12.5 CAPSULE, GELATIN COATED ORAL EVERY MORNING
COMMUNITY
Start: 2020-12-17 | End: 2020-12-31 | Stop reason: HOSPADM

## 2020-12-23 NOTE — PROGRESS NOTES
Sleep Medicine initial Consultation    Preet Jones  : 1965  55 y.o. male   Date of Service: 2020  Referring provider: Paula Carranza, *    New sleep, neck measures 17 1/2 inches    Patient c/o snoring h/o stroke 3 in the past year and half and headaches with elevated BP.  While in hospital while sleeping he had desaturations so o2 per nc was prescribed   Patient has trouble with staying asleep, wakes up frequently during the night has tried different positions sleeps better on his side.   Patient has h/o working 3rd shift for 14 years and still working that shift.     Onset of headaches since the stroke radiates on the neck up to the head feels pressure h/o epi dural injections. Parkinson's and dementia runs in his family.     Obesity, BMI-28.7    The patient c/o daytime sleepiness issues:  excessive daytime sleepiness, , chronic fatigue and There is no H/O sleep paralysis, hypnagogic hallucinations or cataplexy..      There is no history of hypnagogic hallucinations, sleep paralysis or cataplexy.    The patient complains of snoring loud in all sleeping positions, has witnessed episodes of sleep apnea, awakened gasping for breath, has morning headaches and has dry mouth or sore mouth when he wakes up. Pt snores on side as well as supine     The patient complains of Leg symptoms: leg jerking during sleep.     The patient complains of problems with insomnia:  difficulty falling asleep, difficulty staying asleep, frequent awakenings 2-3, has restless sleep, Does not feel rested even after a long sleep and Still feels sleepy even when increasing sleep time.    The patient reports history of these childhood sleep problems: There is no h/O sleepwalking or bedwetting or nightmares or sleep eating or acting out dreams    Sleep schedule: Bedtime:11pm , gets out of bed at 5:30am, sleep latency: 15 minutes, Gets about 3-4 hours of sleep.    When working sleeps 7am till 10am, then back to bed for  another 2-3 hr getting 4-6 hour     EPWORTH SLEEPINESS SCALE  Sitting and reading 1 WatchingTV 2  Sitting, inactive, in a public place 1  As a passenger in a car for 1 hour w/o a break  2  Lying down to rest in the afternoon  1  Sitting and talking to someone  0  Sitting quietly after a lunch  1  In a car, while stopped for traffic or a light  0  Total 8       Past Medical History:   Diagnosis Date   • Abnormal cardiovascular stress test 1/19/2017   • Acute myocardial infarction (CMS/Formerly [facility]) 2019   • Aortic aneurysm (CMS/Formerly [facility]) 6/22/2017   • Bicuspid aortic valve 2/16/2017   • Coronary artery disease 10/29/2019   • History of coronary angioplasty with insertion of stent    • Hyperlipidemia    • Hypertension    • Obesity 9/2/2011   • Primary hyperparathyroidism (CMS/Formerly [facility]) 9/2/2011   • RUE weakness 10/29/2019    Previous residual from Stroke, but was not work prohibiting.   • Sinus arrhythmia    • Stroke (CMS/[facility]ly [facility])    • Type 2 diabetes mellitus (CMS/Formerly [facility]) 10/29/2019     Past Surgical History:   Procedure Laterality Date   • APPENDECTOMY     • BACK SURGERY     • CARDIAC CATHETERIZATION  2010   • CARDIAC CATHETERIZATION  2019   • JOINT REPLACEMENT     • KNEE ARTHROSCOPY       Current Outpatient Medications on File Prior to Visit   Medication Sig Dispense Refill   • amitriptyline (ELAVIL) 25 MG tablet Take 25 mg by mouth every night at bedtime.     • amLODIPine (NORVASC) 5 MG tablet      • atorvastatin (LIPITOR) 80 MG tablet Take 1 tablet by mouth Every Night. 30 tablet 0   • Cholecalciferol (VITAMIN D-3) 25 MCG (1000 UT) capsule Take 3 capsules by mouth Daily.     • clopidogrel (PLAVIX) 75 MG tablet Take 1 tablet by mouth Daily. 30 tablet 6   • [facility] Aspirin 325 MG tablet Take 325 mg by mouth Daily.     • glucose blood (OneTouch Verio) test strip 1 each by Other route 4 (Four) Times a Day Before Meals & at Bedtime. Use as instructed 200 each 0   • hydroCHLOROthiazide (MICROZIDE) 12.5 MG capsule      • insulin detemir (LEVEMIR)  "100 UNIT/ML injection Inject 15 Units under the skin into the appropriate area as directed 2 (Two) Times a Day. (Patient taking differently: Inject 18 Units under the skin into the appropriate area as directed 2 (Two) Times a Day.) 3 pen 1   • Insulin Pen Needle (Pen Needles 3/16\") 31G X 5 MM misc 15 Units 2 (two) times a day. 100 each 0   • Isopropyl Alcohol Wipes 70 % misc Apply 1 each topically 4 (Four) Times a Day Before Meals & at Bedtime. 200 each 0   • Lancets (OneTouch Delica Plus Squbbk14V) misc 1 each 4 (Four) Times a Day Before Meals & at Bedtime. 200 each 0   • lisinopril (PRINIVIL,ZESTRIL) 40 MG tablet      • metFORMIN (Glucophage) 500 MG tablet Take 1 tablet by mouth 2 (Two) Times a Day With Meals. 60 tablet 0   • metoprolol succinate XL (TOPROL-XL) 50 MG 24 hr tablet Take 50 mg by mouth Daily.     • NON FORMULARY Pt states 1 more, unsure of name     • [DISCONTINUED] lisinopril (PRINIVIL,ZESTRIL) 20 MG tablet Take 40 mg by mouth Daily.       No current facility-administered medications on file prior to visit.      Allergies   Allergen Reactions   • Amoxicillin Rash   • Morphine GI Intolerance     Family History   Problem Relation Age of Onset   • Heart disease Mother    • Parkinsonism Father    • Cancer Father      Social History     Socioeconomic History   • Marital status:      Spouse name: Not on file   • Number of children: Not on file   • Years of education: Not on file   • Highest education level: Not on file   Tobacco Use   • Smoking status: Never Smoker   • Smokeless tobacco: Never Used   Substance and Sexual Activity   • Alcohol use: No     Frequency: Never   • Drug use: No   • Sexual activity: Yes     Partners: Female     Review of Systems   Constitutional: Positive for fatigue. Negative for appetite change.   HENT: Negative for sinus pressure and sinus pain.    Eyes: Negative for pain and itching.   Respiratory: Negative for cough and shortness of breath.    Cardiovascular: Negative " for chest pain and palpitations.   Gastrointestinal: Negative for constipation and diarrhea.   Endocrine: Negative for cold intolerance and heat intolerance.   Genitourinary: Positive for frequency. Negative for difficulty urinating.   Musculoskeletal: Positive for neck pain. Negative for back pain.   Allergic/Immunologic: Negative for environmental allergies.   Neurological: Positive for light-headedness and headaches. Negative for dizziness, tremors, seizures, syncope, facial asymmetry, speech difficulty, weakness and numbness.   Psychiatric/Behavioral: Positive for sleep disturbance. Negative for agitation.       I reviewed and addressed ROS entered by MA.    Patient examination:  Vitals:    12/23/20 1136   BP: (!) 190/99   Pulse: 81   Temp: 97.7 °F (36.5 °C)    Body mass index is 28.73 kg/m².     Physical Exam  Vitals signs reviewed.   Constitutional:       Appearance: Normal appearance.   HENT:      Head: Normocephalic.      Nose: Nose normal.      Mouth/Throat:      Mouth: Mucous membranes are moist.   Eyes:      Pupils: Pupils are equal, round, and reactive to light.   Pulmonary:      Effort: Pulmonary effort is normal. No respiratory distress.      Breath sounds: Normal breath sounds.   Neurological:      General: No focal deficit present.      Mental Status: He is alert.   Psychiatric:         Mood and Affect: Mood normal.         ASSESSMENT AND PLAN:    Diagnoses and all orders for this visit:    1. Obstructive sleep apnea (Primary)  -     Polysomnography 4 or More Parameters; Future    2. History of stroke     probable aurelio,   Will obtain npsg and treat as indicated    Return in about 3 months (around 3/23/2021).    This document has been electronically signed by Joseph Seipel, MD  on December 23, 2020 12:03 EST

## 2020-12-29 ENCOUNTER — HOSPITAL ENCOUNTER (OUTPATIENT)
Dept: SLEEP MEDICINE | Facility: HOSPITAL | Age: 55
Discharge: HOME | End: 2020-12-29
Admitting: PSYCHIATRY & NEUROLOGY

## 2020-12-29 ENCOUNTER — HOSPITAL ENCOUNTER (OUTPATIENT)
Dept: RESPIRATORY THERAPY | Facility: HOSPITAL | Age: 55
Discharge: HOME | End: 2020-12-29
Admitting: INTERNAL MEDICINE

## 2020-12-29 VITALS — WEIGHT: 200.18 LBS | HEIGHT: 70 IN | BODY MASS INDEX: 28.66 KG/M2

## 2020-12-29 DIAGNOSIS — E11.65 TYPE 2 DIABETES MELLITUS WITH HYPERGLYCEMIA, UNSPECIFIED WHETHER LONG TERM INSULIN USE (HCC): ICD-10-CM

## 2020-12-29 DIAGNOSIS — I63.512 ARTERIAL ISCHEMIC STROKE, MCA (MIDDLE CEREBRAL ARTERY), LEFT, ACUTE (HCC): ICD-10-CM

## 2020-12-29 DIAGNOSIS — E78.2 MIXED HYPERLIPIDEMIA: ICD-10-CM

## 2020-12-29 DIAGNOSIS — G47.33 OBSTRUCTIVE SLEEP APNEA: ICD-10-CM

## 2020-12-29 DIAGNOSIS — I10 ESSENTIAL HYPERTENSION: ICD-10-CM

## 2020-12-29 DIAGNOSIS — I25.119 CORONARY ARTERY DISEASE INVOLVING NATIVE CORONARY ARTERY OF NATIVE HEART WITH ANGINA PECTORIS (HCC): ICD-10-CM

## 2020-12-29 PROCEDURE — 95810 POLYSOM 6/> YRS 4/> PARAM: CPT

## 2020-12-29 PROCEDURE — 93270 REMOTE 30 DAY ECG REV/REPORT: CPT

## 2020-12-30 ENCOUNTER — HOSPITAL ENCOUNTER (OUTPATIENT)
Facility: HOSPITAL | Age: 55
Discharge: HOME | End: 2020-12-31
Attending: EMERGENCY MEDICINE | Admitting: INTERNAL MEDICINE

## 2020-12-30 ENCOUNTER — APPOINTMENT (OUTPATIENT)
Dept: GENERAL RADIOLOGY | Facility: HOSPITAL | Age: 55
End: 2020-12-30

## 2020-12-30 DIAGNOSIS — I25.119 CORONARY ARTERY DISEASE INVOLVING NATIVE CORONARY ARTERY OF NATIVE HEART WITH ANGINA PECTORIS (HCC): ICD-10-CM

## 2020-12-30 DIAGNOSIS — R00.2 PALPITATIONS: Primary | ICD-10-CM

## 2020-12-30 DIAGNOSIS — R94.39 ABNORMAL NUCLEAR STRESS TEST: ICD-10-CM

## 2020-12-30 DIAGNOSIS — R06.00 DYSPNEA, UNSPECIFIED TYPE: ICD-10-CM

## 2020-12-30 DIAGNOSIS — I47.1 PSVT (PAROXYSMAL SUPRAVENTRICULAR TACHYCARDIA) (HCC): ICD-10-CM

## 2020-12-30 PROBLEM — I47.10 PSVT (PAROXYSMAL SUPRAVENTRICULAR TACHYCARDIA): Status: ACTIVE | Noted: 2020-12-30

## 2020-12-30 PROBLEM — F32.A DEPRESSION: Chronic | Status: ACTIVE | Noted: 2020-12-30

## 2020-12-30 PROBLEM — I63.512 ARTERIAL ISCHEMIC STROKE, MCA (MIDDLE CEREBRAL ARTERY), LEFT, ACUTE (HCC): Chronic | Status: ACTIVE | Noted: 2019-10-29

## 2020-12-30 PROBLEM — Z86.73 HISTORY OF CEREBROVASCULAR ACCIDENT: Chronic | Status: ACTIVE | Noted: 2019-02-17

## 2020-12-30 LAB
ANION GAP SERPL CALCULATED.3IONS-SCNC: 14 MMOL/L (ref 5–15)
APTT PPP: 24.9 SECONDS (ref 24–31)
BASOPHILS # BLD AUTO: 0.1 10*3/MM3 (ref 0–0.2)
BASOPHILS NFR BLD AUTO: 1.3 % (ref 0–1.5)
BUN SERPL-MCNC: 14 MG/DL (ref 6–20)
BUN/CREAT SERPL: 16.7 (ref 7–25)
CALCIUM SPEC-SCNC: 9.3 MG/DL (ref 8.6–10.5)
CHLORIDE SERPL-SCNC: 99 MMOL/L (ref 98–107)
CO2 SERPL-SCNC: 21 MMOL/L (ref 22–29)
CREAT SERPL-MCNC: 0.84 MG/DL (ref 0.76–1.27)
DEPRECATED RDW RBC AUTO: 41.1 FL (ref 37–54)
EOSINOPHIL # BLD AUTO: 0.2 10*3/MM3 (ref 0–0.4)
EOSINOPHIL NFR BLD AUTO: 2.9 % (ref 0.3–6.2)
ERYTHROCYTE [DISTWIDTH] IN BLOOD BY AUTOMATED COUNT: 13.7 % (ref 12.3–15.4)
GFR SERPL CREATININE-BSD FRML MDRD: 95 ML/MIN/1.73
GLUCOSE BLDC GLUCOMTR-MCNC: 273 MG/DL (ref 70–105)
GLUCOSE BLDC GLUCOMTR-MCNC: 353 MG/DL (ref 70–105)
GLUCOSE SERPL-MCNC: 448 MG/DL (ref 65–99)
HBA1C MFR BLD: 11.7 % (ref 3.5–5.6)
HCT VFR BLD AUTO: 42.1 % (ref 37.5–51)
HGB BLD-MCNC: 14.5 G/DL (ref 13–17.7)
INR PPP: 0.94 (ref 0.93–1.1)
LYMPHOCYTES # BLD AUTO: 1.4 10*3/MM3 (ref 0.7–3.1)
LYMPHOCYTES NFR BLD AUTO: 24.8 % (ref 19.6–45.3)
MAGNESIUM SERPL-MCNC: 2 MG/DL (ref 1.6–2.6)
MCH RBC QN AUTO: 29.2 PG (ref 26.6–33)
MCHC RBC AUTO-ENTMCNC: 34.5 G/DL (ref 31.5–35.7)
MCV RBC AUTO: 84.7 FL (ref 79–97)
MONOCYTES # BLD AUTO: 0.3 10*3/MM3 (ref 0.1–0.9)
MONOCYTES NFR BLD AUTO: 6.2 % (ref 5–12)
NEUTROPHILS NFR BLD AUTO: 3.6 10*3/MM3 (ref 1.7–7)
NEUTROPHILS NFR BLD AUTO: 64.8 % (ref 42.7–76)
NRBC BLD AUTO-RTO: 0.1 /100 WBC (ref 0–0.2)
NT-PROBNP SERPL-MCNC: 117.3 PG/ML (ref 0–900)
NT-PROBNP SERPL-MCNC: 133.6 PG/ML (ref 0–900)
PLATELET # BLD AUTO: 186 10*3/MM3 (ref 140–450)
PMV BLD AUTO: 9.4 FL (ref 6–12)
POTASSIUM SERPL-SCNC: 4.3 MMOL/L (ref 3.5–5.2)
PROTHROMBIN TIME: 10.4 SECONDS (ref 9.6–11.7)
RBC # BLD AUTO: 4.97 10*6/MM3 (ref 4.14–5.8)
SARS-COV-2 ORF1AB RESP QL NAA+PROBE: NOT DETECTED
SODIUM SERPL-SCNC: 134 MMOL/L (ref 136–145)
TROPONIN T SERPL-MCNC: 0.02 NG/ML (ref 0–0.03)
TROPONIN T SERPL-MCNC: 0.02 NG/ML (ref 0–0.03)
TSH SERPL DL<=0.05 MIU/L-ACNC: 0.87 UIU/ML (ref 0.27–4.2)
WBC # BLD AUTO: 5.6 10*3/MM3 (ref 3.4–10.8)

## 2020-12-30 PROCEDURE — 80048 BASIC METABOLIC PNL TOTAL CA: CPT | Performed by: EMERGENCY MEDICINE

## 2020-12-30 PROCEDURE — 85025 COMPLETE CBC W/AUTO DIFF WBC: CPT | Performed by: EMERGENCY MEDICINE

## 2020-12-30 PROCEDURE — 95810 POLYSOM 6/> YRS 4/> PARAM: CPT | Performed by: PSYCHIATRY & NEUROLOGY

## 2020-12-30 PROCEDURE — 83036 HEMOGLOBIN GLYCOSYLATED A1C: CPT | Performed by: NURSE PRACTITIONER

## 2020-12-30 PROCEDURE — 84484 ASSAY OF TROPONIN QUANT: CPT | Performed by: NURSE PRACTITIONER

## 2020-12-30 PROCEDURE — 93005 ELECTROCARDIOGRAM TRACING: CPT

## 2020-12-30 PROCEDURE — 82962 GLUCOSE BLOOD TEST: CPT

## 2020-12-30 PROCEDURE — 25010000002 ENOXAPARIN PER 10 MG: Performed by: NURSE PRACTITIONER

## 2020-12-30 PROCEDURE — 84443 ASSAY THYROID STIM HORMONE: CPT | Performed by: NURSE PRACTITIONER

## 2020-12-30 PROCEDURE — G0378 HOSPITAL OBSERVATION PER HR: HCPCS

## 2020-12-30 PROCEDURE — 96372 THER/PROPH/DIAG INJ SC/IM: CPT

## 2020-12-30 PROCEDURE — 99245 OFF/OP CONSLTJ NEW/EST HI 55: CPT | Performed by: INTERNAL MEDICINE

## 2020-12-30 PROCEDURE — 63710000001 INSULIN GLARGINE PER 5 UNITS: Performed by: NURSE PRACTITIONER

## 2020-12-30 PROCEDURE — 85730 THROMBOPLASTIN TIME PARTIAL: CPT | Performed by: EMERGENCY MEDICINE

## 2020-12-30 PROCEDURE — 83735 ASSAY OF MAGNESIUM: CPT | Performed by: NURSE PRACTITIONER

## 2020-12-30 PROCEDURE — 71045 X-RAY EXAM CHEST 1 VIEW: CPT

## 2020-12-30 PROCEDURE — 85610 PROTHROMBIN TIME: CPT | Performed by: EMERGENCY MEDICINE

## 2020-12-30 PROCEDURE — C9803 HOPD COVID-19 SPEC COLLECT: HCPCS

## 2020-12-30 PROCEDURE — 84484 ASSAY OF TROPONIN QUANT: CPT | Performed by: EMERGENCY MEDICINE

## 2020-12-30 PROCEDURE — U0004 COV-19 TEST NON-CDC HGH THRU: HCPCS | Performed by: EMERGENCY MEDICINE

## 2020-12-30 PROCEDURE — 99284 EMERGENCY DEPT VISIT MOD MDM: CPT

## 2020-12-30 PROCEDURE — 99219 PR INITIAL OBSERVATION CARE/DAY 50 MINUTES: CPT | Performed by: NURSE PRACTITIONER

## 2020-12-30 PROCEDURE — 83880 ASSAY OF NATRIURETIC PEPTIDE: CPT | Performed by: NURSE PRACTITIONER

## 2020-12-30 RX ORDER — HYDRALAZINE HYDROCHLORIDE 20 MG/ML
10 INJECTION INTRAMUSCULAR; INTRAVENOUS EVERY 6 HOURS PRN
Status: DISCONTINUED | OUTPATIENT
Start: 2020-12-30 | End: 2020-12-31 | Stop reason: HOSPADM

## 2020-12-30 RX ORDER — INSULIN GLARGINE 100 [IU]/ML
20 INJECTION, SOLUTION SUBCUTANEOUS NIGHTLY
Status: DISCONTINUED | OUTPATIENT
Start: 2020-12-30 | End: 2020-12-30

## 2020-12-30 RX ORDER — LISINOPRIL 20 MG/1
40 TABLET ORAL DAILY
Status: DISCONTINUED | OUTPATIENT
Start: 2020-12-31 | End: 2020-12-31 | Stop reason: HOSPADM

## 2020-12-30 RX ORDER — SODIUM CHLORIDE 0.9 % (FLUSH) 0.9 %
10 SYRINGE (ML) INJECTION AS NEEDED
Status: DISCONTINUED | OUTPATIENT
Start: 2020-12-30 | End: 2020-12-31 | Stop reason: HOSPADM

## 2020-12-30 RX ORDER — ATORVASTATIN CALCIUM 40 MG/1
80 TABLET, FILM COATED ORAL NIGHTLY
Status: DISCONTINUED | OUTPATIENT
Start: 2020-12-30 | End: 2020-12-31 | Stop reason: HOSPADM

## 2020-12-30 RX ORDER — METOPROLOL SUCCINATE 50 MG/1
50 TABLET, EXTENDED RELEASE ORAL DAILY
Status: DISCONTINUED | OUTPATIENT
Start: 2020-12-31 | End: 2020-12-31 | Stop reason: HOSPADM

## 2020-12-30 RX ORDER — ACETAMINOPHEN 650 MG/1
650 SUPPOSITORY RECTAL EVERY 4 HOURS PRN
Status: DISCONTINUED | OUTPATIENT
Start: 2020-12-30 | End: 2020-12-31 | Stop reason: HOSPADM

## 2020-12-30 RX ORDER — ONDANSETRON 2 MG/ML
4 INJECTION INTRAMUSCULAR; INTRAVENOUS EVERY 6 HOURS PRN
Status: DISCONTINUED | OUTPATIENT
Start: 2020-12-30 | End: 2020-12-31

## 2020-12-30 RX ORDER — NICOTINE POLACRILEX 4 MG
15 LOZENGE BUCCAL
Status: DISCONTINUED | OUTPATIENT
Start: 2020-12-30 | End: 2020-12-31 | Stop reason: HOSPADM

## 2020-12-30 RX ORDER — INSULIN GLARGINE 100 [IU]/ML
15 INJECTION, SOLUTION SUBCUTANEOUS EVERY 12 HOURS SCHEDULED
Status: DISCONTINUED | OUTPATIENT
Start: 2020-12-30 | End: 2020-12-31 | Stop reason: HOSPADM

## 2020-12-30 RX ORDER — MAGNESIUM SULFATE HEPTAHYDRATE 40 MG/ML
2 INJECTION, SOLUTION INTRAVENOUS AS NEEDED
Status: DISCONTINUED | OUTPATIENT
Start: 2020-12-30 | End: 2020-12-31 | Stop reason: HOSPADM

## 2020-12-30 RX ORDER — ONDANSETRON 4 MG/1
4 TABLET, FILM COATED ORAL EVERY 6 HOURS PRN
Status: DISCONTINUED | OUTPATIENT
Start: 2020-12-30 | End: 2020-12-31

## 2020-12-30 RX ORDER — POTASSIUM CHLORIDE 20 MEQ/1
40 TABLET, EXTENDED RELEASE ORAL AS NEEDED
Status: DISCONTINUED | OUTPATIENT
Start: 2020-12-30 | End: 2020-12-31 | Stop reason: HOSPADM

## 2020-12-30 RX ORDER — AMLODIPINE BESYLATE 5 MG/1
10 TABLET ORAL DAILY
Status: DISCONTINUED | OUTPATIENT
Start: 2020-12-31 | End: 2020-12-31

## 2020-12-30 RX ORDER — MAGNESIUM SULFATE 1 G/100ML
1 INJECTION INTRAVENOUS AS NEEDED
Status: DISCONTINUED | OUTPATIENT
Start: 2020-12-30 | End: 2020-12-31 | Stop reason: HOSPADM

## 2020-12-30 RX ORDER — ACETAMINOPHEN 160 MG/5ML
650 SOLUTION ORAL EVERY 4 HOURS PRN
Status: DISCONTINUED | OUTPATIENT
Start: 2020-12-30 | End: 2020-12-31

## 2020-12-30 RX ORDER — SODIUM CHLORIDE 0.9 % (FLUSH) 0.9 %
10 SYRINGE (ML) INJECTION EVERY 12 HOURS SCHEDULED
Status: DISCONTINUED | OUTPATIENT
Start: 2020-12-30 | End: 2020-12-31 | Stop reason: HOSPADM

## 2020-12-30 RX ORDER — CLOPIDOGREL BISULFATE 75 MG/1
75 TABLET ORAL DAILY
Status: DISCONTINUED | OUTPATIENT
Start: 2020-12-31 | End: 2020-12-31 | Stop reason: HOSPADM

## 2020-12-30 RX ORDER — INSULIN LISPRO 100 [IU]/ML
0-9 INJECTION, SOLUTION INTRAVENOUS; SUBCUTANEOUS
Status: DISCONTINUED | OUTPATIENT
Start: 2020-12-30 | End: 2020-12-31 | Stop reason: HOSPADM

## 2020-12-30 RX ORDER — DEXTROSE MONOHYDRATE 25 G/50ML
25 INJECTION, SOLUTION INTRAVENOUS
Status: DISCONTINUED | OUTPATIENT
Start: 2020-12-30 | End: 2020-12-31 | Stop reason: HOSPADM

## 2020-12-30 RX ORDER — ASPIRIN 325 MG
325 TABLET ORAL DAILY
Status: DISCONTINUED | OUTPATIENT
Start: 2020-12-31 | End: 2020-12-31 | Stop reason: HOSPADM

## 2020-12-30 RX ORDER — HYDROCHLOROTHIAZIDE 12.5 MG/1
12.5 TABLET ORAL DAILY
Status: DISCONTINUED | OUTPATIENT
Start: 2020-12-31 | End: 2020-12-31 | Stop reason: HOSPADM

## 2020-12-30 RX ORDER — ACETAMINOPHEN 325 MG/1
650 TABLET ORAL EVERY 4 HOURS PRN
Status: DISCONTINUED | OUTPATIENT
Start: 2020-12-30 | End: 2020-12-31

## 2020-12-30 RX ORDER — INSULIN LISPRO 100 [IU]/ML
0-9 INJECTION, SOLUTION INTRAVENOUS; SUBCUTANEOUS AS NEEDED
Status: DISCONTINUED | OUTPATIENT
Start: 2020-12-30 | End: 2020-12-31 | Stop reason: HOSPADM

## 2020-12-30 RX ORDER — AMITRIPTYLINE HYDROCHLORIDE 25 MG/1
25 TABLET, FILM COATED ORAL NIGHTLY
Status: DISCONTINUED | OUTPATIENT
Start: 2020-12-30 | End: 2020-12-31 | Stop reason: HOSPADM

## 2020-12-30 RX ADMIN — Medication 10 ML: at 16:37

## 2020-12-30 RX ADMIN — INSULIN GLARGINE 15 UNITS: 100 INJECTION, SOLUTION SUBCUTANEOUS at 22:37

## 2020-12-30 RX ADMIN — ENOXAPARIN SODIUM 40 MG: 40 INJECTION SUBCUTANEOUS at 16:37

## 2020-12-30 RX ADMIN — Medication 10 ML: at 20:28

## 2020-12-30 RX ADMIN — ATORVASTATIN CALCIUM 80 MG: 40 TABLET, FILM COATED ORAL at 22:37

## 2020-12-30 RX ADMIN — AMITRIPTYLINE HYDROCHLORIDE 25 MG: 25 TABLET, FILM COATED ORAL at 22:37

## 2020-12-31 ENCOUNTER — APPOINTMENT (OUTPATIENT)
Dept: NUCLEAR MEDICINE | Facility: HOSPITAL | Age: 55
End: 2020-12-31

## 2020-12-31 ENCOUNTER — APPOINTMENT (OUTPATIENT)
Dept: CARDIOLOGY | Facility: HOSPITAL | Age: 55
End: 2020-12-31

## 2020-12-31 VITALS
BODY MASS INDEX: 25.18 KG/M2 | DIASTOLIC BLOOD PRESSURE: 77 MMHG | SYSTOLIC BLOOD PRESSURE: 114 MMHG | HEART RATE: 71 BPM | RESPIRATION RATE: 18 BRPM | WEIGHT: 190 LBS | TEMPERATURE: 97.5 F | OXYGEN SATURATION: 94 % | HEIGHT: 73 IN

## 2020-12-31 PROBLEM — I25.119 CORONARY ARTERY DISEASE INVOLVING NATIVE CORONARY ARTERY OF NATIVE HEART WITH ANGINA PECTORIS (HCC): Status: ACTIVE | Noted: 2020-12-30

## 2020-12-31 PROBLEM — R94.39 ABNORMAL NUCLEAR STRESS TEST: Status: ACTIVE | Noted: 2020-12-30

## 2020-12-31 LAB
ANION GAP SERPL CALCULATED.3IONS-SCNC: 11 MMOL/L (ref 5–15)
BH CV NUCLEAR PRIOR STUDY: 3
BH CV STRESS BP STAGE 1: NORMAL
BH CV STRESS BP STAGE 2: NORMAL
BH CV STRESS BP STAGE 3: NORMAL
BH CV STRESS BP STAGE 4: NORMAL
BH CV STRESS COMMENTS STAGE 1: NORMAL
BH CV STRESS COMMENTS STAGE 2: NORMAL
BH CV STRESS DOSE REGADENOSON STAGE 1: 0.4
BH CV STRESS DURATION MIN STAGE 1: 0
BH CV STRESS DURATION MIN STAGE 2: 4
BH CV STRESS DURATION SEC STAGE 1: 10
BH CV STRESS DURATION SEC STAGE 2: 0
BH CV STRESS HR STAGE 1: 93
BH CV STRESS HR STAGE 2: 91
BH CV STRESS HR STAGE 3: 100
BH CV STRESS HR STAGE 4: 93
BH CV STRESS PROTOCOL 1: NORMAL
BH CV STRESS RECOVERY BP: NORMAL MMHG
BH CV STRESS RECOVERY HR: 86 BPM
BH CV STRESS STAGE 1: 1
BH CV STRESS STAGE 2: 2
BH CV STRESS STAGE 3: 3
BH CV STRESS STAGE 4: 4
BH CV UPPER VENOUS LEFT AXILLARY AUGMENT: NORMAL
BH CV UPPER VENOUS LEFT AXILLARY COMPETENT: NORMAL
BH CV UPPER VENOUS LEFT AXILLARY COMPRESS: NORMAL
BH CV UPPER VENOUS LEFT AXILLARY PHASIC: NORMAL
BH CV UPPER VENOUS LEFT AXILLARY SPONT: NORMAL
BH CV UPPER VENOUS LEFT BASILIC FOREARM COMPRESS: NORMAL
BH CV UPPER VENOUS LEFT BASILIC UPPER COMPRESS: NORMAL
BH CV UPPER VENOUS LEFT BRACHIAL COMPRESS: NORMAL
BH CV UPPER VENOUS LEFT CEPHALIC FOREARM COMPRESS: NORMAL
BH CV UPPER VENOUS LEFT CEPHALIC UPPER COMPRESS: NORMAL
BH CV UPPER VENOUS LEFT INTERNAL JUGULAR AUGMENT: NORMAL
BH CV UPPER VENOUS LEFT INTERNAL JUGULAR COMPETENT: NORMAL
BH CV UPPER VENOUS LEFT INTERNAL JUGULAR COMPRESS: NORMAL
BH CV UPPER VENOUS LEFT INTERNAL JUGULAR PHASIC: NORMAL
BH CV UPPER VENOUS LEFT INTERNAL JUGULAR SPONT: NORMAL
BH CV UPPER VENOUS LEFT RADIAL COMPRESS: NORMAL
BH CV UPPER VENOUS LEFT SUBCLAVIAN AUGMENT: NORMAL
BH CV UPPER VENOUS LEFT SUBCLAVIAN COMPETENT: NORMAL
BH CV UPPER VENOUS LEFT SUBCLAVIAN COMPRESS: NORMAL
BH CV UPPER VENOUS LEFT SUBCLAVIAN PHASIC: NORMAL
BH CV UPPER VENOUS LEFT SUBCLAVIAN SPONT: NORMAL
BH CV UPPER VENOUS LEFT ULNAR COMPRESS: NORMAL
BH CV UPPER VENOUS RIGHT INTERNAL JUGULAR AUGMENT: NORMAL
BH CV UPPER VENOUS RIGHT INTERNAL JUGULAR COMPETENT: NORMAL
BH CV UPPER VENOUS RIGHT INTERNAL JUGULAR COMPRESS: NORMAL
BH CV UPPER VENOUS RIGHT INTERNAL JUGULAR PHASIC: NORMAL
BH CV UPPER VENOUS RIGHT INTERNAL JUGULAR SPONT: NORMAL
BH CV UPPER VENOUS RIGHT SUBCLAVIAN AUGMENT: NORMAL
BH CV UPPER VENOUS RIGHT SUBCLAVIAN COMPETENT: NORMAL
BH CV UPPER VENOUS RIGHT SUBCLAVIAN COMPRESS: NORMAL
BH CV UPPER VENOUS RIGHT SUBCLAVIAN PHASIC: NORMAL
BH CV UPPER VENOUS RIGHT SUBCLAVIAN SPONT: NORMAL
BUN SERPL-MCNC: 17 MG/DL (ref 6–20)
BUN/CREAT SERPL: 18.7 (ref 7–25)
CALCIUM SPEC-SCNC: 8.9 MG/DL (ref 8.6–10.5)
CHLORIDE SERPL-SCNC: 98 MMOL/L (ref 98–107)
CO2 SERPL-SCNC: 22 MMOL/L (ref 22–29)
CREAT SERPL-MCNC: 0.91 MG/DL (ref 0.76–1.27)
DEPRECATED RDW RBC AUTO: 42.9 FL (ref 37–54)
ERYTHROCYTE [DISTWIDTH] IN BLOOD BY AUTOMATED COUNT: 14 % (ref 12.3–15.4)
GFR SERPL CREATININE-BSD FRML MDRD: 86 ML/MIN/1.73
GLUCOSE BLDC GLUCOMTR-MCNC: 297 MG/DL (ref 70–105)
GLUCOSE BLDC GLUCOMTR-MCNC: 325 MG/DL (ref 70–105)
GLUCOSE BLDC GLUCOMTR-MCNC: 331 MG/DL (ref 70–105)
GLUCOSE BLDC GLUCOMTR-MCNC: 350 MG/DL (ref 70–105)
GLUCOSE SERPL-MCNC: 318 MG/DL (ref 65–99)
HCT VFR BLD AUTO: 40.3 % (ref 37.5–51)
HGB BLD-MCNC: 13.9 G/DL (ref 13–17.7)
LV EF NUC BP: 43 %
MAGNESIUM SERPL-MCNC: 2 MG/DL (ref 1.6–2.6)
MAXIMAL PREDICTED HEART RATE: 164 BPM
MCH RBC QN AUTO: 29.8 PG (ref 26.6–33)
MCHC RBC AUTO-ENTMCNC: 34.5 G/DL (ref 31.5–35.7)
MCV RBC AUTO: 86.2 FL (ref 79–97)
PERCENT MAX PREDICTED HR: 60.98 %
PLATELET # BLD AUTO: 199 10*3/MM3 (ref 140–450)
PMV BLD AUTO: 9.8 FL (ref 6–12)
POTASSIUM SERPL-SCNC: 3.7 MMOL/L (ref 3.5–5.2)
RBC # BLD AUTO: 4.68 10*6/MM3 (ref 4.14–5.8)
SODIUM SERPL-SCNC: 131 MMOL/L (ref 136–145)
STRESS BASELINE BP: NORMAL MMHG
STRESS BASELINE HR: 76 BPM
STRESS PERCENT HR: 72 %
STRESS POST PEAK BP: NORMAL MMHG
STRESS POST PEAK HR: 100 BPM
STRESS TARGET HR: 139 BPM
WBC # BLD AUTO: 5.4 10*3/MM3 (ref 3.4–10.8)

## 2020-12-31 PROCEDURE — 99215 OFFICE O/P EST HI 40 MIN: CPT | Performed by: INTERNAL MEDICINE

## 2020-12-31 PROCEDURE — 0 IOPAMIDOL PER 1 ML: Performed by: INTERNAL MEDICINE

## 2020-12-31 PROCEDURE — 99152 MOD SED SAME PHYS/QHP 5/>YRS: CPT | Performed by: INTERNAL MEDICINE

## 2020-12-31 PROCEDURE — 25010000002 FENTANYL CITRATE (PF) 100 MCG/2ML SOLUTION: Performed by: INTERNAL MEDICINE

## 2020-12-31 PROCEDURE — 93971 EXTREMITY STUDY: CPT

## 2020-12-31 PROCEDURE — 93017 CV STRESS TEST TRACING ONLY: CPT

## 2020-12-31 PROCEDURE — 93458 L HRT ARTERY/VENTRICLE ANGIO: CPT | Performed by: INTERNAL MEDICINE

## 2020-12-31 PROCEDURE — 78452 HT MUSCLE IMAGE SPECT MULT: CPT

## 2020-12-31 PROCEDURE — G0378 HOSPITAL OBSERVATION PER HR: HCPCS

## 2020-12-31 PROCEDURE — 85027 COMPLETE CBC AUTOMATED: CPT | Performed by: NURSE PRACTITIONER

## 2020-12-31 PROCEDURE — 0 TECHNETIUM SESTAMIBI: Performed by: INTERNAL MEDICINE

## 2020-12-31 PROCEDURE — C1894 INTRO/SHEATH, NON-LASER: HCPCS | Performed by: INTERNAL MEDICINE

## 2020-12-31 PROCEDURE — 80048 BASIC METABOLIC PNL TOTAL CA: CPT | Performed by: NURSE PRACTITIONER

## 2020-12-31 PROCEDURE — 93018 CV STRESS TEST I&R ONLY: CPT | Performed by: INTERNAL MEDICINE

## 2020-12-31 PROCEDURE — 25010000002 MIDAZOLAM PER 1 MG: Performed by: INTERNAL MEDICINE

## 2020-12-31 PROCEDURE — 63710000001 INSULIN LISPRO (HUMAN) PER 5 UNITS: Performed by: NURSE PRACTITIONER

## 2020-12-31 PROCEDURE — 78452 HT MUSCLE IMAGE SPECT MULT: CPT | Performed by: INTERNAL MEDICINE

## 2020-12-31 PROCEDURE — A9500 TC99M SESTAMIBI: HCPCS | Performed by: INTERNAL MEDICINE

## 2020-12-31 PROCEDURE — 25010000002 REGADENOSON 0.4 MG/5ML SOLUTION: Performed by: INTERNAL MEDICINE

## 2020-12-31 PROCEDURE — 99217 PR OBSERVATION CARE DISCHARGE MANAGEMENT: CPT | Performed by: INTERNAL MEDICINE

## 2020-12-31 PROCEDURE — 63710000001 INSULIN GLARGINE PER 5 UNITS: Performed by: NURSE PRACTITIONER

## 2020-12-31 PROCEDURE — 63710000001 INSULIN LISPRO (HUMAN) PER 5 UNITS: Performed by: INTERNAL MEDICINE

## 2020-12-31 PROCEDURE — 99153 MOD SED SAME PHYS/QHP EA: CPT | Performed by: INTERNAL MEDICINE

## 2020-12-31 PROCEDURE — 93016 CV STRESS TEST SUPVJ ONLY: CPT | Performed by: INTERNAL MEDICINE

## 2020-12-31 PROCEDURE — C1769 GUIDE WIRE: HCPCS | Performed by: INTERNAL MEDICINE

## 2020-12-31 PROCEDURE — 82962 GLUCOSE BLOOD TEST: CPT

## 2020-12-31 RX ORDER — FENTANYL CITRATE 50 UG/ML
INJECTION, SOLUTION INTRAMUSCULAR; INTRAVENOUS AS NEEDED
Status: DISCONTINUED | OUTPATIENT
Start: 2020-12-31 | End: 2020-12-31 | Stop reason: HOSPADM

## 2020-12-31 RX ORDER — DILTIAZEM HYDROCHLORIDE 120 MG/1
120 CAPSULE, COATED, EXTENDED RELEASE ORAL
Status: DISCONTINUED | OUTPATIENT
Start: 2020-12-31 | End: 2020-12-31 | Stop reason: HOSPADM

## 2020-12-31 RX ORDER — DIPHENHYDRAMINE HCL 25 MG
25 CAPSULE ORAL ONCE
Status: DISCONTINUED | OUTPATIENT
Start: 2020-12-31 | End: 2020-12-31 | Stop reason: HOSPADM

## 2020-12-31 RX ORDER — SODIUM CHLORIDE 9 MG/ML
INJECTION, SOLUTION INTRAVENOUS CONTINUOUS PRN
Status: COMPLETED | OUTPATIENT
Start: 2020-12-31 | End: 2020-12-31

## 2020-12-31 RX ORDER — ACETAMINOPHEN 325 MG/1
650 TABLET ORAL EVERY 4 HOURS PRN
Status: DISCONTINUED | OUTPATIENT
Start: 2020-12-31 | End: 2020-12-31 | Stop reason: HOSPADM

## 2020-12-31 RX ORDER — ALPRAZOLAM 0.25 MG/1
0.25 TABLET ORAL ONCE
Status: DISCONTINUED | OUTPATIENT
Start: 2020-12-31 | End: 2020-12-31 | Stop reason: HOSPADM

## 2020-12-31 RX ORDER — ALUMINA, MAGNESIA, AND SIMETHICONE 2400; 2400; 240 MG/30ML; MG/30ML; MG/30ML
15 SUSPENSION ORAL EVERY 6 HOURS PRN
Status: DISCONTINUED | OUTPATIENT
Start: 2020-12-31 | End: 2020-12-31 | Stop reason: HOSPADM

## 2020-12-31 RX ORDER — LIDOCAINE HYDROCHLORIDE 20 MG/ML
INJECTION, SOLUTION INFILTRATION; PERINEURAL AS NEEDED
Status: DISCONTINUED | OUTPATIENT
Start: 2020-12-31 | End: 2020-12-31 | Stop reason: HOSPADM

## 2020-12-31 RX ORDER — ONDANSETRON 2 MG/ML
4 INJECTION INTRAMUSCULAR; INTRAVENOUS EVERY 6 HOURS PRN
Status: DISCONTINUED | OUTPATIENT
Start: 2020-12-31 | End: 2020-12-31 | Stop reason: HOSPADM

## 2020-12-31 RX ORDER — SODIUM CHLORIDE 9 MG/ML
250 INJECTION, SOLUTION INTRAVENOUS ONCE AS NEEDED
Status: DISCONTINUED | OUTPATIENT
Start: 2020-12-31 | End: 2020-12-31 | Stop reason: HOSPADM

## 2020-12-31 RX ORDER — MIDAZOLAM HYDROCHLORIDE 1 MG/ML
INJECTION INTRAMUSCULAR; INTRAVENOUS AS NEEDED
Status: DISCONTINUED | OUTPATIENT
Start: 2020-12-31 | End: 2020-12-31 | Stop reason: HOSPADM

## 2020-12-31 RX ORDER — DILTIAZEM HYDROCHLORIDE 120 MG/1
120 CAPSULE, COATED, EXTENDED RELEASE ORAL
Qty: 30 CAPSULE | Refills: 0 | Status: ON HOLD | OUTPATIENT
Start: 2021-01-01 | End: 2021-01-20

## 2020-12-31 RX ORDER — DIPHENHYDRAMINE HCL 25 MG
25 CAPSULE ORAL EVERY 6 HOURS PRN
Status: DISCONTINUED | OUTPATIENT
Start: 2020-12-31 | End: 2020-12-31 | Stop reason: HOSPADM

## 2020-12-31 RX ORDER — ONDANSETRON 4 MG/1
4 TABLET, FILM COATED ORAL EVERY 6 HOURS PRN
Status: DISCONTINUED | OUTPATIENT
Start: 2020-12-31 | End: 2020-12-31 | Stop reason: HOSPADM

## 2020-12-31 RX ADMIN — INSULIN LISPRO 8 UNITS: 100 INJECTION, SOLUTION INTRAVENOUS; SUBCUTANEOUS at 12:04

## 2020-12-31 RX ADMIN — INSULIN LISPRO 6 UNITS: 100 INJECTION, SOLUTION INTRAVENOUS; SUBCUTANEOUS at 09:39

## 2020-12-31 RX ADMIN — METOPROLOL SUCCINATE 50 MG: 50 TABLET, EXTENDED RELEASE ORAL at 09:43

## 2020-12-31 RX ADMIN — LISINOPRIL 40 MG: 20 TABLET ORAL at 09:43

## 2020-12-31 RX ADMIN — HYDROCHLOROTHIAZIDE 12.5 MG: 12.5 TABLET ORAL at 09:43

## 2020-12-31 RX ADMIN — INSULIN GLARGINE 15 UNITS: 100 INJECTION, SOLUTION SUBCUTANEOUS at 09:48

## 2020-12-31 RX ADMIN — DILTIAZEM HYDROCHLORIDE 120 MG: 120 CAPSULE, COATED, EXTENDED RELEASE ORAL at 09:47

## 2020-12-31 RX ADMIN — Medication 10 ML: at 09:39

## 2020-12-31 RX ADMIN — INSULIN LISPRO 7 UNITS: 100 INJECTION, SOLUTION INTRAVENOUS; SUBCUTANEOUS at 19:12

## 2020-12-31 RX ADMIN — CLOPIDOGREL BISULFATE 75 MG: 75 TABLET ORAL at 09:42

## 2020-12-31 RX ADMIN — TECHNETIUM TC 99M SESTAMIBI 1 DOSE: 1 INJECTION INTRAVENOUS at 06:45

## 2020-12-31 RX ADMIN — TECHNETIUM TC 99M SESTAMIBI 1 DOSE: 1 INJECTION INTRAVENOUS at 08:15

## 2020-12-31 RX ADMIN — ASPIRIN 325 MG ORAL TABLET 325 MG: 325 PILL ORAL at 09:43

## 2020-12-31 RX ADMIN — REGADENOSON 0.4 MG: 0.08 INJECTION, SOLUTION INTRAVENOUS at 08:15

## 2021-01-01 LAB — QT INTERVAL: 358 MS

## 2021-01-01 NOTE — PROGRESS NOTES
Discharge Planning Assessment   [facility]     Patient Name: Preet Jones  MRN: [id number]  Today's Date: 1/1/2021    Admit Date: 12/30/2020          Plan    Final Discharge Disposition Code  01 - home or self-care    Final Note  return home     \  Carol naegele rn  Case management  Office number [phone]  Cell phone [phone]

## 2021-01-03 ENCOUNTER — NURSE TRIAGE (OUTPATIENT)
Dept: CALL CENTER | Facility: HOSPITAL | Age: 56
End: 2021-01-03

## 2021-01-03 NOTE — TELEPHONE ENCOUNTER
"AVS reviewed questions answered.    Reason for Disposition  • Health Information question, no triage required and triager able to answer question    Additional Information  • Negative: [1] Caller is not with the adult (patient) AND [2] reporting urgent symptoms  • Negative: Lab result questions  • Negative: Medication questions  • Negative: Caller can't be reached by phone  • Negative: Caller has already spoken to PCP or another triager  • Negative: RN needs further essential information from caller in order to complete triage  • Negative: Requesting regular office appointment  • Negative: [1] Caller requesting NON-URGENT health information AND [2] PCP's office is the best resource    Answer Assessment - Initial Assessment Questions  1. REASON FOR CALL or QUESTION: \"What is your reason for calling today?\" or \"How can I best help you?\" or \"What question do you have that I can help answer?\"      I some questions about my discharge instructions.    Protocols used: INFORMATION ONLY CALL - NO TRIAGE-ADULT-      "

## 2021-01-04 ENCOUNTER — TELEPHONE (OUTPATIENT)
Dept: CARDIOLOGY | Facility: CLINIC | Age: 56
End: 2021-01-04

## 2021-01-05 ENCOUNTER — TELEPHONE (OUTPATIENT)
Dept: NEUROLOGY | Facility: CLINIC | Age: 56
End: 2021-01-05

## 2021-01-05 DIAGNOSIS — G47.33 OBSTRUCTIVE SLEEP APNEA: Primary | ICD-10-CM

## 2021-01-05 NOTE — TELEPHONE ENCOUNTER
----- Message from Joseph F Seipel, MD sent at 12/30/2020 11:50 AM EST -----  Polysomnography study completed set up on auto CPAP 6-16          Pended order for supplies

## 2021-01-08 ENCOUNTER — PREP FOR SURGERY (OUTPATIENT)
Dept: OTHER | Facility: HOSPITAL | Age: 56
End: 2021-01-08

## 2021-01-08 ENCOUNTER — OFFICE VISIT (OUTPATIENT)
Dept: CARDIOLOGY | Facility: CLINIC | Age: 56
End: 2021-01-08

## 2021-01-08 VITALS
HEART RATE: 92 BPM | SYSTOLIC BLOOD PRESSURE: 149 MMHG | BODY MASS INDEX: 30.94 KG/M2 | HEIGHT: 71 IN | OXYGEN SATURATION: 96 % | WEIGHT: 221 LBS | DIASTOLIC BLOOD PRESSURE: 98 MMHG

## 2021-01-08 DIAGNOSIS — I25.119 CORONARY ARTERY DISEASE INVOLVING NATIVE CORONARY ARTERY OF NATIVE HEART WITH ANGINA PECTORIS (HCC): ICD-10-CM

## 2021-01-08 DIAGNOSIS — R00.2 PALPITATIONS: ICD-10-CM

## 2021-01-08 DIAGNOSIS — I48.92 ATRIAL FLUTTER WITH RAPID VENTRICULAR RESPONSE (HCC): ICD-10-CM

## 2021-01-08 DIAGNOSIS — I10 ESSENTIAL HYPERTENSION: ICD-10-CM

## 2021-01-08 DIAGNOSIS — I47.1 PAROXYSMAL SVT (SUPRAVENTRICULAR TACHYCARDIA) (HCC): Primary | ICD-10-CM

## 2021-01-08 DIAGNOSIS — E78.2 MIXED HYPERLIPIDEMIA: ICD-10-CM

## 2021-01-08 PROBLEM — I47.10 PAROXYSMAL SVT (SUPRAVENTRICULAR TACHYCARDIA): Status: ACTIVE | Noted: 2021-01-08

## 2021-01-08 PROCEDURE — 99205 OFFICE O/P NEW HI 60 MIN: CPT | Performed by: INTERNAL MEDICINE

## 2021-01-08 RX ORDER — SODIUM CHLORIDE 0.9 % (FLUSH) 0.9 %
3-10 SYRINGE (ML) INJECTION AS NEEDED
Status: CANCELLED | OUTPATIENT
Start: 2021-01-08

## 2021-01-08 RX ORDER — SODIUM CHLORIDE 0.9 % (FLUSH) 0.9 %
3 SYRINGE (ML) INJECTION EVERY 12 HOURS SCHEDULED
Status: CANCELLED | OUTPATIENT
Start: 2021-01-08

## 2021-01-08 RX ORDER — SODIUM CHLORIDE 9 MG/ML
80 INJECTION, SOLUTION INTRAVENOUS CONTINUOUS
Status: CANCELLED | OUTPATIENT
Start: 2021-01-08

## 2021-01-08 NOTE — H&P (VIEW-ONLY)
CC--recurrent symptomatic SVT, coronary artery disease and prior history of stroke    Sub--55-year-old male patient was recently admitted to hospital because of recurrent arrhythmia and was found to have SVT and patient being referred to our office because of recurrent symptoms of SVT despite beta-blockers and calcium channel blockers.  Chronic medical problems include coronary artery disease with prior history of PCI, history of cardioembolic stroke x3  He also has history of hyperlipidemia and diabetes  Denies any active angina      Physical Exam    General:      well developed, well nourished, in no acute distress.    Head:      normocephalic and atraumatic.    Eyes:      PERRL/EOM intact, conjunctiva and sclera clear with out nystagmus.    Neck:      no masses, thyromegaly, trachea central with normal respiratory effort  Lungs:      clear bilaterally to auscultation.    Heart:       regular rate and rhythm, S1, S2 without murmurs, rubs, or gallops      Assessment plan    Recurrent symptomatic SVT and cannot exclude accessory pathway cannot exclude AV alvino atrial tachycardia possible atrial flutter--rhythm strips and EKGs reviewed  Schedule patient for FRAN and EP study with ablation and risks and benefits and outcomes educated and orders placed  Recent labs reviewed  Recent cardiac catheterization films reviewed  Recent echocardiography showed EF of 50% which was reviewed   recurrent stroke and FRAN to be done before ablation to exclude any shunt  Diabetes  Hypertension  Hyperlipidemia  Discussed with Dr. Brown      Electronically signed by Nathen Olmstead MD, 01/08/21, 4:22 PM EST.

## 2021-01-11 ENCOUNTER — LAB (OUTPATIENT)
Dept: LAB | Facility: HOSPITAL | Age: 56
End: 2021-01-11

## 2021-01-11 ENCOUNTER — TELEPHONE (OUTPATIENT)
Dept: CARDIOLOGY | Facility: CLINIC | Age: 56
End: 2021-01-11

## 2021-01-11 DIAGNOSIS — Z01.818 PREOP EXAMINATION: Primary | ICD-10-CM

## 2021-01-11 DIAGNOSIS — I48.92 ATRIAL FLUTTER WITH RAPID VENTRICULAR RESPONSE (HCC): ICD-10-CM

## 2021-01-11 DIAGNOSIS — R00.2 PALPITATIONS: ICD-10-CM

## 2021-01-11 DIAGNOSIS — I47.1 PAROXYSMAL SVT (SUPRAVENTRICULAR TACHYCARDIA) (HCC): ICD-10-CM

## 2021-01-11 LAB
ALBUMIN SERPL-MCNC: 3.9 G/DL (ref 3.5–5.2)
ALBUMIN/GLOB SERPL: 1.2 G/DL
ALP SERPL-CCNC: 107 U/L (ref 39–117)
ALT SERPL W P-5'-P-CCNC: 18 U/L (ref 1–41)
ANION GAP SERPL CALCULATED.3IONS-SCNC: 11.4 MMOL/L (ref 5–15)
AST SERPL-CCNC: 16 U/L (ref 1–40)
BASOPHILS # BLD AUTO: 0.06 10*3/MM3 (ref 0–0.2)
BASOPHILS NFR BLD AUTO: 1.1 % (ref 0–1.5)
BILIRUB SERPL-MCNC: 0.4 MG/DL (ref 0–1.2)
BUN SERPL-MCNC: 17 MG/DL (ref 6–20)
BUN/CREAT SERPL: 20.2 (ref 7–25)
CALCIUM SPEC-SCNC: 8.9 MG/DL (ref 8.6–10.5)
CHLORIDE SERPL-SCNC: 98 MMOL/L (ref 98–107)
CO2 SERPL-SCNC: 23.6 MMOL/L (ref 22–29)
CREAT SERPL-MCNC: 0.84 MG/DL (ref 0.76–1.27)
DEPRECATED RDW RBC AUTO: 40.7 FL (ref 37–54)
EOSINOPHIL # BLD AUTO: 0.25 10*3/MM3 (ref 0–0.4)
EOSINOPHIL NFR BLD AUTO: 4.7 % (ref 0.3–6.2)
ERYTHROCYTE [DISTWIDTH] IN BLOOD BY AUTOMATED COUNT: 13.1 % (ref 12.3–15.4)
GFR SERPL CREATININE-BSD FRML MDRD: 95 ML/MIN/1.73
GLOBULIN UR ELPH-MCNC: 3.2 GM/DL
GLUCOSE SERPL-MCNC: 410 MG/DL (ref 65–99)
HCT VFR BLD AUTO: 43.6 % (ref 37.5–51)
HGB BLD-MCNC: 14.8 G/DL (ref 13–17.7)
IMM GRANULOCYTES # BLD AUTO: 0.01 10*3/MM3 (ref 0–0.05)
IMM GRANULOCYTES NFR BLD AUTO: 0.2 % (ref 0–0.5)
LYMPHOCYTES # BLD AUTO: 1.88 10*3/MM3 (ref 0.7–3.1)
LYMPHOCYTES NFR BLD AUTO: 35.3 % (ref 19.6–45.3)
MAGNESIUM SERPL-MCNC: 2.2 MG/DL (ref 1.6–2.6)
MCH RBC QN AUTO: 29.3 PG (ref 26.6–33)
MCHC RBC AUTO-ENTMCNC: 33.9 G/DL (ref 31.5–35.7)
MCV RBC AUTO: 86.3 FL (ref 79–97)
MONOCYTES # BLD AUTO: 0.47 10*3/MM3 (ref 0.1–0.9)
MONOCYTES NFR BLD AUTO: 8.8 % (ref 5–12)
NEUTROPHILS NFR BLD AUTO: 2.65 10*3/MM3 (ref 1.7–7)
NEUTROPHILS NFR BLD AUTO: 49.9 % (ref 42.7–76)
NRBC BLD AUTO-RTO: 0 /100 WBC (ref 0–0.2)
PLATELET # BLD AUTO: 200 10*3/MM3 (ref 140–450)
PMV BLD AUTO: 11.9 FL (ref 6–12)
POTASSIUM SERPL-SCNC: 3.9 MMOL/L (ref 3.5–5.2)
PROT SERPL-MCNC: 7.1 G/DL (ref 6–8.5)
RBC # BLD AUTO: 5.05 10*6/MM3 (ref 4.14–5.8)
SARS-COV-2 ORF1AB RESP QL NAA+PROBE: NOT DETECTED
SODIUM SERPL-SCNC: 133 MMOL/L (ref 136–145)
WBC # BLD AUTO: 5.32 10*3/MM3 (ref 3.4–10.8)

## 2021-01-11 PROCEDURE — 85025 COMPLETE CBC W/AUTO DIFF WBC: CPT

## 2021-01-11 PROCEDURE — 80053 COMPREHEN METABOLIC PANEL: CPT

## 2021-01-11 PROCEDURE — U0004 COV-19 TEST NON-CDC HGH THRU: HCPCS

## 2021-01-11 PROCEDURE — 83735 ASSAY OF MAGNESIUM: CPT

## 2021-01-11 PROCEDURE — C9803 HOPD COVID-19 SPEC COLLECT: HCPCS

## 2021-01-11 PROCEDURE — 36415 COLL VENOUS BLD VENIPUNCTURE: CPT

## 2021-01-11 NOTE — TELEPHONE ENCOUNTER
Call from lab at [facility] reporting high glucose called patient and he stated he hadn't taken his insulin. Said he would recheck it and dose per the glucose schedule his PCP gave him.

## 2021-01-13 ENCOUNTER — HOSPITAL ENCOUNTER (OUTPATIENT)
Dept: CARDIOLOGY | Facility: HOSPITAL | Age: 56
End: 2021-01-13

## 2021-01-18 ENCOUNTER — TRANSCRIBE ORDERS (OUTPATIENT)
Dept: ADMINISTRATIVE | Facility: HOSPITAL | Age: 56
End: 2021-01-18

## 2021-01-18 ENCOUNTER — LAB (OUTPATIENT)
Dept: LAB | Facility: HOSPITAL | Age: 56
End: 2021-01-18

## 2021-01-18 DIAGNOSIS — Z01.818 PRE-OP TESTING: ICD-10-CM

## 2021-01-18 DIAGNOSIS — Z01.818 PRE-OP TESTING: Primary | ICD-10-CM

## 2021-01-18 LAB — SARS-COV-2 ORF1AB RESP QL NAA+PROBE: NOT DETECTED

## 2021-01-18 PROCEDURE — U0004 COV-19 TEST NON-CDC HGH THRU: HCPCS | Performed by: INTERNAL MEDICINE

## 2021-01-18 PROCEDURE — C9803 HOPD COVID-19 SPEC COLLECT: HCPCS | Performed by: INTERNAL MEDICINE

## 2021-01-19 ENCOUNTER — ANESTHESIA EVENT (OUTPATIENT)
Dept: CARDIOLOGY | Facility: HOSPITAL | Age: 56
End: 2021-01-19

## 2021-01-20 ENCOUNTER — ANESTHESIA (OUTPATIENT)
Dept: CARDIOLOGY | Facility: HOSPITAL | Age: 56
End: 2021-01-20

## 2021-01-20 ENCOUNTER — HOSPITAL ENCOUNTER (OUTPATIENT)
Dept: CARDIOLOGY | Facility: HOSPITAL | Age: 56
Discharge: HOME | End: 2021-01-20

## 2021-01-20 ENCOUNTER — HOSPITAL ENCOUNTER (OUTPATIENT)
Facility: HOSPITAL | Age: 56
Setting detail: OBSERVATION
Discharge: HOME | End: 2021-01-21
Attending: INTERNAL MEDICINE | Admitting: INTERNAL MEDICINE

## 2021-01-20 ENCOUNTER — ANESTHESIA EVENT (OUTPATIENT)
Dept: CARDIOLOGY | Facility: HOSPITAL | Age: 56
End: 2021-01-20

## 2021-01-20 VITALS — OXYGEN SATURATION: 99 % | DIASTOLIC BLOOD PRESSURE: 61 MMHG | SYSTOLIC BLOOD PRESSURE: 105 MMHG

## 2021-01-20 DIAGNOSIS — R00.2 PALPITATIONS: ICD-10-CM

## 2021-01-20 DIAGNOSIS — I47.1 PAROXYSMAL SVT (SUPRAVENTRICULAR TACHYCARDIA) (HCC): ICD-10-CM

## 2021-01-20 DIAGNOSIS — I48.92 ATRIAL FLUTTER WITH RAPID VENTRICULAR RESPONSE (HCC): ICD-10-CM

## 2021-01-20 LAB
ACT BLD: 120 SECONDS (ref 89–137)
ACT BLD: 213 SECONDS (ref 89–137)
ACT BLD: 252 SECONDS (ref 89–137)
ACT BLD: 285 SECONDS (ref 89–137)
BH CV ECHO MEAS - EF(MOD-BP): 45 %
BH CV ECHO MEAS - MV MAX PG: 1.8 MMHG
BH CV ECHO MEAS - MV MEAN PG: 1.1 MMHG
BH CV ECHO MEAS - MV V2 MAX: 67.6 CM/SEC
BH CV ECHO MEAS - MV V2 MEAN: 51.4 CM/SEC
BH CV ECHO MEAS - MV V2 VTI: 15.7 CM
BH CV ECHO MEAS - TR MAX VEL: 201.2 CM/SEC
GLUCOSE BLDC GLUCOMTR-MCNC: 193 MG/DL (ref 70–105)
GLUCOSE BLDC GLUCOMTR-MCNC: 292 MG/DL (ref 70–105)
GLUCOSE BLDC GLUCOMTR-MCNC: 312 MG/DL (ref 70–105)
GLUCOSE BLDC GLUCOMTR-MCNC: 331 MG/DL (ref 70–105)
LV EF 2D ECHO EST: 45 %

## 2021-01-20 PROCEDURE — C1733 CATH, EP, OTHR THAN COOL-TIP: HCPCS | Performed by: INTERNAL MEDICINE

## 2021-01-20 PROCEDURE — 25010000002 PROPOFOL 10 MG/ML EMULSION: Performed by: NURSE ANESTHETIST, CERTIFIED REGISTERED

## 2021-01-20 PROCEDURE — G0378 HOSPITAL OBSERVATION PER HR: HCPCS

## 2021-01-20 PROCEDURE — 63710000001 INSULIN REGULAR HUMAN PER 5 UNITS: Performed by: STUDENT IN AN ORGANIZED HEALTH CARE EDUCATION/TRAINING PROGRAM

## 2021-01-20 PROCEDURE — 93320 DOPPLER ECHO COMPLETE: CPT

## 2021-01-20 PROCEDURE — 25010000002 MIDAZOLAM PER 1 MG: Performed by: NURSE ANESTHETIST, CERTIFIED REGISTERED

## 2021-01-20 PROCEDURE — C1894 INTRO/SHEATH, NON-LASER: HCPCS | Performed by: INTERNAL MEDICINE

## 2021-01-20 PROCEDURE — 25010000002 PROTAMINE SULFATE PER 10 MG: Performed by: ANESTHESIOLOGY

## 2021-01-20 PROCEDURE — 85347 COAGULATION TIME ACTIVATED: CPT

## 2021-01-20 PROCEDURE — 93613 INTRACARDIAC EPHYS 3D MAPG: CPT | Performed by: INTERNAL MEDICINE

## 2021-01-20 PROCEDURE — 93657 TX L/R ATRIAL FIB ADDL: CPT | Performed by: INTERNAL MEDICINE

## 2021-01-20 PROCEDURE — 25010000002 FENTANYL CITRATE (PF) 100 MCG/2ML SOLUTION: Performed by: NURSE ANESTHETIST, CERTIFIED REGISTERED

## 2021-01-20 PROCEDURE — 93655 ICAR CATH ABLTJ DSCRT ARRHYT: CPT | Performed by: INTERNAL MEDICINE

## 2021-01-20 PROCEDURE — 25010000002 ONDANSETRON PER 1 MG: Performed by: ANESTHESIOLOGY

## 2021-01-20 PROCEDURE — 93312 ECHO TRANSESOPHAGEAL: CPT | Performed by: INTERNAL MEDICINE

## 2021-01-20 PROCEDURE — 93662 INTRACARDIAC ECG (ICE): CPT | Performed by: INTERNAL MEDICINE

## 2021-01-20 PROCEDURE — 93325 DOPPLER ECHO COLOR FLOW MAPG: CPT

## 2021-01-20 PROCEDURE — C1766 INTRO/SHEATH,STRBLE,NON-PEEL: HCPCS | Performed by: INTERNAL MEDICINE

## 2021-01-20 PROCEDURE — C1730 CATH, EP, 19 OR FEW ELECT: HCPCS | Performed by: INTERNAL MEDICINE

## 2021-01-20 PROCEDURE — 63710000001 INSULIN GLARGINE PER 5 UNITS: Performed by: INTERNAL MEDICINE

## 2021-01-20 PROCEDURE — 93656 COMPRE EP EVAL ABLTJ ATR FIB: CPT | Performed by: INTERNAL MEDICINE

## 2021-01-20 PROCEDURE — C1769 GUIDE WIRE: HCPCS | Performed by: INTERNAL MEDICINE

## 2021-01-20 PROCEDURE — 25010000002 MORPHINE PER 10 MG: Performed by: ANESTHESIOLOGY

## 2021-01-20 PROCEDURE — 25010000002 SUCCINYLCHOLINE PER 20 MG: Performed by: NURSE ANESTHETIST, CERTIFIED REGISTERED

## 2021-01-20 PROCEDURE — 25010000002 HEPARIN (PORCINE) PER 1000 UNITS: Performed by: NURSE ANESTHETIST, CERTIFIED REGISTERED

## 2021-01-20 PROCEDURE — 93320 DOPPLER ECHO COMPLETE: CPT | Performed by: INTERNAL MEDICINE

## 2021-01-20 PROCEDURE — 93325 DOPPLER ECHO COLOR FLOW MAPG: CPT | Performed by: INTERNAL MEDICINE

## 2021-01-20 PROCEDURE — 93623 PRGRMD STIMJ&PACG IV RX NFS: CPT | Performed by: INTERNAL MEDICINE

## 2021-01-20 PROCEDURE — C1732 CATH, EP, DIAG/ABL, 3D/VECT: HCPCS | Performed by: INTERNAL MEDICINE

## 2021-01-20 PROCEDURE — C1759 CATH, INTRA ECHOCARDIOGRAPHY: HCPCS | Performed by: INTERNAL MEDICINE

## 2021-01-20 PROCEDURE — 82962 GLUCOSE BLOOD TEST: CPT

## 2021-01-20 PROCEDURE — C1893 INTRO/SHEATH, FIXED,NON-PEEL: HCPCS | Performed by: INTERNAL MEDICINE

## 2021-01-20 PROCEDURE — 93312 ECHO TRANSESOPHAGEAL: CPT

## 2021-01-20 RX ORDER — OXYCODONE HYDROCHLORIDE 5 MG/1
5 TABLET ORAL ONCE AS NEEDED
Status: DISCONTINUED | OUTPATIENT
Start: 2021-01-20 | End: 2021-01-20 | Stop reason: HOSPADM

## 2021-01-20 RX ORDER — FENTANYL CITRATE 50 UG/ML
25 INJECTION, SOLUTION INTRAMUSCULAR; INTRAVENOUS EVERY 8 HOURS PRN
Status: DISCONTINUED | OUTPATIENT
Start: 2021-01-20 | End: 2021-01-21 | Stop reason: HOSPADM

## 2021-01-20 RX ORDER — PROPOFOL 10 MG/ML
VIAL (ML) INTRAVENOUS AS NEEDED
Status: DISCONTINUED | OUTPATIENT
Start: 2021-01-20 | End: 2021-01-20 | Stop reason: SURG

## 2021-01-20 RX ORDER — CLOPIDOGREL BISULFATE 75 MG/1
75 TABLET ORAL DAILY
Status: DISCONTINUED | OUTPATIENT
Start: 2021-01-20 | End: 2021-01-21 | Stop reason: HOSPADM

## 2021-01-20 RX ORDER — LISINOPRIL 20 MG/1
40 TABLET ORAL DAILY
Status: DISCONTINUED | OUTPATIENT
Start: 2021-01-20 | End: 2021-01-21 | Stop reason: HOSPADM

## 2021-01-20 RX ORDER — LIDOCAINE HYDROCHLORIDE 10 MG/ML
INJECTION, SOLUTION EPIDURAL; INFILTRATION; INTRACAUDAL; PERINEURAL AS NEEDED
Status: DISCONTINUED | OUTPATIENT
Start: 2021-01-20 | End: 2021-01-20 | Stop reason: SURG

## 2021-01-20 RX ORDER — INSULIN GLARGINE 100 [IU]/ML
18 INJECTION, SOLUTION SUBCUTANEOUS EVERY 12 HOURS SCHEDULED
Status: DISCONTINUED | OUTPATIENT
Start: 2021-01-20 | End: 2021-01-21 | Stop reason: HOSPADM

## 2021-01-20 RX ORDER — PHENYLEPHRINE HCL IN 0.9% NACL 0.5 MG/5ML
SYRINGE (ML) INTRAVENOUS AS NEEDED
Status: DISCONTINUED | OUTPATIENT
Start: 2021-01-20 | End: 2021-01-20 | Stop reason: SURG

## 2021-01-20 RX ORDER — AMITRIPTYLINE HYDROCHLORIDE 25 MG/1
25 TABLET, FILM COATED ORAL NIGHTLY
Status: DISCONTINUED | OUTPATIENT
Start: 2021-01-20 | End: 2021-01-21 | Stop reason: HOSPADM

## 2021-01-20 RX ORDER — METOPROLOL SUCCINATE 50 MG/1
50 TABLET, EXTENDED RELEASE ORAL DAILY
Status: DISCONTINUED | OUTPATIENT
Start: 2021-01-20 | End: 2021-01-21 | Stop reason: HOSPADM

## 2021-01-20 RX ORDER — MIDAZOLAM HYDROCHLORIDE 1 MG/ML
INJECTION INTRAMUSCULAR; INTRAVENOUS AS NEEDED
Status: DISCONTINUED | OUTPATIENT
Start: 2021-01-20 | End: 2021-01-20 | Stop reason: SURG

## 2021-01-20 RX ORDER — SODIUM CHLORIDE 9 MG/ML
80 INJECTION, SOLUTION INTRAVENOUS CONTINUOUS
Status: DISCONTINUED | OUTPATIENT
Start: 2021-01-20 | End: 2021-01-21 | Stop reason: HOSPADM

## 2021-01-20 RX ORDER — HEPARIN SODIUM 1000 [USP'U]/ML
INJECTION, SOLUTION INTRAVENOUS; SUBCUTANEOUS AS NEEDED
Status: DISCONTINUED | OUTPATIENT
Start: 2021-01-20 | End: 2021-01-20 | Stop reason: SURG

## 2021-01-20 RX ORDER — ONDANSETRON 2 MG/ML
4 INJECTION INTRAMUSCULAR; INTRAVENOUS EVERY 6 HOURS PRN
Status: DISCONTINUED | OUTPATIENT
Start: 2021-01-20 | End: 2021-01-21 | Stop reason: HOSPADM

## 2021-01-20 RX ORDER — ACETAMINOPHEN 325 MG/1
650 TABLET ORAL EVERY 4 HOURS PRN
Status: DISCONTINUED | OUTPATIENT
Start: 2021-01-20 | End: 2021-01-21 | Stop reason: HOSPADM

## 2021-01-20 RX ORDER — HYDROMORPHONE HCL 110MG/55ML
0.25 PATIENT CONTROLLED ANALGESIA SYRINGE INTRAVENOUS
Status: DISCONTINUED | OUTPATIENT
Start: 2021-01-20 | End: 2021-01-20 | Stop reason: HOSPADM

## 2021-01-20 RX ORDER — MORPHINE SULFATE 4 MG/ML
INJECTION, SOLUTION INTRAMUSCULAR; INTRAVENOUS AS NEEDED
Status: DISCONTINUED | OUTPATIENT
Start: 2021-01-20 | End: 2021-01-20 | Stop reason: SURG

## 2021-01-20 RX ORDER — SODIUM CHLORIDE 0.9 % (FLUSH) 0.9 %
3 SYRINGE (ML) INJECTION EVERY 12 HOURS SCHEDULED
Status: DISCONTINUED | OUTPATIENT
Start: 2021-01-20 | End: 2021-01-20 | Stop reason: HOSPADM

## 2021-01-20 RX ORDER — ATORVASTATIN CALCIUM 40 MG/1
80 TABLET, FILM COATED ORAL NIGHTLY
Status: DISCONTINUED | OUTPATIENT
Start: 2021-01-20 | End: 2021-01-21 | Stop reason: HOSPADM

## 2021-01-20 RX ORDER — FENTANYL CITRATE 50 UG/ML
INJECTION, SOLUTION INTRAMUSCULAR; INTRAVENOUS AS NEEDED
Status: DISCONTINUED | OUTPATIENT
Start: 2021-01-20 | End: 2021-01-20 | Stop reason: SURG

## 2021-01-20 RX ORDER — SODIUM CHLORIDE 0.9 % (FLUSH) 0.9 %
3-10 SYRINGE (ML) INJECTION AS NEEDED
Status: DISCONTINUED | OUTPATIENT
Start: 2021-01-20 | End: 2021-01-20 | Stop reason: HOSPADM

## 2021-01-20 RX ORDER — ACETAMINOPHEN 325 MG/1
650 TABLET ORAL ONCE AS NEEDED
Status: DISCONTINUED | OUTPATIENT
Start: 2021-01-20 | End: 2021-01-20 | Stop reason: HOSPADM

## 2021-01-20 RX ORDER — LIDOCAINE HYDROCHLORIDE 20 MG/ML
INJECTION, SOLUTION INFILTRATION; PERINEURAL AS NEEDED
Status: DISCONTINUED | OUTPATIENT
Start: 2021-01-20 | End: 2021-01-20 | Stop reason: HOSPADM

## 2021-01-20 RX ORDER — ACETAMINOPHEN 650 MG/1
650 SUPPOSITORY RECTAL ONCE AS NEEDED
Status: DISCONTINUED | OUTPATIENT
Start: 2021-01-20 | End: 2021-01-20 | Stop reason: HOSPADM

## 2021-01-20 RX ORDER — SUCCINYLCHOLINE CHLORIDE 20 MG/ML
INJECTION INTRAMUSCULAR; INTRAVENOUS AS NEEDED
Status: DISCONTINUED | OUTPATIENT
Start: 2021-01-20 | End: 2021-01-20 | Stop reason: SURG

## 2021-01-20 RX ORDER — ONDANSETRON 2 MG/ML
4 INJECTION INTRAMUSCULAR; INTRAVENOUS ONCE AS NEEDED
Status: DISCONTINUED | OUTPATIENT
Start: 2021-01-20 | End: 2021-01-20 | Stop reason: HOSPADM

## 2021-01-20 RX ORDER — ONDANSETRON 2 MG/ML
INJECTION INTRAMUSCULAR; INTRAVENOUS AS NEEDED
Status: DISCONTINUED | OUTPATIENT
Start: 2021-01-20 | End: 2021-01-20 | Stop reason: SURG

## 2021-01-20 RX ORDER — ACETAMINOPHEN 650 MG/1
650 SUPPOSITORY RECTAL EVERY 4 HOURS PRN
Status: DISCONTINUED | OUTPATIENT
Start: 2021-01-20 | End: 2021-01-21 | Stop reason: HOSPADM

## 2021-01-20 RX ORDER — PROTAMINE SULFATE 10 MG/ML
INJECTION, SOLUTION INTRAVENOUS AS NEEDED
Status: DISCONTINUED | OUTPATIENT
Start: 2021-01-20 | End: 2021-01-20 | Stop reason: SURG

## 2021-01-20 RX ADMIN — INSULIN GLARGINE 18 UNITS: 100 INJECTION, SOLUTION SUBCUTANEOUS at 21:29

## 2021-01-20 RX ADMIN — PHENYLEPHRINE HYDROCHLORIDE 100 MCG: 10 INJECTION INTRAVENOUS at 15:53

## 2021-01-20 RX ADMIN — PHENYLEPHRINE HYDROCHLORIDE 200 MCG: 10 INJECTION INTRAVENOUS at 15:55

## 2021-01-20 RX ADMIN — PROPOFOL 20 MG: 10 INJECTION, EMULSION INTRAVENOUS at 13:47

## 2021-01-20 RX ADMIN — PHENYLEPHRINE HYDROCHLORIDE 100 MCG: 10 INJECTION INTRAVENOUS at 15:28

## 2021-01-20 RX ADMIN — ONDANSETRON 4 MG: 2 INJECTION INTRAMUSCULAR; INTRAVENOUS at 16:34

## 2021-01-20 RX ADMIN — HEPARIN SODIUM 6000 UNITS: 1000 INJECTION INTRAVENOUS; SUBCUTANEOUS at 15:54

## 2021-01-20 RX ADMIN — MIDAZOLAM 2 MG: 1 INJECTION INTRAMUSCULAR; INTRAVENOUS at 14:00

## 2021-01-20 RX ADMIN — PROPOFOL 20 MG: 10 INJECTION, EMULSION INTRAVENOUS at 13:44

## 2021-01-20 RX ADMIN — PROPOFOL 125 MG: 10 INJECTION, EMULSION INTRAVENOUS at 15:27

## 2021-01-20 RX ADMIN — FENTANYL CITRATE 50 MCG: 50 INJECTION, SOLUTION INTRAMUSCULAR; INTRAVENOUS at 14:03

## 2021-01-20 RX ADMIN — PROPOFOL 10 MG: 10 INJECTION, EMULSION INTRAVENOUS at 14:27

## 2021-01-20 RX ADMIN — METOPROLOL SUCCINATE 50 MG: 50 TABLET, EXTENDED RELEASE ORAL at 22:26

## 2021-01-20 RX ADMIN — SODIUM CHLORIDE: 9 INJECTION, SOLUTION INTRAVENOUS at 13:40

## 2021-01-20 RX ADMIN — FENTANYL CITRATE 50 MCG: 50 INJECTION, SOLUTION INTRAMUSCULAR; INTRAVENOUS at 15:07

## 2021-01-20 RX ADMIN — HEPARIN SODIUM 2000 UNITS: 1000 INJECTION INTRAVENOUS; SUBCUTANEOUS at 14:33

## 2021-01-20 RX ADMIN — LISINOPRIL 40 MG: 20 TABLET ORAL at 23:04

## 2021-01-20 RX ADMIN — SODIUM CHLORIDE: 9 INJECTION, SOLUTION INTRAVENOUS at 15:24

## 2021-01-20 RX ADMIN — PROPOFOL 80 MG: 10 INJECTION, EMULSION INTRAVENOUS at 13:43

## 2021-01-20 RX ADMIN — HEPARIN SODIUM 7000 UNITS: 1000 INJECTION INTRAVENOUS; SUBCUTANEOUS at 15:43

## 2021-01-20 RX ADMIN — HEPARIN SODIUM 5000 UNITS: 1000 INJECTION INTRAVENOUS; SUBCUTANEOUS at 15:40

## 2021-01-20 RX ADMIN — CLOPIDOGREL BISULFATE 75 MG: 75 TABLET ORAL at 21:31

## 2021-01-20 RX ADMIN — FAMOTIDINE 20 MG: 10 INJECTION, SOLUTION INTRAVENOUS at 15:50

## 2021-01-20 RX ADMIN — PROPOFOL 50 MCG/KG/MIN: 10 INJECTION, EMULSION INTRAVENOUS at 14:03

## 2021-01-20 RX ADMIN — SODIUM CHLORIDE 80 ML/HR: 9 INJECTION, SOLUTION INTRAVENOUS at 11:41

## 2021-01-20 RX ADMIN — ISOPROTERENOL HYDROCHLORIDE 1 MCG/MIN: 1 INJECTION, SOLUTION INTRACARDIAC; INTRAMUSCULAR; INTRAVENOUS; SUBCUTANEOUS at 14:49

## 2021-01-20 RX ADMIN — HEPARIN SODIUM 5000 UNITS: 1000 INJECTION INTRAVENOUS; SUBCUTANEOUS at 16:09

## 2021-01-20 RX ADMIN — LIDOCAINE HYDROCHLORIDE 100 MG: 10 INJECTION, SOLUTION EPIDURAL; INFILTRATION; INTRACAUDAL; PERINEURAL at 13:43

## 2021-01-20 RX ADMIN — MORPHINE SULFATE 4 MG: 4 INJECTION INTRAVENOUS at 16:09

## 2021-01-20 RX ADMIN — SODIUM CHLORIDE: 9 INJECTION, SOLUTION INTRAVENOUS at 13:57

## 2021-01-20 RX ADMIN — PROPOFOL 20 MG: 10 INJECTION, EMULSION INTRAVENOUS at 13:45

## 2021-01-20 RX ADMIN — PROPOFOL 50 MG: 10 INJECTION, EMULSION INTRAVENOUS at 14:03

## 2021-01-20 RX ADMIN — PROTAMINE SULFATE 100 MG: 10 INJECTION, SOLUTION INTRAVENOUS at 16:35

## 2021-01-20 RX ADMIN — ATORVASTATIN CALCIUM 80 MG: 40 TABLET, FILM COATED ORAL at 21:30

## 2021-01-20 RX ADMIN — PHENYLEPHRINE HYDROCHLORIDE 150 MCG: 10 INJECTION INTRAVENOUS at 15:50

## 2021-01-20 RX ADMIN — INSULIN HUMAN 10 UNITS: 100 INJECTION, SOLUTION PARENTERAL at 12:38

## 2021-01-20 RX ADMIN — SUCCINYLCHOLINE CHLORIDE 80 MG: 20 INJECTION, SOLUTION INTRAMUSCULAR; INTRAVENOUS at 15:27

## 2021-01-20 RX ADMIN — FENTANYL CITRATE 100 MCG: 50 INJECTION, SOLUTION INTRAMUSCULAR; INTRAVENOUS at 15:34

## 2021-01-20 NOTE — ANESTHESIA PROCEDURE NOTES
Airway  Urgency: elective    Date/Time: 1/20/2021 3:29 PM  Airway not difficult    General Information and Staff    Patient location during procedure: OR  Anesthesiologist: Americo Borjas MD  CRNA: Ruby Borrego CRNA    Indications and Patient Condition  Indications for airway management: airway protection    Preoxygenated: yes  Mask difficulty assessment: 1 - vent by mask    Final Airway Details  Final airway type: endotracheal airway      Successful airway: ETT  Cuffed: yes   Successful intubation technique: direct laryngoscopy  Facilitating devices/methods: intubating stylet  Endotracheal tube insertion site: oral  Blade: Florencia  Blade size: 4  ETT size (mm): 8.0  Cormack-Lehane Classification: grade I - full view of glottis  Placement verified by: chest auscultation and capnometry   Measured from: lips  ETT/EBT  to lips (cm): 24  Number of attempts at approach: 1  Assessment: lips, teeth, and gum same as pre-op and atraumatic intubation

## 2021-01-20 NOTE — ANESTHESIA PREPROCEDURE EVALUATION
Anesthesia Evaluation     Patient summary reviewed and Nursing notes reviewed   NPO Solid Status: > 8 hours  NPO Liquid Status: > 8 hours           Airway   Mallampati: II  TM distance: >3 FB  Neck ROM: full  No difficulty expected  Dental - normal exam     Pulmonary - normal exam   Cardiovascular - normal exam    ECG reviewed    (+) hypertension, valvular problems/murmurs, past MI , CAD, cardiac stents dysrhythmias Atrial Fib, angina, hyperlipidemia,       Neuro/Psych  (+) CVA residual symptoms, psychiatric history,     GI/Hepatic/Renal/Endo    (+) obesity,   diabetes mellitus,     Musculoskeletal     Abdominal  - normal exam    Bowel sounds: normal.   Substance History      OB/GYN          Other        ROS/Med Hx Other: Bicuspid av   uncontrolled dm     Non obst CAD     Stress  · Findings consistent with a normal ECG stress test.  · Left ventricular ejection fraction is mildly reduced. (Calculated EF = 43%).  · Impressions are consistent with an intermediate risk study.  · Cardiomyopathy with medium to large inferolateral fixed defect clinical correlation is recommended.                  Anesthesia Plan    ASA 3     general     intravenous induction     Anesthetic plan, all risks, benefits, and alternatives have been provided, discussed and informed consent has been obtained with: patient.

## 2021-01-20 NOTE — ANESTHESIA POSTPROCEDURE EVALUATION
Patient: Preet Jones    Procedure Summary     Date: 01/20/21 Room / Location: [facility]ATH LAB 3 / [facility] CATH INVASIVE LOCATION    Anesthesia Start: 1357 Anesthesia Stop: 1701    Procedure: EP/Ablation (N/A ) Diagnosis:       Paroxysmal SVT (supraventricular tachycardia) (CMS/HCC)      Palpitations      Atrial flutter with rapid ventricular response (CMS/HCC)      Paroxysmal atrial fibrillation (CMS/HCC)      Cardioembolic stroke (CMS/HCC)      (Post atrial arrhythmia ablation without complications)    Provider: Nathen Olmstead MD Provider: Americo Borjas MD    Anesthesia Type: general ASA Status: 3          Anesthesia Type: general    Vitals  Vitals Value Taken Time   /82 01/20/21 1732   Temp 97.3 °F (36.3 °C) 01/20/21 1701   Pulse 65 01/20/21 1732   Resp 14 01/20/21 1725   SpO2 93 % 01/20/21 1732   Vitals shown include unvalidated device data.        Post Anesthesia Care and Evaluation    Patient location during evaluation: PACU  Patient participation: complete - patient participated  Level of consciousness: awake  Pain scale: See nurse's notes for pain score.  Pain management: adequate  Airway patency: patent  Anesthetic complications: No anesthetic complications  PONV Status: none  Cardiovascular status: acceptable  Respiratory status: acceptable  Hydration status: acceptable    Comments: Patient seen and examined postoperatively; vital signs stable; SpO2 greater than or equal to 90%; cardiopulmonary status stable; nausea/vomiting adequately controlled; pain adequately controlled; no apparent anesthesia complications; patient discharged from anesthesia care when discharge criteria were met

## 2021-01-21 VITALS
SYSTOLIC BLOOD PRESSURE: 107 MMHG | OXYGEN SATURATION: 96 % | HEART RATE: 82 BPM | DIASTOLIC BLOOD PRESSURE: 68 MMHG | RESPIRATION RATE: 15 BRPM | HEIGHT: 70 IN | BODY MASS INDEX: 28.69 KG/M2 | TEMPERATURE: 97.7 F | WEIGHT: 200.4 LBS

## 2021-01-21 LAB
GLUCOSE BLDC GLUCOMTR-MCNC: 232 MG/DL (ref 70–105)
QT INTERVAL: 370 MS

## 2021-01-21 PROCEDURE — 93005 ELECTROCARDIOGRAM TRACING: CPT | Performed by: INTERNAL MEDICINE

## 2021-01-21 PROCEDURE — 99217 PR OBSERVATION CARE DISCHARGE MANAGEMENT: CPT | Performed by: NURSE PRACTITIONER

## 2021-01-21 PROCEDURE — G0378 HOSPITAL OBSERVATION PER HR: HCPCS

## 2021-01-21 PROCEDURE — 82962 GLUCOSE BLOOD TEST: CPT

## 2021-01-21 PROCEDURE — 63710000001 INSULIN GLARGINE PER 5 UNITS: Performed by: INTERNAL MEDICINE

## 2021-01-21 RX ADMIN — CLOPIDOGREL BISULFATE 75 MG: 75 TABLET ORAL at 08:28

## 2021-01-21 RX ADMIN — INSULIN GLARGINE 18 UNITS: 100 INJECTION, SOLUTION SUBCUTANEOUS at 08:29

## 2021-01-21 RX ADMIN — METOPROLOL SUCCINATE 50 MG: 50 TABLET, EXTENDED RELEASE ORAL at 08:28

## 2021-01-21 RX ADMIN — LISINOPRIL 40 MG: 20 TABLET ORAL at 08:28

## 2021-01-21 RX ADMIN — APIXABAN 5 MG: 5 TABLET, FILM COATED ORAL at 08:29

## 2021-01-21 NOTE — PLAN OF CARE
Problem: Adult Inpatient Plan of Care  Goal: Plan of Care Review  Outcome: Ongoing, Progressing  Flowsheets (Taken 1/20/2021 2225)  Plan of Care Reviewed With: patient  Goal: Patient-Specific Goal (Individualized)  Outcome: Ongoing, Progressing  Goal: Absence of Hospital-Acquired Illness or Injury  Outcome: Ongoing, Progressing  Intervention: Identify and Manage Fall Risk  Recent Flowsheet Documentation  Taken 1/20/2021 2315 by Adele Abarca RN  Safety Promotion/Fall Prevention:   assistive device/personal items within reach   clutter free environment maintained   lighting adjusted   nonskid shoes/slippers when out of bed   safety round/check completed   room organization consistent  Taken 1/20/2021 2220 by Adele Abarca RN  Safety Promotion/Fall Prevention:   safety round/check completed   room organization consistent   nonskid shoes/slippers when out of bed   lighting adjusted   assistive device/personal items within reach   clutter free environment maintained  Intervention: Prevent Skin Injury  Recent Flowsheet Documentation  Taken 1/20/2021 2315 by Adele Abarca RN  Body Position: position changed independently  Taken 1/20/2021 2220 by Adele Abarca RN  Body Position: position changed independently  Intervention: Prevent and Manage VTE (venous thromboembolism) Risk  Recent Flowsheet Documentation  Taken 1/20/2021 2220 by Adele Abarca RN  VTE Prevention/Management: dorsiflexion/plantar flexion performed  Intervention: Prevent Infection  Recent Flowsheet Documentation  Taken 1/20/2021 2315 by Adele Abarca RN  Infection Prevention: personal protective equipment utilized  Taken 1/20/2021 2220 by Adele Abarca RN  Infection Prevention: personal protective equipment utilized  Goal: Optimal Comfort and Wellbeing  Outcome: Ongoing, Progressing  Intervention: Provide Person-Centered Care  Recent Flowsheet Documentation  Taken 1/20/2021 2220 by Adele Abarca RN  Trust Relationship/Rapport:    care explained   empathic listening provided   questions answered   questions encouraged  Goal: Readiness for Transition of Care  Outcome: Ongoing, Progressing     Problem: Arrhythmia/Dysrhythmia (Cardiac Catheterization)  Goal: Stable Heart Rate and Rhythm  Outcome: Ongoing, Progressing     Problem: Bleeding (Cardiac Catheterization)  Goal: Absence of Bleeding  Outcome: Ongoing, Progressing     Problem: Contrast-Induced Injury Risk (Cardiac Catheterization)  Goal: Absence of Contrast-Induced Injury  Outcome: Ongoing, Progressing     Problem: Embolism (Cardiac Catheterization)  Goal: Absence of Embolism Signs and Symptoms  Outcome: Ongoing, Progressing  Intervention: Prevent or Manage Embolism  Recent Flowsheet Documentation  Taken 1/20/2021 2220 by Adele Abarca, RN  VTE Prevention/Management: dorsiflexion/plantar flexion performed     Problem: Ongoing Anesthesia/Sedation Effects (Cardiac Catheterization)  Goal: Anesthesia/Sedation Recovery  Outcome: Ongoing, Progressing  Intervention: Optimize Anesthesia Recovery  Recent Flowsheet Documentation  Taken 1/20/2021 2315 by Adele Abarca, RN  Safety Promotion/Fall Prevention:   assistive device/personal items within reach   clutter free environment maintained   lighting adjusted   nonskid shoes/slippers when out of bed   safety round/check completed   room organization consistent  Taken 1/20/2021 2220 by Adele Abarca, RN  Safety Promotion/Fall Prevention:   safety round/check completed   room organization consistent   nonskid shoes/slippers when out of bed   lighting adjusted   assistive device/personal items within reach   clutter free environment maintained     Problem: Pain (Cardiac Catheterization)  Goal: Acceptable Pain Control  Outcome: Ongoing, Progressing     Problem: Vascular Access Protection (Cardiac Catheterization)  Goal: Absence of Vascular Access Complication  Outcome: Ongoing, Progressing  Intervention: Prevent Vascular Access Site  Complication  Recent Flowsheet Documentation  Taken 1/20/2021 2315 by Adele Abarca, RN  Activity Management: up ad mesfin  Taken 1/20/2021 2220 by Adele Abarca, RN  Activity Management:   ambulated to bathroom   ambulated in room   Goal Outcome Evaluation:  Plan of Care Reviewed With: patient

## 2021-01-21 NOTE — DISCHARGE INSTRUCTIONS
[place] diet x3 days then regular  Monitor groin site for any swelling or bleeding  Plavix and Eliquis as prescribed  No aspirin  Follow-up Dr. Olmstead 1-2 weeks

## 2021-01-21 NOTE — DISCHARGE SUMMARY
Cardiology Short Stay Note      Patient Care Team:  Celine Garcia MD as PCP - General (Internal Medicine)  Moris Pedro MD as Consulting Physician (Cardiology)    CHIEF COMPLAINT:     PATIENT IDENTIFICATION  Name: Preet Jones  Age: 55 y.o.  Sex: male  :  1965  MRN: [id number]             HISTORY OF PRESENT ILLNESS:   55-year-old male patient was recently admitted to hospital because of recurrent arrhythmia and was found to have SVT.  He was referred to our office because of recurrent symptoms of SVT despite beta-blockers and calcium channel blockers.  Chronic medical problems include coronary artery disease with prior history of PCI, history of cardioembolic stroke x3, hyperlipidemia and diabetes    ADMISSION  DIAGNOSIS  Recurrent, symptomatic SVT      Past Medical History:   Diagnosis Date   • Abnormal cardiovascular stress test 2017   • Acute myocardial infarction (CMS/Formerly [facility])    • Aortic aneurysm (CMS/HCC) 2017   • Bicuspid aortic valve 2017   • Coronary artery disease 10/29/2019   • History of coronary angioplasty with insertion of stent    • Hyperlipidemia    • Hypertension    • Obesity 2011   • Primary hyperparathyroidism (CMS/HCC) 2011   • RUE weakness 10/29/2019    Previous residual from Stroke, but was not work prohibiting.   • Sinus arrhythmia    • Stroke (CMS/[facility])    • Type 2 diabetes mellitus (CMS/[facility]) 10/29/2019     Past Surgical History:   Procedure Laterality Date   • APPENDECTOMY     • BACK SURGERY     • CARDIAC CATHETERIZATION     • CARDIAC CATHETERIZATION     • CARDIAC CATHETERIZATION N/A 2020    Procedure: Cardiac Catheterization/Vascular Study;  Surgeon: Moris Pedro MD;  Location: [facility] INVASIVE LOCATION;  Service: Cardiovascular;  Laterality: N/A;   • JOINT REPLACEMENT     • KNEE ARTHROSCOPY       Family History   Problem Relation Age of Onset   • Heart disease Mother    • Parkinsonism  "Father    • Cancer Father      Social History     Tobacco Use   • Smoking status: Never Smoker   • Smokeless tobacco: Never Used   Substance Use Topics   • Alcohol use: No     Frequency: Never   • Drug use: No     Medications Prior to Admission   Medication Sig Dispense Refill Last Dose   • amitriptyline (ELAVIL) 25 MG tablet Take 25 mg by mouth every night at bedtime.   1/19/2021 at Unknown time   • atorvastatin (LIPITOR) 80 MG tablet Take 1 tablet by mouth Every Night. 30 tablet 0 1/19/2021 at Unknown time   • Cholecalciferol (VITAMIN D-3) 25 MCG (1000 UT) capsule Take 3 capsules by mouth Daily.   1/19/2021 at Unknown time   • clopidogrel (PLAVIX) 75 MG tablet Take 1 tablet by mouth Daily. 30 tablet 6 1/19/2021 at Unknown time   • insulin detemir (LEVEMIR) 100 UNIT/ML injection Inject 18 Units under the skin into the appropriate area as directed 2 (Two) Times a Day. He adjusts it up 2 units if blood sugar is above 280.   1/19/2021 at Unknown time   • lisinopril (PRINIVIL,ZESTRIL) 40 MG tablet Take 40 mg by mouth Daily.   1/19/2021 at Unknown time   • metFORMIN (Glucophage) 500 MG tablet Take 1 tablet by mouth 2 (Two) Times a Day With Meals. 60 tablet 0 1/18/2021 at Unknown time   • metoprolol succinate XL (TOPROL-XL) 50 MG 24 hr tablet Take 50 mg by mouth Daily.   1/19/2021 at Unknown time     Allergies:  Morphine    Immunization History   Administered Date(s) Administered   • Flulaval/Fluarix/Fluzone Quad 11/06/2020           REVIEW OF SYSTEMS:  Pertinent items are noted in HPI, all other systems reviewed and negative    Vital Signs  Temp:  [97.2 °F (36.2 °C)-97.9 °F (36.6 °C)] 97.7 °F (36.5 °C)  Heart Rate:  [65-89] 82  Resp:  [10-18] 15  BP: (102-158)/(61-99) 107/68    Flowsheet Rows      First Filed Value   Admission Height  176.5 cm (69.5\") Documented at 01/20/2021 1113   Admission Weight  90.9 kg (200 lb 6.4 oz) Documented at 01/20/2021 1113           Physical Exam:    General: Alert, cooperative, no " distress, appears stated age  Head:  Normocephalic, atraumatic, mucous membranes moist  Eyes:  Conjunctiva/corneas clear, EOM's intact     Neck:  Supple,  no adenopathy;      Lungs: Clear to auscultation bilaterally, no wheezes rhonchi rales are noted  Chest wall: No tenderness  Heart::  Regular rate and rhythm, S1 and S2 normal, no murmur, rub or gallop.  Groin site very tender to palpate, no hematoma or bruising noted.  Abdomen: Soft, non-tender, nondistended bowel sounds active  Extremities: No cyanosis, clubbing, or edema groin soft no hematoma.  Pulses: 2+ and symmetric all extremities  Skin:  No rashes or lesions  Neuro/psych: A&O x3. CN II through XII are grossly intact with appropriate affect      Results Review:      I reviewed the patient's new clinical results.    CBC      Cr Clearance Estimated Creatinine Clearance: 110.4 mL/min (by C-G formula based on SCr of 0.84 mg/dL).  Coag     HbA1C   Lab Results   Component Value Date    HGBA1C 11.7 (H) 12/30/2020    HGBA1C 11.4 (H) 11/04/2020    HGBA1C 10.6 (H) 10/30/2019     Blood Glucose   Glucose   Date/Time Value Ref Range Status   01/21/2021 0749 232 (H) 70 - 105 mg/dL Final     Comment:     Serial Number: [id number]:  [id number]   01/20/2021 2128 312 (H) 70 - 105 mg/dL Final     Comment:     Serial Number: [id number]:  [id number]   01/20/2021 1711 193 (H) 70 - 105 mg/dL Final     Comment:     Serial Number: [id number]:  [id number]   01/20/2021 1323 292 (H) 70 - 105 mg/dL Final     Comment:     Serial Number: [id number]:  [id number]   01/20/2021 1118 331 (H) 70 - 105 mg/dL Final     Comment:     Serial Number: [id number]:  [id number]     Infection     CMP     ABG      UA      TAMMY  No results found for: POCMETH, POCAMPHET, POCBARBITUR, POCBENZO, POCCOCAINE, POCOPIATES, POCOXYCODO, POCPHENCYC, POCPROPOXY, POCTHC, POCTRICYC  [facility]     Radiology(recent) No radiology results for the last day            Assessment/Plan      Palpitations    Paroxysmal SVT (supraventricular tachycardia) (CMS/HCC)    Atrial flutter with rapid ventricular response (CMS/HCC)            I discussed the patients findings and my recommendations with patient and attending nurse.     DISCHARGE DIAGNOSIS    Palpitations    Paroxysmal SVT (supraventricular tachycardia) (CMS/HCC)    Atrial flutter with rapid ventricular response (CMS/HCC)        Hospital Course  Patient is a 55 y.o. male presented with recurrent, symptomatic supraventricular tachycardia.  He presented for SVT ablation per Dr. Olmstead on 1/20/2021.  Patient also underwent transesophageal echocardiogram that showed borderline concentric LVH, EF 41-45%, RA mildly dilated with centrally directed jet noted.  Normal RVSP.  No evidence of left atrial shunting or clot.  EP study and ablation was then performed.  Please see full procedure note for additional details.  Patient was observed overnight in the OP CV.  No events noted.  Groin site is very tender to palpate but soft with no hematoma.  Patient has maintained normal sinus rhythm and is stable for discharge home today.      Past Medical History:     Past Medical History:   Diagnosis Date   • Abnormal cardiovascular stress test 1/19/2017   • Acute myocardial infarction (CMS/HCC) 2019   • Aortic aneurysm (CMS/HCC) 6/22/2017   • Bicuspid aortic valve 2/16/2017   • Coronary artery disease 10/29/2019   • History of coronary angioplasty with insertion of stent    • Hyperlipidemia    • Hypertension    • Obesity 9/2/2011   • Primary hyperparathyroidism (CMS/HCC) 9/2/2011   • RUE weakness 10/29/2019    Previous residual from Stroke, but was not work prohibiting.   • Sinus arrhythmia    • Stroke (CMS/HCC)    • Type 2 diabetes mellitus (CMS/[facility]) 10/29/2019       Past Surgical History:     Past Surgical History:   Procedure Laterality Date   • APPENDECTOMY     • BACK SURGERY     • CARDIAC CATHETERIZATION  2010   • CARDIAC CATHETERIZATION  2019   • CARDIAC  CATHETERIZATION N/A 12/31/2020    Procedure: Cardiac Catheterization/Vascular Study;  Surgeon: Moris Pedro MD;  Location: [facility] INVASIVE LOCATION;  Service: Cardiovascular;  Laterality: N/A;   • JOINT REPLACEMENT     • KNEE ARTHROSCOPY         Social History:   Social History     Socioeconomic History   • Marital status:      Spouse name: Not on file   • Number of children: Not on file   • Years of education: Not on file   • Highest education level: Not on file   Tobacco Use   • Smoking status: Never Smoker   • Smokeless tobacco: Never Used   Substance and Sexual Activity   • Alcohol use: No     Frequency: Never   • Drug use: No   • Sexual activity: Yes     Partners: Female       Procedures Performed    Procedure(s):  EP/Ablation  -------------------   Transesophageal echocardiogram    Consults:   Consults     Date and Time Order Name Status Description    12/30/2020 1143 Hospitalist (on-call MD unless specified) Completed     12/30/2020 1043 Cardiology (on-call MD unless specified) Completed           Condition on Discharge: Stable    Discharge Disposition Home      Discharge Medications     Discharge Medications      ASK your doctor about these medications      Instructions Start Date   amitriptyline 25 MG tablet  Commonly known as: ELAVIL   25 mg, Oral, Every Night at Bedtime      atorvastatin 80 MG tablet  Commonly known as: LIPITOR   80 mg, Oral, Nightly      clopidogrel 75 MG tablet  Commonly known as: PLAVIX   75 mg, Oral, Daily      insulin detemir 100 UNIT/ML injection  Commonly known as: LEVEMIR  Ask about: Which instructions should I use?   18 Units, Subcutaneous, 2 Times Daily, He adjusts it up 2 units if blood sugar is above 280.       lisinopril 40 MG tablet  Commonly known as: PRINIVIL,ZESTRIL   40 mg, Oral, Daily      metFORMIN 500 MG tablet  Commonly known as: Glucophage   500 mg, Oral, 2 Times Daily With Meals      metoprolol succinate XL 50 MG 24 hr tablet  Commonly  "known as: TOPROL-XL   50 mg, Oral, Daily      Vitamin D-3 25 MCG (1000 UT) capsule   3 capsules, Oral, Daily             Discharge Diet: Magnolia diet x3 days then regular    Activity at Discharge: No lifting greater than 10 pounds x 1 week    Follow-up Appointments  No future appointments.  [unfilled]    Test Results Pending at Discharge  [unfilled]     Risk for Readmission (LACE) Score: 6 (1/21/2021  6:00 AM)          EDWINA Pina  01/21/21  07:59 EST    Time spent for discharging patient and education: 15      EMR Dragon/Transcription:   \"Dictated utilizing Dragon dictation\".     Disclaimer: Please note that areas of this note were completed with computer voice recognition software.  Quite often unanticipated grammatical, syntax, homophones, and other interpretive errors are inadvertently transcribed by the computer software. Please excuse any errors that have escaped final proofreading    Electronically signed by EDWINA iPna, 01/21/21, 7:59 AM EST.    "

## 2021-01-21 NOTE — PAYOR COMM NOTE
"Observation order 1/20/21    Overnight cardiac monitoring s/p scheduled ablation  ---------  [facility];  Electrophysiologic Study and Intracardiac Catheter Ablation (M-154)  Goal Length of Stay: Ambulatory  Extended Stay  Minimal (a few hours to 1 day), Brief (1 to 3 days), Moderate (4 to 7 days), and Prolonged (more than 7 days).  Inpatient stay may be needed for(1)(14)(31)(32)(68)(69)(70):  Extended (ie, more than 24 hours) monitoring of patient on telemetry needed (eg, discontinuing antiarrhythmic agents before electrophysiologic study, dangerous arrhythmia or conduction defect detected or suspected)  --------  AUTHORIZATION PENDING:   PLEASE FAX OR CALL DETERMINATION TO CONTACT BELOW:       THANK YOU,    SHANIQUE Morejon, RN  Utilization Review  [facility]  Phone: [phone]  Fax: [phone]      NPI: [id number]  Tax ID: [id number]        JonesPreet (55 y.o. Male)     Date of Birth Social Security Number Address Home Phone MRN    1965  [address] [place] [facility] [phone] [id number]    Gnosticism Marital Status          None        Admission Date Admission Type Admitting Provider Attending Provider Department, Room/Bed    1/20/21 Elective Nathen Olmstead MD Garimella, Satya V, MD [facility], 1104/1    Discharge Date Discharge Disposition Discharge Destination                       Attending Provider: Nathen Olmstead MD    Allergies: Morphine    Isolation: None   Infection: None   Code Status: Prior    Ht: 176.5 cm (69.5\")   Wt: 90.9 kg (200 lb 6.4 oz)    Admission Cmt: None   Principal Problem: None                Active Insurance as of 1/20/2021     Primary Coverage     Payor Plan Insurance Group Employer/Plan Group    [facility] [employer] [facility] PPO [id number]     Payor Plan Address Payor Plan Phone Number Payor Plan Fax Number Effective Dates    [address] [phone]  1/1/2021 - None Entered    " [facility] [id number]       Subscriber Name Subscriber Birth Date Member ID       THALIA ROMAN 1965 [id number]                 Emergency Contacts      (Rel.) Home Phone Work Phone Mobile Phone    JETT ROMAN (Spouse) [phone] -- --               History & Physical      Nathen Olmstead MD at 01/20/21 1225          H&P reviewed. The patient was examined and there are no changes to the H&P.          Electronically signed by Nathen Olmstead MD at 01/20/21 1225   Source Note        CC--recurrent symptomatic SVT, coronary artery disease and prior history of stroke    Sub--55-year-old male patient was recently admitted to hospital because of recurrent arrhythmia and was found to have SVT and patient being referred to our office because of recurrent symptoms of SVT despite beta-blockers and calcium channel blockers.  Chronic medical problems include coronary artery disease with prior history of PCI, history of cardioembolic stroke x3  He also has history of hyperlipidemia and diabetes  Denies any active angina      Physical Exam    General:      well developed, well nourished, in no acute distress.    Head:      normocephalic and atraumatic.    Eyes:      PERRL/EOM intact, conjunctiva and sclera clear with out nystagmus.    Neck:      no masses, thyromegaly, trachea central with normal respiratory effort  Lungs:      clear bilaterally to auscultation.    Heart:       regular rate and rhythm, S1, S2 without murmurs, rubs, or gallops      Assessment plan    Recurrent symptomatic SVT and cannot exclude accessory pathway cannot exclude AV alvino atrial tachycardia possible atrial flutter--rhythm strips and EKGs reviewed  Schedule patient for FRAN and EP study with ablation and risks and benefits and outcomes educated and orders placed  Recent labs reviewed  Recent cardiac catheterization films reviewed  Recent echocardiography showed EF of 50% which was reviewed   recurrent stroke and FRAN to  be done before ablation to exclude any shunt  Diabetes  Hypertension  Hyperlipidemia  Discussed with Dr. Brown      Electronically signed by Nathen Olmstead MD, 01/08/21, 4:22 PM EST.        Electronically signed by Nathen Olmstead MD at 01/08/21 7250             Nathen Olmstead MD at 01/08/21 1515        CC--recurrent symptomatic SVT, coronary artery disease and prior history of stroke    Sub--55-year-old male patient was recently admitted to hospital because of recurrent arrhythmia and was found to have SVT and patient being referred to our office because of recurrent symptoms of SVT despite beta-blockers and calcium channel blockers.  Chronic medical problems include coronary artery disease with prior history of PCI, history of cardioembolic stroke x3  He also has history of hyperlipidemia and diabetes  Denies any active angina      Physical Exam    General:      well developed, well nourished, in no acute distress.    Head:      normocephalic and atraumatic.    Eyes:      PERRL/EOM intact, conjunctiva and sclera clear with out nystagmus.    Neck:      no masses, thyromegaly, trachea central with normal respiratory effort  Lungs:      clear bilaterally to auscultation.    Heart:       regular rate and rhythm, S1, S2 without murmurs, rubs, or gallops      Assessment plan    Recurrent symptomatic SVT and cannot exclude accessory pathway cannot exclude AV alvino atrial tachycardia possible atrial flutter--rhythm strips and EKGs reviewed  Schedule patient for FRAN and EP study with ablation and risks and benefits and outcomes educated and orders placed  Recent labs reviewed  Recent cardiac catheterization films reviewed  Recent echocardiography showed EF of 50% which was reviewed   recurrent stroke and FRAN to be done before ablation to exclude any shunt  Diabetes  Hypertension  Hyperlipidemia  Discussed with Dr. Brown      Electronically signed by Nathen Olmstead MD, 01/08/21, 4:22 PM  EST.        Electronically signed by Nathen Olmstead MD at 21 1622          Discharge Summary      Holly Rubio APRN at 21 [phone]                        Cardiology Short Stay Note      Patient Care Team:  Celine Garcia MD as PCP - General (Internal Medicine)  Moris Pedro MD as Consulting Physician (Cardiology)    CHIEF COMPLAINT:     PATIENT IDENTIFICATION  Name: Preet Jones  Age: 55 y.o.  Sex: male  :  1965  MRN: [id number]             HISTORY OF PRESENT ILLNESS:   55-year-old male patient was recently admitted to hospital because of recurrent arrhythmia and was found to have SVT.  He was referred to our office because of recurrent symptoms of SVT despite beta-blockers and calcium channel blockers.  Chronic medical problems include coronary artery disease with prior history of PCI, history of cardioembolic stroke x3, hyperlipidemia and diabetes    ADMISSION  DIAGNOSIS  Recurrent, symptomatic SVT      Past Medical History:   Diagnosis Date   • Abnormal cardiovascular stress test 2017   • Acute myocardial infarction (CMS/HCC)    • Aortic aneurysm (CMS/HCC) 2017   • Bicuspid aortic valve 2017   • Coronary artery disease 10/29/2019   • History of coronary angioplasty with insertion of stent    • Hyperlipidemia    • Hypertension    • Obesity 2011   • Primary hyperparathyroidism (CMS/HCC) 2011   • RUE weakness 10/29/2019    Previous residual from Stroke, but was not work prohibiting.   • Sinus arrhythmia    • Stroke (CMS/[facility])    • Type 2 diabetes mellitus (CMS/HCC) 10/29/2019     Past Surgical History:   Procedure Laterality Date   • APPENDECTOMY     • BACK SURGERY     • CARDIAC CATHETERIZATION     • CARDIAC CATHETERIZATION     • CARDIAC CATHETERIZATION N/A 2020    Procedure: Cardiac Catheterization/Vascular Study;  Surgeon: Moris Pedro MD;  Location: [facility] CATH INVASIVE LOCATION;  Service: Cardiovascular;   "Laterality: N/A;   • JOINT REPLACEMENT     • KNEE ARTHROSCOPY       Family History   Problem Relation Age of Onset   • Heart disease Mother    • Parkinsonism Father    • Cancer Father      Social History     Tobacco Use   • Smoking status: Never Smoker   • Smokeless tobacco: Never Used   Substance Use Topics   • Alcohol use: No     Frequency: Never   • Drug use: No     Medications Prior to Admission   Medication Sig Dispense Refill Last Dose   • amitriptyline (ELAVIL) 25 MG tablet Take 25 mg by mouth every night at bedtime.   1/19/2021 at Unknown time   • atorvastatin (LIPITOR) 80 MG tablet Take 1 tablet by mouth Every Night. 30 tablet 0 1/19/2021 at Unknown time   • Cholecalciferol (VITAMIN D-3) 25 MCG (1000 UT) capsule Take 3 capsules by mouth Daily.   1/19/2021 at Unknown time   • clopidogrel (PLAVIX) 75 MG tablet Take 1 tablet by mouth Daily. 30 tablet 6 1/19/2021 at Unknown time   • insulin detemir (LEVEMIR) 100 UNIT/ML injection Inject 18 Units under the skin into the appropriate area as directed 2 (Two) Times a Day. He adjusts it up 2 units if blood sugar is above 280.   1/19/2021 at Unknown time   • lisinopril (PRINIVIL,ZESTRIL) 40 MG tablet Take 40 mg by mouth Daily.   1/19/2021 at Unknown time   • metFORMIN (Glucophage) 500 MG tablet Take 1 tablet by mouth 2 (Two) Times a Day With Meals. 60 tablet 0 1/18/2021 at Unknown time   • metoprolol succinate XL (TOPROL-XL) 50 MG 24 hr tablet Take 50 mg by mouth Daily.   1/19/2021 at Unknown time     Allergies:  Morphine    Immunization History   Administered Date(s) Administered   • Flulaval/Fluarix/Fluzone Quad 11/06/2020           REVIEW OF SYSTEMS:  Pertinent items are noted in HPI, all other systems reviewed and negative    Vital Signs  Temp:  [97.2 °F (36.2 °C)-97.9 °F (36.6 °C)] 97.7 °F (36.5 °C)  Heart Rate:  [65-89] 82  Resp:  [10-18] 15  BP: (102-158)/(61-99) 107/68    Flowsheet Rows      First Filed Value   Admission Height  176.5 cm (69.5\") Documented at " 01/20/2021 1113   Admission Weight  90.9 kg (200 lb 6.4 oz) Documented at 01/20/2021 1113           Physical Exam:    General: Alert, cooperative, no distress, appears stated age  Head:  Normocephalic, atraumatic, mucous membranes moist  Eyes:  Conjunctiva/corneas clear, EOM's intact     Neck:  Supple,  no adenopathy;      Lungs: Clear to auscultation bilaterally, no wheezes rhonchi rales are noted  Chest wall: No tenderness  Heart::  Regular rate and rhythm, S1 and S2 normal, no murmur, rub or gallop  Abdomen: Soft, non-tender, nondistended bowel sounds active  Extremities: No cyanosis, clubbing, or edema groin soft no hematoma.  Pulses: 2+ and symmetric all extremities  Skin:  No rashes or lesions  Neuro/psych: A&O x3. CN II through XII are grossly intact with appropriate affect      Results Review:      I reviewed the patient's new clinical results.    CBC      Cr Clearance Estimated Creatinine Clearance: 110.4 mL/min (by C-G formula based on SCr of 0.84 mg/dL).  [facility]     HbA1C   Lab Results   Component Value Date    HGBA1C 11.7 (H) 12/30/2020    HGBA1C 11.4 (H) 11/04/2020    HGBA1C 10.6 (H) 10/30/2019     Blood Glucose   Glucose   Date/Time Value Ref Range Status   01/21/2021 0749 232 (H) 70 - 105 mg/dL Final     Comment:     Serial Number: [id number]:  [id number]   01/20/2021 2128 312 (H) 70 - 105 mg/dL Final     Comment:     Serial Number: [id number]:  [id number]   01/20/2021 1711 193 (H) 70 - 105 mg/dL Final     Comment:     Serial Number: [id number]:  [id number]   01/20/2021 1323 292 (H) 70 - 105 mg/dL Final     Comment:     Serial Number: [id number]:  [id number]   01/20/2021 1118 331 (H) 70 - 105 mg/dL Final     Comment:     Serial Number: [id number]:  [id number]     Infection     CMP     ABG      UA      TAMMY  No results found for: POCMETH, POCAMPHET, POCBARBITUR, POCBENZO, POCCOCAINE, POCOPIATES, POCOXYCODO, POCPHENCYC, POCPROPOXY, POCTHC, POCTRICYC  [facility]      Radiology(recent) No radiology results for the last day            Assessment/Plan     Palpitations    Paroxysmal SVT (supraventricular tachycardia) (CMS/HCC)    Atrial flutter with rapid ventricular response (CMS/HCC)            I discussed the patients findings and my recommendations with patient and attending nurse.     DISCHARGE DIAGNOSIS    Palpitations    Paroxysmal SVT (supraventricular tachycardia) (CMS/HCC)    Atrial flutter with rapid ventricular response (CMS/HCC)        Hospital Course  Patient is a 55 y.o. male presented with recurrent, symptomatic supraventricular tachycardia.  He presented for SVT ablation per Dr. Olmstead on 1/20/2021.  Patient also underwent transesophageal echocardiogram that showed borderline concentric LVH, EF 41-45%, RA mildly dilated with centrally directed jet noted.  Normal RVSP.  No evidence of left atrial shunting or clot.  EP study and ablation was then performed.  Please see full procedure note for additional details.  Patient was observed overnight in the OP CV.  No events noted.  Patient has maintained normal sinus rhythm and is stable for discharge home today.      Past Medical History:     Past Medical History:   Diagnosis Date   • Abnormal cardiovascular stress test 1/19/2017   • Acute myocardial infarction (CMS/HCC) 2019   • Aortic aneurysm (CMS/HCC) 6/22/2017   • Bicuspid aortic valve 2/16/2017   • Coronary artery disease 10/29/2019   • History of coronary angioplasty with insertion of stent    • Hyperlipidemia    • Hypertension    • Obesity 9/2/2011   • Primary hyperparathyroidism (CMS/HCC) 9/2/2011   • RUE weakness 10/29/2019    Previous residual from Stroke, but was not work prohibiting.   • Sinus arrhythmia    • Stroke (CMS/HCC)    • Type 2 diabetes mellitus (CMS/[facility]) 10/29/2019       Past Surgical History:     Past Surgical History:   Procedure Laterality Date   • APPENDECTOMY     • BACK SURGERY     • CARDIAC CATHETERIZATION  2010   • CARDIAC  CATHETERIZATION  2019   • CARDIAC CATHETERIZATION N/A 12/31/2020    Procedure: Cardiac Catheterization/Vascular Study;  Surgeon: Moris Pedro MD;  Location: [facility] INVASIVE LOCATION;  Service: Cardiovascular;  Laterality: N/A;   • JOINT REPLACEMENT     • KNEE ARTHROSCOPY         Social History:   Social History     Socioeconomic History   • Marital status:      Spouse name: Not on file   • Number of children: Not on file   • Years of education: Not on file   • Highest education level: Not on file   Tobacco Use   • Smoking status: Never Smoker   • Smokeless tobacco: Never Used   Substance and Sexual Activity   • Alcohol use: No     Frequency: Never   • Drug use: No   • Sexual activity: Yes     Partners: Female       Procedures Performed    Procedure(s):  EP/Ablation  -------------------   Transesophageal echocardiogram    Consults:   Consults     Date and Time Order Name Status Description    12/30/2020 1143 Hospitalist (on-call MD unless specified) Completed     12/30/2020 1043 Cardiology (on-call MD unless specified) Completed           Condition on Discharge: Stable    Discharge Disposition Home      Discharge Medications     Discharge Medications      ASK your doctor about these medications      Instructions Start Date   amitriptyline 25 MG tablet  Commonly known as: ELAVIL   25 mg, Oral, Every Night at Bedtime      atorvastatin 80 MG tablet  Commonly known as: LIPITOR   80 mg, Oral, Nightly      clopidogrel 75 MG tablet  Commonly known as: PLAVIX   75 mg, Oral, Daily      insulin detemir 100 UNIT/ML injection  Commonly known as: LEVEMIR  Ask about: Which instructions should I use?   18 Units, Subcutaneous, 2 Times Daily, He adjusts it up 2 units if blood sugar is above 280.       lisinopril 40 MG tablet  Commonly known as: PRINIVIL,ZESTRIL   40 mg, Oral, Daily      metFORMIN 500 MG tablet  Commonly known as: Glucophage   500 mg, Oral, 2 Times Daily With Meals      metoprolol succinate  "XL 50 MG 24 hr tablet  Commonly known as: TOPROL-XL   50 mg, Oral, Daily      Vitamin D-3 25 MCG (1000 UT) capsule   3 capsules, Oral, Daily             Discharge Diet: Regular    Activity at Discharge: No lifting greater than 10 pounds x 1 week    Follow-up Appointments  No future appointments.  [unfilled]    Test Results Pending at Discharge  [unfilled]     Risk for Readmission (LACE) Score: 6 (1/21/2021  6:00 AM)          EDWINA Pina  01/21/21  07:59 EST    Time spent for discharging patient and education: 15      EMR Dragon/Transcription:   \"Dictated utilizing Dragon dictation\".     Disclaimer: Please note that areas of this note were completed with computer voice recognition software.  Quite often unanticipated grammatical, syntax, homophones, and other interpretive errors are inadvertently transcribed by the computer software. Please excuse any errors that have escaped final proofreading    Electronically signed by EDWINA Pina, 01/21/21, 7:59 AM EST.      Electronically signed by Holly Rubio APRN at 01/21/21 0804       "

## 2021-01-21 NOTE — NURSING NOTE
Called pts wife and updated pt has been discharged.  Updated on new medication, Eliquis.  Also, updated pt and wife that co-pay card for Eliquis and free 30 trial card have been activated.  Educated pt not to lift for the next two weeks per MD.  Questions anwered and support provided.

## 2021-01-23 ENCOUNTER — NURSE TRIAGE (OUTPATIENT)
Dept: CALL CENTER | Facility: HOSPITAL | Age: 56
End: 2021-01-23

## 2021-01-24 NOTE — TELEPHONE ENCOUNTER
States FRAN and ablation on Wednesday. States he was discharged Thursday morning. States yesterday and today he has been coughing and when he takes a deep breath it hurts. States dry cough.     Denies site where went in for ablation tender to touch but no hematoma. States he does not have any palpitations, etc.     States he thinks cough is d/t FRAN. States voice is deeper and throat is irritated. Discussed warm shower - states this helped some earlier. Discussed plain mucinex OTC okay to take but do not take DM or with anything else in it. Discussed s/s that require evaluation in ER. Denies chest pain. Denies pain on palpation, shortness of breath, etc.     Reason for Disposition  • ALSO, mild central chest pain occurs only when coughing    Additional Information  • Negative: Severe difficulty breathing (e.g., struggling for each breath, speaks in single words)  • Negative: Bluish (or gray) lips or face now  • Negative: [1] Rapid onset of cough AND [2] has hives  • Negative: Coughing started suddenly after medicine, an allergic food or bee sting  • Negative: [1] Difficulty breathing AND [2] exposure to flames, smoke, or fumes  • Negative: [1] Stridor AND [2] difficulty breathing  • Negative: Sounds like a life-threatening emergency to the triager  • Negative: Choked on object of food that could be caught in the throat  • Negative: Chest pain is main symptom  • Negative: [1] Previous asthma attacks AND [2] this feels like asthma attack  • Negative: Cough lasts > 3 weeks  • Negative: Wet (productive) cough (i.e., white-yellow, yellow, green, or samuel colored sputum)  • Negative: [1] Dry (non-productive) cough AND [2] within 14 days of COVID-19 Exposure  • Negative: Chest pain  (Exception: MILD central chest pain, present only when coughing)  • Negative: Difficulty breathing  • Negative: Patient sounds very sick or weak to the triager  • Negative: [1] Coughed up blood AND [2] > 1 tablespoon (15 ml) (Exception:  "blood-tinged sputum)  • Negative: Fever > 103 F (39.4 C)  • Negative: [1] Fever > 101 F (38.3 C) AND [2] age > 60  • Negative: [1] Fever > 100.0 F (37.8 C) AND [2] bedridden (e.g., nursing home patient, CVA, chronic illness, recovering from surgery)  • Negative: [1] Fever > 100.0 F (37.8 C) AND [2] diabetes mellitus or weak immune system (e.g., HIV positive, cancer chemo, splenectomy, organ transplant, chronic steroids)  • Negative: Wheezing is present  • Negative: [1] Ankle swelling AND [2] swelling is increasing  • Negative: SEVERE coughing spells (e.g., whooping sound after coughing, vomiting after coughing)  • Negative: [1] Continuous (nonstop) coughing interferes with work or school AND [2] no improvement using cough treatment per protocol  • Negative: Fever present > 3 days (72 hours)  • Negative: [1] Fever returns after gone for over 24 hours AND [2] symptoms worse or not improved  • Negative: [1] Using nasal washes and pain medicine > 24 hours AND [2] sinus pain (around cheekbone or eye) persists  • Negative: Earache is present  • Negative: Cough has been present for > 3 weeks  • Negative: [1] Nasal discharge AND [2] present > 10 days  • Negative: [1] Coughed up blood-tinged sputum AND [2] more than once  • Negative: [1] Patient also has allergy symptoms (e.g., itchy eyes, clear nasal discharge, postnasal drip) AND [2] they are acting up  • Negative: Taking an ACE Inhibitor medication  (e.g., benazepril/LOTENSIN, captopril/CAPOTEN, enalapril/VASOTEC, lisinopril/ZESTRIL)  • Negative: Exposure to TB (Tuberculosis)  • Negative: Cough  • Negative: Cough with cold symptoms (e.g., runny nose, postnasal drip, throat clearing)    Answer Assessment - Initial Assessment Questions  1. ONSET: \"When did the cough begin?\"       See note  2. SEVERITY: \"How bad is the cough today?\"       n/a  3. RESPIRATORY DISTRESS: \"Describe your breathing.\"       n/a  4. FEVER: \"Do you have a fever?\" If so, ask: \"What is your " "temperature, how was it measured, and when did it start?\"      n/a  5. HEMOPTYSIS: \"Are you coughing up any blood?\" If so ask: \"How much?\" (flecks, streaks, tablespoons, etc.)      n/a  6. TREATMENT: \"What have you done so far to treat the cough?\" (e.g., meds, fluids, humidifier)      n/a  7. CARDIAC HISTORY: \"Do you have any history of heart disease?\" (e.g., heart attack, congestive heart failure)       n/a  8. LUNG HISTORY: \"Do you have any history of lung disease?\"  (e.g., pulmonary embolus, asthma, emphysema)      n/a  9. PE RISK FACTORS: \"Do you have a history of blood clots?\" (or: recent major surgery, recent prolonged travel, bedridden)      n/a  10. OTHER SYMPTOMS: \"Do you have any other symptoms? (e.g., runny nose, wheezing, chest pain)        n/a  11. PREGNANCY: \"Is there any chance you are pregnant?\" \"When was your last menstrual period?\"        n/a  12. TRAVEL: \"Have you traveled out of the country in the last month?\" (e.g., travel history, exposures)        N/a    Protocols used: COUGH - ACUTE NON-PRODUCTIVE-ADULT-AH      "

## 2021-01-26 NOTE — ANESTHESIA PREPROCEDURE EVALUATION
Anesthesia Evaluation     Patient summary reviewed and Nursing notes reviewed   NPO Solid Status: > 8 hours  NPO Liquid Status: > 8 hours           Airway   Mallampati: II  TM distance: >3 FB  Neck ROM: full  No difficulty expected  Dental - normal exam     Pulmonary - normal exam   Cardiovascular - normal exam    (+) hypertension, valvular problems/murmurs, past MI , CAD, dysrhythmias Atrial Fib, angina, hyperlipidemia,       Neuro/Psych  (+) CVA,     GI/Hepatic/Renal/Endo    (+) morbid obesity,  diabetes mellitus,     Musculoskeletal     Abdominal  - normal exam    Bowel sounds: normal.   Substance History      OB/GYN          Other                        Anesthesia Plan    ASA 4     MAC     intravenous induction     Anesthetic plan, all risks, benefits, and alternatives have been provided, discussed and informed consent has been obtained with: patient.

## 2021-01-26 NOTE — ADDENDUM NOTE
Addendum  created 01/26/21 1137 by Americo Borjas MD    Clinical Note Signed, Review and Sign - Ready for Procedure

## 2021-01-29 LAB
Lab: 30
TOAL ENROLLMENT DAYS: 30

## 2021-01-29 PROCEDURE — 93272 ECG/REVIEW INTERPRET ONLY: CPT | Performed by: INTERNAL MEDICINE

## 2021-02-04 ENCOUNTER — TELEPHONE (OUTPATIENT)
Dept: CARDIOLOGY | Facility: CLINIC | Age: 56
End: 2021-02-04

## 2021-02-04 ENCOUNTER — OFFICE VISIT (OUTPATIENT)
Dept: CARDIOLOGY | Facility: CLINIC | Age: 56
End: 2021-02-04

## 2021-02-04 VITALS
HEIGHT: 70 IN | SYSTOLIC BLOOD PRESSURE: 165 MMHG | WEIGHT: 199 LBS | HEART RATE: 88 BPM | BODY MASS INDEX: 28.49 KG/M2 | OXYGEN SATURATION: 95 % | DIASTOLIC BLOOD PRESSURE: 94 MMHG

## 2021-02-04 DIAGNOSIS — I63.512 ARTERIAL ISCHEMIC STROKE, MCA (MIDDLE CEREBRAL ARTERY), LEFT, ACUTE (HCC): Primary | Chronic | ICD-10-CM

## 2021-02-04 DIAGNOSIS — I48.92 ATRIAL FLUTTER WITH RAPID VENTRICULAR RESPONSE (HCC): ICD-10-CM

## 2021-02-04 DIAGNOSIS — E78.2 MIXED HYPERLIPIDEMIA: Chronic | ICD-10-CM

## 2021-02-04 DIAGNOSIS — I10 ESSENTIAL HYPERTENSION: Chronic | ICD-10-CM

## 2021-02-04 DIAGNOSIS — I25.119 CORONARY ARTERY DISEASE INVOLVING NATIVE CORONARY ARTERY OF NATIVE HEART WITH ANGINA PECTORIS (HCC): Chronic | ICD-10-CM

## 2021-02-04 DIAGNOSIS — E11.65 TYPE 2 DIABETES MELLITUS WITH HYPERGLYCEMIA, UNSPECIFIED WHETHER LONG TERM INSULIN USE (HCC): Chronic | ICD-10-CM

## 2021-02-04 PROCEDURE — 99214 OFFICE O/P EST MOD 30 MIN: CPT | Performed by: INTERNAL MEDICINE

## 2021-02-04 RX ORDER — CLOPIDOGREL BISULFATE 75 MG/1
75 TABLET ORAL DAILY
Qty: 90 TABLET | Refills: 1 | Status: SHIPPED | OUTPATIENT
Start: 2021-02-04 | End: 2022-02-07

## 2021-02-04 RX ORDER — ATORVASTATIN CALCIUM 80 MG/1
80 TABLET, FILM COATED ORAL NIGHTLY
Qty: 90 TABLET | Refills: 1 | Status: SHIPPED | OUTPATIENT
Start: 2021-02-04

## 2021-02-04 RX ORDER — AMLODIPINE BESYLATE 10 MG/1
TABLET ORAL
COMMUNITY
Start: 2021-01-22 | End: 2021-02-04 | Stop reason: ALTCHOICE

## 2021-02-04 RX ORDER — METOPROLOL SUCCINATE 50 MG/1
50 TABLET, EXTENDED RELEASE ORAL DAILY
Qty: 90 TABLET | Refills: 1 | Status: ON HOLD | OUTPATIENT
Start: 2021-02-04 | End: 2022-02-24

## 2021-02-04 NOTE — TELEPHONE ENCOUNTER
Paperwork received yesterday. Faxed to Gisele/ Dr. Brown in [facility] today as the patient is scheduled there this afternoon.     Restriction Form

## 2021-02-04 NOTE — PROGRESS NOTES
Subjective:     Encounter Date:02/04/2021      Patient ID: Preet Jones is a 55 y.o. male.    Chief Complaint: 4 week follow-up from Cardiac Cath & several other tests  History of Present Illness     55-year-old white male patient with known history of hypertension history of CAD Comes back for follow-up   patient was diagnosed with CVA in 2019  Patient had another CVA recently admitted to the hospital and was given TPA      Patient recentlyRestartedOn Plavix and also statins      Echocardiogram November 2020 EF around 50% questionable apical hypokinesis       Patient underwent stress Myoview 2017 which showed lateral wall ischemia  Patient underwent cardiac catheterization which showed severe 99% disease of the medium caliber marginal branch and underwent PCI January 2017    Repeat cardiac catheter December 2020  Left main 0%The diagonal artery stent was open LAD itself has up to 50% disease circumflex artery mid circumflex artery up to 50% stenosis RCA nondominant artery  Patient had a event monitor which showed PSVT paroxysmal atrial flutter so he underwent ablation recently  Patient started anticoagulation  His blood pressure at times elevated previously advised him to start hydrochlorothiazide  Advised him to monitor blood pressure closely  Follow-up in 3 months with EKG and labs  Cardiac wise patient is very stable okay to restart work without any restrictions  The following portions of the patient's history were reviewed and updated as appropriate: Allergies current medications past family history past medical history past social history past surgical history problem list and review of systems  Past Medical History:   Diagnosis Date   • Abnormal cardiovascular stress test 1/19/2017   • Acute myocardial infarction (CMS/[facility]) 2019   • Aortic aneurysm (CMS/HCC) 6/22/2017   • Bicuspid aortic valve 2/16/2017   • Coronary artery disease 10/29/2019   • History of coronary angioplasty with insertion of stent  "   • Hyperlipidemia    • Hypertension    • Obesity 9/2/2011   • Primary hyperparathyroidism (CMS/HCC) 9/2/2011   • RUE weakness 10/29/2019    Previous residual from Stroke, but was not work prohibiting.   • Sinus arrhythmia    • Stroke (CMS/HCC)    • Type 2 diabetes mellitus (CMS/HCC) 10/29/2019     Past Surgical History:   Procedure Laterality Date   • APPENDECTOMY     • BACK SURGERY     • CARDIAC CATHETERIZATION  2010   • CARDIAC CATHETERIZATION  2019   • CARDIAC CATHETERIZATION N/A 12/31/2020    Procedure: Cardiac Catheterization/Vascular Study;  Surgeon: Moris Pedro MD;  Location:  [facility];  Service: Cardiovascular;  Laterality: N/A;   • CARDIAC ELECTROPHYSIOLOGY PROCEDURE N/A 1/20/2021    Procedure: EP/Ablation;  Surgeon: Nathen Olmstead MD;  Location:  [facility];  Service: Cardiovascular;  Laterality: N/A;   • JOINT REPLACEMENT     • KNEE ARTHROSCOPY       /94 (BP Location: Left arm, Patient Position: Sitting, Cuff Size: Adult)   Pulse 88   Ht 176.5 cm (69.5\")   Wt 90.3 kg (199 lb)   SpO2 95%   BMI 28.97 kg/m²   Family History   Problem Relation Age of Onset   • Heart disease Mother    • Parkinsonism Father    • Cancer Father        Current Outpatient Medications:   •  apixaban (ELIQUIS) 5 MG tablet tablet, Take 1 tablet by mouth Every 12 (Twelve) Hours. Indications: Atrial Fibrillation, Disp: 60 tablet, Rfl: 5  •  atorvastatin (LIPITOR) 80 MG tablet, Take 1 tablet by mouth Every Night., Disp: 90 tablet, Rfl: 1  •  Cholecalciferol (VITAMIN D-3) 25 MCG (1000 UT) capsule, Take 3 capsules by mouth Daily., Disp: , Rfl:   •  clopidogrel (PLAVIX) 75 MG tablet, Take 1 tablet by mouth Daily., Disp: 90 tablet, Rfl: 1  •  insulin detemir (LEVEMIR) 100 UNIT/ML injection, Inject 18 Units under the skin into the appropriate area as directed 2 (Two) Times a Day. He adjusts it up 2 units if blood sugar is above 280., Disp: , Rfl:   •  lisinopril " (PRINIVIL,ZESTRIL) 40 MG tablet, Take 40 mg by mouth Daily., Disp: , Rfl:   •  metFORMIN (Glucophage) 500 MG tablet, Take 1 tablet by mouth 2 (Two) Times a Day With Meals., Disp: 60 tablet, Rfl: 0  •  metoprolol succinate XL (TOPROL-XL) 50 MG 24 hr tablet, Take 1 tablet by mouth Daily., Disp: 90 tablet, Rfl: 1  Social History     Socioeconomic History   • Marital status:      Spouse name: Not on file   • Number of children: Not on file   • Years of education: Not on file   • Highest education level: Not on file   Tobacco Use   • Smoking status: Never Smoker   • Smokeless tobacco: Never Used   Substance and Sexual Activity   • Alcohol use: No     Frequency: Never   • Drug use: No   • Sexual activity: Yes     Partners: Female     Allergies   Allergen Reactions   • Morphine GI Intolerance     Review of Systems   Constitution: Negative for fever and malaise/fatigue.   HENT: Negative for congestion and hearing loss.    Eyes: Negative for double vision and visual disturbance.   Cardiovascular: Negative for chest pain, claudication, dyspnea on exertion, leg swelling and syncope.   Respiratory: Negative for cough and shortness of breath.    Endocrine: Negative for cold intolerance.   Skin: Negative for color change and rash.   Musculoskeletal: Negative for arthritis and joint pain.   Gastrointestinal: Negative for abdominal pain and heartburn.   Genitourinary: Negative for hematuria.   Neurological: Negative for excessive daytime sleepiness and dizziness.   Psychiatric/Behavioral: Negative for depression. The patient is not nervous/anxious.    All other systems reviewed and are negative.             Objective:     Physical Exam  Blood pressure elevated otherwise alert not in any acute distress neck no JVP elevation lungs clear heart sounds S1-S2 regular extremities no edema bilateral pulses present equal  Procedures    Lab Review:       Assessment:          Diagnosis Plan   1. Arterial ischemic stroke, MCA (middle  cerebral artery), left, acute (CMS/HCC)     2. Coronary artery disease involving native coronary artery of native heart with angina pectoris (CMS/[facility])     3. Mixed hyperlipidemia     4. Essential hypertension     5. Type 2 diabetes mellitus with hyperglycemia, unspecified whether long term insulin use (CMS/[facility])     6. Atrial flutter with rapid ventricular response (CMS/HCC)            Plan:       MDM  Number of Diagnoses or Management Options  Arterial ischemic stroke, MCA (middle cerebral artery), left, acute (CMS/HCC): established, improving  Atrial flutter with rapid ventricular response (CMS/HCC): established, improving  Coronary artery disease involving native coronary artery of native heart with angina pectoris (CMS/HCC): established, improving  Essential hypertension: established, worsening  Mixed hyperlipidemia: established, improving  Type 2 diabetes mellitus with hyperglycemia, unspecified whether long term insulin use (CMS/[facility]): established, improving     Amount and/or Complexity of Data Reviewed  Clinical lab tests: reviewed and ordered    Risk of Complications, Morbidity, and/or Mortality  Presenting problems: moderate  Management options: moderate    Patient Progress  Patient progress: stable  Blood pressure is uncontrolled discussed with the patient regarding restarting hydrochlorothiazide follow-up in 3 months with EKG and labs

## 2021-02-08 ENCOUNTER — OFFICE VISIT (OUTPATIENT)
Dept: CARDIOLOGY | Facility: CLINIC | Age: 56
End: 2021-02-08

## 2021-02-08 VITALS
OXYGEN SATURATION: 97 % | HEIGHT: 69 IN | SYSTOLIC BLOOD PRESSURE: 128 MMHG | WEIGHT: 203 LBS | DIASTOLIC BLOOD PRESSURE: 86 MMHG | RESPIRATION RATE: 18 BRPM | BODY MASS INDEX: 30.07 KG/M2 | HEART RATE: 88 BPM

## 2021-02-08 DIAGNOSIS — Z98.890 STATUS POST ABLATION OF ATRIAL FIBRILLATION: ICD-10-CM

## 2021-02-08 DIAGNOSIS — I48.0 PAROXYSMAL ATRIAL FIBRILLATION (HCC): ICD-10-CM

## 2021-02-08 DIAGNOSIS — I48.92 ATRIAL FLUTTER WITH RAPID VENTRICULAR RESPONSE (HCC): Primary | ICD-10-CM

## 2021-02-08 DIAGNOSIS — E78.2 MIXED HYPERLIPIDEMIA: ICD-10-CM

## 2021-02-08 DIAGNOSIS — Z86.79 STATUS POST ABLATION OF ATRIAL FIBRILLATION: ICD-10-CM

## 2021-02-08 DIAGNOSIS — I25.119 CORONARY ARTERY DISEASE INVOLVING NATIVE CORONARY ARTERY OF NATIVE HEART WITH ANGINA PECTORIS (HCC): ICD-10-CM

## 2021-02-08 DIAGNOSIS — I10 ESSENTIAL HYPERTENSION: ICD-10-CM

## 2021-02-08 PROCEDURE — 99213 OFFICE O/P EST LOW 20 MIN: CPT | Performed by: INTERNAL MEDICINE

## 2021-02-08 PROCEDURE — 93000 ELECTROCARDIOGRAM COMPLETE: CPT | Performed by: INTERNAL MEDICINE

## 2021-02-08 NOTE — PROGRESS NOTES
CC--recurrent symptomatic SVT, coronary artery disease and prior history of stroke    Sub--55-year-old male patient was recently admitted to hospital because of recurrent arrhythmia and was found to have SVT and patient underwent EP study and ablation of AF and flutter and comes for follow up--doing remarkably well  Chronic medical problems include coronary artery disease with prior history of PCI, history of cardioembolic stroke x3  He also has history of hyperlipidemia and diabetes  Denies any active angina      Physical Exam    General:      well developed, well nourished, in no acute distress.    Head:      normocephalic and atraumatic.    Eyes:      PERRL/EOM intact, conjunctiva and sclera clear with out nystagmus.    Neck:      no masses, thyromegaly, trachea central with normal respiratory effort  Lungs:      clear bilaterally to auscultation.    Heart:       regular rate and rhythm, S1, S2 without murmurs, rubs, or gallops      Assessment plan    Recurrent symptomatic SVT//AF/FLUTTER--POST CRYO AND FLUTTER ABLATION--DOING VERY WELL   recurrent stroke and FRAN without  shunt  Diabetes  Hypertension  Hyperlipidemia  Follow up in 3 months   meds reviewed.        ECG 12 Lead    Date/Time: 2/8/2021 2:04 PM  Performed by: Nathen Olmstead MD  Authorized by: Nathen Olmstead MD   Comparison: compared with previous ECG   Similar to previous ECG  Rhythm: sinus rhythm  Ectopy: atrial premature contractions  Rate: normal  Conduction: conduction normal  QRS axis: left  Other findings: non-specific ST-T wave changes and left ventricular hypertrophy          Electronically signed by Nathen Olmstead MD, 02/08/21, 2:03 PM EST.

## 2021-11-23 NOTE — TELEPHONE ENCOUNTER
[facility]  History and Physical     Bev Ramey Patient Status:  Emergency    3/9/1969 MRN [id number]   Location [address] [facility] Attending Bi Bautista MD   Hosp Day # 0 PCP No primary care provider on file.      Lisa Patient called into [facility] office very upset per Claudette. I called patient he was totally fine and did not seem upset at all. Patient and I went over all the recent events he's had under his most recent hospital stay and we made him an appt this upcoming Friday. Patient was very thankful and verbalized understanding of all instructions.    organomegaly. Neurologic: No focal neurological deficits. Musculoskeletal: Moves all extremities. Extremities: No edema or cyanosis. Integument: No rashes or lesions. Psychiatric: Appropriate mood and affect.       Diagnostic Data:      Labs:  Recent

## 2022-01-19 ENCOUNTER — OFFICE VISIT (OUTPATIENT)
Dept: CARDIOLOGY | Facility: CLINIC | Age: 57
End: 2022-01-19

## 2022-01-19 VITALS
OXYGEN SATURATION: 93 % | HEIGHT: 69 IN | WEIGHT: 188.2 LBS | SYSTOLIC BLOOD PRESSURE: 139 MMHG | HEART RATE: 87 BPM | BODY MASS INDEX: 27.88 KG/M2 | DIASTOLIC BLOOD PRESSURE: 80 MMHG

## 2022-01-19 DIAGNOSIS — I48.92 ATRIAL FLUTTER WITH RAPID VENTRICULAR RESPONSE: ICD-10-CM

## 2022-01-19 DIAGNOSIS — R00.2 PALPITATIONS: Primary | ICD-10-CM

## 2022-01-19 DIAGNOSIS — Z86.73 HISTORY OF CEREBROVASCULAR ACCIDENT: Chronic | ICD-10-CM

## 2022-01-19 DIAGNOSIS — I25.119 CORONARY ARTERY DISEASE INVOLVING NATIVE CORONARY ARTERY OF NATIVE HEART WITH ANGINA PECTORIS: Chronic | ICD-10-CM

## 2022-01-19 PROBLEM — Q23.1 BICUSPID AORTIC VALVE: Status: RESOLVED | Noted: 2017-02-16 | Resolved: 2022-01-19

## 2022-01-19 PROCEDURE — 99214 OFFICE O/P EST MOD 30 MIN: CPT | Performed by: INTERNAL MEDICINE

## 2022-01-19 NOTE — PROGRESS NOTES
HP      Name: Preet Jones ADMIT: (Not on file)   : 1965  PCP: Celine Garcia MD    MRN: [id number] LOS: 0 days   AGE/SEX: 56 y.o. male  ROOM: Room/bed info not found     Chief Complaint   Patient presents with   • Atrial Flutter     1 yr f/u       Subjective         History of present illness  Preet Jones is a 56-year-old male patient with history of coronary artery disease status post PCI to the marginal branch on 2017, also history of atrial fibrillation status post cryoablation and CTI ablation on 2021, also hypertension, dyslipidemia diabetes and history of stroke, is here for follow-up.  Patient denies having any chest pain however here lately he has been having palpitations and is concerned about recurrence of atrial fibrillation.  No lower extremity edema no syncopal episodes.  Previously had been on Eliquis but that was discontinued due to side effects.    Past Medical History:   Diagnosis Date   • Abnormal cardiovascular stress test 2017   • Acute myocardial infarction (HCC)    • Aortic aneurysm ([facility]) 2017   • Bicuspid aortic valve 2017   • Coronary artery disease 10/29/2019   • History of coronary angioplasty with insertion of stent    • Hyperlipidemia    • Hypertension    • Obesity 2011   • Primary hyperparathyroidism ([facility]) 2011   • RUE weakness 10/29/2019    Previous residual from Stroke, but was not work prohibiting.   • Sinus arrhythmia    • Stroke ([facility])    • Type 2 diabetes mellitus ([facility]) 10/29/2019     Past Surgical History:   Procedure Laterality Date   • APPENDECTOMY     • BACK SURGERY     • CARDIAC CATHETERIZATION     • CARDIAC CATHETERIZATION     • CARDIAC CATHETERIZATION N/A 2020    Procedure: Cardiac Catheterization/Vascular Study;  Surgeon: Moris Pedro MD;  Location: [facility] CATH INVASIVE LOCATION;  Service: Cardiovascular;  Laterality: N/A;   • CARDIAC ELECTROPHYSIOLOGY PROCEDURE N/A 2021     Procedure: EP/Ablation;  Surgeon: Nathen Olmstead MD;  Location: [facility] INVASIVE LOC[facility];  Service: Cardiovascular;  Laterality: N/A;   • JOINT REPLACEMENT     • KNEE ARTHROSCOPY       Family History   Problem Relation Age of Onset   • Heart disease Mother    • Parkinsonism Father    • Cancer Father      Social History     Tobacco Use   • Smoking status: Never Smoker   • Smokeless tobacco: Never Used   Vaping Use   • Vaping Use: Never used   Substance Use Topics   • Alcohol use: No   • Drug use: No     (Not in a hospital admission)    Allergies:  Morphine    Review of systems    Constitutional: Negative.    Respiratory and cardiovascular: As detailed in HPI section.  Gastrointestinal: Negative for constipation, nausea and vomiting negative for abdominal distention, abdominal pain and diarrhea.   Genitourinary: Negative for difficulty urinating and flank pain.   Musculoskeletal: Negative for arthralgias, joint swelling and myalgias.   Skin: Negative for color change, rash and wound.   Neurological: Negative for dizziness, syncope, weakness and headaches.   Hematological: Negative for adenopathy.   Psychiatric/Behavioral: Negative for confusion.   All other systems reviewed and are negative.    Physical Exam  VITALS REVIEWED    General:      well developed, in no acute distress.    Head:      normocephalic and atraumatic.    Eyes:      PERRL/EOM intact, conjunctiva and sclera clear with out nystagmus.    Neck:      no masses, thyromegaly,  trachea central with normal respiratory effort and PMI displaced laterally  Lungs:      Clear to auscultation bilaterally  Heart:       Irregular rhythm  Msk:      no deformity or scoliosis noted of thoracic or lumbar spine.    Pulses:      pulses normal in all 4 extremities.    Extremities:       No lower extremity edema  Neurologic:      no focal deficits.   alert oriented x3  Skin:      intact without lesions or rashes.    Psych:      alert and cooperative; normal mood  and affect; normal attention span and concentration.      Result Review :                Pertinent cardiac workup    1. EKG 1/19/2022 sinus rhythm with PACs  2. EKG 1/17/2022 sinus rhythm with PACs  3.   Stress test on 12/31/2020 inferolateral fixed defect, ejection fraction 43%.    Procedures        Assessment and Plan      Preet Jones is a 56-year-old male patient who has a history of atrial fibrillation and had ablation on January 2021, he also has a history of coronary artery disease status post PCI to the marginal branch.  Patient is experiencing palpitations however EKG today showed sinus with PACs, I reviewed multiple other EKGs in the chart and they all have been in sinus rhythm.  Previously the patient had been on Eliquis but that was discontinued due to side effects.  Ideally he does need to be on blood thinner due to high [id number] score of 4, given his diabetes previous history of stroke and hypertension.  For now I would like to get an Holter monitor and will see him in the clinic afterwards most likely I will restart anticoagulation whether A. fib is recurring or not but I will discuss this with him during his next visit.    Diagnoses and all orders for this visit:    1. Palpitations (Primary)  -     Holter Monitor - 72 Hour Up To 15 Days; Future    2. Coronary artery disease involving native coronary artery of native heart with angina pectoris (HCC)    3. History of cerebrovascular accident    4. Atrial flutter with rapid ventricular response (HCC)  Overview:  Added automatically from request for surgery [id number]           No follow-ups on file.  Patient was given instructions and counseling regarding his condition or for health maintenance advice. Please see specific information pulled into the AVS if appropriate.

## 2022-01-31 ENCOUNTER — TELEPHONE (OUTPATIENT)
Dept: CARDIOLOGY | Facility: CLINIC | Age: 57
End: 2022-01-31

## 2022-01-31 ENCOUNTER — APPOINTMENT (OUTPATIENT)
Dept: GENERAL RADIOLOGY | Facility: HOSPITAL | Age: 57
End: 2022-01-31

## 2022-01-31 ENCOUNTER — APPOINTMENT (OUTPATIENT)
Dept: CT IMAGING | Facility: HOSPITAL | Age: 57
End: 2022-01-31

## 2022-01-31 ENCOUNTER — HOSPITAL ENCOUNTER (OUTPATIENT)
Facility: HOSPITAL | Age: 57
Discharge: HOME | End: 2022-02-02
Attending: EMERGENCY MEDICINE | Admitting: HOSPITALIST

## 2022-01-31 DIAGNOSIS — I25.119 CORONARY ARTERY DISEASE INVOLVING NATIVE CORONARY ARTERY OF NATIVE HEART WITH ANGINA PECTORIS: ICD-10-CM

## 2022-01-31 DIAGNOSIS — E78.2 MIXED HYPERLIPIDEMIA: ICD-10-CM

## 2022-01-31 DIAGNOSIS — R94.39 ABNORMAL STRESS TEST: ICD-10-CM

## 2022-01-31 DIAGNOSIS — I47.1 PAROXYSMAL SVT (SUPRAVENTRICULAR TACHYCARDIA): ICD-10-CM

## 2022-01-31 DIAGNOSIS — R07.9 CHEST PAIN, UNSPECIFIED TYPE: Primary | ICD-10-CM

## 2022-01-31 DIAGNOSIS — R00.2 PALPITATIONS: ICD-10-CM

## 2022-01-31 DIAGNOSIS — I10 PRIMARY HYPERTENSION: ICD-10-CM

## 2022-01-31 LAB
ANION GAP SERPL CALCULATED.3IONS-SCNC: 12 MMOL/L (ref 5–15)
BASOPHILS # BLD AUTO: 0.1 10*3/MM3 (ref 0–0.2)
BASOPHILS NFR BLD AUTO: 1.5 % (ref 0–1.5)
BUN SERPL-MCNC: 15 MG/DL (ref 6–20)
BUN/CREAT SERPL: 16.5 (ref 7–25)
CALCIUM SPEC-SCNC: 9.7 MG/DL (ref 8.6–10.5)
CHLORIDE SERPL-SCNC: 99 MMOL/L (ref 98–107)
CO2 SERPL-SCNC: 25 MMOL/L (ref 22–29)
CREAT SERPL-MCNC: 0.91 MG/DL (ref 0.76–1.27)
D DIMER PPP FEU-MCNC: 1.08 MG/L (FEU) (ref 0–0.59)
DEPRECATED RDW RBC AUTO: 40.7 FL (ref 37–54)
EOSINOPHIL # BLD AUTO: 0.8 10*3/MM3 (ref 0–0.4)
EOSINOPHIL NFR BLD AUTO: 9.3 % (ref 0.3–6.2)
ERYTHROCYTE [DISTWIDTH] IN BLOOD BY AUTOMATED COUNT: 14.1 % (ref 12.3–15.4)
GFR SERPL CREATININE-BSD FRML MDRD: 86 ML/MIN/1.73
GLUCOSE BLDC GLUCOMTR-MCNC: 226 MG/DL (ref 70–105)
GLUCOSE SERPL-MCNC: 254 MG/DL (ref 65–99)
HCT VFR BLD AUTO: 40.6 % (ref 37.5–51)
HGB BLD-MCNC: 14 G/DL (ref 13–17.7)
LYMPHOCYTES # BLD AUTO: 2 10*3/MM3 (ref 0.7–3.1)
LYMPHOCYTES NFR BLD AUTO: 24.2 % (ref 19.6–45.3)
MCH RBC QN AUTO: 28.3 PG (ref 26.6–33)
MCHC RBC AUTO-ENTMCNC: 34.4 G/DL (ref 31.5–35.7)
MCV RBC AUTO: 82.1 FL (ref 79–97)
MONOCYTES # BLD AUTO: 0.6 10*3/MM3 (ref 0.1–0.9)
MONOCYTES NFR BLD AUTO: 7.6 % (ref 5–12)
NEUTROPHILS NFR BLD AUTO: 4.8 10*3/MM3 (ref 1.7–7)
NEUTROPHILS NFR BLD AUTO: 57.4 % (ref 42.7–76)
NRBC BLD AUTO-RTO: 0.1 /100 WBC (ref 0–0.2)
PLATELET # BLD AUTO: 195 10*3/MM3 (ref 140–450)
PMV BLD AUTO: 9.5 FL (ref 6–12)
POTASSIUM SERPL-SCNC: 4 MMOL/L (ref 3.5–5.2)
RBC # BLD AUTO: 4.95 10*6/MM3 (ref 4.14–5.8)
SARS-COV-2 RNA PNL SPEC NAA+PROBE: NOT DETECTED
SODIUM SERPL-SCNC: 136 MMOL/L (ref 136–145)
TROPONIN T SERPL-MCNC: 0.01 NG/ML (ref 0–0.03)
TROPONIN T SERPL-MCNC: 0.02 NG/ML (ref 0–0.03)
WBC NRBC COR # BLD: 8.3 10*3/MM3 (ref 3.4–10.8)

## 2022-01-31 PROCEDURE — 82962 GLUCOSE BLOOD TEST: CPT

## 2022-01-31 PROCEDURE — 63710000001 INSULIN GLARGINE PER 5 UNITS: Performed by: EMERGENCY MEDICINE

## 2022-01-31 PROCEDURE — 93005 ELECTROCARDIOGRAM TRACING: CPT

## 2022-01-31 PROCEDURE — 25010000002 ENOXAPARIN PER 10 MG: Performed by: NURSE PRACTITIONER

## 2022-01-31 PROCEDURE — 87635 SARS-COV-2 COVID-19 AMP PRB: CPT | Performed by: EMERGENCY MEDICINE

## 2022-01-31 PROCEDURE — G0378 HOSPITAL OBSERVATION PER HR: HCPCS

## 2022-01-31 PROCEDURE — 84484 ASSAY OF TROPONIN QUANT: CPT | Performed by: INTERNAL MEDICINE

## 2022-01-31 PROCEDURE — 96372 THER/PROPH/DIAG INJ SC/IM: CPT

## 2022-01-31 PROCEDURE — 85379 FIBRIN DEGRADATION QUANT: CPT | Performed by: EMERGENCY MEDICINE

## 2022-01-31 PROCEDURE — 85025 COMPLETE CBC W/AUTO DIFF WBC: CPT | Performed by: EMERGENCY MEDICINE

## 2022-01-31 PROCEDURE — 84484 ASSAY OF TROPONIN QUANT: CPT | Performed by: EMERGENCY MEDICINE

## 2022-01-31 PROCEDURE — 0 IOPAMIDOL PER 1 ML: Performed by: EMERGENCY MEDICINE

## 2022-01-31 PROCEDURE — 99214 OFFICE O/P EST MOD 30 MIN: CPT | Performed by: INTERNAL MEDICINE

## 2022-01-31 PROCEDURE — 99284 EMERGENCY DEPT VISIT MOD MDM: CPT

## 2022-01-31 PROCEDURE — 71045 X-RAY EXAM CHEST 1 VIEW: CPT

## 2022-01-31 PROCEDURE — 80048 BASIC METABOLIC PNL TOTAL CA: CPT | Performed by: EMERGENCY MEDICINE

## 2022-01-31 PROCEDURE — C9803 HOPD COVID-19 SPEC COLLECT: HCPCS

## 2022-01-31 PROCEDURE — 71275 CT ANGIOGRAPHY CHEST: CPT

## 2022-01-31 PROCEDURE — 36415 COLL VENOUS BLD VENIPUNCTURE: CPT

## 2022-01-31 RX ORDER — ONDANSETRON 2 MG/ML
4 INJECTION INTRAMUSCULAR; INTRAVENOUS EVERY 6 HOURS PRN
Status: DISCONTINUED | OUTPATIENT
Start: 2022-01-31 | End: 2022-02-02 | Stop reason: HOSPADM

## 2022-01-31 RX ORDER — ACETAMINOPHEN 650 MG/1
650 SUPPOSITORY RECTAL EVERY 4 HOURS PRN
Status: DISCONTINUED | OUTPATIENT
Start: 2022-01-31 | End: 2022-02-02 | Stop reason: HOSPADM

## 2022-01-31 RX ORDER — ASPIRIN 81 MG/1
81 TABLET, CHEWABLE ORAL DAILY
COMMUNITY

## 2022-01-31 RX ORDER — ONDANSETRON 4 MG/1
4 TABLET, FILM COATED ORAL EVERY 6 HOURS PRN
Status: DISCONTINUED | OUTPATIENT
Start: 2022-01-31 | End: 2022-02-02 | Stop reason: HOSPADM

## 2022-01-31 RX ORDER — SODIUM CHLORIDE 0.9 % (FLUSH) 0.9 %
10 SYRINGE (ML) INJECTION EVERY 12 HOURS SCHEDULED
Status: DISCONTINUED | OUTPATIENT
Start: 2022-01-31 | End: 2022-02-02 | Stop reason: HOSPADM

## 2022-01-31 RX ORDER — SODIUM CHLORIDE 0.9 % (FLUSH) 0.9 %
10 SYRINGE (ML) INJECTION AS NEEDED
Status: DISCONTINUED | OUTPATIENT
Start: 2022-01-31 | End: 2022-02-02 | Stop reason: HOSPADM

## 2022-01-31 RX ORDER — ACETAMINOPHEN 160 MG/5ML
650 SOLUTION ORAL EVERY 4 HOURS PRN
Status: DISCONTINUED | OUTPATIENT
Start: 2022-01-31 | End: 2022-02-02 | Stop reason: HOSPADM

## 2022-01-31 RX ORDER — ACETAMINOPHEN 325 MG/1
650 TABLET ORAL EVERY 4 HOURS PRN
Status: DISCONTINUED | OUTPATIENT
Start: 2022-01-31 | End: 2022-02-02 | Stop reason: HOSPADM

## 2022-01-31 RX ADMIN — INSULIN GLARGINE 20 UNITS: 100 INJECTION, SOLUTION SUBCUTANEOUS at 20:49

## 2022-01-31 RX ADMIN — SODIUM CHLORIDE, PRESERVATIVE FREE 10 ML: 5 INJECTION INTRAVENOUS at 20:48

## 2022-01-31 RX ADMIN — ENOXAPARIN SODIUM 40 MG: 40 INJECTION SUBCUTANEOUS at 18:57

## 2022-01-31 RX ADMIN — IOPAMIDOL 100 ML: 755 INJECTION, SOLUTION INTRAVENOUS at 17:03

## 2022-02-01 ENCOUNTER — APPOINTMENT (OUTPATIENT)
Dept: NUCLEAR MEDICINE | Facility: HOSPITAL | Age: 57
End: 2022-02-01

## 2022-02-01 PROBLEM — I25.700 CORONARY ARTERY DISEASE INVOLVING CORONARY BYPASS GRAFT OF NATIVE HEART WITH UNSTABLE ANGINA PECTORIS: Status: ACTIVE | Noted: 2020-12-23

## 2022-02-01 LAB
BH CV REST NUCLEAR ISOTOPE DOSE: 7.9 MCI
BH CV STRESS BP STAGE 2: NORMAL
BH CV STRESS BP STAGE 3: NORMAL
BH CV STRESS DURATION MIN STAGE 1: 3
BH CV STRESS DURATION MIN STAGE 2: 3
BH CV STRESS DURATION MIN STAGE 3: 3
BH CV STRESS DURATION SEC STAGE 1: 0
BH CV STRESS DURATION SEC STAGE 2: 0
BH CV STRESS DURATION SEC STAGE 3: 0
BH CV STRESS GRADE STAGE 1: 10
BH CV STRESS GRADE STAGE 2: 12
BH CV STRESS GRADE STAGE 3: 14
BH CV STRESS HR STAGE 1: 117
BH CV STRESS HR STAGE 2: 132
BH CV STRESS HR STAGE 3: 142
BH CV STRESS METS STAGE 1: 5
BH CV STRESS METS STAGE 2: 7.5
BH CV STRESS METS STAGE 3: 10
BH CV STRESS NUCLEAR ISOTOPE DOSE: 19.9 MCI
BH CV STRESS PROTOCOL 1: NORMAL
BH CV STRESS RECOVERY BP: NORMAL MMHG
BH CV STRESS RECOVERY HR: 84 BPM
BH CV STRESS SPEED STAGE 1: 1.7
BH CV STRESS SPEED STAGE 2: 2.5
BH CV STRESS SPEED STAGE 3: 3.4
BH CV STRESS STAGE 1: 1
BH CV STRESS STAGE 2: 2
BH CV STRESS STAGE 3: 3
GLUCOSE BLDC GLUCOMTR-MCNC: 166 MG/DL (ref 70–105)
GLUCOSE BLDC GLUCOMTR-MCNC: 175 MG/DL (ref 70–105)
GLUCOSE BLDC GLUCOMTR-MCNC: 227 MG/DL (ref 70–105)
HBA1C MFR BLD: 12.2 % (ref 3.5–5.6)
LV EF NUC BP: 47 %
MAXIMAL PREDICTED HEART RATE: 163 BPM
PERCENT MAX PREDICTED HR: 87.12 %
STRESS BASELINE BP: NORMAL MMHG
STRESS BASELINE HR: 84 BPM
STRESS PERCENT HR: 102 %
STRESS POST PEAK BP: NORMAL MMHG
STRESS POST PEAK HR: 142 BPM
STRESS TARGET HR: 139 BPM

## 2022-02-01 PROCEDURE — A9502 TC99M TETROFOSMIN: HCPCS | Performed by: EMERGENCY MEDICINE

## 2022-02-01 PROCEDURE — 82962 GLUCOSE BLOOD TEST: CPT

## 2022-02-01 PROCEDURE — 83036 HEMOGLOBIN GLYCOSYLATED A1C: CPT | Performed by: NURSE PRACTITIONER

## 2022-02-01 PROCEDURE — 78452 HT MUSCLE IMAGE SPECT MULT: CPT

## 2022-02-01 PROCEDURE — 93458 L HRT ARTERY/VENTRICLE ANGIO: CPT | Performed by: INTERNAL MEDICINE

## 2022-02-01 PROCEDURE — 0 IOPAMIDOL PER 1 ML: Performed by: INTERNAL MEDICINE

## 2022-02-01 PROCEDURE — 99152 MOD SED SAME PHYS/QHP 5/>YRS: CPT | Performed by: INTERNAL MEDICINE

## 2022-02-01 PROCEDURE — G0378 HOSPITAL OBSERVATION PER HR: HCPCS

## 2022-02-01 PROCEDURE — 80048 BASIC METABOLIC PNL TOTAL CA: CPT | Performed by: INTERNAL MEDICINE

## 2022-02-01 PROCEDURE — 63710000001 INSULIN GLARGINE PER 5 UNITS: Performed by: INTERNAL MEDICINE

## 2022-02-01 PROCEDURE — 63710000001 INSULIN LISPRO (HUMAN) PER 5 UNITS: Performed by: NURSE PRACTITIONER

## 2022-02-01 PROCEDURE — 99214 OFFICE O/P EST MOD 30 MIN: CPT | Performed by: INTERNAL MEDICINE

## 2022-02-01 PROCEDURE — 25010000002 FENTANYL CITRATE (PF) 100 MCG/2ML SOLUTION: Performed by: INTERNAL MEDICINE

## 2022-02-01 PROCEDURE — 93018 CV STRESS TEST I&R ONLY: CPT | Performed by: INTERNAL MEDICINE

## 2022-02-01 PROCEDURE — 25010000002 MIDAZOLAM PER 1 MG: Performed by: INTERNAL MEDICINE

## 2022-02-01 PROCEDURE — 63710000001 INSULIN GLARGINE PER 5 UNITS: Performed by: EMERGENCY MEDICINE

## 2022-02-01 PROCEDURE — 85027 COMPLETE CBC AUTOMATED: CPT | Performed by: INTERNAL MEDICINE

## 2022-02-01 PROCEDURE — C1894 INTRO/SHEATH, NON-LASER: HCPCS | Performed by: INTERNAL MEDICINE

## 2022-02-01 PROCEDURE — 0 TECHNETIUM TETROFOSMIN KIT: Performed by: EMERGENCY MEDICINE

## 2022-02-01 PROCEDURE — 78452 HT MUSCLE IMAGE SPECT MULT: CPT | Performed by: INTERNAL MEDICINE

## 2022-02-01 PROCEDURE — 99219 PR INITIAL OBSERVATION CARE/DAY 50 MINUTES: CPT | Performed by: NURSE PRACTITIONER

## 2022-02-01 PROCEDURE — C1769 GUIDE WIRE: HCPCS | Performed by: INTERNAL MEDICINE

## 2022-02-01 PROCEDURE — 93017 CV STRESS TEST TRACING ONLY: CPT

## 2022-02-01 RX ORDER — OLANZAPINE 10 MG/2ML
1 INJECTION, POWDER, LYOPHILIZED, FOR SOLUTION INTRAMUSCULAR
Status: DISCONTINUED | OUTPATIENT
Start: 2022-02-01 | End: 2022-02-02 | Stop reason: HOSPADM

## 2022-02-01 RX ORDER — LIDOCAINE HYDROCHLORIDE 20 MG/ML
INJECTION, SOLUTION INFILTRATION; PERINEURAL AS NEEDED
Status: DISCONTINUED | OUTPATIENT
Start: 2022-02-01 | End: 2022-02-01 | Stop reason: HOSPADM

## 2022-02-01 RX ORDER — NICOTINE POLACRILEX 4 MG
15 LOZENGE BUCCAL
Status: DISCONTINUED | OUTPATIENT
Start: 2022-02-01 | End: 2022-02-02 | Stop reason: HOSPADM

## 2022-02-01 RX ORDER — ATORVASTATIN CALCIUM 40 MG/1
80 TABLET, FILM COATED ORAL NIGHTLY
Status: DISCONTINUED | OUTPATIENT
Start: 2022-02-01 | End: 2022-02-02 | Stop reason: HOSPADM

## 2022-02-01 RX ORDER — ASPIRIN 81 MG/1
81 TABLET, CHEWABLE ORAL DAILY
Status: DISCONTINUED | OUTPATIENT
Start: 2022-02-01 | End: 2022-02-02 | Stop reason: HOSPADM

## 2022-02-01 RX ORDER — SODIUM CHLORIDE 9 MG/ML
INJECTION, SOLUTION INTRAVENOUS CONTINUOUS PRN
Status: COMPLETED | OUTPATIENT
Start: 2022-02-01 | End: 2022-02-01

## 2022-02-01 RX ORDER — MIDAZOLAM HYDROCHLORIDE 1 MG/ML
INJECTION INTRAMUSCULAR; INTRAVENOUS AS NEEDED
Status: DISCONTINUED | OUTPATIENT
Start: 2022-02-01 | End: 2022-02-01 | Stop reason: HOSPADM

## 2022-02-01 RX ORDER — METOPROLOL SUCCINATE 50 MG/1
50 TABLET, EXTENDED RELEASE ORAL DAILY
Status: DISCONTINUED | OUTPATIENT
Start: 2022-02-01 | End: 2022-02-02 | Stop reason: HOSPADM

## 2022-02-01 RX ORDER — FENTANYL CITRATE 50 UG/ML
INJECTION, SOLUTION INTRAMUSCULAR; INTRAVENOUS AS NEEDED
Status: DISCONTINUED | OUTPATIENT
Start: 2022-02-01 | End: 2022-02-01 | Stop reason: HOSPADM

## 2022-02-01 RX ORDER — SODIUM CHLORIDE 9 MG/ML
100 INJECTION, SOLUTION INTRAVENOUS CONTINUOUS
Status: DISCONTINUED | OUTPATIENT
Start: 2022-02-01 | End: 2022-02-02

## 2022-02-01 RX ORDER — INSULIN LISPRO 100 [IU]/ML
0-9 INJECTION, SOLUTION INTRAVENOUS; SUBCUTANEOUS
Status: DISCONTINUED | OUTPATIENT
Start: 2022-02-01 | End: 2022-02-02 | Stop reason: HOSPADM

## 2022-02-01 RX ORDER — LISINOPRIL 20 MG/1
40 TABLET ORAL DAILY
Status: DISCONTINUED | OUTPATIENT
Start: 2022-02-01 | End: 2022-02-02 | Stop reason: HOSPADM

## 2022-02-01 RX ORDER — ACETAMINOPHEN 325 MG/1
650 TABLET ORAL EVERY 4 HOURS PRN
Status: DISCONTINUED | OUTPATIENT
Start: 2022-02-01 | End: 2022-02-01 | Stop reason: SDUPTHER

## 2022-02-01 RX ORDER — INSULIN LISPRO 100 [IU]/ML
0-9 INJECTION, SOLUTION INTRAVENOUS; SUBCUTANEOUS AS NEEDED
Status: DISCONTINUED | OUTPATIENT
Start: 2022-02-01 | End: 2022-02-02

## 2022-02-01 RX ORDER — DEXTROSE MONOHYDRATE 25 G/50ML
25 INJECTION, SOLUTION INTRAVENOUS
Status: DISCONTINUED | OUTPATIENT
Start: 2022-02-01 | End: 2022-02-02 | Stop reason: HOSPADM

## 2022-02-01 RX ADMIN — LISINOPRIL 40 MG: 20 TABLET ORAL at 11:09

## 2022-02-01 RX ADMIN — ATORVASTATIN CALCIUM 80 MG: 40 TABLET, FILM COATED ORAL at 21:41

## 2022-02-01 RX ADMIN — INSULIN GLARGINE 20 UNITS: 100 INJECTION, SOLUTION SUBCUTANEOUS at 11:09

## 2022-02-01 RX ADMIN — INSULIN GLARGINE 20 UNITS: 100 INJECTION, SOLUTION SUBCUTANEOUS at 21:42

## 2022-02-01 RX ADMIN — INSULIN LISPRO 2 UNITS: 100 INJECTION, SOLUTION INTRAVENOUS; SUBCUTANEOUS at 11:09

## 2022-02-01 RX ADMIN — ASPIRIN 81 MG: 81 TABLET, CHEWABLE ORAL at 11:09

## 2022-02-01 RX ADMIN — TETROFOSMIN 1 DOSE: 1.38 INJECTION, POWDER, LYOPHILIZED, FOR SOLUTION INTRAVENOUS at 07:31

## 2022-02-01 RX ADMIN — ACETAMINOPHEN 650 MG: 325 TABLET, FILM COATED ORAL at 21:45

## 2022-02-01 RX ADMIN — METOPROLOL SUCCINATE 50 MG: 50 TABLET, EXTENDED RELEASE ORAL at 11:09

## 2022-02-01 RX ADMIN — SODIUM CHLORIDE, PRESERVATIVE FREE 10 ML: 5 INJECTION INTRAVENOUS at 11:12

## 2022-02-01 RX ADMIN — TETROFOSMIN 1 DOSE: 1.38 INJECTION, POWDER, LYOPHILIZED, FOR SOLUTION INTRAVENOUS at 08:43

## 2022-02-01 NOTE — H&P
[facility] Observation Unit H&P    Patient Name: Preet Jones  : 1965  MRN: [id number]  Primary Care Physician: Celine Garcia MD  Date of admission: 2022     Patient Care Team:  Celine Garcia MD as PCP - General (Internal Medicine)  Moris Pedro MD as Consulting Physician (Cardiology)          Subjective   History Present Illness     Chief Complaint:   Chief Complaint   Patient presents with   • Chest Pain     chest pain for few days, worse last night unable to rest.       Mr. Jones is a 56 y.o.  presents to [facility] complaining of chest pain      56-year-old male presents to the ER with a chief complaint of chest pain and palpitations withdizziness but without near syncope or syncope. The patient has had intermittent symptoms over the last several week but worsening today.  The patient also reports increased cough and shortness of breath.  He denies any lower extremity edema.  Patient denies any associated nausea or diaphoresis.  Patient denies recent subjective fever, chills, loss of taste or smell, or headache.  The patient recently saw his cardiologist with placement of event monitor.    Review of records with summary: 2022 patient saw Dr. Montana, history of atrial fibrillation with ablation 2021.  CAD with PCI to the marginal branch.  Had been on Eliquis which has been discontinued secondary to side effects but has a high [id number] score of 4 with history of prior stroke, hypertension, and diabetes.      Review of Systems   Constitutional: Negative for chills, diaphoresis and fever.   Cardiovascular: Positive for chest pain and dyspnea on exertion.   Respiratory: Positive for cough and shortness of breath.    All other systems reviewed and are negative.          Personal History     Past Medical History:   Past Medical History:   Diagnosis Date   • Abnormal cardiovascular stress test 2017   • Acute myocardial infarction (HCC) 2019    • Aortic aneurysm (HCC) 6/22/2017   • Bicuspid aortic valve 2/16/2017   • Coronary artery disease 10/29/2019   • History of coronary angioplasty with insertion of stent    • Hyperlipidemia    • Hypertension    • Obesity 9/2/2011   • Primary hyperparathyroidism (HCC) 9/2/2011   • RUE weakness 10/29/2019    Previous residual from Stroke, but was not work prohibiting.   • Sinus arrhythmia    • Stroke ([facility])    • Type 2 diabetes mellitus ([facility]) 10/29/2019       Surgical History:      Past Surgical History:   Procedure Laterality Date   • APPENDECTOMY     • BACK SURGERY     • CARDIAC CATHETERIZATION  2010   • CARDIAC CATHETERIZATION  2019   • CARDIAC CATHETERIZATION N/A 12/31/2020    Procedure: Cardiac Catheterization/Vascular Study;  Surgeon: Moris Pedro MD;  Location: [facility] CATH INVASIVE LOCATION;  Service: Cardiovascular;  Laterality: N/A;   • CARDIAC ELECTROPHYSIOLOGY PROCEDURE N/A 1/20/2021    Procedure: EP/Ablation;  Surgeon: Nathen Olmstead MD;  Location: [facility] CATH INVASIVE LOCATION;  Service: Cardiovascular;  Laterality: N/A;   • JOINT REPLACEMENT     • KNEE ARTHROSCOPY             Family History: family history includes Cancer in his father; Heart disease in his mother; Parkinsonism in his father. Otherwise pertinent FHx was reviewed and unremarkable.     Social History:  reports that he has never smoked. He has never used smokeless tobacco. He reports that he does not drink alcohol and does not use drugs.      Medications:  Prior to Admission medications    Medication Sig Start Date End Date Taking? Authorizing Provider   aspirin 81 MG chewable tablet Chew 81 mg Daily.   Yes Ramsey Nye MD   Cholecalciferol (VITAMIN D-3) 25 MCG (1000 UT) capsule Take 3 capsules by mouth Daily. 6/22/17  Yes Ramsey Nye MD   insulin detemir (LEVEMIR) 100 UNIT/ML injection Inject 20 Units under the skin into the appropriate area as directed 2 (Two) Times a Day. He adjusts it up 2 units if  blood sugar is above 280.    Yes Provider, MD Ramsey   lisinopril (PRINIVIL,ZESTRIL) 40 MG tablet Take 40 mg by mouth Daily. 12/23/20  Yes Ramsey Nye MD   metFORMIN (GLUCOPHAGE) 1000 MG tablet 2 (Two) Times a Day With Meals. 10/26/21  Yes ProviderRamsey MD   metoprolol succinate XL (TOPROL-XL) 50 MG 24 hr tablet Take 1 tablet by mouth Daily. 2/4/21  Yes Moris Pedro MD   atorvastatin (LIPITOR) 80 MG tablet Take 1 tablet by mouth Every Night.  Patient not taking: Reported on 1/31/2022 2/4/21   Moris Pedro MD   clopidogrel (PLAVIX) 75 MG tablet Take 1 tablet by mouth Daily. 2/4/21   Moris Pedro MD       Allergies:    Allergies   Allergen Reactions   • Morphine GI Intolerance       Objective   Objective     Vital Signs  Temp:  [97.7 °F (36.5 °C)-98.1 °F (36.7 °C)] 98.1 °F (36.7 °C)  Heart Rate:  [78-89] 82  Resp:  [18-20] 18  BP: (122-169)/() 152/83  SpO2:  [95 %-99 %] 96 %  on   ;   Device (Oxygen Therapy): room air  Body mass index is 27.81 kg/m².    Physical Exam  Vitals and nursing note reviewed.   Constitutional:       Appearance: Normal appearance. He is not ill-appearing.   HENT:      Head: Normocephalic and atraumatic.      Right Ear: External ear normal.      Left Ear: External ear normal.      Nose: Nose normal.      Mouth/Throat:      Mouth: Mucous membranes are moist.   Eyes:      General: No scleral icterus.        Right eye: No discharge.         Left eye: No discharge.      Extraocular Movements: Extraocular movements intact.      Conjunctiva/sclera: Conjunctivae normal.      Pupils: Pupils are equal, round, and reactive to light.   Cardiovascular:      Rate and Rhythm: Normal rate and regular rhythm.      Pulses: Normal pulses.      Heart sounds: Normal heart sounds. No murmur heard.      Pulmonary:      Effort: Pulmonary effort is normal.      Breath sounds: Normal breath sounds.   Abdominal:      General: Bowel sounds are  normal.      Palpations: Abdomen is soft.   Musculoskeletal:         General: Normal range of motion.      Cervical back: Normal range of motion and neck supple.      Right lower leg: No edema.      Left lower leg: No edema.   Skin:     General: Skin is warm and dry.      Capillary Refill: Capillary refill takes less than 2 seconds.   Neurological:      General: No focal deficit present.      Mental Status: He is alert and oriented to person, place, and time.   Psychiatric:         Mood and Affect: Mood normal.         Behavior: Behavior normal.         Thought Content: Thought content normal.         Judgment: Judgment normal.           Results Review:  I have personally reviewed most recent cardiac tracings, lab results, microbiology results and radiology images and interpretations and agree with findings.    Results from last 7 days   Lab Units 01/31/22  1459   WBC 10*3/mm3 8.30   HEMOGLOBIN g/dL 14.0   HEMATOCRIT % 40.6   PLATELETS 10*3/mm3 195     Results from last 7 days   Lab Units 01/31/22  2030 01/31/22  1459   SODIUM mmol/L  --  136   POTASSIUM mmol/L  --  4.0   CHLORIDE mmol/L  --  99   CO2 mmol/L  --  25.0   BUN mg/dL  --  15   CREATININE mg/dL  --  0.91   GLUCOSE mg/dL  --  254*   CALCIUM mg/dL  --  9.7   TROPONIN T ng/mL 0.013 0.019     Estimated Creatinine Clearance: 100.1 mL/min (by C-G formula based on SCr of 0.91 mg/dL).  Brief Urine Lab Results     None          Microbiology Results (last 10 days)     Procedure Component Value - Date/Time    COVID-19,CEPHEID/WALTER,COR/IDANIA/PAD/LYDIA IN-HOUSE(OR EMERGENT/ADD-ON),NP SWAB IN TRANSPORT MEDIA 3-4 HR TAT, RT-PCR - Swab, Nasopharynx [[id number]]  (Normal) Collected: 01/31/22 1714    Lab Status: Final result Specimen: Swab from Nasopharynx Updated: 01/31/22 1741     COVID19 Not Detected    Narrative:      Fact sheet for providers: https://www.fda.gov/media/541745/download     Fact sheet for patients: https://www.fda.gov/media/235458/download  Fact sheet for  providers: https://www.fda.gov/media/007310/download    Fact sheet for patients: https://www.fda.gov/media/278422/download    Test performed by PCR.          ECG/EMG Results (most recent)     Procedure Component Value Units Date/Time    ECG 12 Lead [[id number]] Collected: 01/31/22 1245     Updated: 01/31/22 1247     QT Interval 415 ms     Narrative:      HEART RATE= 79  bpm  RR Interval= 772  ms  PA Interval= 211  ms  P Horizontal Axis= -33  deg  P Front Axis= 22  deg  QRSD Interval= 83  ms  QT Interval= 415  ms  QRS Axis= -21  deg  T Wave Axis= 16  deg  - ABNORMAL ECG -  Sinus rhythm  Atrial premature complex  Prolonged PA interval  Electronically Signed By:   Date and Time of Study: 2022-01-31 12:45:58          Results for orders placed during the hospital encounter of 12/30/20    Duplex Venous Upper Extremity - Left CAR    Interpretation Summary  · Normal left upper extremity venous duplex scan.      Results for orders placed during the hospital encounter of 01/20/21    Adult Transesophageal Echo (FRAN) W/ Cont if Necessary Per Protocol    Interpretation Summary  · Left ventricular wall thickness is consistent with concentric hypertrophy.  · Estimated left ventricular EF = 45% Estimated left ventricular EF was in agreement with the calculated left ventricular EF. Left ventricular ejection fraction appears to be 41 - 45%. Left ventricular systolic function is low normal.  · The right atrial cavity is mildly dilated.  · With a centrally-directed jet noted.  · Estimated right ventricular systolic pressure from tricuspid regurgitation is normal (<35 mmHg).    Procedure indication  Recurrent cardioembolic stroke and history of atrial arrhythmias and patient brought in for FRAN prior to atrial arrhythmia ablation      conscious sedation administered by anesthesia    consent obtained before procedure      Procedure note  after obtaining a valid consent patient was sedated by Anesthesia and a FRAN probe was easily placed  into esophagus with multiplane imaging with 2D, color and Doppler followed by bubble study with agitated saline without any complications    FRAN  Findings    Moderate left atrial enlargement without any shunt or clot  LV ejection fraction of 45%  No thrombus  Mild MR and TR and trivial effusion    Plan  Proceed with EP study and ablation      Procedures done  Transesophageal echocardiography        Electronically signed by Nathen Olmstead MD, 01/20/21, 5:08 PM EST.      XR Chest 1 View    Result Date: 1/31/2022  No acute cardiopulmonary process identified.  Electronically Signed By-Misha Antonio MD On:1/31/2022 2:56 PM This report was finalized on [id number] by  Misha Antonio MD.    CT Chest Pulmonary Embolism    Result Date: 1/31/2022  1.No evidence for pulmonary embolism. 2.Findings suggestive of CHF and mild to moderate pulmonary edema. Changes secondary to atypical/viral infection or multifocal pneumonia are felt less likely.  Electronically Signed By-Misha Coronel MD On:1/31/2022 5:11 PM This report was finalized on [id number] by  iMsha Coronel MD.        Estimated Creatinine Clearance: 100.1 mL/min (by C-G formula based on SCr of 0.91 mg/dL).    Assessment/Plan   Assessment/Plan       Active Hospital Problems    Diagnosis  POA   • Chest pain [R07.9]  Yes   • Palpitations [R00.2]  Yes   • Coronary artery disease [I25.10]  Yes      Resolved Hospital Problems   No resolved problems to display.     Chest pain, uncertain etiology--cardiac cause needs to be evaluated: Serial troponin; stress Myoview; cardiology consult  -CT chest PE protocol negative for pulmonary embolism but did show evidence of CHF with less likely viral pneumonia    CAD, chronic: Continue aspirin; continue metoprolol; continue lisinopril  -Patient reports he is no longer on Plavix, prior stent placed January 2021 to the marginal branch    Diabetes type 2, chronic, uncontrolled with elevated blood glucose: Continue Levemir  with formulary substitution Lantus; add sliding scale; hold Metformin; check hemoglobin A1c    Hypertension, chronic: Continue metoprolol; continue lisinopril    HLD, chronic: Continue atorvastatin  -Patient had recent increase of atorvastatin to 80 mg from 40 secondary to elevated cholesterol      VTE Prophylaxis -   Mechanical Order History:     None      Pharmalogical Order History:      Ordered     Dose Route Frequency Stop    01/31/22 1704  enoxaparin (LOVENOX) syringe 40 mg         40 mg SC Every 24 Hours --    01/31/22 1709  enoxaparin (LOVENOX) syringe 40 mg         40 mg SC Once 01/31/22 7346                CODE STATUS:    Code Status and Medical Interventions:   Ordered at: 01/31/22 1704     Code Status (Patient has no pulse and is not breathing):    CPR (Attempt to Resuscitate)     Medical Interventions (Patient has pulse or is breathing):    Full Support       This patient has been examined wearing personal protective equipment.     I discussed the patient's findings and my recommendations with patient.      Signature:Electronically signed by EDWINA Quintero, 02/01/22, 7:53 AM EST.

## 2022-02-01 NOTE — PROGRESS NOTES
[facility] Medicine Services Daily Progress Note    Patient Name: Preet Jones  : 1965  MRN: [id number]  Primary Care Physician:  Celine Garcia MD  Date of admission: 2022      Subjective      Chief Complaint: chest pain    HPI taken from medical chart review and edited:   Mr. Jones is a 56 year old male with PMH of CAD s/p PCI, left MCA CVA 2019, hypertension, hyperlipidemia, diabetes mellitus type 2, atrial flutter not on anticoagulation due to side effects who presented to [facility] 2022 with complaints of chest pain associated with shortness of breath, palpitations, and dizziness.  He reported intermittent symptoms over the last several weeks with increased worsening.  In the ED he had EKG that showed no acute ST changes and had normal troponin level.  He was admitted to the observation unit for further work-up of chest pain.    Since admission the patient had abnormal stress test.  He was seen by Dr. Montana and needs heart catheterization. He was admitted to the hospitalist group. Pt continues to have chest pain.    Left heart cath 2021: No obstructive coronary artery disease previously placed LAD stent was open.  Mild mid anterolateral wall hypokinesis.    Review of Systems   Constitutional: Negative.   HENT: Negative.    Eyes: Negative.    Cardiovascular: Positive for chest pain.   Respiratory: Negative.    Endocrine: Negative.    Hematologic/Lymphatic: Negative.    Skin: Negative.    Musculoskeletal: Negative.    Gastrointestinal: Negative.    Genitourinary: Negative.    Neurological: Negative.    Psychiatric/Behavioral: Negative.    Allergic/Immunologic: Negative.    All other systems reviewed and are negative.         Objective      Vitals:   Temp:  [97.2 °F (36.2 °C)-98.2 °F (36.8 °C)] 98.2 °F (36.8 °C)  Heart Rate:  [79-89] 80  Resp:  [18] 18  BP: (122-169)/() 162/103  Flow (L/min):  [3] 3    Physical Exam  Vitals and  nursing note reviewed.   HENT:      Head: Normocephalic and atraumatic.   Eyes:      Extraocular Movements: Extraocular movements intact.      Pupils: Pupils are equal, round, and reactive to light.   Cardiovascular:      Rate and Rhythm: Normal rate and regular rhythm.      Pulses: Normal pulses.      Heart sounds: Normal heart sounds.   Pulmonary:      Effort: Pulmonary effort is normal.      Breath sounds: Normal breath sounds.   Abdominal:      General: Bowel sounds are normal.      Palpations: Abdomen is soft.      Tenderness: There is no abdominal tenderness.   Musculoskeletal:         General: Normal range of motion.   Skin:     General: Skin is warm and dry.   Neurological:      General: No focal deficit present.      Mental Status: He is alert and oriented to person, place, and time.             Result Review    Result Review:  I have personally reviewed the results from the time of this admission to 2/1/2022 18:05 EST and agree with these findings:  [x]  Laboratory  []  Microbiology  [x]  Radiology  [x]  EKG/Telemetry   []  Cardiology/Vascular   []  Pathology  [x]  Old records        Assessment/Plan      Brief Patient Summary:  Preet Jones is a 56 y.o. male with PMH of CAD s/p PCI, left MCA CVA 2019, hypertension, hyperlipidemia, diabetes mellitus type 2, atrial flutter not on anticoagulation due to side effects who presented to [facility]/31/2022 with complaints of chest pain associated with shortness of breath, palpitations, and dizziness.  He reported intermittent symptoms over the last several weeks with increased worsening.  In the ED he had EKG that showed no acute ST changes and had normal troponin level.  He was admitted to the observation unit for further work-up of chest pain.    Since admission the patient had abnormal stress test.  He was seen by Dr. Montana and needs heart catheterization. He was admitted to the hospitalist group. Pt continues to have chest pain.    Left  heart cath 12/31/2021: No obstructive coronary artery disease previously placed LAD stent was open.  Mild mid anterolateral wall hypokinesis.      [MAR Hold] aspirin, 81 mg, Oral, Daily  [MAR Hold] atorvastatin, 80 mg, Oral, Nightly  [MAR Hold] enoxaparin, 40 mg, Subcutaneous, Q24H  [MAR Hold] insulin glargine, 20 Units, Subcutaneous, Q12H  [MAR Hold] insulin lispro, 0-9 Units, Subcutaneous, TID AC  lisinopril, 40 mg, Oral, Daily  metoprolol succinate XL, 50 mg, Oral, Daily  [MAR Hold] sodium chloride, 10 mL, Intravenous, Q12H       sodium chloride, , Last Rate: 75 mL/hr (02/01/22 3106)         Active Hospital Problems:  Active Hospital Problems    Diagnosis    • Chest pain    • Palpitations    • Abnormal nuclear stress test      Added automatically from request for surgery [id number]     • Paroxysmal SVT (supraventricular tachycardia) (HCC)      Added automatically from request for surgery [id number]     • Coronary artery disease involving native coronary artery of native heart with angina pectoris (HCC)      Added automatically from request for surgery [id number]     • Coronary artery disease    • Hypertension    • Hyperlipidemia    • Type 2 diabetes mellitus (HCC)      Plan:     Chest pain in patient with CAD s/p PCI  Palpitations  -EKG: Normal sinus rhythm rate 79, PAC, prolonged [place] interval.  No acute ST changes  -Troponin negative x2  -Stress test 2/1/2022 abnormal: Moderately severe area of ischemia located in the anterior wall, apex, and lateral wall.  -Cardiology consulted and patient will undergo cardiac catheterization  -On aspirin, Plavix, beta-blocker, statin    History of afib/a flutter with previous ablation  -On metoprolol  -Not on anticoagulation Eliquis due to side effects    Hypertension  Hyperlipidemia  -Uncontrolled Continue home lisinopril, metoprolol.  May need increased    Diabetes mellitus type 2  -Uncontrolled  -Hemoglobin A1c 12.2%  -Patient on 20 units Lantus and sliding scale insulin  -Endocrine  and diabetes educator consulted    H/o Left MCA CVA  -on asa, plavix, statin       DVT prophylaxis:  Medical DVT prophylaxis orders are present.    CODE STATUS:    Code Status (Patient has no pulse and is not breathing): CPR (Attempt to Resuscitate)  Medical Interventions (Patient has pulse or is breathing): Full Support      Disposition:  I expect patient to be discharged in 24-48 hours.    This patient has been examined wearing appropriate Personal Protective Equipment. 02/01/22      Electronically signed by EDWINA Hartmann, 02/01/22, 18:05 EST.  [facility] Hospitalist Team

## 2022-02-01 NOTE — PROGRESS NOTES
[facility] Daily Progress Note        LOS 0 days      Patient Care Team:  Celine Garcia MD as PCP - General (Internal Medicine)  Moris Pedro MD as Consulting Physician (Cardiology)    Patient Location: 105/1      Subjective   Subjective     Chief Complaint / Subjective  Chief Complaint   Patient presents with   • Chest Pain     chest pain for few days, worse last night unable to rest.         Brief Synopsis of Hospital Course/HPI  56-year-old male presents to the ER with a chief complaint of chest pain and palpitations withdizziness but without near syncope or syncope. The patient has had intermittent symptoms over the last several week but worsening today.  The patient also reports increased cough and shortness of breath.  He denies any lower extremity edema.  Patient denies any associated nausea or diaphoresis.  Patient denies recent subjective fever, chills, loss of taste or smell, or headache.  The patient recently saw his cardiologist with placement of event monitor.     Review of records with summary: 1/19/2022 patient saw Dr. Montana, history of atrial fibrillation with ablation January 2021.  CAD with PCI to the marginal branch.  Had been on Eliquis which has been discontinued secondary to side effects but has a high Kigf0CS8-AMRa score of 4 with history of prior stroke, hypertension, and diabetes.         Date:: 2/1/2022: The patient's stress Myoview was abnormal.  The patient will have heart cath likely today by Dr. Richardson.  Patient is aware and agreeable to the plan.  The patient denies any chest pain or palpitations at this time.  Patient hemoglobin A1c was 12.6, will request endocrinology consult.        Review of Systems   All other systems reviewed and are negative.    Physical Exam  Vitals and nursing note reviewed.   Constitutional:       Appearance: Normal appearance. He is not ill-appearing.   HENT:      Head: Normocephalic and atraumatic.      Right Ear:  "External ear normal.      Left Ear: External ear normal.      Nose: Nose normal.      Mouth/Throat:      Mouth: Mucous membranes are moist.   Eyes:      General: No scleral icterus.        Right eye: No discharge.         Left eye: No discharge.      Extraocular Movements: Extraocular movements intact.      Conjunctiva/sclera: Conjunctivae normal.      Pupils: Pupils are equal, round, and reactive to light.   Cardiovascular:      Rate and Rhythm: Normal rate and regular rhythm.      Pulses: Normal pulses.      Heart sounds: Normal heart sounds. No murmur heard.       Pulmonary:      Effort: Pulmonary effort is normal.      Breath sounds: Normal breath sounds.   Abdominal:      General: Bowel sounds are normal.      Palpations: Abdomen is soft.   Musculoskeletal:         General: Normal range of motion.      Cervical back: Normal range of motion and neck supple.      Right lower leg: No edema.      Left lower leg: No edema.   Skin:     General: Skin is warm and dry.      Capillary Refill: Capillary refill takes less than 2 seconds.   Neurological:      General: No focal deficit present.      Mental Status: He is alert and oriented to person, place, and time.   Psychiatric:         Mood and Affect: Mood normal.         Behavior: Behavior normal.         Thought Content: Thought content normal.         Judgment: Judgment normal.        Objective   Objective      Vital Signs  Temp:  [97.2 °F (36.2 °C)-98.2 °F (36.8 °C)] 98.2 °F (36.8 °C)  Heart Rate:  [79-89] 81  Resp:  [18] 18  BP: (122-169)/(75-99) 129/84  Oxygen Therapy  SpO2: 94 %  Device (Oxygen Therapy): room air  Flowsheet Rows      First Filed Value   Admission Height 177.8 cm (70\") Documented at 01/31/2022 1257   Admission Weight 88.1 kg (194 lb 3.6 oz) Documented at 01/31/2022 1257        Intake & Output (last 3 days)       01/29 0701 01/30 0700 01/30 0701 01/31 0700 01/31 0701  02/01 0700 02/01 0701  02/02 0700    P.O.    444    Total Intake(mL/kg)    444 " (5.1)    Net    +444                Lines, Drains & Airways     Active LDAs     Name Placement date Placement time Site Days    Peripheral IV 01/31/22 1413 Right Forearm 01/31/22  1413  Forearm  1                          Procedures:              Results Review:     I reviewed the patient's new clinical results.      Lab Results (last 24 hours)     Procedure Component Value Units Date/Time    POC Glucose Once [[id number]]  (Abnormal) Collected: 02/01/22 1223    Specimen: Blood Updated: 02/01/22 1225     Glucose 227 mg/dL      Comment: Serial Number: [id number]:  [id number]       Hemoglobin A1c [[id number]]  (Abnormal) Collected: 02/01/22 0923    Specimen: Blood Updated: 02/01/22 1021     Hemoglobin A1C 12.2 %     Narrative:      Hemoglobin A1C Reference Range:    <5.7 %        Normal  5.7-6.4 %     Increased risk for diabetes  > 6.4 %        Diabetes       These guidelines have been recommended by the American Diabetic Association for Hgb A1c.      The following 2010 guidelines have been recommended by the American Diabetes Association for Hemoglobin A1c.    HBA1c 5.7-6.4% Increased risk for future diabetes (pre-diabetes)  HBA1c     >6.4% Diabetes      POC Glucose Once [[id number]]  (Abnormal) Collected: 02/01/22 0714    Specimen: Blood Updated: 02/01/22 0716     Glucose 175 mg/dL      Comment: Serial Number: [id number]:  [id number]       Troponin [[id number]]  (Normal) Collected: 01/31/22 2030    Specimen: Blood Updated: 01/31/22 2111     Troponin T 0.013 ng/mL     Narrative:      Troponin T Reference Range:  <= 0.03 ng/mL-   Negative for AMI  >0.03 ng/mL-     Abnormal for myocardial necrosis.  Clinicians would have to utilize clinical acumen, EKG, Troponin and serial changes to determine if it is an Acute Myocardial Infarction or myocardial injury due to an underlying chronic condition.       Results may be falsely decreased if patient taking Biotin.      POC Glucose Once [[id number]]  (Abnormal) Collected:  01/31/22 2044    Specimen: Blood Updated: 01/31/22 2044     Glucose 226 mg/dL      Comment: Serial Number: [id number]:  [id number]       COVID-19,CEPHEID/WALTER,COR/IDANIA/PAD/LYDIA IN-HOUSE(OR EMERGENT/ADD-ON),NP SWAB IN TRANSPORT MEDIA 3-4 HR TAT, RT-PCR - Swab, Nasopharynx [[id number]]  (Normal) Collected: 01/31/22 1714    Specimen: Swab from Nasopharynx Updated: 01/31/22 1741     COVID19 Not Detected    Narrative:      Fact sheet for providers: https://www.fda.gov/media/723006/download     Fact sheet for patients: https://www.fda.gov/media/667772/download  Fact sheet for providers: https://www.fda.gov/media/912641/download    Fact sheet for patients: https://www.fda.gov/media/264929/download    Test performed by PCR.        Hemoglobin A1C   Date Value Ref Range Status   02/01/2022 12.2 (H) 3.5 - 5.6 % Final               No results found for: LIPASE  Lab Results   Component Value Date    CHOL 167 11/04/2020    TRIG 432 (H) 11/04/2020    HDL 26 (L) 11/04/2020    LDL 73 11/04/2020       No results found for: INTRAOP, PREDX, FINALDX, COMDX    Microbiology Results (last 10 days)     Procedure Component Value - Date/Time    COVID-19,CEPHEID/WALTER,COR/IDANIA/PAD/LYDIA IN-HOUSE(OR EMERGENT/ADD-ON),NP SWAB IN TRANSPORT MEDIA 3-4 HR TAT, RT-PCR - Swab, Nasopharynx [[id number]]  (Normal) Collected: 01/31/22 1714    Lab Status: Final result Specimen: Swab from Nasopharynx Updated: 01/31/22 1741     COVID19 Not Detected    Narrative:      Fact sheet for providers: https://www.fda.gov/media/756162/download     Fact sheet for patients: https://www.fda.gov/media/829111/download  Fact sheet for providers: https://www.fda.gov/media/260678/download    Fact sheet for patients: https://www.fda.gov/media/415543/download    Test performed by PCR.          ECG/EMG Results (most recent)     Procedure Component Value Units Date/Time    ECG 12 Lead [[id number]] Collected: 01/31/22 1245     Updated: 01/31/22 1247     QT Interval 415 ms      Narrative:      HEART RATE= 79  bpm  RR Interval= 772  ms  MT Interval= 211  ms  P Horizontal Axis= -33  deg  P Front Axis= 22  deg  QRSD Interval= 83  ms  QT Interval= 415  ms  QRS Axis= -21  deg  T Wave Axis= 16  deg  - ABNORMAL ECG -  Sinus rhythm  Atrial premature complex  Prolonged MT interval  Electronically Signed By:   Date and Time of Study: 2022-01-31 12:45:58    SCANNED - TELEMETRY   [[id number]] Resulted: 01/31/22     Updated: 02/01/22 0904    SCANNED - TELEMETRY   [[id number]] Resulted: 01/31/22     Updated: 02/01/22 1217          Results for orders placed during the hospital encounter of 12/30/20    Duplex Venous Upper Extremity - Left CAR    Interpretation Summary  · Normal left upper extremity venous duplex scan.      Results for orders placed during the hospital encounter of 01/20/21    Adult Transesophageal Echo (FRAN) W/ Cont if Necessary Per Protocol    Interpretation Summary  · Left ventricular wall thickness is consistent with concentric hypertrophy.  · Estimated left ventricular EF = 45% Estimated left ventricular EF was in agreement with the calculated left ventricular EF. Left ventricular ejection fraction appears to be 41 - 45%. Left ventricular systolic function is low normal.  · The right atrial cavity is mildly dilated.  · With a centrally-directed jet noted.  · Estimated right ventricular systolic pressure from tricuspid regurgitation is normal (<35 mmHg).    Procedure indication  Recurrent cardioembolic stroke and history of atrial arrhythmias and patient brought in for FRAN prior to atrial arrhythmia ablation      conscious sedation administered by anesthesia    consent obtained before procedure      Procedure note  after obtaining a valid consent patient was sedated by Anesthesia and a FRAN probe was easily placed into esophagus with multiplane imaging with 2D, color and Doppler followed by bubble study with agitated saline without any complications    FRAN  Findings    Moderate left  atrial enlargement without any shunt or clot  LV ejection fraction of 45%  No thrombus  Mild MR and TR and trivial effusion    Plan  Proceed with EP study and ablation      Procedures done  Transesophageal echocardiography        Electronically signed by Nathen Olmstead MD, 01/20/21, 5:08 PM EST.      XR Chest 1 View    Result Date: 1/31/2022  No acute cardiopulmonary process identified.  Electronically Signed By-Misha Antonio MD On:1/31/2022 2:56 PM This report was finalized on [id number] by  Misha Antonio MD.    CT Chest Pulmonary Embolism    Result Date: 1/31/2022  1.No evidence for pulmonary embolism. 2.Findings suggestive of CHF and mild to moderate pulmonary edema. Changes secondary to atypical/viral infection or multifocal pneumonia are felt less likely.  Electronically Signed By-Misha Coronel MD On:1/31/2022 5:11 PM This report was finalized on [id number] by  Misha Coronel MD.          Xrays, labs reviewed personally by physician.    Medication Review:   I have reviewed the patient's current medication list      Scheduled Meds  aspirin, 81 mg, Oral, Daily  atorvastatin, 80 mg, Oral, Nightly  enoxaparin, 40 mg, Subcutaneous, Q24H  insulin glargine, 20 Units, Subcutaneous, Q12H  insulin lispro, 0-9 Units, Subcutaneous, TID AC  lisinopril, 40 mg, Oral, Daily  metoprolol succinate XL, 50 mg, Oral, Daily  sodium chloride, 10 mL, Intravenous, Q12H        Meds Infusions       Meds PRN  •  acetaminophen **OR** acetaminophen **OR** acetaminophen  •  dextrose  •  dextrose  •  glucagon (human recombinant)  •  insulin lispro **AND** insulin lispro  •  ondansetron **OR** ondansetron  •  sodium chloride        Assessment/Plan   Assessment/Plan     Active Hospital Problems    Diagnosis  POA   • Chest pain [R07.9]  Yes   • Palpitations [R00.2]  Yes   • Coronary artery disease [I25.10]  Yes      Resolved Hospital Problems   No resolved problems to display.       MEDICAL DECISION MAKING COMPLEXITY BY  PROBLEM:     Chest pain, uncertain etiology--cardiac cause needs to be evaluated: cardiology consult  -stress Myoview was abnormal; patient will likely have heart cath today or tomorrow by Dr. Richardson   -MI ruled out by negatives troponin x 2  -CT chest PE protocol negative for pulmonary embolism but did show evidence of CHF with less likely viral pneumonia     CAD, chronic: Continue aspirin; continue metoprolol; continue lisinopril  -Patient reports he is no longer on Plavix, prior stent placed January 2021 to the marginal branch     Diabetes type 2, chronic, uncontrolled with elevated blood glucose: Continue Levemir with formulary substitution Lantus; add sliding scale; hold Metformin; add endocrinology consult  -hemoglobin A1c 12.6     Hypertension, chronic: Continue metoprolol; continue lisinopril     HLD, chronic: Continue atorvastatin  -Patient had recent increase of atorvastatin to 80 mg from 40 secondary to elevated cholesterol       VTE Prophylaxis -   Mechanical Order History:     None      Pharmalogical Order History:      Ordered     Dose Route Frequency Stop    01/31/22 1704  enoxaparin (LOVENOX) syringe 40 mg         40 mg SC Every 24 Hours --    01/31/22 1709  enoxaparin (LOVENOX) syringe 40 mg         40 mg SC Once 01/31/22 1857                  Code Status -   Code Status and Medical Interventions:   Ordered at: 01/31/22 1704     Code Status (Patient has no pulse and is not breathing):    CPR (Attempt to Resuscitate)     Medical Interventions (Patient has pulse or is breathing):    Full Support       This patient has been examined wearing appropriate Personal Protective Equipment. 02/01/22        Discharge Planning  Will request Hospitalist for inpatient admission         Electronically signed by EDWINA Quintero, 02/01/22, 16:49 EST.  [facility] Hospitalist Team

## 2022-02-01 NOTE — PLAN OF CARE
Goal Outcome Evaluation:  Plan of Care Reviewed With: patient           Outcome Summary: No c/o cp this shift.  Myoview results pending.  VSS. Will continue to monitor.

## 2022-02-01 NOTE — PROGRESS NOTES
"    Reason for follow-up: Chest discomfort and palpitations     Patient Care Team:  Celine Garcia MD as PCP - General (Internal Medicine)  Moris Pedro MD as Consulting Physician (Cardiology)    Subjective .   Preet Jones was seen today at the stress lab, he did not have any events overnight.     ROS    Morphine    Scheduled Meds:aspirin, 81 mg, Oral, Daily  atorvastatin, 80 mg, Oral, Nightly  enoxaparin, 40 mg, Subcutaneous, Q24H  insulin glargine, 20 Units, Subcutaneous, Q12H  insulin lispro, 0-9 Units, Subcutaneous, TID AC  lisinopril, 40 mg, Oral, Daily  metoprolol succinate XL, 50 mg, Oral, Daily  sodium chloride, 10 mL, Intravenous, Q12H      Continuous Infusions:   PRN Meds:.•  acetaminophen **OR** acetaminophen **OR** acetaminophen  •  dextrose  •  dextrose  •  glucagon (human recombinant)  •  insulin lispro **AND** insulin lispro  •  ondansetron **OR** ondansetron  •  sodium chloride      VITAL SIGNS  Vitals:    01/31/22 2318 02/01/22 0643 02/01/22 1028 02/01/22 1402   BP: 122/75 152/83 159/99 129/84   BP Location: Right arm Right arm Right arm Right arm   Patient Position: Lying Lying Lying Lying   Pulse: 79 82 85 81   Resp: 18 18 18 18   Temp: 97.7 °F (36.5 °C) 98.1 °F (36.7 °C) 97.2 °F (36.2 °C) 98.2 °F (36.8 °C)   TempSrc: Oral Oral Oral Oral   SpO2: 95% 96% 98% 94%   Weight:       Height:           Flowsheet Rows      First Filed Value   Admission Height 177.8 cm (70\") Documented at 01/31/2022 1257   Admission Weight 88.1 kg (194 lb 3.6 oz) Documented at 01/31/2022 1257             Physical Exam  VITALS REVIEWED    General:      well developed, in no acute distress.    Head:      normocephalic and atraumatic.    Eyes:      PERRL/EOM intact, conjunctiva and sclera clear with out nystagmus.    Neck:      no masses, thyromegaly,  trachea central with normal respiratory effort and PMI displaced laterally  Lungs:      Clear  Heart:       Regular rate and rhythm  Msk:      no " deformity or scoliosis noted of thoracic or lumbar spine.    Pulses:      pulses normal in all 4 extremities.    Extremities:       No lower extremity edema  Neurologic:      no focal deficits.   alert oriented x3  Skin:      intact without lesions or rashes.    Psych:      alert and cooperative; normal mood and affect; normal attention span and concentration.          LAB RESULTS (LAST 7 DAYS)    CBC  Results from last 7 days   Lab Units 01/31/22  1459   WBC 10*3/mm3 8.30   RBC 10*6/mm3 4.95   HEMOGLOBIN g/dL 14.0   HEMATOCRIT % 40.6   MCV fL 82.1   PLATELETS 10*3/mm3 195       BMP  Results from last 7 days   Lab Units 01/31/22  1459   SODIUM mmol/L 136   POTASSIUM mmol/L 4.0   CHLORIDE mmol/L 99   CO2 mmol/L 25.0   BUN mg/dL 15   CREATININE mg/dL 0.91   GLUCOSE mg/dL 254*       CMP   Results from last 7 days   Lab Units 01/31/22  1459   SODIUM mmol/L 136   POTASSIUM mmol/L 4.0   CHLORIDE mmol/L 99   CO2 mmol/L 25.0   BUN mg/dL 15   CREATININE mg/dL 0.91   GLUCOSE mg/dL 254*         BNP        TROPONIN  Results from last 7 days   Lab Units 01/31/22  2030   TROPONIN T ng/mL 0.013       CoAg        Creatinine Clearance  Estimated Creatinine Clearance: 100.1 mL/min (by C-G formula based on SCr of 0.91 mg/dL).    ABG          EKG    I personally reviewed the patient's EKG/Telemetry data: Sinus with PACs      Assessment/Plan       Coronary artery disease    Palpitations    Chest pain      Preet Jones is a 56-year-old male patient with history of coronary artery disease with previous PCI, history of A. fib status post ablation presented to the ER with palpitations and chest discomfort.  He ruled out for myocardial infarction.  His rhythm is sinus with PACs.  He had a stress test today which revealed inferolateral and apical defect which was reversible.  Patient will have a heart catheterization today    I discussed the patients findings and my recommendations with patient and agrees with the outlined plan.    Candido  MD Nan  02/01/22  17:12 EST

## 2022-02-02 ENCOUNTER — APPOINTMENT (OUTPATIENT)
Dept: CARDIOLOGY | Facility: HOSPITAL | Age: 57
End: 2022-02-02

## 2022-02-02 VITALS
HEART RATE: 76 BPM | BODY MASS INDEX: 27.75 KG/M2 | HEIGHT: 70 IN | TEMPERATURE: 97.7 F | OXYGEN SATURATION: 97 % | RESPIRATION RATE: 16 BRPM | WEIGHT: 193.8 LBS | SYSTOLIC BLOOD PRESSURE: 126 MMHG | DIASTOLIC BLOOD PRESSURE: 77 MMHG

## 2022-02-02 LAB
ANION GAP SERPL CALCULATED.3IONS-SCNC: 10 MMOL/L (ref 5–15)
BH CV RIGHT GROIN PSA PROCEDURE SCRIPTING LRR: 1
BH CV XLRA MEAS EXT ILIAC A PSV RIGHT: 71 CM/SEC
BUN SERPL-MCNC: 14 MG/DL (ref 6–20)
BUN/CREAT SERPL: 15.2 (ref 7–25)
CALCIUM SPEC-SCNC: 8.4 MG/DL (ref 8.6–10.5)
CHLORIDE SERPL-SCNC: 103 MMOL/L (ref 98–107)
CO2 SERPL-SCNC: 23 MMOL/L (ref 22–29)
CREAT SERPL-MCNC: 0.92 MG/DL (ref 0.76–1.27)
DEPRECATED RDW RBC AUTO: 42 FL (ref 37–54)
ERYTHROCYTE [DISTWIDTH] IN BLOOD BY AUTOMATED COUNT: 14.4 % (ref 12.3–15.4)
GFR SERPL CREATININE-BSD FRML MDRD: 85 ML/MIN/1.73
GLUCOSE BLDC GLUCOMTR-MCNC: 133 MG/DL (ref 70–105)
GLUCOSE BLDC GLUCOMTR-MCNC: 163 MG/DL (ref 70–105)
GLUCOSE BLDC GLUCOMTR-MCNC: 167 MG/DL (ref 70–105)
GLUCOSE SERPL-MCNC: 190 MG/DL (ref 65–99)
HCT VFR BLD AUTO: 35.2 % (ref 37.5–51)
HGB BLD-MCNC: 12 G/DL (ref 13–17.7)
MAXIMAL PREDICTED HEART RATE: 163 BPM
MCH RBC QN AUTO: 28.3 PG (ref 26.6–33)
MCHC RBC AUTO-ENTMCNC: 34.1 G/DL (ref 31.5–35.7)
MCV RBC AUTO: 83 FL (ref 79–97)
PLATELET # BLD AUTO: 182 10*3/MM3 (ref 140–450)
PMV BLD AUTO: 9.3 FL (ref 6–12)
POTASSIUM SERPL-SCNC: 3.6 MMOL/L (ref 3.5–5.2)
PROX PFA PSV RIGHT: 54 CM/SEC
PROX SFA PSV RIGHT: 66 CM/SEC
QT INTERVAL: 415 MS
RBC # BLD AUTO: 4.24 10*6/MM3 (ref 4.14–5.8)
RIGHT GROIN CFA SYS: 102 CM/SEC
SODIUM SERPL-SCNC: 136 MMOL/L (ref 136–145)
STRESS TARGET HR: 139 BPM
WBC NRBC COR # BLD: 6.7 10*3/MM3 (ref 3.4–10.8)

## 2022-02-02 PROCEDURE — 63710000001 INSULIN GLARGINE PER 5 UNITS: Performed by: INTERNAL MEDICINE

## 2022-02-02 PROCEDURE — 93005 ELECTROCARDIOGRAM TRACING: CPT | Performed by: INTERNAL MEDICINE

## 2022-02-02 PROCEDURE — G0108 DIAB MANAGE TRN  PER INDIV: HCPCS

## 2022-02-02 PROCEDURE — 82962 GLUCOSE BLOOD TEST: CPT

## 2022-02-02 PROCEDURE — 63710000001 INSULIN LISPRO (HUMAN) PER 5 UNITS: Performed by: INTERNAL MEDICINE

## 2022-02-02 PROCEDURE — 99214 OFFICE O/P EST MOD 30 MIN: CPT | Performed by: NURSE PRACTITIONER

## 2022-02-02 PROCEDURE — 99217 PR OBSERVATION CARE DISCHARGE MANAGEMENT: CPT | Performed by: HOSPITALIST

## 2022-02-02 PROCEDURE — 99204 OFFICE O/P NEW MOD 45 MIN: CPT | Performed by: INTERNAL MEDICINE

## 2022-02-02 PROCEDURE — 93010 ELECTROCARDIOGRAM REPORT: CPT | Performed by: INTERNAL MEDICINE

## 2022-02-02 PROCEDURE — 93926 LOWER EXTREMITY STUDY: CPT

## 2022-02-02 PROCEDURE — G0378 HOSPITAL OBSERVATION PER HR: HCPCS

## 2022-02-02 RX ORDER — INSULIN LISPRO 100 [IU]/ML
10 INJECTION, SOLUTION INTRAVENOUS; SUBCUTANEOUS
Status: DISCONTINUED | OUTPATIENT
Start: 2022-02-02 | End: 2022-02-02 | Stop reason: HOSPADM

## 2022-02-02 RX ORDER — INSULIN LISPRO 100 [IU]/ML
10 INJECTION, SOLUTION INTRAVENOUS; SUBCUTANEOUS
Qty: 10 ML | Refills: 2 | Status: SHIPPED | OUTPATIENT
Start: 2022-02-02 | End: 2022-07-18

## 2022-02-02 RX ORDER — ISOSORBIDE MONONITRATE 30 MG/1
30 TABLET, EXTENDED RELEASE ORAL
Qty: 30 TABLET | Refills: 3 | Status: ON HOLD | OUTPATIENT
Start: 2022-02-02 | End: 2022-02-24

## 2022-02-02 RX ORDER — NITROGLYCERIN 0.4 MG/1
0.4 TABLET SUBLINGUAL
Qty: 100 TABLET | Refills: 0 | Status: ON HOLD | OUTPATIENT
Start: 2022-02-02 | End: 2022-03-08

## 2022-02-02 RX ORDER — ISOSORBIDE MONONITRATE 30 MG/1
30 TABLET, EXTENDED RELEASE ORAL
Status: DISCONTINUED | OUTPATIENT
Start: 2022-02-02 | End: 2022-02-02 | Stop reason: HOSPADM

## 2022-02-02 RX ADMIN — INSULIN LISPRO 2 UNITS: 100 INJECTION, SOLUTION INTRAVENOUS; SUBCUTANEOUS at 08:29

## 2022-02-02 RX ADMIN — INSULIN GLARGINE 20 UNITS: 100 INJECTION, SOLUTION SUBCUTANEOUS at 08:29

## 2022-02-02 RX ADMIN — SODIUM CHLORIDE, PRESERVATIVE FREE 10 ML: 5 INJECTION INTRAVENOUS at 08:32

## 2022-02-02 RX ADMIN — ASPIRIN 81 MG: 81 TABLET, CHEWABLE ORAL at 08:30

## 2022-02-02 RX ADMIN — LISINOPRIL 40 MG: 20 TABLET ORAL at 08:30

## 2022-02-02 RX ADMIN — METOPROLOL SUCCINATE 50 MG: 50 TABLET, EXTENDED RELEASE ORAL at 08:30

## 2022-02-02 RX ADMIN — INSULIN LISPRO 2 UNITS: 100 INJECTION, SOLUTION INTRAVENOUS; SUBCUTANEOUS at 15:17

## 2022-02-02 RX ADMIN — ISOSORBIDE MONONITRATE 30 MG: 30 TABLET, EXTENDED RELEASE ORAL at 15:17

## 2022-02-02 RX ADMIN — INSULIN LISPRO 10 UNITS: 100 INJECTION, SOLUTION INTRAVENOUS; SUBCUTANEOUS at 18:44

## 2022-02-02 NOTE — CONSULTS
Inpatient Endocrine Consult  Consultation reviewed with Dr. Richardson for uncontrolled diabetes  Patient Care Team:  Celine Garcia MD as PCP - General (Internal Medicine)  Moris Pedro MD as Consulting Physician (Cardiology)    Chief Complaint: Uncontrolled type 2 diabetes    HPI: This is a 56-year-old male with history of type 2 diabetes for last 3 years, hypertension, hyperlipidemia, CAD, A. fib admitted with chest pain.  Has undergone work-up apparently the Cardiac cath was negative.  He was found to have an A1c of more than 12% and endocrine consultation was requested for further evaluation management.    Patient tells me at home he takes Metformin, Levemir 20 units twice a day and Ozempic which was recently started.  He has not been checking blood sugars at home and has not been following his diet very well.    Past Medical History:   Diagnosis Date   • Abnormal cardiovascular stress test 1/19/2017   • Acute myocardial infarction (HCC) 2019   • Aortic aneurysm ([facility]) 6/22/2017   • Bicuspid aortic valve 2/16/2017   • Coronary artery disease 10/29/2019   • History of coronary angioplasty with insertion of stent    • Hyperlipidemia    • Hypertension    • Obesity 9/2/2011   • Primary hyperparathyroidism ([facility]) 9/2/2011   • RUE weakness 10/29/2019    Previous residual from Stroke, but was not work prohibiting.   • Sinus arrhythmia    • Stroke ([facility])    • Type 2 diabetes mellitus ([facility]) 10/29/2019       Social History     Socioeconomic History   • Marital status:    Tobacco Use   • Smoking status: Never Smoker   • Smokeless tobacco: Never Used   Vaping Use   • Vaping Use: Never used   Substance and Sexual Activity   • Alcohol use: No   • Drug use: No   • Sexual activity: Yes     Partners: Female       Family History   Problem Relation Age of Onset   • Heart disease Mother    • Parkinsonism Father    • Cancer Father        Allergies   Allergen Reactions   • Morphine GI Intolerance        ROS:   Constitutional:  Denies fatigue, tiredness.    Eyes:  Denies change in visual acuity   HENT:  Denies nasal congestion or sore throat   Respiratory: denies cough, shortness of breath.   Cardiovascular:  denies chest pain, edema   GI:  Denies abdominal pain, nausea, vomiting.   :  Denies Polyuria and Polydipsia  Musculoskeletal:  Denies back pain or joint pain   Integument:  Denies dry skin, rash   Neurologic:  Denies headache, focal weakness or sensory changes   Endocrine:  Denies polyuria or polydipsia   Psychiatric:  Denies depression or anxiety      Vitals:    02/02/22 1442   BP: 126/77   Pulse: 76   Resp: 16   Temp: 97.7 °F (36.5 °C)   SpO2: 97%      Body mass index is 27.81 kg/m².     Physical Exam:  GEN: NAD, conversant  EYES: EOMI, PERRL, no conjunctival erythema  NECK: no thyromegaly, full ROM   CV: RRR, no murmurs/rubs/gallops, no peripheral edema  LUNG: CTAB, no wheezes/rales/ronchi  SKIN: no rashes, no acanthosis  MSK: no deformities, full ROM of all extremities  NEURO: no tremors, DTR normal  PSYCH: AOX3, appropriate mood, affect normal      Results Review:     I reviewed the patient's new clinical results.    Lab Results   Component Value Date    GLUCOSE 190 (H) 02/01/2022    BUN 14 02/01/2022    CREATININE 0.92 02/01/2022    EGFRIFNONA 85 02/01/2022    BCR 15.2 02/01/2022    K 3.6 02/01/2022    CO2 23.0 02/01/2022    CALCIUM 8.4 (L) 02/01/2022    ALBUMIN 3.90 01/11/2021    AST 16 01/11/2021    ALT 18 01/11/2021       Lab Results   Component Value Date    HGBA1C 12.2 (H) 02/01/2022    HGBA1C 11.7 (H) 12/30/2020    HGBA1C 11.4 (H) 11/04/2020     Lab Results   Component Value Date    CREATININE 0.92 02/01/2022     Results from last 7 days   Lab Units 02/02/22  1655 02/02/22  1126 02/02/22  0700 02/01/22  2134 02/01/22  1223 02/01/22  0714   GLUCOSE mg/dL 133* 167* 163* 166* 227* 175*       Medication Review: Reviewed.       Current Facility-Administered Medications:   •  acetaminophen  (TYLENOL) tablet 650 mg, 650 mg, Oral, Q4H PRN, 650 mg at 02/01/22 2145 **OR** acetaminophen (TYLENOL) 160 MG/5ML solution 650 mg, 650 mg, Oral, Q4H PRN **OR** acetaminophen (TYLENOL) suppository 650 mg, 650 mg, Rectal, Q4H PRN, Kamlesh Richardson MD  •  aspirin chewable tablet 81 mg, 81 mg, Oral, Daily, Kamlesh Richardson MD, 81 mg at 02/02/22 0830  •  atorvastatin (LIPITOR) tablet 80 mg, 80 mg, Oral, Nightly, Kamlesh Richardson MD, 80 mg at 02/01/22 2141  •  dextrose (D50W) (25 g/50 mL) IV injection 25 g, 25 g, Intravenous, Q15 Min PRN, Kamlesh Richardson MD  •  dextrose (GLUTOSE) oral gel 15 g, 15 g, Oral, Q15 Min PRN, Kamlesh Richardson MD  •  glucagon (human recombinant) (GLUCAGEN DIAGNOSTIC) 1 mg in sterile water (preservative free) 1 mL injection, 1 mg, Subcutaneous, Q15 Min PRN, Kamlesh Richardson MD  •  insulin glargine (LANTUS, SEMGLEE) injection 20 Units, 20 Units, Subcutaneous, Q12H, Kamlesh Richardson MD, 20 Units at 02/02/22 0829  •  insulin lispro (ADMELOG) injection 0-9 Units, 0-9 Units, Subcutaneous, TID AC, 2 Units at 02/02/22 1517 **AND** insulin lispro (ADMELOG) injection 0-9 Units, 0-9 Units, Subcutaneous, PRN, Kamlesh Richardson MD  •  isosorbide mononitrate (IMDUR) 24 hr tablet 30 mg, 30 mg, Oral, Q24H, Yoli Baumann APRN, 30 mg at 02/02/22 1517  •  lisinopril (PRINIVIL,ZESTRIL) tablet 40 mg, 40 mg, Oral, Daily, Kamlesh Richardson MD, 40 mg at 02/02/22 0830  •  metoprolol succinate XL (TOPROL-XL) 24 hr tablet 50 mg, 50 mg, Oral, Daily, Kamlesh Richardson MD, 50 mg at 02/02/22 0830  •  ondansetron (ZOFRAN) tablet 4 mg, 4 mg, Oral, Q6H PRN **OR** ondansetron (ZOFRAN) injection 4 mg, 4 mg, Intravenous, Q6H PRN, Kamlesh Richardson MD  •  sodium chloride 0.9 % flush 10 mL, 10 mL, Intravenous, Q12H, Kamlesh Richardson MD, 10 mL at 02/02/22 0832  •  sodium chloride 0.9 % flush 10 mL, 10 mL, Intravenous, PRN,  Kamlesh Richardson MD    Assessment/Plan   1.  Diabetes mellitus type 2: Uncontrolled with significant hyperglycemia.  At this time I will change Lantus/Levemir to 30 units subcu nightly and Humalog 10 units with each meal.  Will DC Ozempic and continue Metformin for now.  Recommend to follow diet.  He was seen by the dietitian while he was here at the hospital he will be working on his diet.  Recommend to check blood sugars before meals and at bedtime and always keep glucose source in case of low blood sugars.    2.  Hypertension: Well-controlled, continue current medication    3.  Hyperlipidemia: Currently on atorvastatin    4.  CAD/A. fib: Followed by cardiology.    Thank you very much for the consultation.  Okay to discharge from the endocrine perspective.  Recommend to follow-up with primary care physician in next few weeks and will be happy to see him in the next 4 to 6 weeks in the office if needed.             Rosario Moore MD FACE.

## 2022-02-02 NOTE — CONSULTS
Patient does not qualify for Cardiac Rehab due to not recent MI, coronary intervention, OHS, or EF less than 35%.

## 2022-02-02 NOTE — PLAN OF CARE
Goal Outcome Evaluation:  Plan of Care Reviewed With: patient        Progress: improving  Outcome Summary: dc

## 2022-02-02 NOTE — PROGRESS NOTES
[facility]   PROGRESS NOTE    Reason for follow-up: Chest pain, abnormal nuclear stress test      Patient Care Team:  Celine Garcia MD as PCP - General (Internal Medicine)  Moris Pedro MD as Consulting Physician (Cardiology)    Subjective .   Patient seen and examined; chart and labs reviewed.  Discussed with bedside nurse    Patient reports when he stood up he felt a pop with a sensation down his right leg and into his groin.  It is tender but no bruising or hematoma is present.         Review of Systems   Constitutional: Negative for fever and malaise/fatigue.   Cardiovascular: Negative for chest pain, dyspnea on exertion, leg swelling and palpitations.   Respiratory: Negative for shortness of breath.    Musculoskeletal:        Right groin tenderness at cath site    Gastrointestinal: Negative for abdominal pain, nausea and vomiting.   Neurological: Negative for dizziness, light-headedness and weakness.   All other systems reviewed and are negative.      Allergies:  Morphine    Scheduled Meds:aspirin, 81 mg, Oral, Daily  atorvastatin, 80 mg, Oral, Nightly  insulin glargine, 20 Units, Subcutaneous, Q12H  insulin lispro, 0-9 Units, Subcutaneous, TID AC  isosorbide mononitrate, 30 mg, Oral, Q24H  lisinopril, 40 mg, Oral, Daily  metoprolol succinate XL, 50 mg, Oral, Daily  sodium chloride, 10 mL, Intravenous, Q12H      Continuous Infusions:   PRN Meds:.•  acetaminophen **OR** acetaminophen **OR** acetaminophen  •  dextrose  •  dextrose  •  glucagon (human recombinant)  •  insulin lispro **AND** insulin lispro  •  ondansetron **OR** ondansetron  •  sodium chloride    Objective   Sitting in bed, eating lunch no distress noted.     VITAL SIGNS  Vitals:    02/01/22 2302 02/01/22 2310 02/02/22 0643 02/02/22 1013   BP: 109/70 109/70 116/75 150/97   BP Location:  Left arm Right arm Right arm   Patient Position:  Lying Lying Lying   Pulse: 75 75 74 78   Resp:  16 18 16  "  Temp:  98.6 °F (37 °C) 98 °F (36.7 °C) 97.3 °F (36.3 °C)   TempSrc:  Oral Oral Axillary   SpO2:  95% 96% 97%   Weight:       Height:           Flowsheet Rows      First Filed Value   Admission Height 177.8 cm (70\") Documented at 01/31/2022 1257   Admission Weight 88.1 kg (194 lb 3.6 oz) Documented at 01/31/2022 1257           TELEMETRY: sinus rhythm     Physical Exam:  Vitals and nursing note reviewed.   Constitutional:       Appearance: Healthy appearance. Not in distress.   Neck:      Vascular: No JVR. JVD normal.   Pulmonary:      Effort: Pulmonary effort is normal.      Breath sounds: Normal breath sounds. No wheezing. No rhonchi. No rales.   Chest:      Chest wall: Not tender to palpatation.   Cardiovascular:      PMI at left midclavicular line. Normal rate. Regular rhythm. Normal S1. Normal S2.      Murmurs: There is no murmur.      No gallop. No click. No rub.   Pulses:     Intact distal pulses.   Edema:     Peripheral edema absent.   Abdominal:      General: Bowel sounds are normal.      Palpations: Abdomen is soft.      Tenderness: There is no abdominal tenderness.   Musculoskeletal: Normal range of motion.         General: No tenderness.      Comments: Right groin dressing intact.  Soft no bruising or hematoma noted. Tenderness noted into the lower abdomen  Skin:     General: Skin is warm and dry.   Neurological:      General: No focal deficit present.      Mental Status: Alert and oriented to person, place and time.                  LAB RESULTS (LAST 7 DAYS)    CBC  Results from last 7 days   Lab Units 02/01/22 2322 01/31/22  1459   WBC 10*3/mm3 6.70 8.30   RBC 10*6/mm3 4.24 4.95   HEMOGLOBIN g/dL 12.0* 14.0   HEMATOCRIT % 35.2* 40.6   MCV fL 83.0 82.1   PLATELETS 10*3/mm3 182 195       BMP  Results from last 7 days   Lab Units 02/01/22 2322 01/31/22  1459   SODIUM mmol/L 136 136   POTASSIUM mmol/L 3.6 4.0   CHLORIDE mmol/L 103 99   CO2 mmol/L 23.0 25.0   BUN mg/dL 14 15   CREATININE mg/dL 0.92 0.91 "   GLUCOSE mg/dL 190* 254*       CMP   Results from last 7 days   Lab Units 02/01/22  2322 01/31/22  1459   SODIUM mmol/L 136 136   POTASSIUM mmol/L 3.6 4.0   CHLORIDE mmol/L 103 99   CO2 mmol/L 23.0 25.0   BUN mg/dL 14 15   CREATININE mg/dL 0.92 0.91   GLUCOSE mg/dL 190* 254*         BNP        TROPONIN  Results from last 7 days   Lab Units 01/31/22  2030   TROPONIN T ng/mL 0.013       CoAg        Creatinine Clearance  Estimated Creatinine Clearance: 99 mL/min (by C-G formula based on SCr of 0.92 mg/dL).    ABG        Radiology  XR Chest 1 View    Result Date: 1/31/2022  No acute cardiopulmonary process identified.  Electronically Signed By-Misha Antonio MD On:1/31/2022 2:56 PM This report was finalized on [id number] by  Misha Antonio MD.    CT Chest Pulmonary Embolism    Result Date: 1/31/2022  1.No evidence for pulmonary embolism. 2.Findings suggestive of CHF and mild to moderate pulmonary edema. Changes secondary to atypical/viral infection or multifocal pneumonia are felt less likely.  Electronically Signed By-Misha Coronel MD On:1/31/2022 5:11 PM This report was finalized on [id number] by  Misha Coronel MD.            EKG        I personally viewed and interpreted the patient's EKG/Telemetry data:    ECHOCARDIOGRAM:    Results for orders placed during the hospital encounter of 01/20/21    Adult Transesophageal Echo (FRAN) W/ Cont if Necessary Per Protocol    Interpretation Summary  · Left ventricular wall thickness is consistent with concentric hypertrophy.  · Estimated left ventricular EF = 45% Estimated left ventricular EF was in agreement with the calculated left ventricular EF. Left ventricular ejection fraction appears to be 41 - 45%. Left ventricular systolic function is low normal.  · The right atrial cavity is mildly dilated.  · With a centrally-directed jet noted.  · Estimated right ventricular systolic pressure from tricuspid regurgitation is normal (<35 mmHg).    Procedure  indication  Recurrent cardioembolic stroke and history of atrial arrhythmias and patient brought in for FRAN prior to atrial arrhythmia ablation      conscious sedation administered by anesthesia    consent obtained before procedure      Procedure note  after obtaining a valid consent patient was sedated by Anesthesia and a FRAN probe was easily placed into esophagus with multiplane imaging with 2D, color and Doppler followed by bubble study with agitated saline without any complications    FRAN  Findings    Moderate left atrial enlargement without any shunt or clot  LV ejection fraction of 45%  No thrombus  Mild MR and TR and trivial effusion    Plan  Proceed with EP study and ablation      Procedures done  Transesophageal echocardiography        Electronically signed by Nathen Olmstead MD, 01/20/21, 5:08 PM EST.    STRESS MYOVIEW:    CARDIAC CATHETERIZATION:    OTHER:         Assessment/Plan       Coronary artery disease    Hyperlipidemia    Hypertension    Type 2 diabetes mellitus ([facility])    Coronary artery disease involving coronary bypass graft of native heart with unstable angina pectoris ([facility])    Palpitations    Abnormal nuclear stress test    Coronary artery disease involving native coronary artery of native heart with angina pectoris ([facility])    Paroxysmal SVT (supraventricular tachycardia) ([facility])    Chest pain    PLAN:  Patient presented with chest pain on 1/31/2022 and underwent stress myoview 2/1/2022 that was abnormal with medium-sized, moderately severe area of ischemia located in the anterior wall, apex and lateral wall.  He there underwent cardiac catheterization with Dr. Richardson last evening that showed  40 to 50% in-stent narrowing in first marginal.  Mid circumflex right after the marginal has borderline 60% narrowing.  Distal circumflex and left PDA has 80% narrowing which is best suited for medical therapy.    Continue aspirin, statin, metoprolol, lisinopril  Add imdur 30mg daily     On examination  patient states that about 10 minutes prior to my evaluation he stood up, bent over and felt a pop at the cath site with a sensation down his right leg and into his groin.  The groin is soft without bruising or hematoma however extremely tender.      Duplex of the right groin ordered to rule out pseudoaneurysm   If this is negative patient can be discharged home and follow up with Dr. Montana in one month.     Discussed post cath instructions with patient.        I discussed the patients findings and my recommendations with patient and bedside nurse    EDWINA Javier  02/02/22  10:44 EST   Electronically signed by EDWINA Javier, 02/02/22, 12:45 PM EST.

## 2022-02-02 NOTE — PLAN OF CARE
Goal Outcome Evaluation:  Plan of Care Reviewed With: patient        Progress: improving  Outcome Summary: Pt to d/c today after seeing endocrinology.  Cath was clean, will treat medically.  VSS.  Will continue to monitor.

## 2022-02-02 NOTE — PLAN OF CARE
Goal Outcome Evaluation:  Plan of Care Reviewed With: patient        Progress: improving  Outcome Summary: Patient had cardiac cath yesterday evening with plan for medical management. VSS. No complaints of pain or shortness of breath. Right groin sheath site c/d/i. Will monitor.

## 2022-02-02 NOTE — CONSULTS
"Diabetes Education  Assessment/Teaching    Patient Name:  Preet Jones  YOB: 1965  MRN: [id number]  Admit Date:  1/31/2022      Assessment Date:  2/2/2022    Most Recent Value   General Information     Referral From: A1c,  Blood glucose,  MD order  [MD consult for A1c >7.0%, admission blood sugar 254, and on 2/1/2022 A1c was 12.2%.]   Height 177.8 cm (70\")   Height Method Stated   Weight 87.9 kg (193 lb 12.8 oz)   Weight Method Standing scale   Pregnancy Assessment    Diabetes History    What type of diabetes do you have? Type 2   Length of Diabetes Diagnosis 6 - 10 years  [Diagnosed 6-7 years ago.]   Current DM knowledge fair   Have you had diabetes education/teaching in the past? yes   When and where was your diabetes education? Inpatient hospitalization at [facility] on 11/4/2020   Do you test your blood sugar at home? yes   Frequency of checks daily   Meter type One Touch Verio Flex a little over a year old   Who performs the test? patient   Typical readings 150-200 the last 2 weeks   Have you had low blood sugar? (<70mg/dl) no   Have you had high blood sugar? (>140mg/dl) yes   How often do you have high blood sugar? frequently   When was your last high blood sugar? Admission blood sugar 254   Education Preferences    What areas of diabetes would you like to learn about? avoiding high blood sugar,  diet information,  diabetes complications   Nutrition Information    Assessment Topics    Healthy Eating - Assessment Needs education   Problem Solving - Assessment Needs education   Reducing Risk - Assessment Needs education   DM Goals    Healthy Eating - Goal Today   Problem Solving - Goal Today   Reducing Risk - Goal Today            Most Recent Value   DM Education Needs    Meter Has own   Meter Type One Touch   Frequency of Testing Daily   Medication Insulin,  Oral,  Other injectables  [At home patient stated that he takes Levemir 20 units BID, Metformin 1000 mg BID, and just started this past Sunday " on Ozempic .25 mg weekly.]   Problem Solving Hyperglycemia,  Signs,  Symptoms,  Treatment   Reducing Risks A1C testing  [On 2/1/2022 A1c was 12.2%.]   Healthy Eating Basic meal plan provided   Discharge Plan Home   Motivation Engaged,  Strong   Teaching Method Discussion,  Handouts   Patient Response Verbalized understanding            Other Comments:  A1c info sheet given with discussion on A1c target and healthy blood sugar range. Patient stated that his A1c was 13.6% 2 weeks ago and that is when his doctor prescribed Ozempic. Patient stated that in October he got bronchitis and was on steroids and didn't feel good for 2 months. In October patient stated A1c was 9%. Patient stated the doctor told him what foods he shouldn't eat but is unsure of foods he should eat. Handouts given with discussion on 1 serving of carbs being = 15 grams, being allowed 4 servings  of carbs per meal, counting carbs, reading food labels, and meal planning. Also given handout on low carb snacks. Patient has no further questions or concerns related to diabetes at this time.        Electronically signed by:  Halima Goldberg RN  02/02/22 11:51 EST

## 2022-02-02 NOTE — PROGRESS NOTES
[facility] Medicine Services Daily Progress Note    Patient Name: Preet Jones  : 1965  MRN: [id number]  Primary Care Physician:  Celine Garcia MD  Date of admission: 2022      Subjective      Chief Complaint:  Chest pain      Patient Reports     22: s/p LHC yesterday.    Review of Systems   All other systems reviewed and are negative.         Objective      Vitals:   Temp:  [97.2 °F (36.2 °C)-98.6 °F (37 °C)] 97.3 °F (36.3 °C)  Heart Rate:  [73-85] 78  Resp:  [16-18] 16  BP: (109-162)/() 150/97  Flow (L/min):  [3] 3    Physical Exam  HENT:      Head: Normocephalic.      Mouth/Throat:      Mouth: Mucous membranes are moist.   Eyes:      General: No scleral icterus.     Extraocular Movements: Extraocular movements intact.      Pupils: Pupils are equal, round, and reactive to light.   Cardiovascular:      Rate and Rhythm: Normal rate and regular rhythm.   Pulmonary:      Effort: Pulmonary effort is normal.   Abdominal:      General: Bowel sounds are normal.   Genitourinary:     Penis: Normal.    Musculoskeletal:         General: Normal range of motion.      Cervical back: Normal range of motion.   Skin:     General: Skin is warm.   Neurological:      Mental Status: He is alert. Mental status is at baseline.   Psychiatric:         Mood and Affect: Mood normal.             Result Review    Result Review:  I have personally reviewed the results from the time of this admission to 2022 10:16 EST and agree with these findings:  [x]  Laboratory  []  Microbiology  [x]  Radiology  []  EKG/Telemetry   []  Cardiology/Vascular   []  Pathology  [x]  Old records  []  Other:          Assessment/Plan      Brief Patient Summary:    56-year-old male with IDDM and CAD.  Presentation with chest pain s/p C 22.  Hospitalist consult for medical management    aspirin, 81 mg, Oral, Daily  atorvastatin, 80 mg, Oral, Nightly  insulin glargine, 20 Units, Subcutaneous, Q12H  insulin  lispro, 0-9 Units, Subcutaneous, TID AC  lisinopril, 40 mg, Oral, Daily  metoprolol succinate XL, 50 mg, Oral, Daily  sodium chloride, 10 mL, Intravenous, Q12H       sodium chloride, 100 mL/hr         Active Hospital Problems:  Active Hospital Problems    Diagnosis    • Chest pain    • Paroxysmal SVT (supraventricular tachycardia) (HCC)      Added automatically from request for surgery [id number]     • Palpitations    • Abnormal nuclear stress test      Added automatically from request for surgery [id number]     • Coronary artery disease involving native coronary artery of native heart with angina pectoris (HCC)      Added automatically from request for surgery [id number]     • Coronary artery disease involving coronary bypass graft of native heart with unstable angina pectoris (HCC)    • Coronary artery disease    • Hypertension    • Hyperlipidemia    • Type 2 diabetes mellitus (HCC)        Chest pain in patient with CAD s/p PCI  Palpitations  -EKG: Normal sinus rhythm rate 79, PAC, prolonged MO interval.  No acute ST changes  -Troponin negative x2  -Stress test 2/1/22 abnormal: Moderately severe area of ischemia located in the anterior wall, apex, and lateral wall->s/p LHC 2/1/22--> 40 to 50% in-stent narrowing in first marginal.  Mid circumflex right after the marginal has borderline 60% narrowing.  Distal circumflex and left PDA has 80% narrowing.         History of afib/a flutter with previous ablation  -On metoprolol  -Not on anticoagulation Eliquis due to side effects     Hypertension  Hyperlipidemia  -Uncontrolled Continue home lisinopril, metoprolol.  May need increased     Uncontrolled insulin-dependent diabetes mellitus:  -Hemoglobin A1c 12.2%  -Patient on 20 units Lantus and sliding scale insulin  -Endocrine and diabetes educator consulted     H/o Left MCA CVA  -on asa, plavix, statin          DVT prophylaxis:  No DVT prophylaxis order currently exists.    CODE STATUS:    Code Status (Patient has no pulse and is  not breathing): CPR (Attempt to Resuscitate)  Medical Interventions (Patient has pulse or is breathing): Full Support      Disposition:  I expect patient to be discharged home.    This patient has been examined wearing appropriate Personal Protective Equipment and discussed with nursing. 02/02/22      Electronically signed by Jacky Pedroza DO, 02/02/22, 10:16 EST.  [facility] Hospitalist Team

## 2022-02-03 PROBLEM — R07.9 CHEST PAIN: Status: RESOLVED | Noted: 2022-01-31 | Resolved: 2022-02-03

## 2022-02-03 NOTE — DISCHARGE SUMMARY
[facility] Medicine Services  DISCHARGE SUMMARY    Patient Name: Preet Jones  : 1965  MRN: [id number]    Date of Admission: 2022  Date of Discharge:  2022  Primary Care Physician: Celine Garcia MD      Presenting Problem:   Palpitations [R00.2]  Chest pain [R07.9]  Chest pain, unspecified type [R07.9]    Active and Resolved Hospital Problems:  Active Hospital Problems    Diagnosis POA   • Abnormal nuclear stress test [R94.39] Unknown     Priority: High   • Type 2 diabetes mellitus (HCC) [E11.9] Yes     Priority: High   • Paroxysmal SVT (supraventricular tachycardia) (HCC) [I47.1] Unknown   • Palpitations [R00.2] Yes   • Coronary artery disease involving native coronary artery of native heart with angina pectoris (HCC) [I25.119] Unknown   • Coronary artery disease involving coronary bypass graft of native heart with unstable angina pectoris (HCC) [I25.700] Unknown   • Coronary artery disease [I25.10] Yes   • Hypertension [I10] Unknown   • Hyperlipidemia [E78.5] Unknown      Resolved Hospital Problems    Diagnosis POA   • Chest pain [R07.9] Yes     Priority: High         Hospital Course     Hospital Course:    The patient is a pleasant 56-year-old male with history of CAD, left MCA CVA in 2019, hypertension, hyperlipidemia, IDDM and atrial flutter.    The patient came to the emergency room on 2022 complaining of 1 week history of chest pain that was associated with palpitations, shortness of air and dizziness.    The patient was initially placed on observation.  He underwent stress test which was abnormal.  He was evaluated by cardiologist and underwent left heart catheterization on 2021 which showed nonobstructive CAD and medical management was recommended.  Endocrinologist was consulted due to uncontrolled diabetes mellitus with hemoglobin A1C of 12..  The patient was discharged home in the afternoon of 2022.    Problem list addressed during  hospitalization:        Chest pain in patient with CAD s/p PCI/palpitations: Resolved  -Stress test 2/1/22 abnormal: Moderately severe area of ischemia located in the anterior wall, apex, and lateral wall->s/p LHC 2/1/22--> 40 to 50% in-stent narrowing in first marginal.  Mid circumflex right after the marginal has borderline 60% narrowing.  Distal circumflex and left PDA has 80% narrowing.  -Medical management recommended        History of afib/a flutter with previous ablation  -On metoprolol  -Not on anticoagulation Eliquis due to side effects     Hypertension/Hyperlipidemia  -Continue home lisinopril and metoprolol.       Uncontrolled insulin-dependent diabetes mellitus:  -Hemoglobin A1c 12.2%  -Evaluated by endocrinologist and diabetic educator  -Mealtime insulin 3 times daily ordered on discharge.     H/o Left MCA CVA  -on asa, plavix, statin         DISCHARGE Follow Up Recommendations for labs and diagnostics:       Reasons For Change In Medications and Indications for New Medications:      Day of Discharge     Vital Signs:       Physical Exam:  Physical Exam  HENT:      Head: Normocephalic.      Mouth/Throat:      Mouth: Mucous membranes are moist.   Eyes:      Pupils: Pupils are equal, round, and reactive to light.   Cardiovascular:      Rate and Rhythm: Normal rate and regular rhythm.   Pulmonary:      Effort: Pulmonary effort is normal.   Abdominal:      General: Bowel sounds are normal.   Musculoskeletal:         General: Normal range of motion.      Cervical back: Normal range of motion.   Skin:     General: Skin is warm.   Neurological:      Mental Status: He is alert. Mental status is at baseline.   Psychiatric:         Mood and Affect: Mood normal.            Pertinent  and/or Most Recent Results     LAB RESULTS:      Lab 02/01/22  2322 01/31/22  1459   WBC 6.70 8.30   HEMOGLOBIN 12.0* 14.0   HEMATOCRIT 35.2* 40.6   PLATELETS 182 195   NEUTROS ABS  --  4.80   LYMPHS ABS  --  2.00   MONOS ABS  --  0.60    EOS ABS  --  0.80*   MCV 83.0 82.1         Lab 02/01/22  2322 02/01/22  0923 01/31/22  1459   SODIUM 136  --  136   POTASSIUM 3.6  --  4.0   CHLORIDE 103  --  99   CO2 23.0  --  25.0   ANION GAP 10.0  --  12.0   BUN 14  --  15   CREATININE 0.92  --  0.91   GLUCOSE 190*  --  254*   CALCIUM 8.4*  --  9.7   HEMOGLOBIN A1C  --  12.2*  --              Lab 01/31/22 2030 01/31/22  1459   TROPONIN T 0.013 0.019                 Brief Urine Lab Results     None        Microbiology Results (last 10 days)     Procedure Component Value - Date/Time    COVID-19,CEPHEID/WALTER,COR/IDANIA/PAD/LYDIA IN-HOUSE(OR EMERGENT/ADD-ON),NP SWAB IN TRANSPORT MEDIA 3-4 HR TAT, RT-PCR - Swab, Nasopharynx [[id number]]  (Normal) Collected: 01/31/22 1714    Lab Status: Final result Specimen: Swab from Nasopharynx Updated: 01/31/22 1741     COVID19 Not Detected    Narrative:      Fact sheet for providers: https://www.fda.gov/media/859967/download     Fact sheet for patients: https://www.fda.gov/media/930103/download  Fact sheet for providers: https://www.fda.gov/media/074961/download    Fact sheet for patients: https://www.fda.gov/media/583890/download    Test performed by PCR.          XR Chest 1 View    Result Date: 1/31/2022  Impression: No acute cardiopulmonary process identified.  Electronically Signed By-Misha Antonio MD On:1/31/2022 2:56 PM This report was finalized on [id number] by  Misha Antonio MD.    CT Chest Pulmonary Embolism    Result Date: 1/31/2022  Impression: 1.No evidence for pulmonary embolism. 2.Findings suggestive of CHF and mild to moderate pulmonary edema. Changes secondary to atypical/viral infection or multifocal pneumonia are felt less likely.  Electronically Signed By-Misha Coronel MD On:1/31/2022 5:11 PM This report was finalized on [id number] by  Misha Coronel MD.      Results for orders placed during the hospital encounter of 01/31/22    Duplex Groin Pseudoaneurysm unilateral CAR    Interpretation Summary  ·  No evidence of pseudoaneurysm or AV fistula in the right groin.      Results for orders placed during the hospital encounter of 01/31/22    Duplex Groin Pseudoaneurysm unilateral CAR    Interpretation Summary  · No evidence of pseudoaneurysm or AV fistula in the right groin.      Results for orders placed during the hospital encounter of 01/20/21    Adult Transesophageal Echo (FRAN) W/ Cont if Necessary Per Protocol    Interpretation Summary  · Left ventricular wall thickness is consistent with concentric hypertrophy.  · Estimated left ventricular EF = 45% Estimated left ventricular EF was in agreement with the calculated left ventricular EF. Left ventricular ejection fraction appears to be 41 - 45%. Left ventricular systolic function is low normal.  · The right atrial cavity is mildly dilated.  · With a centrally-directed jet noted.  · Estimated right ventricular systolic pressure from tricuspid regurgitation is normal (<35 mmHg).    Procedure indication  Recurrent cardioembolic stroke and history of atrial arrhythmias and patient brought in for FRAN prior to atrial arrhythmia ablation      conscious sedation administered by anesthesia    consent obtained before procedure      Procedure note  after obtaining a valid consent patient was sedated by Anesthesia and a FRAN probe was easily placed into esophagus with multiplane imaging with 2D, color and Doppler followed by bubble study with agitated saline without any complications    FRAN  Findings    Moderate left atrial enlargement without any shunt or clot  LV ejection fraction of 45%  No thrombus  Mild MR and TR and trivial effusion    Plan  Proceed with EP study and ablation      Procedures done  Transesophageal echocardiography        Electronically signed by Nathen Olmstead MD, 01/20/21, 5:08 PM EST.      Labs Pending at Discharge:      Procedures Performed  Procedure(s):  Left Heart Cath         Consults:   Consults     No orders found from 1/2/2022 to  2/1/2022.            Discharge Details        Discharge Medications      New Medications      Instructions Start Date   insulin lispro 100 UNIT/ML injection  Commonly known as: ADMELOG   10 Units, Subcutaneous, 3 Times Daily With Meals      isosorbide mononitrate 30 MG 24 hr tablet  Commonly known as: IMDUR   30 mg, Oral, Every 24 Hours Scheduled      nitroglycerin 0.4 MG SL tablet  Commonly known as: NITROSTAT   0.4 mg, Sublingual, Every 5 Minutes PRN, 1 under the tongue as needed for angina, may repeat q5mins for up three doses         Changes to Medications      Instructions Start Date   insulin detemir 100 UNIT/ML injection  Commonly known as: LEVEMIR  What changed: when to take this   20 Units, Subcutaneous, Daily, He adjusts it up 2 units if blood sugar is above 280.          Continue These Medications      Instructions Start Date   aspirin 81 MG chewable tablet   81 mg, Oral, Daily      atorvastatin 80 MG tablet  Commonly known as: LIPITOR   80 mg, Oral, Nightly      clopidogrel 75 MG tablet  Commonly known as: PLAVIX   75 mg, Oral, Daily      lisinopril 40 MG tablet  Commonly known as: PRINIVIL,ZESTRIL   40 mg, Oral, Daily      metFORMIN 1000 MG tablet  Commonly known as: GLUCOPHAGE   2 Times Daily With Meals      metoprolol succinate XL 50 MG 24 hr tablet  Commonly known as: TOPROL-XL   50 mg, Oral, Daily      Vitamin D-3 25 MCG (1000 UT) capsule   3 capsules, Oral, Daily             Allergies   Allergen Reactions   • Morphine GI Intolerance         Discharge Disposition:   Home or Self Care    Diet:  Hospital:No active diet order        Discharge Activity: As tolerated      Discharge Condition: Stable      CODE STATUS:  Code Status and Medical Interventions:   Ordered at: 01/31/22 1704     Code Status (Patient has no pulse and is not breathing):    CPR (Attempt to Resuscitate)     Medical Interventions (Patient has pulse or is breathing):    Full Support         No future appointments.    Additional  Instructions for the Follow-ups that You Need to Schedule     Discharge Follow-up with PCP   As directed       Currently Documented PCP:    Celine Garcia MD    PCP Phone Number:    [phone]     Follow Up Details: 2 weeks               Time spent on Discharge including face to face service:  15 minutes    This patient has been examined wearing appropriate Personal Protective Equipment and discussed with nursing. 02/03/22      Signature: Electronically signed by Jacky Pedroza DO, 02/03/22, 3:17 PM EST.

## 2022-02-04 LAB — QT INTERVAL: 414 MS

## 2022-02-07 ENCOUNTER — OFFICE VISIT (OUTPATIENT)
Dept: CARDIOLOGY | Facility: CLINIC | Age: 57
End: 2022-02-07

## 2022-02-07 ENCOUNTER — TELEPHONE (OUTPATIENT)
Dept: CARDIOLOGY | Facility: CLINIC | Age: 57
End: 2022-02-07

## 2022-02-07 VITALS
BODY MASS INDEX: 27.63 KG/M2 | TEMPERATURE: 98.3 F | HEIGHT: 70 IN | HEART RATE: 84 BPM | SYSTOLIC BLOOD PRESSURE: 162 MMHG | DIASTOLIC BLOOD PRESSURE: 95 MMHG | OXYGEN SATURATION: 97 % | WEIGHT: 193 LBS

## 2022-02-07 DIAGNOSIS — Z98.61 CAD S/P PERCUTANEOUS CORONARY ANGIOPLASTY: Primary | ICD-10-CM

## 2022-02-07 DIAGNOSIS — E11.65 TYPE 2 DIABETES MELLITUS WITH HYPERGLYCEMIA, WITH LONG-TERM CURRENT USE OF INSULIN: ICD-10-CM

## 2022-02-07 DIAGNOSIS — I25.10 CAD S/P PERCUTANEOUS CORONARY ANGIOPLASTY: Primary | ICD-10-CM

## 2022-02-07 DIAGNOSIS — I10 ESSENTIAL HYPERTENSION: ICD-10-CM

## 2022-02-07 DIAGNOSIS — Z79.4 TYPE 2 DIABETES MELLITUS WITH HYPERGLYCEMIA, WITH LONG-TERM CURRENT USE OF INSULIN: ICD-10-CM

## 2022-02-07 DIAGNOSIS — I48.0 PAROXYSMAL ATRIAL FIBRILLATION: ICD-10-CM

## 2022-02-07 DIAGNOSIS — S30.1XXD HEMATOMA OF GROIN, SUBSEQUENT ENCOUNTER: ICD-10-CM

## 2022-02-07 DIAGNOSIS — Z86.73 HISTORY OF CVA (CEREBROVASCULAR ACCIDENT): ICD-10-CM

## 2022-02-07 PROCEDURE — 99214 OFFICE O/P EST MOD 30 MIN: CPT | Performed by: INTERNAL MEDICINE

## 2022-02-07 NOTE — PROGRESS NOTES
Subjective:     Encounter Date:02/07/2022      Patient ID: Preet Jones is a 56 y.o. male.    Chief Complaint : Acute visit for groin check.  History of Present Illness      Mr. Preet Jones has PMH of    CAD, cardiac cath 2/1/2022 patent stent in proximal marginal with 40 to 50% in-stent, 60% hazy mid narrowing and 80% distal narrowing before PDA.  PSVT  Atrial flutter/fib status post ablation  Left MCA CVA  Hypertension  Dyslipidemia  Insulin requiring diabetes  Back surgery, appendix, and joint replacement  Allergies/intolerance to morphine  Family history of heart disease in mother and father in fifties      Here for an acute visit for groin pain.  Patient was recently in the hospital with chest pain underwent cardiac catheter 1/20/2022 which revealed distal circumflex disease but suited for medical management.  Patient has job which requires requires heavy lifting.  Is complaining of groin pain.  Had a hematoma and ultrasound for pseudoaneurysm has been negative.  Patient has occasional chest pains and palpitations and dyspnea on exertion which is mild.  Patient's arterial pressures 162/95, heart rate 84, O2 sat of 97% on room air.  Labs from 2/1/2022 reveal globin A1c of 12.2.        Assessment:     Groin pain/hematoma  CAD, PCI, stable angina  Atrial flutter  History of CVA  Hypertension, dyslipidemia, insulin requiring diabetes        Recommendations and plan:    Advised patient to stay off work for 2 weeks.  We will follow-up in nurse practitioner [facility] in 2 weeks and if it he will release him to work.  Continue medical management with aspirin, atorvastatin, Plavix, isosorbide, metoprolol succinate, lisinopril as tolerated.  Discussed about COVID-19 vaccination patient took all 3 doses.  Follow-up with PMD for diabetes care.  Follow-up with EP for atrial fibrillation /flutter.  Patient has high [id number] score due to hypertension, insulin requiring diabetes, history of CVA, CAD  making it at least 5, will benefit from long-term anticoagulation.  We will start him on Eliquis 5 twice daily.  Given samples.  Discussed risk benefits alternatives.  Will discontinue Plavix.      Procedures EKG done 2/2/2022 reviewed/interpreted by me reveals sinus rhythm with rate of 76 bpm with first-degree AV block    The following portions of the patient's history were reviewed and updated as appropriate: allergies, current medications, past family history, past medical history, past social history, past surgical history and problem list.    Assessment:         [facility]     Diagnosis Plan   1. CAD S/P percutaneous coronary angioplasty     2. Hematoma of groin, subsequent encounter     3. Paroxysmal atrial fibrillation (HCC)     4. Essential hypertension     5. Type 2 diabetes mellitus with hyperglycemia, with long-term current use of insulin (HCC)     6. History of CVA (cerebrovascular accident)            Plan:               Past Medical History:  Past Medical History:   Diagnosis Date   • Abnormal cardiovascular stress test 1/19/2017   • Acute myocardial infarction (HCC) 2019   • Aortic aneurysm (HCC) 6/22/2017   • Bicuspid aortic valve 2/16/2017   • Coronary artery disease 10/29/2019   • History of coronary angioplasty with insertion of stent    • Hyperlipidemia    • Hypertension    • Obesity 9/2/2011   • Primary hyperparathyroidism (HCC) 9/2/2011   • RUE weakness 10/29/2019    Previous residual from Stroke, but was not work prohibiting.   • Sinus arrhythmia    • Stroke (HCC)    • Type 2 diabetes mellitus (HCC) 10/29/2019     Past Surgical History:  Past Surgical History:   Procedure Laterality Date   • APPENDECTOMY     • BACK SURGERY     • CARDIAC CATHETERIZATION  2010   • CARDIAC CATHETERIZATION  2019   • CARDIAC CATHETERIZATION N/A 12/31/2020    Procedure: Cardiac Catheterization/Vascular Study;  Surgeon: Moris Pedro MD;  Location: [facility] CATH INVASIVE LOCATION;  Service: Cardiovascular;   Laterality: N/A;   • CARDIAC CATHETERIZATION N/A 2/1/2022    Procedure: Left Heart Cath;  Surgeon: Kamlesh Richardson MD;  Location: [facility] CATH INVASIVE LOCATION;  Service: Cardiovascular;  Laterality: N/A;   • CARDIAC ELECTROPHYSIOLOGY PROCEDURE N/A 1/20/2021    Procedure: EP/Ablation;  Surgeon: Nathen Olmstead MD;  Location: [facility] CATH INVASIVE LOCATION;  Service: Cardiovascular;  Laterality: N/A;   • JOINT REPLACEMENT     • KNEE ARTHROSCOPY        Allergies:  Allergies   Allergen Reactions   • Morphine GI Intolerance   • Isosorbide Headache     Home Meds:  Current Meds:     Current Outpatient Medications:   •  aspirin 81 MG chewable tablet, Chew 81 mg Daily., Disp: , Rfl:   •  atorvastatin (LIPITOR) 80 MG tablet, Take 1 tablet by mouth Every Night., Disp: 90 tablet, Rfl: 1  •  Cholecalciferol (VITAMIN D-3) 25 MCG (1000 UT) capsule, Take 3 capsules by mouth Daily., Disp: , Rfl:   •  insulin detemir (LEVEMIR) 100 UNIT/ML injection, Inject 20 Units under the skin into the appropriate area as directed Daily. He adjusts it up 2 units if blood sugar is above 280., Disp: 3 mL, Rfl: 3  •  insulin lispro (ADMELOG) 100 UNIT/ML injection, Inject 10 Units under the skin into the appropriate area as directed 3 (Three) Times a Day With Meals., Disp: 10 mL, Rfl: 2  •  lisinopril (PRINIVIL,ZESTRIL) 40 MG tablet, Take 40 mg by mouth Daily., Disp: , Rfl:   •  metFORMIN (GLUCOPHAGE) 1000 MG tablet, 2 (Two) Times a Day With Meals., Disp: , Rfl:   •  metoprolol succinate XL (TOPROL-XL) 50 MG 24 hr tablet, Take 1 tablet by mouth Daily., Disp: 90 tablet, Rfl: 1  •  nitroglycerin (NITROSTAT) 0.4 MG SL tablet, Place 1 tablet under the tongue Every 5 (Five) Minutes As Needed for Chest Pain. 1 under the tongue as needed for angina, may repeat q5mins for up three doses, Disp: 100 tablet, Rfl: 0  •  apixaban (ELIQUIS) 5 MG tablet tablet, Take 1 tablet by mouth 2 (Two) Times a Day., Disp: 60 tablet, Rfl: 11  •  isosorbide  "mononitrate (IMDUR) 30 MG 24 hr tablet, Take 1 tablet by mouth Daily., Disp: 30 tablet, Rfl: 3  Social History:   Social History     Tobacco Use   • Smoking status: Never Smoker   • Smokeless tobacco: Never Used   Substance Use Topics   • Alcohol use: No      Family History:  Family History   Problem Relation Age of Onset   • Heart disease Mother    • Parkinsonism Father    • Cancer Father               Review of Systems   Constitutional: Negative for chills, fever and malaise/fatigue.   Cardiovascular: Positive for palpitations. Negative for chest pain and leg swelling.   Respiratory: Negative for shortness of breath.    Skin: Negative for rash.   Neurological: Negative for dizziness, light-headedness and numbness.     All other systems are negative         Objective:     Physical Exam  /95   Pulse 84   Temp 98.3 °F (36.8 °C)   Ht 177.8 cm (70\")   Wt 87.5 kg (193 lb)   SpO2 97%   BMI 27.69 kg/m²   General:  Appears in no acute distress  Eyes: Sclera is anicteric,  conjunctiva is clear   HEENT:  No JVD.  No carotid bruits  Respiratory: Respirations regular and unlabored at rest.  Clear to auscultation  Cardiovascular: S1,S2 Regular rate and rhythm. No murmur, rub or gallop auscultated.   Extremities: Small right groin hematoma without bruits.  Skin: Color pink. Skin warm and dry to touch. No rashes  No xanthoma  Neuro: Alert and awake.    Lab Reviewed:         Kamlesh Richardson MD  2/11/2022 16:52 EST      Much of the above report is an electronic transcription/translation of the spoken language to printed text using Dragon Software. As such, the subtleties and finesse of the spoken language may permit erroneous, or at times, nonsensical words or phrases to be inadvertently transcribed; thus changes may be made at a later date to rectify these errors.         "

## 2022-02-07 NOTE — TELEPHONE ENCOUNTER
Caller:  THALIA    Relationship:SELF    Best call back number: [phone]                                         What is your medical concern? CATH 2/1, ASKING WHEN HE CAN GO BACK TO WORK

## 2022-02-09 ENCOUNTER — TELEPHONE (OUTPATIENT)
Dept: CARDIOLOGY | Facility: CLINIC | Age: 57
End: 2022-02-09

## 2022-02-14 ENCOUNTER — TELEPHONE (OUTPATIENT)
Dept: CARDIOLOGY | Facility: CLINIC | Age: 57
End: 2022-02-14

## 2022-02-14 NOTE — TELEPHONE ENCOUNTER
Kari called me to report abnormal holter, patient had HR of up to192 for approx 2 minutes. Pt has appt with  2/16/22 in [place] . He confirmed he will keep this appt to discuss holter findings.

## 2022-02-15 NOTE — TELEPHONE ENCOUNTER
Patient received call that paperwork has been completed. Can we take that out to [place] tomorrow?

## 2022-02-16 ENCOUNTER — OFFICE VISIT (OUTPATIENT)
Dept: CARDIOLOGY | Facility: CLINIC | Age: 57
End: 2022-02-16

## 2022-02-16 ENCOUNTER — PREP FOR SURGERY (OUTPATIENT)
Dept: OTHER | Facility: HOSPITAL | Age: 57
End: 2022-02-16

## 2022-02-16 VITALS
HEART RATE: 80 BPM | SYSTOLIC BLOOD PRESSURE: 149 MMHG | HEIGHT: 70 IN | WEIGHT: 194 LBS | OXYGEN SATURATION: 97 % | DIASTOLIC BLOOD PRESSURE: 93 MMHG | BODY MASS INDEX: 27.77 KG/M2

## 2022-02-16 DIAGNOSIS — I47.1 PSVT (PAROXYSMAL SUPRAVENTRICULAR TACHYCARDIA): Primary | ICD-10-CM

## 2022-02-16 DIAGNOSIS — I47.1 PAROXYSMAL SVT (SUPRAVENTRICULAR TACHYCARDIA): Primary | ICD-10-CM

## 2022-02-16 DIAGNOSIS — R00.2 PALPITATIONS: ICD-10-CM

## 2022-02-16 DIAGNOSIS — I25.700 CORONARY ARTERY DISEASE INVOLVING CORONARY BYPASS GRAFT OF NATIVE HEART WITH UNSTABLE ANGINA PECTORIS: ICD-10-CM

## 2022-02-16 DIAGNOSIS — I10 PRIMARY HYPERTENSION: Chronic | ICD-10-CM

## 2022-02-16 PROCEDURE — 99214 OFFICE O/P EST MOD 30 MIN: CPT | Performed by: INTERNAL MEDICINE

## 2022-02-16 RX ORDER — SODIUM CHLORIDE 0.9 % (FLUSH) 0.9 %
3 SYRINGE (ML) INJECTION EVERY 12 HOURS SCHEDULED
Status: CANCELLED | OUTPATIENT
Start: 2022-02-16

## 2022-02-16 RX ORDER — SODIUM CHLORIDE 0.9 % (FLUSH) 0.9 %
3-10 SYRINGE (ML) INJECTION AS NEEDED
Status: CANCELLED | OUTPATIENT
Start: 2022-02-16

## 2022-02-16 NOTE — PROGRESS NOTES
HP      Name: Preet Jones ADMIT: (Not on file)   : 1965  PCP: Celine Garcia MD    MRN: [id number] LOS: 0 days   AGE/SEX: 56 y.o. male  ROOM: Room/bed info not found     Chief Complaint   Patient presents with   • Palpitations     Event monitor results       Subjective         History of present illness  Preet Jones is a 56-year-old male patient with history of coronary artery disease status post PCI to the marginal on 2017, history of atrial fibrillation status post cryoablation and CTI on 2021, also hypertension, dyslipidemia, diabetes, history of stroke.  Patient presented to the ER in 2022 with palpitations and chest discomfort.  At that time he was in sinus rhythm with PACs, a stress test was done which was abnormal and he had a heart catheterization but no PCI was performed.  Patient was discharged home with a Holter monitor.  He is here today for follow-up.  He continues to have palpitations and chest discomfort.    Past Medical History:   Diagnosis Date   • Abnormal cardiovascular stress test 2017   • Acute myocardial infarction (HCC)    • Aortic aneurysm (HCC) 2017   • Bicuspid aortic valve 2017   • Coronary artery disease 10/29/2019   • History of coronary angioplasty with insertion of stent    • Hyperlipidemia    • Hypertension    • Obesity 2011   • Primary hyperparathyroidism (HCC) 2011   • RUE weakness 10/29/2019    Previous residual from Stroke, but was not work prohibiting.   • Sinus arrhythmia    • Stroke (HCC)    • Type 2 diabetes mellitus ([facility]) 10/29/2019     Past Surgical History:   Procedure Laterality Date   • APPENDECTOMY     • BACK SURGERY     • CARDIAC CATHETERIZATION     • CARDIAC CATHETERIZATION     • CARDIAC CATHETERIZATION N/A 2020    Procedure: Cardiac Catheterization/Vascular Study;  Surgeon: Moris Pedro MD;  Location: [facility] CATH INVASIVE LOCATION;  Service: Cardiovascular;  Laterality:  N/A;   • CARDIAC CATHETERIZATION N/A 2/1/2022    Procedure: Left Heart Cath;  Surgeon: Kamlesh Richardson MD;  Location: [facility] CATH INVASIVE LOCATION;  Service: Cardiovascular;  Laterality: N/A;   • CARDIAC ELECTROPHYSIOLOGY PROCEDURE N/A 1/20/2021    Procedure: EP/Ablation;  Surgeon: Nathen Olmstead MD;  Location: [facility] CATH INVASIVE LOCATION;  Service: Cardiovascular;  Laterality: N/A;   • JOINT REPLACEMENT     • KNEE ARTHROSCOPY       Family History   Problem Relation Age of Onset   • Heart disease Mother    • Parkinsonism Father    • Cancer Father      Social History     Tobacco Use   • Smoking status: Never Smoker   • Smokeless tobacco: Never Used   Vaping Use   • Vaping Use: Never used   Substance Use Topics   • Alcohol use: No   • Drug use: No     (Not in a hospital admission)    Allergies:  Morphine and Isosorbide    Review of systems    Constitutional: Negative.    Respiratory and cardiovascular: As detailed in HPI section.  Gastrointestinal: Negative for constipation, nausea and vomiting negative for abdominal distention, abdominal pain and diarrhea.   Genitourinary: Negative for difficulty urinating and flank pain.   Musculoskeletal: Negative for arthralgias, joint swelling and myalgias.   Skin: Negative for color change, rash and wound.   Neurological: Negative for dizziness, syncope, weakness and headaches.   Hematological: Negative for adenopathy.   Psychiatric/Behavioral: Negative for confusion.   All other systems reviewed and are negative.    Physical Exam  VITALS REVIEWED    General:      well developed, in no acute distress.    Head:      normocephalic and atraumatic.    Eyes:      PERRL/EOM intact, conjunctiva and sclera clear with out nystagmus.    Neck:      no masses, thyromegaly,  trachea central with normal respiratory effort and PMI displaced laterally  Lungs:      Clear to auscultation bilaterally  Heart:       Regular rate and rhythm  Msk:      no deformity or scoliosis noted  of thoracic or lumbar spine.    Pulses:      pulses normal in all 4 extremities.    Extremities:       No lower extremity edema  Neurologic:      no focal deficits.   alert oriented x3  Skin:      intact without lesions or rashes.    Psych:      alert and cooperative; normal mood and affect; normal attention span and concentration.      Result Review :                Pertinent cardiac workup    1. Heart catheterization 2/1/2022 40 to 50% in-stent stenosis of the first marginal, distal circumflex and left PDA had 80% stenosis, not amenable for intervention.  Ejection fraction 55%  2. Holter monitor on 1/26/2022 for 6 days showed an episode of supraventricular tachycardia with heart rate sustained at 200 bpm lasting 2 minutes.      Procedures        Assessment and Plan {CC Problem List  Visit Diagnosis   ROS  Review (Popup)  Health Maintenance  Quality  BestPractice  Medications  SmartSets  SnapShot Encounters  Media :23}     Preet Jones is a 56-year-old male patient who has history of coronary artery disease status post PCI, also history of atrial fibrillation status post cryoablation and CTI.  Patient continues to have palpitations and a Holter monitor recently showed SVT lasting 2 minutes.  The intervals were regular and the arrhythmia was A. fib of true SVT such as AVNRT versus atrial fibrillation or atrial flutter.  He is already on Toprol.  We will go ahead and increase the Toprol but since his symptoms are recurrent we will go ahead and schedule him for an EP study in the next few weeks.  Patient is agreeable with the plan.    Diagnoses and all orders for this visit:    1. PSVT (paroxysmal supraventricular tachycardia) (HCC) (Primary)    2. Coronary artery disease involving coronary bypass graft of native heart with unstable angina pectoris (HCC)    3. Primary hypertension    4. Palpitations           No follow-ups on file.  Patient was given instructions and counseling regarding his condition  or for health maintenance advice. Please see specific information pulled into the AVS if appropriate.

## 2022-02-16 NOTE — H&P (VIEW-ONLY)
HP      Name: Preet Jones ADMIT: (Not on file)   : 1965  PCP: Celine Garcia MD    MRN: [id number] LOS: 0 days   AGE/SEX: 56 y.o. male  ROOM: Room/bed info not found     Chief Complaint   Patient presents with   • Palpitations     Event monitor results       Subjective         History of present illness  Preet Jones is a 56-year-old male patient with history of coronary artery disease status post PCI to the marginal on 2017, history of atrial fibrillation status post cryoablation and CTI on 2021, also hypertension, dyslipidemia, diabetes, history of stroke.  Patient presented to the ER in 2022 with palpitations and chest discomfort.  At that time he was in sinus rhythm with PACs, a stress test was done which was abnormal and he had a heart catheterization but no PCI was performed.  Patient was discharged home with a Holter monitor.  He is here today for follow-up.  He continues to have palpitations and chest discomfort.    Past Medical History:   Diagnosis Date   • Abnormal cardiovascular stress test 2017   • Acute myocardial infarction (HCC)    • Aortic aneurysm (HCC) 2017   • Bicuspid aortic valve 2017   • Coronary artery disease 10/29/2019   • History of coronary angioplasty with insertion of stent    • Hyperlipidemia    • Hypertension    • Obesity 2011   • Primary hyperparathyroidism (HCC) 2011   • RUE weakness 10/29/2019    Previous residual from Stroke, but was not work prohibiting.   • Sinus arrhythmia    • Stroke (HCC)    • Type 2 diabetes mellitus ([facility]) 10/29/2019     Past Surgical History:   Procedure Laterality Date   • APPENDECTOMY     • BACK SURGERY     • CARDIAC CATHETERIZATION     • CARDIAC CATHETERIZATION     • CARDIAC CATHETERIZATION N/A 2020    Procedure: Cardiac Catheterization/Vascular Study;  Surgeon: Moris Pedro MD;  Location: [facility] CATH INVASIVE LOCATION;  Service: Cardiovascular;  Laterality:  N/A;   • CARDIAC CATHETERIZATION N/A 2/1/2022    Procedure: Left Heart Cath;  Surgeon: Kamlesh Richardson MD;  Location: [facility] CATH INVASIVE LOCATION;  Service: Cardiovascular;  Laterality: N/A;   • CARDIAC ELECTROPHYSIOLOGY PROCEDURE N/A 1/20/2021    Procedure: EP/Ablation;  Surgeon: Nathen Olmstead MD;  Location: [facility] CATH INVASIVE LOCATION;  Service: Cardiovascular;  Laterality: N/A;   • JOINT REPLACEMENT     • KNEE ARTHROSCOPY       Family History   Problem Relation Age of Onset   • Heart disease Mother    • Parkinsonism Father    • Cancer Father      Social History     Tobacco Use   • Smoking status: Never Smoker   • Smokeless tobacco: Never Used   Vaping Use   • Vaping Use: Never used   Substance Use Topics   • Alcohol use: No   • Drug use: No     (Not in a hospital admission)    Allergies:  Morphine and Isosorbide    Review of systems    Constitutional: Negative.    Respiratory and cardiovascular: As detailed in HPI section.  Gastrointestinal: Negative for constipation, nausea and vomiting negative for abdominal distention, abdominal pain and diarrhea.   Genitourinary: Negative for difficulty urinating and flank pain.   Musculoskeletal: Negative for arthralgias, joint swelling and myalgias.   Skin: Negative for color change, rash and wound.   Neurological: Negative for dizziness, syncope, weakness and headaches.   Hematological: Negative for adenopathy.   Psychiatric/Behavioral: Negative for confusion.   All other systems reviewed and are negative.    Physical Exam  VITALS REVIEWED    General:      well developed, in no acute distress.    Head:      normocephalic and atraumatic.    Eyes:      PERRL/EOM intact, conjunctiva and sclera clear with out nystagmus.    Neck:      no masses, thyromegaly,  trachea central with normal respiratory effort and PMI displaced laterally  Lungs:      Clear to auscultation bilaterally  Heart:       Regular rate and rhythm  Msk:      no deformity or scoliosis noted  of thoracic or lumbar spine.    Pulses:      pulses normal in all 4 extremities.    Extremities:       No lower extremity edema  Neurologic:      no focal deficits.   alert oriented x3  Skin:      intact without lesions or rashes.    Psych:      alert and cooperative; normal mood and affect; normal attention span and concentration.      Result Review :                Pertinent cardiac workup    1. Heart catheterization 2/1/2022 40 to 50% in-stent stenosis of the first marginal, distal circumflex and left PDA had 80% stenosis, not amenable for intervention.  Ejection fraction 55%  2. Holter monitor on 1/26/2022 for 6 days showed an episode of supraventricular tachycardia with heart rate sustained at 200 bpm lasting 2 minutes.      Procedures        Assessment and Plan      Preet Jones is a 56-year-old male patient who has history of coronary artery disease status post PCI, also history of atrial fibrillation status post cryoablation and CTI.  Patient continues to have palpitations and a Holter monitor recently showed SVT lasting 2 minutes.  The intervals were regular and the arrhythmia was A. fib of true SVT such as AVNRT versus atrial fibrillation or atrial flutter.  He is already on Toprol.  We will go ahead and increase the Toprol but since his symptoms are recurrent we will go ahead and schedule him for an EP study in the next few weeks.  Patient is agreeable with the plan.    Diagnoses and all orders for this visit:    1. PSVT (paroxysmal supraventricular tachycardia) (HCC) (Primary)    2. Coronary artery disease involving coronary bypass graft of native heart with unstable angina pectoris (HCC)    3. Primary hypertension    4. Palpitations           No follow-ups on file.  Patient was given instructions and counseling regarding his condition or for health maintenance advice. Please see specific information pulled into the AVS if appropriate.

## 2022-02-18 ENCOUNTER — TELEPHONE (OUTPATIENT)
Dept: CARDIOLOGY | Facility: CLINIC | Age: 57
End: 2022-02-18

## 2022-02-18 NOTE — TELEPHONE ENCOUNTER
Bee called from [place] pt was in the office and stated had a lot of problems last night with SOA, lightheadedness, weakness and wanted to see if ablation could be moved up. Per Dr. Montana pt moved up to 2/24. Mary will call patient w new info and advise if gets worse to go to the ER.

## 2022-02-22 ENCOUNTER — LAB (OUTPATIENT)
Dept: LAB | Facility: HOSPITAL | Age: 57
End: 2022-02-22

## 2022-02-22 DIAGNOSIS — I47.1 PAROXYSMAL SVT (SUPRAVENTRICULAR TACHYCARDIA): ICD-10-CM

## 2022-02-22 LAB
ALBUMIN SERPL-MCNC: 3.6 G/DL (ref 3.5–5.2)
ALBUMIN/GLOB SERPL: 1.1 G/DL
ALP SERPL-CCNC: 113 U/L (ref 39–117)
ALT SERPL W P-5'-P-CCNC: 19 U/L (ref 1–41)
ANION GAP SERPL CALCULATED.3IONS-SCNC: 12.3 MMOL/L (ref 5–15)
AST SERPL-CCNC: 13 U/L (ref 1–40)
BASOPHILS # BLD AUTO: 0.08 10*3/MM3 (ref 0–0.2)
BASOPHILS NFR BLD AUTO: 1.2 % (ref 0–1.5)
BILIRUB SERPL-MCNC: 0.3 MG/DL (ref 0–1.2)
BUN SERPL-MCNC: 17 MG/DL (ref 6–20)
BUN/CREAT SERPL: 19.5 (ref 7–25)
CALCIUM SPEC-SCNC: 9.1 MG/DL (ref 8.6–10.5)
CHLORIDE SERPL-SCNC: 101 MMOL/L (ref 98–107)
CO2 SERPL-SCNC: 23.7 MMOL/L (ref 22–29)
CREAT SERPL-MCNC: 0.87 MG/DL (ref 0.76–1.27)
DEPRECATED RDW RBC AUTO: 41.4 FL (ref 37–54)
EOSINOPHIL # BLD AUTO: 0.56 10*3/MM3 (ref 0–0.4)
EOSINOPHIL NFR BLD AUTO: 8.4 % (ref 0.3–6.2)
ERYTHROCYTE [DISTWIDTH] IN BLOOD BY AUTOMATED COUNT: 13.6 % (ref 12.3–15.4)
GFR SERPL CREATININE-BSD FRML MDRD: 90 ML/MIN/1.73
GLOBULIN UR ELPH-MCNC: 3.2 GM/DL
GLUCOSE SERPL-MCNC: 358 MG/DL (ref 65–99)
HCT VFR BLD AUTO: 41.1 % (ref 37.5–51)
HGB BLD-MCNC: 13.6 G/DL (ref 13–17.7)
IMM GRANULOCYTES # BLD AUTO: 0.02 10*3/MM3 (ref 0–0.05)
IMM GRANULOCYTES NFR BLD AUTO: 0.3 % (ref 0–0.5)
INR PPP: 1.03 (ref 0.93–1.1)
LYMPHOCYTES # BLD AUTO: 1.76 10*3/MM3 (ref 0.7–3.1)
LYMPHOCYTES NFR BLD AUTO: 26.5 % (ref 19.6–45.3)
MAGNESIUM SERPL-MCNC: 1.8 MG/DL (ref 1.6–2.6)
MCH RBC QN AUTO: 27.8 PG (ref 26.6–33)
MCHC RBC AUTO-ENTMCNC: 33.1 G/DL (ref 31.5–35.7)
MCV RBC AUTO: 84 FL (ref 79–97)
MONOCYTES # BLD AUTO: 0.52 10*3/MM3 (ref 0.1–0.9)
MONOCYTES NFR BLD AUTO: 7.8 % (ref 5–12)
NEUTROPHILS NFR BLD AUTO: 3.7 10*3/MM3 (ref 1.7–7)
NEUTROPHILS NFR BLD AUTO: 55.8 % (ref 42.7–76)
NRBC BLD AUTO-RTO: 0 /100 WBC (ref 0–0.2)
PLATELET # BLD AUTO: 245 10*3/MM3 (ref 140–450)
PMV BLD AUTO: 11.5 FL (ref 6–12)
POTASSIUM SERPL-SCNC: 4 MMOL/L (ref 3.5–5.2)
PROT SERPL-MCNC: 6.8 G/DL (ref 6–8.5)
PROTHROMBIN TIME: 11.4 SECONDS (ref 9.6–11.7)
RBC # BLD AUTO: 4.89 10*6/MM3 (ref 4.14–5.8)
SARS-COV-2 ORF1AB RESP QL NAA+PROBE: NOT DETECTED
SODIUM SERPL-SCNC: 137 MMOL/L (ref 136–145)
WBC NRBC COR # BLD: 6.64 10*3/MM3 (ref 3.4–10.8)

## 2022-02-22 PROCEDURE — U0004 COV-19 TEST NON-CDC HGH THRU: HCPCS

## 2022-02-22 PROCEDURE — 83735 ASSAY OF MAGNESIUM: CPT

## 2022-02-22 PROCEDURE — 80053 COMPREHEN METABOLIC PANEL: CPT

## 2022-02-22 PROCEDURE — 36415 COLL VENOUS BLD VENIPUNCTURE: CPT

## 2022-02-22 PROCEDURE — 85025 COMPLETE CBC W/AUTO DIFF WBC: CPT

## 2022-02-22 PROCEDURE — C9803 HOPD COVID-19 SPEC COLLECT: HCPCS

## 2022-02-22 PROCEDURE — 85610 PROTHROMBIN TIME: CPT

## 2022-02-24 ENCOUNTER — HOSPITAL ENCOUNTER (OUTPATIENT)
Facility: HOSPITAL | Age: 57
Setting detail: HOSPITAL OUTPATIENT SURGERY
Discharge: HOME | End: 2022-02-24
Attending: INTERNAL MEDICINE | Admitting: INTERNAL MEDICINE

## 2022-02-24 ENCOUNTER — ANESTHESIA EVENT (OUTPATIENT)
Dept: CARDIOLOGY | Facility: HOSPITAL | Age: 57
End: 2022-02-24

## 2022-02-24 ENCOUNTER — ANESTHESIA (OUTPATIENT)
Dept: CARDIOLOGY | Facility: HOSPITAL | Age: 57
End: 2022-02-24

## 2022-02-24 VITALS
OXYGEN SATURATION: 95 % | WEIGHT: 191.8 LBS | RESPIRATION RATE: 15 BRPM | HEIGHT: 70 IN | SYSTOLIC BLOOD PRESSURE: 145 MMHG | DIASTOLIC BLOOD PRESSURE: 90 MMHG | HEART RATE: 83 BPM | BODY MASS INDEX: 27.46 KG/M2 | TEMPERATURE: 97.8 F

## 2022-02-24 DIAGNOSIS — I47.1 PAROXYSMAL SVT (SUPRAVENTRICULAR TACHYCARDIA): ICD-10-CM

## 2022-02-24 LAB — GLUCOSE BLDC GLUCOMTR-MCNC: 248 MG/DL (ref 70–105)

## 2022-02-24 PROCEDURE — 93653 COMPRE EP EVAL TX SVT: CPT | Performed by: INTERNAL MEDICINE

## 2022-02-24 PROCEDURE — C1894 INTRO/SHEATH, NON-LASER: HCPCS | Performed by: INTERNAL MEDICINE

## 2022-02-24 PROCEDURE — C1730 CATH, EP, 19 OR FEW ELECT: HCPCS | Performed by: INTERNAL MEDICINE

## 2022-02-24 PROCEDURE — 25010000002 PROPOFOL 1000 MG/100ML EMULSION: Performed by: ANESTHESIOLOGIST ASSISTANT

## 2022-02-24 PROCEDURE — 82962 GLUCOSE BLOOD TEST: CPT

## 2022-02-24 PROCEDURE — C1732 CATH, EP, DIAG/ABL, 3D/VECT: HCPCS | Performed by: INTERNAL MEDICINE

## 2022-02-24 PROCEDURE — 93623 PRGRMD STIMJ&PACG IV RX NFS: CPT | Performed by: INTERNAL MEDICINE

## 2022-02-24 PROCEDURE — 25010000002 MIDAZOLAM PER 1 MG: Performed by: ANESTHESIOLOGIST ASSISTANT

## 2022-02-24 PROCEDURE — 25010000002 FENTANYL CITRATE (PF) 100 MCG/2ML SOLUTION: Performed by: ANESTHESIOLOGIST ASSISTANT

## 2022-02-24 PROCEDURE — 93005 ELECTROCARDIOGRAM TRACING: CPT | Performed by: INTERNAL MEDICINE

## 2022-02-24 RX ORDER — OXYCODONE HYDROCHLORIDE 5 MG/1
5 TABLET ORAL ONCE AS NEEDED
Status: DISCONTINUED | OUTPATIENT
Start: 2022-02-24 | End: 2022-02-25 | Stop reason: HOSPADM

## 2022-02-24 RX ORDER — FENTANYL CITRATE 50 UG/ML
50 INJECTION, SOLUTION INTRAMUSCULAR; INTRAVENOUS
Status: DISCONTINUED | OUTPATIENT
Start: 2022-02-24 | End: 2022-02-25 | Stop reason: HOSPADM

## 2022-02-24 RX ORDER — FLUMAZENIL 0.1 MG/ML
0.5 INJECTION INTRAVENOUS AS NEEDED
Status: DISCONTINUED | OUTPATIENT
Start: 2022-02-24 | End: 2022-02-25 | Stop reason: HOSPADM

## 2022-02-24 RX ORDER — LIDOCAINE HYDROCHLORIDE 20 MG/ML
INJECTION, SOLUTION INFILTRATION; PERINEURAL AS NEEDED
Status: DISCONTINUED | OUTPATIENT
Start: 2022-02-24 | End: 2022-02-24 | Stop reason: HOSPADM

## 2022-02-24 RX ORDER — FENTANYL CITRATE 50 UG/ML
INJECTION, SOLUTION INTRAMUSCULAR; INTRAVENOUS AS NEEDED
Status: DISCONTINUED | OUTPATIENT
Start: 2022-02-24 | End: 2022-02-24 | Stop reason: SURG

## 2022-02-24 RX ORDER — OXYCODONE HYDROCHLORIDE 5 MG/1
10 TABLET ORAL EVERY 4 HOURS PRN
Status: DISCONTINUED | OUTPATIENT
Start: 2022-02-24 | End: 2022-02-25 | Stop reason: HOSPADM

## 2022-02-24 RX ORDER — ACETAMINOPHEN 325 MG/1
650 TABLET ORAL ONCE AS NEEDED
Status: DISCONTINUED | OUTPATIENT
Start: 2022-02-24 | End: 2022-02-25 | Stop reason: HOSPADM

## 2022-02-24 RX ORDER — HYDROMORPHONE HCL 110MG/55ML
0.5 PATIENT CONTROLLED ANALGESIA SYRINGE INTRAVENOUS
Status: DISCONTINUED | OUTPATIENT
Start: 2022-02-24 | End: 2022-02-25 | Stop reason: HOSPADM

## 2022-02-24 RX ORDER — DIPHENHYDRAMINE HYDROCHLORIDE 50 MG/ML
12.5 INJECTION INTRAMUSCULAR; INTRAVENOUS
Status: DISCONTINUED | OUTPATIENT
Start: 2022-02-24 | End: 2022-02-25 | Stop reason: HOSPADM

## 2022-02-24 RX ORDER — MIDAZOLAM HYDROCHLORIDE 1 MG/ML
1 INJECTION INTRAMUSCULAR; INTRAVENOUS
Status: DISCONTINUED | OUTPATIENT
Start: 2022-02-24 | End: 2022-02-25 | Stop reason: HOSPADM

## 2022-02-24 RX ORDER — METOPROLOL SUCCINATE 100 MG/1
100 TABLET, EXTENDED RELEASE ORAL DAILY
COMMUNITY
End: 2022-04-13

## 2022-02-24 RX ORDER — MIDAZOLAM HYDROCHLORIDE 1 MG/ML
INJECTION INTRAMUSCULAR; INTRAVENOUS AS NEEDED
Status: DISCONTINUED | OUTPATIENT
Start: 2022-02-24 | End: 2022-02-24 | Stop reason: SURG

## 2022-02-24 RX ORDER — PROPOFOL 10 MG/ML
INJECTION, EMULSION INTRAVENOUS AS NEEDED
Status: DISCONTINUED | OUTPATIENT
Start: 2022-02-24 | End: 2022-02-24 | Stop reason: SURG

## 2022-02-24 RX ORDER — LORAZEPAM 2 MG/ML
1 INJECTION INTRAMUSCULAR
Status: DISCONTINUED | OUTPATIENT
Start: 2022-02-24 | End: 2022-02-25 | Stop reason: HOSPADM

## 2022-02-24 RX ORDER — SODIUM CHLORIDE 0.9 % (FLUSH) 0.9 %
3-10 SYRINGE (ML) INJECTION AS NEEDED
Status: DISCONTINUED | OUTPATIENT
Start: 2022-02-24 | End: 2022-02-25 | Stop reason: HOSPADM

## 2022-02-24 RX ORDER — MEPERIDINE HYDROCHLORIDE 25 MG/ML
12.5 INJECTION INTRAMUSCULAR; INTRAVENOUS; SUBCUTANEOUS
Status: DISCONTINUED | OUTPATIENT
Start: 2022-02-24 | End: 2022-02-25 | Stop reason: HOSPADM

## 2022-02-24 RX ORDER — EPHEDRINE SULFATE 5 MG/ML
5 INJECTION INTRAVENOUS ONCE AS NEEDED
Status: DISCONTINUED | OUTPATIENT
Start: 2022-02-24 | End: 2022-02-25 | Stop reason: HOSPADM

## 2022-02-24 RX ORDER — NALOXONE HCL 0.4 MG/ML
0.4 VIAL (ML) INJECTION AS NEEDED
Status: DISCONTINUED | OUTPATIENT
Start: 2022-02-24 | End: 2022-02-25 | Stop reason: HOSPADM

## 2022-02-24 RX ORDER — ACETAMINOPHEN 650 MG/1
650 SUPPOSITORY RECTAL ONCE AS NEEDED
Status: DISCONTINUED | OUTPATIENT
Start: 2022-02-24 | End: 2022-02-25 | Stop reason: HOSPADM

## 2022-02-24 RX ORDER — SODIUM CHLORIDE 0.9 % (FLUSH) 0.9 %
3 SYRINGE (ML) INJECTION EVERY 12 HOURS SCHEDULED
Status: DISCONTINUED | OUTPATIENT
Start: 2022-02-24 | End: 2022-02-24

## 2022-02-24 RX ORDER — DIPHENHYDRAMINE HCL 25 MG
25 CAPSULE ORAL
Status: DISCONTINUED | OUTPATIENT
Start: 2022-02-24 | End: 2022-02-25 | Stop reason: HOSPADM

## 2022-02-24 RX ORDER — ONDANSETRON 2 MG/ML
4 INJECTION INTRAMUSCULAR; INTRAVENOUS ONCE AS NEEDED
Status: DISCONTINUED | OUTPATIENT
Start: 2022-02-24 | End: 2022-02-25 | Stop reason: HOSPADM

## 2022-02-24 RX ORDER — LABETALOL HYDROCHLORIDE 5 MG/ML
5 INJECTION, SOLUTION INTRAVENOUS
Status: DISCONTINUED | OUTPATIENT
Start: 2022-02-24 | End: 2022-02-25 | Stop reason: HOSPADM

## 2022-02-24 RX ORDER — SODIUM CHLORIDE 9 MG/ML
INJECTION, SOLUTION INTRAVENOUS CONTINUOUS PRN
Status: DISCONTINUED | OUTPATIENT
Start: 2022-02-24 | End: 2022-02-24 | Stop reason: SURG

## 2022-02-24 RX ORDER — ACETAMINOPHEN 325 MG/1
325 TABLET ORAL ONCE AS NEEDED
Status: DISCONTINUED | OUTPATIENT
Start: 2022-02-24 | End: 2022-02-25 | Stop reason: HOSPADM

## 2022-02-24 RX ORDER — HYDRALAZINE HYDROCHLORIDE 20 MG/ML
5 INJECTION INTRAMUSCULAR; INTRAVENOUS
Status: DISCONTINUED | OUTPATIENT
Start: 2022-02-24 | End: 2022-02-25 | Stop reason: HOSPADM

## 2022-02-24 RX ORDER — IPRATROPIUM BROMIDE AND ALBUTEROL SULFATE 2.5; .5 MG/3ML; MG/3ML
3 SOLUTION RESPIRATORY (INHALATION) ONCE AS NEEDED
Status: DISCONTINUED | OUTPATIENT
Start: 2022-02-24 | End: 2022-02-25 | Stop reason: HOSPADM

## 2022-02-24 RX ADMIN — MIDAZOLAM 1 MG: 1 INJECTION INTRAMUSCULAR; INTRAVENOUS at 16:32

## 2022-02-24 RX ADMIN — FENTANYL CITRATE 50 MCG: 50 INJECTION INTRAMUSCULAR; INTRAVENOUS at 16:25

## 2022-02-24 RX ADMIN — ISOPROTERENOL HYDROCHLORIDE 1 MCG/MIN: 1 INJECTION, SOLUTION INTRACARDIAC; INTRAMUSCULAR; INTRAVENOUS; SUBCUTANEOUS at 17:09

## 2022-02-24 RX ADMIN — MIDAZOLAM 1 MG: 1 INJECTION INTRAMUSCULAR; INTRAVENOUS at 16:28

## 2022-02-24 RX ADMIN — PROPOFOL 100 MCG/KG/MIN: 10 INJECTION, EMULSION INTRAVENOUS at 16:32

## 2022-02-24 RX ADMIN — SODIUM CHLORIDE: 0.9 INJECTION, SOLUTION INTRAVENOUS at 16:16

## 2022-02-24 RX ADMIN — PROPOFOL 50 MG: 10 INJECTION, EMULSION INTRAVENOUS at 17:24

## 2022-02-24 RX ADMIN — FENTANYL CITRATE 50 MCG: 50 INJECTION INTRAMUSCULAR; INTRAVENOUS at 16:32

## 2022-02-24 NOTE — ANESTHESIA PREPROCEDURE EVALUATION
Anesthesia Evaluation     Patient summary reviewed and Nursing notes reviewed   no history of anesthetic complications:  NPO Solid Status: > 8 hours  NPO Liquid Status: > 8 hours           Airway   Mallampati: II  TM distance: >3 FB  Neck ROM: full  No difficulty expected  Dental - normal exam     Pulmonary - normal exam    breath sounds clear to auscultation  Cardiovascular - normal exam    ECG reviewed  PT is on anticoagulation therapy  Patient on routine beta blocker  Rhythm: regular  Rate: normal    (+) hypertension, valvular problems/murmurs MR, past MI  >12 months, CAD, cardiac stents dysrhythmias Tachycardia, Atrial Flutter, angina at rest, hyperlipidemia,     ROS comment: Interpretation Summary       · Left ventricular wall thickness is consistent with concentric hypertrophy.  · Estimated left ventricular EF = 45% Estimated left ventricular EF was in agreement with the calculated left ventricular EF. Left ventricular ejection fraction appears to be 41 - 45%. Left ventricular systolic function is low normal.  · The right atrial cavity is mildly dilated.  · With a centrally-directed jet noted.  · Estimated right ventricular systolic pressure from tricuspid regurgitation is normal (<35 mmHg).      Procedure indication  Recurrent cardioembolic stroke and history of atrial arrhythmias and patient brought in for FRAN prior to atrial arrhythmia ablation    Cath - Med Management    Neuro/Psych  (+) CVA, weakness, psychiatric history Anxiety and Depression,    GI/Hepatic/Renal/Endo    (+) obesity,   diabetes mellitus, thyroid problem     Musculoskeletal     Abdominal  - normal exam   Substance History      OB/GYN          Other        ROS/Med Hx Other: SVT, palpitations, Aflutter/RVR, allergies, low vit D, aortic aneurysm, hyperparathyroidism, facial droop, slurred speech, low vit D    Echo  · Left ventricular wall thickness is consistent with concentric hypertrophy.  · Estimated left ventricular EF = 45% Estimated  left ventricular EF was in agreement with the calculated left ventricular EF. Left ventricular ejection fraction appears to be 41 - 45%. Left ventricular systolic function is low normal.  · The right atrial cavity is mildly dilated.  · With a centrally-directed jet noted.  · Estimated right ventricular systolic pressure from tricuspid regurgitation is normal (<35 mmHg).    Normal LV size with mild LV dysfunction with possible apical hypokinesis, estimated LV ejection fraction of   50%  Normal RV size  Normal atrial size  Aortic valve is thickened, has adequate cusp separation.    Mitral valve, tricuspid valve appears structurally normal, trace MR TR seen.  Calculated RV systolic pressure of 32 mm/hg  No pericardial effusion seen.  Proximal aorta appears normal in size.    Stress  · Left ventricular ejection fraction is mildly reduced. (Calculated EF = 47%).  · Myocardial perfusion imaging indicates a medium-sized, moderately severe area of ischemia located in the anterior wall, apex and lateral wall.  · Impressions are consistent with a high risk study.    Cath  Hemodynamics     Pressures     Ao:                                                  122/76  mmHg     LV:                                                   129/17  mmHg  End-diastolic pressure:    17          mmHg  No significant aortic valvular gradient on pullback              Coronary Angiography     Left Main :  The left main   large caliber vessel without disease     Left Anterior Descending : Starts of is a moderate caliber vessel after the diagonal becomes a smaller vessel does not reach all the way to the apex has diffuse disease in distal portion.     Left Circumflex : Is dominant vessel.  Has proximal marginal which is a stent with 40-50% in-stent stenosis divides into 2 branches.  Right after the stent mid circumflex has a hazy 60% narrowing.  Circumflex gives rise to principal distal marginal then continues in the AV groove and gives rise to PDA  which has 80% mid narrowing.     Right Coronary Artery :  The right coronary artery   is a small codominant vessel with diffuse tapering distally.                   Dominance:    Left    Right    Co-Dominant             Left Ventriculography:      Estimated Ejection Fraction:         55 %   Wall motion abnormalities: Possible lateral wall hypokinesis mild  Mitral Regurgitation:  None      Impression:     1. Patient has 40 to 50% in-stent narrowing in first marginal.  Mid circumflex right after the marginal has borderline 60% narrowing.  Distal circumflex and left PDA has 80% narrowing.     Recommendations:     1. This distal circumflex/left PDA is better suited for medical management.        [facility]  KNEE ARTHROSCOPY JOINT REPLACEMENT  APPENDECTOMY BACK SURGERY  CARDIAC CATHETERIZATION CARDIAC CATHETERIZATION  CARDIAC CATHETERIZATION CARDIAC ELECTROPHYSIOLOGY PROCEDURE  CARDIAC CATHETERIZATION THYROIDECTOMY, PARTIAL                Anesthesia Plan    ASA 3     MAC   (Patient identified; pre-operative vital signs, all relevant labs/studies, complete medical/surgical/anesthetic history, full medication list, full allergy list, and NPO status obtained/reviewed; physical assessment performed; anesthetic options, side effects, potential complications, risks, and benefits discussed; questions answered; written anesthesia consent obtained; patient cleared for procedure; anesthesia machine and equipment checked and functioning)    Anesthetic plan, all risks, benefits, and alternatives have been provided, discussed and informed consent has been obtained with: patient.    Plan discussed with CRNA.        CODE STATUS:

## 2022-02-25 NOTE — ANESTHESIA POSTPROCEDURE EVALUATION
Patient: Preet Jones    Procedure Summary     Date: 02/24/22 Room / Location: [facility] CATH LAB 3 / [facility] CATH INVASIVE LOCATION    Anesthesia Start: 1615 Anesthesia Stop: 1824    Procedure: EP/Ablation-SVT [place]ong aware (N/A ) Diagnosis:       Paroxysmal SVT (supraventricular tachycardia) (HCC)      (SVT)    Providers: Candido Montana MD Provider: Kurt Vilchis MD    Anesthesia Type: MAC ASA Status: 3          Anesthesia Type: MAC    Vitals  Vitals Value Taken Time   /92 02/24/22 2001   Temp     Pulse 93 02/24/22 2014   Resp     SpO2 94 % 02/24/22 2014   Vitals shown include unvalidated device data.        Post Anesthesia Care and Evaluation    Patient location during evaluation: PACU  Patient participation: complete - patient participated  Level of consciousness: awake  Pain score: 0  Pain management: adequate  Airway patency: patent  Anesthetic complications: No anesthetic complications  PONV Status: none  Cardiovascular status: acceptable  Respiratory status: acceptable  Hydration status: acceptable    Comments: Patient seen and examined postoperatively; vital signs stable; SpO2 greater than or equal to 90%; cardiopulmonary status stable; nausea/vomiting adequately controlled; pain adequately controlled; no apparent anesthesia complications; patient discharged from anesthesia care when discharge criteria were met

## 2022-02-25 NOTE — DISCHARGE INSTRUCTIONS
Post Cath Instructions      Call Dr. Montana’s office to schedule a follow up appointment in 2 months at [phone].  Specific Physician Instructions:  can see you at the [place] office.                                                      STOP taking your eliquis!    1) Drink plenty of fluids for the next 24 hours.  This helps to eliminate the dye used in your procedure through urination.  You may resume a normal diet; however, try to avoid foods that would cause gas or constipation.    2) Sedative medication given to you during your catheterization may decrease your judgement and reaction time for up to 24-48 hours.  Therefore:  a. DO NOT drive or operate hazardous machinery (48 hours)  b. DO NOT consume alcoholic beverages  c. DO NOT make any important/legal decisions  d. Have someone stay with you for at least 24 hours    3) To allow proper healing and prevent bleeding, the following activities are to be strictly avoided for the next 24-48 hours:  a. Excessive bending at wound site  b. Straining (anything that would tense up muscles around the affected puncture site)  c. Lifting objects greater than 5 pounds, pushing, or pulling for 5 days  i. For Groin Cases:  1. Refrain from sexual activity  2. Refrain from running or vigorous walking  3. No prolonged sitting or standing  4. Limit stair climbing as much as possible    4) Keep the puncture site clean and dry.  You may remove the dressing tomorrow and replace it with a band-aid for at least one additional day.  Gently clean the site with mild soap and water.  No scrubbing/rubbing and lightly pat the area dry.  Showers are acceptable; however, avoid submerging in water (tub baths, hot tubs, swimming pools, dishwater, etc…) for at least one week.  The site should be completely healed before resuming these activities to reduce the risk of infection.  Check the site often.  Watch for signs and symptoms of infection and notify your physician if any  of the following occur:  a. Bleeding or an increase in swelling at the puncture site  b. Fever  c. Increased soreness around puncture site  d. Foul odor or significant drainage from the puncture site  e. Swelling, redness, or warmth at the puncture site    **A bruise or small “pea sized” lump under the skin at the puncture site is not unusual.  This should disappear within 3-4 weeks.**  5) CONTACT YOUR PHYSICIAN OR CALL 911 IF YOU EXPERIENCE ANY OF THE FOLLOWING:  a. Increased angina (chest pain) or frequent sensations of pressure, burning, pain, or other discomfort in the chest, arm, jaws, or stomach  b. Lightheadedness, dizziness, faint feeling, sweating, or difficulty breathing  c. Odd sensation changes like numbness, tingling, coldness, or pain in the arm or leg in which the catheter was inserted  d. Limb in which the catheter was inserted becomes pale/bluish in color    IMPORTANT:  Although this occurs very rarely, if you should develop bright red or excessive bleeding, feel a “pop” inside at the insertion site, or notice a sudden increase in swelling larger than a walnut, you should call 911.  Hold continuous firm pressure to the access site until emergency personnel arrive.  It is best if someone else can do this for you.

## 2022-03-07 ENCOUNTER — HOSPITAL ENCOUNTER (OUTPATIENT)
Facility: HOSPITAL | Age: 57
Setting detail: OBSERVATION
Discharge: HOME | End: 2022-03-08
Attending: EMERGENCY MEDICINE | Admitting: EMERGENCY MEDICINE

## 2022-03-07 ENCOUNTER — APPOINTMENT (OUTPATIENT)
Dept: CT IMAGING | Facility: HOSPITAL | Age: 57
End: 2022-03-07

## 2022-03-07 ENCOUNTER — APPOINTMENT (OUTPATIENT)
Dept: GENERAL RADIOLOGY | Facility: HOSPITAL | Age: 57
End: 2022-03-07

## 2022-03-07 DIAGNOSIS — R00.2 PALPITATIONS: Primary | ICD-10-CM

## 2022-03-07 DIAGNOSIS — E11.59 TYPE 2 DIABETES MELLITUS WITH OTHER CIRCULATORY COMPLICATION, WITH LONG-TERM CURRENT USE OF INSULIN: Chronic | ICD-10-CM

## 2022-03-07 DIAGNOSIS — Z79.4 TYPE 2 DIABETES MELLITUS WITH OTHER CIRCULATORY COMPLICATION, WITH LONG-TERM CURRENT USE OF INSULIN: Chronic | ICD-10-CM

## 2022-03-07 LAB
ALBUMIN SERPL-MCNC: 3.5 G/DL (ref 3.5–5.2)
ALBUMIN/GLOB SERPL: 1.3 G/DL
ALP SERPL-CCNC: 91 U/L (ref 39–117)
ALT SERPL W P-5'-P-CCNC: 16 U/L (ref 1–41)
AMPHET+METHAMPHET UR QL: NEGATIVE
ANION GAP SERPL CALCULATED.3IONS-SCNC: 10 MMOL/L (ref 5–15)
AST SERPL-CCNC: 11 U/L (ref 1–40)
BARBITURATES UR QL SCN: NEGATIVE
BASOPHILS # BLD AUTO: 0.1 10*3/MM3 (ref 0–0.2)
BASOPHILS NFR BLD AUTO: 2 % (ref 0–1.5)
BENZODIAZ UR QL SCN: NEGATIVE
BILIRUB SERPL-MCNC: 0.4 MG/DL (ref 0–1.2)
BUN SERPL-MCNC: 15 MG/DL (ref 6–20)
BUN/CREAT SERPL: 17 (ref 7–25)
CALCIUM SPEC-SCNC: 9.2 MG/DL (ref 8.6–10.5)
CANNABINOIDS SERPL QL: NEGATIVE
CHLORIDE SERPL-SCNC: 103 MMOL/L (ref 98–107)
CO2 SERPL-SCNC: 23 MMOL/L (ref 22–29)
COCAINE UR QL: NEGATIVE
CREAT SERPL-MCNC: 0.88 MG/DL (ref 0.76–1.27)
D DIMER PPP FEU-MCNC: 0.82 MG/L (FEU) (ref 0–0.59)
DEPRECATED RDW RBC AUTO: 42.4 FL (ref 37–54)
EGFRCR SERPLBLD CKD-EPI 2021: 100.3 ML/MIN/1.73
EOSINOPHIL # BLD AUTO: 0.3 10*3/MM3 (ref 0–0.4)
EOSINOPHIL NFR BLD AUTO: 5.3 % (ref 0.3–6.2)
ERYTHROCYTE [DISTWIDTH] IN BLOOD BY AUTOMATED COUNT: 14.6 % (ref 12.3–15.4)
GLOBULIN UR ELPH-MCNC: 2.7 GM/DL
GLUCOSE SERPL-MCNC: 258 MG/DL (ref 65–99)
HCT VFR BLD AUTO: 38.3 % (ref 37.5–51)
HGB BLD-MCNC: 13.2 G/DL (ref 13–17.7)
LYMPHOCYTES # BLD AUTO: 1.9 10*3/MM3 (ref 0.7–3.1)
LYMPHOCYTES NFR BLD AUTO: 30.6 % (ref 19.6–45.3)
MCH RBC QN AUTO: 28.2 PG (ref 26.6–33)
MCHC RBC AUTO-ENTMCNC: 34.4 G/DL (ref 31.5–35.7)
MCV RBC AUTO: 82 FL (ref 79–97)
METHADONE UR QL SCN: NEGATIVE
MONOCYTES # BLD AUTO: 0.5 10*3/MM3 (ref 0.1–0.9)
MONOCYTES NFR BLD AUTO: 7.7 % (ref 5–12)
NEUTROPHILS NFR BLD AUTO: 3.4 10*3/MM3 (ref 1.7–7)
NEUTROPHILS NFR BLD AUTO: 54.4 % (ref 42.7–76)
NRBC BLD AUTO-RTO: 0.4 /100 WBC (ref 0–0.2)
NT-PROBNP SERPL-MCNC: 551 PG/ML (ref 0–900)
OPIATES UR QL: NEGATIVE
OXYCODONE UR QL SCN: NEGATIVE
PLATELET # BLD AUTO: 208 10*3/MM3 (ref 140–450)
PMV BLD AUTO: 9 FL (ref 6–12)
POTASSIUM SERPL-SCNC: 4 MMOL/L (ref 3.5–5.2)
PROT SERPL-MCNC: 6.2 G/DL (ref 6–8.5)
RBC # BLD AUTO: 4.67 10*6/MM3 (ref 4.14–5.8)
SARS-COV-2 RNA PNL SPEC NAA+PROBE: NOT DETECTED
SODIUM SERPL-SCNC: 136 MMOL/L (ref 136–145)
TROPONIN T SERPL-MCNC: 0.01 NG/ML (ref 0–0.03)
TROPONIN T SERPL-MCNC: <0.01 NG/ML (ref 0–0.03)
WBC NRBC COR # BLD: 6.3 10*3/MM3 (ref 3.4–10.8)

## 2022-03-07 PROCEDURE — G0378 HOSPITAL OBSERVATION PER HR: HCPCS

## 2022-03-07 PROCEDURE — 0 IOPAMIDOL PER 1 ML: Performed by: NURSE PRACTITIONER

## 2022-03-07 PROCEDURE — 71275 CT ANGIOGRAPHY CHEST: CPT

## 2022-03-07 PROCEDURE — C9803 HOPD COVID-19 SPEC COLLECT: HCPCS

## 2022-03-07 PROCEDURE — 84484 ASSAY OF TROPONIN QUANT: CPT | Performed by: NURSE PRACTITIONER

## 2022-03-07 PROCEDURE — 80307 DRUG TEST PRSMV CHEM ANLYZR: CPT | Performed by: NURSE PRACTITIONER

## 2022-03-07 PROCEDURE — 85379 FIBRIN DEGRADATION QUANT: CPT | Performed by: NURSE PRACTITIONER

## 2022-03-07 PROCEDURE — 36415 COLL VENOUS BLD VENIPUNCTURE: CPT

## 2022-03-07 PROCEDURE — 99284 EMERGENCY DEPT VISIT MOD MDM: CPT

## 2022-03-07 PROCEDURE — 93005 ELECTROCARDIOGRAM TRACING: CPT

## 2022-03-07 PROCEDURE — 83880 ASSAY OF NATRIURETIC PEPTIDE: CPT | Performed by: NURSE PRACTITIONER

## 2022-03-07 PROCEDURE — 80053 COMPREHEN METABOLIC PANEL: CPT | Performed by: NURSE PRACTITIONER

## 2022-03-07 PROCEDURE — 96372 THER/PROPH/DIAG INJ SC/IM: CPT

## 2022-03-07 PROCEDURE — 25010000002 ENOXAPARIN PER 10 MG: Performed by: NURSE PRACTITIONER

## 2022-03-07 PROCEDURE — 87635 SARS-COV-2 COVID-19 AMP PRB: CPT | Performed by: EMERGENCY MEDICINE

## 2022-03-07 PROCEDURE — 93005 ELECTROCARDIOGRAM TRACING: CPT | Performed by: EMERGENCY MEDICINE

## 2022-03-07 PROCEDURE — 71045 X-RAY EXAM CHEST 1 VIEW: CPT

## 2022-03-07 PROCEDURE — 85025 COMPLETE CBC W/AUTO DIFF WBC: CPT | Performed by: NURSE PRACTITIONER

## 2022-03-07 RX ORDER — ACETAMINOPHEN 325 MG/1
650 TABLET ORAL EVERY 4 HOURS PRN
Status: DISCONTINUED | OUTPATIENT
Start: 2022-03-07 | End: 2022-03-08 | Stop reason: HOSPADM

## 2022-03-07 RX ORDER — SODIUM CHLORIDE 0.9 % (FLUSH) 0.9 %
10 SYRINGE (ML) INJECTION AS NEEDED
Status: DISCONTINUED | OUTPATIENT
Start: 2022-03-07 | End: 2022-03-08 | Stop reason: HOSPADM

## 2022-03-07 RX ORDER — SODIUM CHLORIDE 0.9 % (FLUSH) 0.9 %
10 SYRINGE (ML) INJECTION EVERY 12 HOURS SCHEDULED
Status: DISCONTINUED | OUTPATIENT
Start: 2022-03-07 | End: 2022-03-08 | Stop reason: HOSPADM

## 2022-03-07 RX ORDER — ONDANSETRON 2 MG/ML
4 INJECTION INTRAMUSCULAR; INTRAVENOUS EVERY 6 HOURS PRN
Status: DISCONTINUED | OUTPATIENT
Start: 2022-03-07 | End: 2022-03-08 | Stop reason: HOSPADM

## 2022-03-07 RX ORDER — ACETAMINOPHEN 650 MG/1
650 SUPPOSITORY RECTAL EVERY 4 HOURS PRN
Status: DISCONTINUED | OUTPATIENT
Start: 2022-03-07 | End: 2022-03-08 | Stop reason: HOSPADM

## 2022-03-07 RX ORDER — ACETAMINOPHEN 160 MG/5ML
650 SOLUTION ORAL EVERY 4 HOURS PRN
Status: DISCONTINUED | OUTPATIENT
Start: 2022-03-07 | End: 2022-03-08 | Stop reason: HOSPADM

## 2022-03-07 RX ORDER — ONDANSETRON 4 MG/1
4 TABLET, FILM COATED ORAL EVERY 6 HOURS PRN
Status: DISCONTINUED | OUTPATIENT
Start: 2022-03-07 | End: 2022-03-08 | Stop reason: HOSPADM

## 2022-03-07 RX ADMIN — SODIUM CHLORIDE 500 ML: 9 INJECTION, SOLUTION INTRAVENOUS at 11:18

## 2022-03-07 RX ADMIN — ENOXAPARIN SODIUM 40 MG: 40 INJECTION SUBCUTANEOUS at 16:32

## 2022-03-07 RX ADMIN — Medication 10 ML: at 20:23

## 2022-03-07 RX ADMIN — IOPAMIDOL 100 ML: 755 INJECTION, SOLUTION INTRAVENOUS at 13:58

## 2022-03-07 NOTE — ED PROVIDER NOTES
Subjective   57 y/o  male presents to the emergency room with complaint of palpitations and chest pressure that occurred while working this morning.  Patient states that his cardiologist is Dr. Cornelio Reyes and patient had an ablation performed last Friday and his cardiologist told him to come to the ER if pain got worse.  Patient states that the pain got worse so he came to the ER.  Patient denies shortness of breath or nausea.  Patient denies fever cough or congestion.  Onset: This morning while working  Location: Central chest  Duration: Comes and goes but is currently feeling palpitations  Character: Palpitations  Aggravating/Alleviating Factors: Unknown/none  Radiation: None  Severity: Moderate to severe            Review of Systems   Constitutional: Negative for activity change, appetite change, diaphoresis, fatigue and fever.   HENT: Negative.    Eyes: Negative.    Respiratory: Positive for chest tightness. Negative for cough and shortness of breath.    Cardiovascular: Positive for chest pain and palpitations.   Gastrointestinal: Negative for abdominal pain, diarrhea, nausea and vomiting.   Endocrine: Negative.    Genitourinary: Negative for dysuria and flank pain.   Musculoskeletal: Negative for arthralgias, back pain, gait problem, joint swelling, myalgias, neck pain and neck stiffness.   Skin: Negative for color change, pallor, rash and wound.   Allergic/Immunologic: Negative.    Neurological: Negative for dizziness, tremors, seizures, syncope, weakness and light-headedness.   Hematological: Negative.    Psychiatric/Behavioral: Negative.    All other systems reviewed and are negative.      Past Medical History:   Diagnosis Date   • Abnormal cardiovascular stress test 1/19/2017   • Acute myocardial infarction (HCC) 2019   • Aortic aneurysm (HCC) 6/22/2017   • Arrhythmia    • Bicuspid aortic valve 2/16/2017   • Coronary artery disease 10/29/2019   • Disease of thyroid gland    • History of coronary  angioplasty with insertion of stent    • Hyperlipidemia    • Hypertension    • Obesity 9/2/2011   • Primary hyperparathyroidism (HCC) 9/2/2011   • RUE weakness 10/29/2019    Previous residual from Stroke, but was not work prohibiting.   • Sinus arrhythmia    • Stroke (HCC)     3x   • Sustained SVT (HCC)    • Type 2 diabetes mellitus (HCC) 10/29/2019       Allergies   Allergen Reactions   • Morphine GI Intolerance   • Isosorbide Headache   • Nitrofuran Derivatives Headache       Past Surgical History:   Procedure Laterality Date   • APPENDECTOMY     • BACK SURGERY     • CARDIAC CATHETERIZATION  2010   • CARDIAC CATHETERIZATION  2019   • CARDIAC CATHETERIZATION N/A 12/31/2020    Procedure: Cardiac Catheterization/Vascular Study;  Surgeon: Moris Pedro MD;  Location:  [facility];  Service: Cardiovascular;  Laterality: N/A;   • CARDIAC CATHETERIZATION N/A 2/1/2022    Procedure: Left Heart Cath;  Surgeon: Kamlesh Richardson MD;  Location:  [facility];  Service: Cardiovascular;  Laterality: N/A;   • CARDIAC ELECTROPHYSIOLOGY PROCEDURE N/A 1/20/2021    Procedure: EP/Ablation;  Surgeon: Nathen Olmstead MD;  Location:  [facility];  Service: Cardiovascular;  Laterality: N/A;   • CARDIAC ELECTROPHYSIOLOGY PROCEDURE N/A 2/24/2022    Procedure: EP/Ablation-SVT Utong aware;  Surgeon: Candido Montana MD;  Location:  [facility];  Service: Cardiology;  Laterality: N/A;   • JOINT REPLACEMENT     • KNEE ARTHROSCOPY     • THYROIDECTOMY, PARTIAL  2015       Family History   Problem Relation Age of Onset   • Heart disease Mother    • Parkinsonism Father    • Cancer Father        Social History     Socioeconomic History   • Marital status:    Tobacco Use   • Smoking status: Never Smoker   • Smokeless tobacco: Never Used   Vaping Use   • Vaping Use: Never used   Substance and Sexual Activity   • Alcohol use: No   • Drug use: No   •  Sexual activity: Defer           Objective   Physical Exam  Vitals and nursing note reviewed.   Constitutional:       General: He is not in acute distress.     Appearance: Normal appearance. He is not ill-appearing, toxic-appearing or diaphoretic.   HENT:      Head: Normocephalic and atraumatic.      Nose: Nose normal. No congestion or rhinorrhea.      Mouth/Throat:      Mouth: Mucous membranes are moist.      Pharynx: Oropharynx is clear.   Eyes:      Extraocular Movements: Extraocular movements intact.      Conjunctiva/sclera: Conjunctivae normal.      Pupils: Pupils are equal, round, and reactive to light.   Cardiovascular:      Rate and Rhythm: Normal rate.      Pulses: Normal pulses.      Heart sounds: Normal heart sounds.   Pulmonary:      Effort: Pulmonary effort is normal.      Breath sounds: Normal breath sounds.   Abdominal:      General: Abdomen is flat. There is no distension.      Palpations: Abdomen is soft.      Tenderness: There is no abdominal tenderness.   Musculoskeletal:         General: Normal range of motion.      Cervical back: Normal range of motion and neck supple.   Skin:     General: Skin is dry.      Capillary Refill: Capillary refill takes less than 2 seconds.   Neurological:      General: No focal deficit present.      Mental Status: He is alert and oriented to person, place, and time.      Motor: No weakness.      Gait: Gait normal.   Psychiatric:         Mood and Affect: Mood normal.         Behavior: Behavior normal.         Procedures           ED Course      Peripheral IV obtained in EKG obtained and shows sinus rhythm rate of 71 which was seen and signed by Dr. Ho.  Patient's heart rate remains normal sinus.    Labs Reviewed   COMPREHENSIVE METABOLIC PANEL - Abnormal; Notable for the following components:       Result Value    Glucose 258 (*)     All other components within normal limits    Narrative:     GFR Normal >60  Chronic Kidney Disease <60  Kidney Failure <15      D-DIMER, QUANTITATIVE - Abnormal; Notable for the following components:    D-Dimer, Quantitative 0.82 (*)     All other components within normal limits    Narrative:     Reference Range  --------------------------------------------------------------------     < 0.50   Negative Predictive Value  0.50-0.59   Indeterminate    >= 0.60   Probable VTE             A very low percentage of patients with DVT may yield D-Dimer results   below the cut-off of 0.50 mg/L FEU.  This is known to be more   prevalent in patients with distal DVT.             Results of this test should always be interpreted in conjunction with   the patient's medical history, clinical presentation and other   findings.  Clinical diagnosis should not be based on the result of   INNOVANCE D-Dimer alone.   CBC WITH AUTO DIFFERENTIAL - Abnormal; Notable for the following components:    Basophil % 2.0 (*)     nRBC 0.4 (*)     All other components within normal limits   COVID-19,CEPHEID/WALTER,COR/[facility] IN-HOUSE,NP SWAB IN TRANSPORT MEDIA 3-4 HR TAT, RT-PCR - Normal    Narrative:     Fact sheet for providers: https://www.fda.gov/media/181855/download     Fact sheet for patients: https://www.fda.gov/media/158350/download  Fact sheet for providers: https://www.fda.gov/media/828589/download    Fact sheet for patients: https://www.fda.gov/media/668904/download    Test performed by PCR.   BNP (IN-HOUSE) - Normal    Narrative:     Among patients with dyspnea, NT-proBNP is highly sensitive for the detection of acute congestive heart failure. In addition NT-proBNP of <300 pg/ml effectively rules out acute congestive heart failure with 99% negative predictive value.    Results may be falsely decreased if patient taking Biotin.     TROPONIN (IN-HOUSE) - Normal    Narrative:     Troponin T Reference Range:  <= 0.03 ng/mL-   Negative for AMI  >0.03 ng/mL-     Abnormal for myocardial necrosis.  Clinicians would have to utilize clinical acumen, EKG, Troponin and  serial changes to determine if it is an Acute Myocardial Infarction or myocardial injury due to an underlying chronic condition.       Results may be falsely decreased if patient taking Biotin.     URINE DRUG SCREEN - Normal    Narrative:     Negative Thresholds Per Drugs Screened:    Amphetamines                 500 ng/ml  Barbiturates                 200 ng/ml  Benzodiazepines              100 ng/ml  Cocaine                      300 ng/ml  Methadone                    300 ng/ml  Opiates                      300 ng/ml  Oxycodone                    100 ng/ml  THC                           50 ng/ml    The Normal Value for all drugs tested is negative. This report includes final unconfirmed screening results to be used for medical treatment purposes only. Unconfirmed results must not be used for non-medical purposes such as employment or legal testing. Clinical consideration should be applied to any drug of abuse test, particularly when unconfirmed results are used.          All urine drugs of abuse requests without chain of custody are for medical screening purposes only.  False positives are possible.     TROPONIN (IN-HOUSE) - Normal    Narrative:     Troponin T Reference Range:  <= 0.03 ng/mL-   Negative for AMI  >0.03 ng/mL-     Abnormal for myocardial necrosis.  Clinicians would have to utilize clinical acumen, EKG, Troponin and serial changes to determine if it is an Acute Myocardial Infarction or myocardial injury due to an underlying chronic condition.       Results may be falsely decreased if patient taking Biotin.     TROPONIN (IN-HOUSE) - Normal    Narrative:     Troponin T Reference Range:  <= 0.03 ng/mL-   Negative for AMI  >0.03 ng/mL-     Abnormal for myocardial necrosis.  Clinicians would have to utilize clinical acumen, EKG, Troponin and serial changes to determine if it is an Acute Myocardial Infarction or myocardial injury due to an underlying chronic condition.       Results may be falsely  decreased if patient taking Biotin.     COVID PRE-OP / PRE-PROCEDURE SCREENING ORDER (NO ISOLATION)    Narrative:     The following orders were created for panel order COVID PRE-OP / PRE-PROCEDURE SCREENING ORDER (NO ISOLATION) - Swab, Nasopharynx.  Procedure                               Abnormality         Status                     ---------                               -----------         ------                     COVID-19,CEPHEID/WALTER,CO...[[id number]]  Normal              Final result                 Please view results for these tests on the individual orders.   CBC AND DIFFERENTIAL    Narrative:     The following orders were created for panel order CBC & Differential.  Procedure                               Abnormality         Status                     ---------                               -----------         ------                     CBC Auto Differential[[id number]]        Abnormal            Final result                 Please view results for these tests on the individual orders.       Medications   sodium chloride 0.9 % bolus 500 mL (0 mL Intravenous Stopped 3/7/22 1235)   iopamidol (ISOVUE-370) 76 % injection 100 mL (100 mL Intravenous Given 3/7/22 1358)       No radiology results for the last day     Due to patient's recent cardiac ablation ablation, will recommend admission to office and monitor on telemetry with patient's cardiologist contacted for consult for the morning.  Patient is amendable to staying in the hospital for further evaluation and care.                                      MDM    Final diagnoses:   Palpitations       ED Disposition  ED Disposition     ED Disposition   Decision to Admit    Condition   --    Comment   --             Celine Garcia MD  [address] [place] [address] [phone]    Schedule an appointment as soon as possible for a visit in 1 week(s)      Candido Montana MD  [address] [facility] [place] [address] [phone]    Follow  up in 6 week(s)           Medication List      New Prescriptions    apixaban 5 MG tablet tablet  Commonly known as: ELIQUIS  Take 1 tablet by mouth Every 12 (Twelve) Hours. Indications: Atrial Fibrillation           Where to Get Your Medications      These medications were sent to [facility] [address] [place] [address] [place] - [address] [facility] - [phone]  - [phone]   [address] [facility] [place] [address]    Phone: [phone]   · apixaban 5 MG tablet tablet          Vilma Hicks, APRN  03/11/22 1932

## 2022-03-08 ENCOUNTER — APPOINTMENT (OUTPATIENT)
Dept: RESPIRATORY THERAPY | Facility: HOSPITAL | Age: 57
End: 2022-03-08

## 2022-03-08 VITALS
RESPIRATION RATE: 18 BRPM | OXYGEN SATURATION: 96 % | HEIGHT: 70 IN | WEIGHT: 195.77 LBS | DIASTOLIC BLOOD PRESSURE: 98 MMHG | SYSTOLIC BLOOD PRESSURE: 159 MMHG | HEART RATE: 80 BPM | BODY MASS INDEX: 28.03 KG/M2 | TEMPERATURE: 97.8 F

## 2022-03-08 PROBLEM — Z86.79 HISTORY OF ATRIAL FIBRILLATION: Status: ACTIVE | Noted: 2022-03-08

## 2022-03-08 PROBLEM — R00.2 PALPITATIONS: Status: RESOLVED | Noted: 2020-12-30 | Resolved: 2022-03-08

## 2022-03-08 LAB — TROPONIN T SERPL-MCNC: <0.01 NG/ML (ref 0–0.03)

## 2022-03-08 PROCEDURE — 84484 ASSAY OF TROPONIN QUANT: CPT | Performed by: EMERGENCY MEDICINE

## 2022-03-08 PROCEDURE — 63710000001 INSULIN GLARGINE PER 5 UNITS: Performed by: NURSE PRACTITIONER

## 2022-03-08 PROCEDURE — G0378 HOSPITAL OBSERVATION PER HR: HCPCS

## 2022-03-08 PROCEDURE — 99213 OFFICE O/P EST LOW 20 MIN: CPT | Performed by: INTERNAL MEDICINE

## 2022-03-08 PROCEDURE — 93270 REMOTE 30 DAY ECG REV/REPORT: CPT | Performed by: INTERNAL MEDICINE

## 2022-03-08 RX ORDER — METOPROLOL SUCCINATE 50 MG/1
100 TABLET, EXTENDED RELEASE ORAL DAILY
Status: DISCONTINUED | OUTPATIENT
Start: 2022-03-08 | End: 2022-03-08 | Stop reason: HOSPADM

## 2022-03-08 RX ORDER — ASPIRIN 81 MG/1
81 TABLET, CHEWABLE ORAL DAILY
Status: DISCONTINUED | OUTPATIENT
Start: 2022-03-08 | End: 2022-03-08 | Stop reason: HOSPADM

## 2022-03-08 RX ORDER — INSULIN LISPRO 100 [IU]/ML
10 INJECTION, SOLUTION INTRAVENOUS; SUBCUTANEOUS
Status: DISCONTINUED | OUTPATIENT
Start: 2022-03-08 | End: 2022-03-08 | Stop reason: HOSPADM

## 2022-03-08 RX ORDER — ATORVASTATIN CALCIUM 40 MG/1
80 TABLET, FILM COATED ORAL NIGHTLY
Status: DISCONTINUED | OUTPATIENT
Start: 2022-03-08 | End: 2022-03-08 | Stop reason: HOSPADM

## 2022-03-08 RX ORDER — LISINOPRIL 20 MG/1
40 TABLET ORAL DAILY
Status: DISCONTINUED | OUTPATIENT
Start: 2022-03-08 | End: 2022-03-08 | Stop reason: HOSPADM

## 2022-03-08 RX ADMIN — METFORMIN HYDROCHLORIDE 1000 MG: 500 TABLET ORAL at 10:13

## 2022-03-08 RX ADMIN — ASPIRIN 81 MG CHEWABLE TABLET 81 MG: 81 TABLET CHEWABLE at 10:13

## 2022-03-08 RX ADMIN — Medication 10 ML: at 10:14

## 2022-03-08 RX ADMIN — METOPROLOL SUCCINATE 100 MG: 50 TABLET, EXTENDED RELEASE ORAL at 10:13

## 2022-03-08 RX ADMIN — INSULIN GLARGINE 30 UNITS: 100 INJECTION, SOLUTION SUBCUTANEOUS at 10:14

## 2022-03-08 RX ADMIN — APIXABAN 5 MG: 5 TABLET, FILM COATED ORAL at 11:00

## 2022-03-08 RX ADMIN — LISINOPRIL 40 MG: 20 TABLET ORAL at 10:13

## 2022-03-08 NOTE — PLAN OF CARE
Problem: Adult Inpatient Plan of Care  Goal: Plan of Care Review  Outcome: Ongoing, Progressing  Goal: Patient-Specific Goal (Individualized)  Outcome: Ongoing, Progressing  Goal: Absence of Hospital-Acquired Illness or Injury  Outcome: Ongoing, Progressing  Intervention: Identify and Manage Fall Risk  Recent Flowsheet Documentation  Taken 3/8/2022 0330 by Mariama Fuller RN  Safety Promotion/Fall Prevention: safety round/check completed  Taken 3/8/2022 0123 by Mariama Fuller RN  Safety Promotion/Fall Prevention: safety round/check completed  Taken 3/7/2022 2345 by Mariama Fuller RN  Safety Promotion/Fall Prevention: safety round/check completed  Taken 3/7/2022 2115 by Mariama Fuller RN  Safety Promotion/Fall Prevention: safety round/check completed  Taken 3/7/2022 1931 by Mariama Fuller RN  Safety Promotion/Fall Prevention: safety round/check completed  Intervention: Prevent and Manage VTE (Venous Thromboembolism) Risk  Recent Flowsheet Documentation  Taken 3/7/2022 1931 by Mariama Fuller RN  VTE Prevention/Management:   bilateral   dorsiflexion/plantar flexion performed  Intervention: Prevent Infection  Recent Flowsheet Documentation  Taken 3/8/2022 0330 by Mariama Fuller RN  Infection Prevention:   single patient room provided   rest/sleep promoted   hand hygiene promoted  Taken 3/8/2022 0123 by Mariama Fuller RN  Infection Prevention:   single patient room provided   rest/sleep promoted   hand hygiene promoted  Taken 3/7/2022 2345 by Mariama Fuller RN  Infection Prevention:   single patient room provided   rest/sleep promoted   hand hygiene promoted  Taken 3/7/2022 2115 by Mariama Fuller RN  Infection Prevention:   single patient room provided   rest/sleep promoted   hand hygiene promoted  Taken 3/7/2022 1931 by Mariama Fuller RN  Infection Prevention:   single patient room provided   rest/sleep promoted   hand hygiene promoted  Goal: Optimal Comfort and Wellbeing  Outcome: Ongoing, Progressing  Intervention: Provide  Person-Centered Care  Recent Flowsheet Documentation  Taken 3/7/2022 1931 by Mariama Fuller, RN  Trust Relationship/Rapport:   care explained   questions answered   questions encouraged   thoughts/feelings acknowledged  Goal: Readiness for Transition of Care  Outcome: Ongoing, Progressing  Intervention: Mutually Develop Transition Plan  Recent Flowsheet Documentation  Taken 3/7/2022 1926 by Mariama Fuller, RN  Equipment Currently Used at Home: none  Transportation Anticipated: family or friend will provide  Patient/Family Anticipated Services at Transition: none  Patient/Family Anticipates Transition to: home with family   Goal Outcome Evaluation:            Patient resting in bed. He has complained of pain this shift, but refuses any pain medication.  Patient has been NPO since midnight for a cardiology consult.  Vitals stable and on room air.  Will continue to monitor.

## 2022-03-08 NOTE — CONSULTS
HP      Name: Preet Jones ADMIT: 3/7/2022   : 1965  PCP: Celine Garcia MD    MRN: [id number] LOS: 0 days   AGE/SEX: 56 y.o. male  ROOM: 115/1     Chief Complaint   Patient presents with   • Palpitations     [Pt stats palpitations, neck and shoulders are sore, pt had recent ablation done here        Subjective         History of present illness  Preet Jones is a 56-year-old male patient who was history of coronary artery disease status post PCI to the marginal on 2017, history of atrial fibrillation status post cryoablation and CTI on 2021, hypertension, dyslipidemia, diabetes, history of stroke.  Patient was experiencing palpitations and SVT was documented on the monitor and therefore an EP study was done on 2022 with ablation of atypical AVNRT.  Patient however complained of palpitations again and presented to the ER.  He was found to be in sinus rhythm when he presented.    Past Medical History:   Diagnosis Date   • Abnormal cardiovascular stress test 2017   • Acute myocardial infarction (HCC) 2019   • Aortic aneurysm (HCC) 2017   • Arrhythmia    • Bicuspid aortic valve 2017   • Coronary artery disease 10/29/2019   • Disease of thyroid gland    • History of coronary angioplasty with insertion of stent    • Hyperlipidemia    • Hypertension    • Obesity 2011   • Primary hyperparathyroidism (HCC) 2011   • RUE weakness 10/29/2019    Previous residual from Stroke, but was not work prohibiting.   • Sinus arrhythmia    • Stroke ([facility])     3x   • Sustained SVT ([facility])    • Type 2 diabetes mellitus ([facility]) 10/29/2019     Past Surgical History:   Procedure Laterality Date   • APPENDECTOMY     • BACK SURGERY     • CARDIAC CATHETERIZATION     • CARDIAC CATHETERIZATION     • CARDIAC CATHETERIZATION N/A 2020    Procedure: Cardiac Catheterization/Vascular Study;  Surgeon: Moris Pedro MD;  Location: [facility] CATH INVASIVE LOCATION;   Service: Cardiovascular;  Laterality: N/A;   • CARDIAC CATHETERIZATION N/A 2/1/2022    Procedure: Left Heart Cath;  Surgeon: Kamlesh Richardson MD;  Location: [facility] CATH INVASIVE LOCATION;  Service: Cardiovascular;  Laterality: N/A;   • CARDIAC ELECTROPHYSIOLOGY PROCEDURE N/A 1/20/2021    Procedure: EP/Ablation;  Surgeon: Nathen Olmstead MD;  Location: [facility] CATH INVASIVE LOCATION;  Service: Cardiovascular;  Laterality: N/A;   • CARDIAC ELECTROPHYSIOLOGY PROCEDURE N/A 2/24/2022    Procedure: EP/Ablation-SVT Utong aware;  Surgeon: Candido Montana MD;  Location: [facility] CATH INVASIVE LOCATION;  Service: Cardiology;  Laterality: N/A;   • JOINT REPLACEMENT     • KNEE ARTHROSCOPY     • THYROIDECTOMY, PARTIAL  2015     Family History   Problem Relation Age of Onset   • Heart disease Mother    • Parkinsonism Father    • Cancer Father      Social History     Tobacco Use   • Smoking status: Never Smoker   • Smokeless tobacco: Never Used   Vaping Use   • Vaping Use: Never used   Substance Use Topics   • Alcohol use: No   • Drug use: No     No medications prior to admission.     Allergies:  Morphine, Isosorbide, and Nitrofuran derivatives    Review of systems    Constitutional: Negative.    Respiratory and cardiovascular: As detailed in HPI section.  Gastrointestinal: Negative for constipation, nausea and vomiting negative for abdominal distention, abdominal pain and diarrhea.   Genitourinary: Negative for difficulty urinating and flank pain.   Musculoskeletal: Negative for arthralgias, joint swelling and myalgias.   Skin: Negative for color change, rash and wound.   Neurological: Negative for dizziness, syncope, weakness and headaches.   Hematological: Negative for adenopathy.   Psychiatric/Behavioral: Negative for confusion.   All other systems reviewed and are negative.       Physical Exam  VITALS REVIEWED    General:      well developed, in no acute distress.    Head:      normocephalic and atraumatic.     Eyes:      PERRL/EOM intact, conjunctiva and sclera clear with out nystagmus.    Neck:      no masses, thyromegaly,  trachea central with normal respiratory effort and PMI displaced laterally  Lungs:      Clear to auscultation bilaterally  Heart:       Regular rate and rhythm  Msk:      no deformity or scoliosis noted of thoracic or lumbar spine.    Pulses:      pulses normal in all 4 extremities.    Extremities:       No lower extremity edema  Neurologic:      no focal deficits.   alert oriented x3  Skin:      intact without lesions or rashes.    Psych:      alert and cooperative; normal mood and affect; normal attention span and concentration.      Result Review :                Pertinent cardiac workup      1. Heart catheterization 2/1/2022 40 to 50% in-stent stenosis of the first marginal, distal circumflex and left PDA had 80% stenosis, not amenable for intervention.  Ejection fraction 55%  2. Holter monitor on 1/26/2022 for 6 days showed an episode of supraventricular tachycardia with heart rate sustained at 200 bpm lasting 2 minutes.          Assessment and Plan          Coronary artery disease involving coronary bypass graft of native heart with unstable angina pectoris (HCC)    Paroxysmal SVT (supraventricular tachycardia) (HCC)    History of atrial fibrillation      Preet Jones is a 56-year-old male patient who is here today for palpitations.  Patient had an EP study done with ablation of atypical AVNRT in February 2022.  At that time the previously performed flutter line was tested and bidirectional block was confirmed.  And no atrial fibrillation was induced.  Today he is in sinus rhythm.  He is already on metoprolol.  Patient has CAD with recent heart catheterization as detailed above with no PCI.  At this time I think he can be discharged home today with a event monitor for 1 month and I will see him in the clinic after he turns the monitoring.        No follow-ups on file.  Patient was given  instructions and counseling regarding his condition or for health maintenance advice. Please see specific information pulled into the AVS if appropriate.

## 2022-03-08 NOTE — DISCHARGE SUMMARY
"[facility]    Celine Garcia MD    CHIEF COMPLAINT:   Palpitations.      HISTORY OF PRESENT ILLNESS:    56-year-old male presents to the ER with a chief complaint of palpitations with dizziness and chest pressure which began as he was at work today.  Patient reports associated shortness of breath and sweating.  The patient has had similar experiences in the past with known history of atrial for ablation in the remote past with prior ablation.  The patient had EP ablation for SVT on 2/24/2022.  The patient reports that the chest pressure is only present when he feels like his heart is racing.  Patient denies any nausea.    Review of records: Patient had left MCA CVA in 2019 and 2 other prior strokes.  The patient had aggressive physical rehab and is currently back to work but still has some trouble with speech and right lower extremity \"limp.\"  At that time patient had hemoglobin A1c of 12.  Heart cath 2/1/2022 showing CAD with noted distal left PDA 80% narrowing which was thought to be better suited for medical treatment.  Patient had 40 to 50% in-stent narrowing the first marginal.  Mid circumflex has borderline 60% narrowing.  Ejection fraction 55%.         Past Medical History:   Diagnosis Date   • Abnormal cardiovascular stress test 1/19/2017   • Acute myocardial infarction (HCC) 2019   • Aortic aneurysm (HCC) 6/22/2017   • Arrhythmia    • Bicuspid aortic valve 2/16/2017   • Coronary artery disease 10/29/2019   • Disease of thyroid gland    • History of coronary angioplasty with insertion of stent    • Hyperlipidemia    • Hypertension    • Obesity 9/2/2011   • Primary hyperparathyroidism (HCC) 9/2/2011   • RUE weakness 10/29/2019    Previous residual from Stroke, but was not work prohibiting.   • Sinus arrhythmia    • Stroke ([facility])     3x   • Sustained SVT ([facility])    • Type 2 diabetes mellitus ([facility]) 10/29/2019     Past Surgical History:   Procedure Laterality Date   • APPENDECTOMY     • " BACK SURGERY     • CARDIAC CATHETERIZATION  2010   • CARDIAC CATHETERIZATION  2019   • CARDIAC CATHETERIZATION N/A 12/31/2020    Procedure: Cardiac Catheterization/Vascular Study;  Surgeon: Moris Pedro MD;  Location:  [facility];  Service: Cardiovascular;  Laterality: N/A;   • CARDIAC CATHETERIZATION N/A 2/1/2022    Procedure: Left Heart Cath;  Surgeon: Kamlesh Richardson MD;  Location:  [facility];  Service: Cardiovascular;  Laterality: N/A;   • CARDIAC ELECTROPHYSIOLOGY PROCEDURE N/A 1/20/2021    Procedure: EP/Ablation;  Surgeon: Nathen Olmstead MD;  Location:  [facility];  Service: Cardiovascular;  Laterality: N/A;   • CARDIAC ELECTROPHYSIOLOGY PROCEDURE N/A 2/24/2022    Procedure: EP/Ablation-SVT Utong aware;  Surgeon: Candido Montana MD;  Location:  [facility];  Service: Cardiology;  Laterality: N/A;   • JOINT REPLACEMENT     • KNEE ARTHROSCOPY     • THYROIDECTOMY, PARTIAL  2015     Family History   Problem Relation Age of Onset   • Heart disease Mother    • Parkinsonism Father    • Cancer Father      Social History     Tobacco Use   • Smoking status: Never Smoker   • Smokeless tobacco: Never Used   Vaping Use   • Vaping Use: Never used   Substance Use Topics   • Alcohol use: No   • Drug use: No     Medications Prior to Admission   Medication Sig Dispense Refill Last Dose   • aspirin 81 MG chewable tablet Chew 81 mg Daily.   3/7/2022 at Unknown time   • atorvastatin (LIPITOR) 80 MG tablet Take 1 tablet by mouth Every Night. 90 tablet 1 3/7/2022 at Unknown time   • Cholecalciferol (VITAMIN D-3) 25 MCG (1000 UT) capsule Take 3 capsules by mouth Daily.   3/7/2022 at Unknown time   • insulin detemir (LEVEMIR) 100 UNIT/ML injection Inject 30 Units under the skin into the appropriate area as directed Daily.   3/7/2022 at Unknown time   • insulin lispro (ADMELOG) 100 UNIT/ML injection Inject 10 Units under the skin  into the appropriate area as directed 3 (Three) Times a Day With Meals. 10 mL 2 3/7/2022 at Unknown time   • lisinopril (PRINIVIL,ZESTRIL) 40 MG tablet Take 40 mg by mouth Daily.   3/7/2022 at Unknown time   • metFORMIN (GLUCOPHAGE) 1000 MG tablet 2 (Two) Times a Day With Meals.   3/7/2022 at Unknown time   • metoprolol succinate XL (TOPROL-XL) 100 MG 24 hr tablet Take 100 mg by mouth Daily.   3/7/2022 at Unknown time     Allergies:  Morphine, Isosorbide, and Nitrofuran derivatives    Immunization History   Administered Date(s) Administered   • COVID-19 (PFIZER) PURPLE CAP 03/15/2021, 04/05/2021, 10/04/2021   • FluLaval/Fluarix/Fluzone >6 11/06/2020           REVIEW OF SYSTEMS:    Review of Systems   Constitutional: Positive for diaphoresis. Negative for chills and fever.   Cardiovascular: Positive for chest pain, near-syncope and palpitations. Negative for leg swelling.   Gastrointestinal: Negative for nausea.   Neurological: Positive for dizziness.   All other systems reviewed and are negative.      Vital Signs  Temp:  [97.7 °F (36.5 °C)-98 °F (36.7 °C)] 97.8 °F (36.6 °C)  Heart Rate:  [61-80] 80  Resp:  [18] 18  BP: (132-160)/(86-98) 159/98          Physical Exam:  Physical Exam  Vitals and nursing note reviewed.   Constitutional:       Appearance: Normal appearance. He is not ill-appearing.   HENT:      Head: Normocephalic and atraumatic.      Right Ear: External ear normal.      Left Ear: External ear normal.      Nose: Nose normal.      Mouth/Throat:      Mouth: Mucous membranes are moist.   Eyes:      General: No scleral icterus.        Right eye: No discharge.         Left eye: No discharge.      Extraocular Movements: Extraocular movements intact.      Conjunctiva/sclera: Conjunctivae normal.      Pupils: Pupils are equal, round, and reactive to light.   Cardiovascular:      Rate and Rhythm: Normal rate and regular rhythm.      Pulses: Normal pulses.      Heart sounds: Normal heart sounds.   Pulmonary:       Effort: Pulmonary effort is normal.      Breath sounds: Normal breath sounds.   Abdominal:      General: Bowel sounds are normal.      Palpations: Abdomen is soft.   Musculoskeletal:         General: Normal range of motion.      Cervical back: Normal range of motion and neck supple.      Right lower leg: No edema.      Left lower leg: No edema.   Skin:     General: Skin is warm and dry.      Capillary Refill: Capillary refill takes less than 2 seconds.   Neurological:      General: No focal deficit present.      Mental Status: He is alert and oriented to person, place, and time.   Psychiatric:         Mood and Affect: Mood normal.         Behavior: Behavior normal.         Thought Content: Thought content normal.         Judgment: Judgment normal.         Emotional Behavior:   Cooperative; pleasant   Debilities:  None observed  Results Review:    I reviewed the patient's new clinical results.  Lab Results (most recent)     Procedure Component Value Units Date/Time    Troponin [[id number]]  (Normal) Collected: 03/08/22 0555    Specimen: Blood Updated: 03/08/22 0700     Troponin T <0.010 ng/mL     Narrative:      Troponin T Reference Range:  <= 0.03 ng/mL-   Negative for AMI  >0.03 ng/mL-     Abnormal for myocardial necrosis.  Clinicians would have to utilize clinical acumen, EKG, Troponin and serial changes to determine if it is an Acute Myocardial Infarction or myocardial injury due to an underlying chronic condition.       Results may be falsely decreased if patient taking Biotin.      COVID PRE-OP / PRE-PROCEDURE SCREENING ORDER (NO ISOLATION) - Swab, Nasopharynx [[id number]]  (Normal) Collected: 03/07/22 1812    Specimen: Swab from Nasopharynx Updated: 03/07/22 1836    Narrative:      The following orders were created for panel order COVID PRE-OP / PRE-PROCEDURE SCREENING ORDER (NO ISOLATION) - Swab, Nasopharynx.  Procedure                               Abnormality         Status                     ---------                                -----------         ------                     COVID-19,CEPHEID/WALTER,CO...[[id number]]  Normal              Final result                 Please view results for these tests on the individual orders.    COVID-19,CEPHEID/WALTER,COR/[facility]/PAD/[facility] IN-HOUSE(OR EMERGENT/ADD-ON),NP SWAB IN TRANSPORT MEDIA 3-4 HR TAT, RT-PCR - Swab, Nasopharynx [[id number]]  (Normal) Collected: 03/07/22 1812    Specimen: Swab from Nasopharynx Updated: 03/07/22 1836     COVID19 Not Detected    Narrative:      Fact sheet for providers: https://www.fda.gov/media/254082/download     Fact sheet for patients: https://www.fda.gov/media/639631/download  Fact sheet for providers: https://www.fda.gov/media/531598/download    Fact sheet for patients: https://www.fda.gov/media/280943/download    Test performed by PCR.    Urine Drug Screen - Urine, Clean Catch [[id number]]  (Normal) Collected: 03/07/22 1726    Specimen: Urine, Clean Catch Updated: 03/07/22 1754     Amphet/Methamphet, Screen Negative     Barbiturates Screen, Urine Negative     Benzodiazepine Screen, Urine Negative     Cocaine Screen, Urine Negative     Opiate Screen Negative     THC, Screen, Urine Negative     Methadone Screen, Urine Negative     Oxycodone Screen, Urine Negative    Narrative:      Negative Thresholds Per Drugs Screened:    Amphetamines                 500 ng/ml  Barbiturates                 200 ng/ml  Benzodiazepines              100 ng/ml  Cocaine                      300 ng/ml  Methadone                    300 ng/ml  Opiates                      300 ng/ml  Oxycodone                    100 ng/ml  THC                           50 ng/ml    The Normal Value for all drugs tested is negative. This report includes final unconfirmed screening results to be used for medical treatment purposes only. Unconfirmed results must not be used for non-medical purposes such as employment or legal testing. Clinical consideration should be applied to any drug of abuse  test, particularly when unconfirmed results are used.          All urine drugs of abuse requests without chain of custody are for medical screening purposes only.  False positives are possible.      Troponin [[id number]]  (Normal) Collected: 03/07/22 1636    Specimen: Blood from Arm, Left Updated: 03/07/22 1700     Troponin T <0.010 ng/mL     Narrative:      Troponin T Reference Range:  <= 0.03 ng/mL-   Negative for AMI  >0.03 ng/mL-     Abnormal for myocardial necrosis.  Clinicians would have to utilize clinical acumen, EKG, Troponin and serial changes to determine if it is an Acute Myocardial Infarction or myocardial injury due to an underlying chronic condition.       Results may be falsely decreased if patient taking Biotin.      Comprehensive Metabolic Panel [[id number]]  (Abnormal) Collected: 03/07/22 1116    Specimen: Blood Updated: 03/07/22 1146     Glucose 258 mg/dL      BUN 15 mg/dL      Creatinine 0.88 mg/dL      Sodium 136 mmol/L      Potassium 4.0 mmol/L      Chloride 103 mmol/L      CO2 23.0 mmol/L      Calcium 9.2 mg/dL      Total Protein 6.2 g/dL      Albumin 3.50 g/dL      ALT (SGPT) 16 U/L      AST (SGOT) 11 U/L      Alkaline Phosphatase 91 U/L      Total Bilirubin 0.4 mg/dL      Globulin 2.7 gm/dL      A/G Ratio 1.3 g/dL      BUN/Creatinine Ratio 17.0     Anion Gap 10.0 mmol/L      eGFR 100.3 mL/min/1.73      Comment: National Kidney Foundation and American Society of Nephrology (ASN) Task Force recommended calculation based on the Chronic Kidney Disease Epidemiology Collaboration (CKD-EPI) equation refit without adjustment for race.       Narrative:      GFR Normal >60  Chronic Kidney Disease <60  Kidney Failure <15      BNP [[id number]]  (Normal) Collected: 03/07/22 1116    Specimen: Blood Updated: 03/07/22 1144     proBNP 551.0 pg/mL     Narrative:      Among patients with dyspnea, NT-proBNP is highly sensitive for the detection of acute congestive heart failure. In addition NT-proBNP of <300  pg/ml effectively rules out acute congestive heart failure with 99% negative predictive value.    Results may be falsely decreased if patient taking Biotin.      D-dimer, Quantitative [[id number]]  (Abnormal) Collected: 03/07/22 1116    Specimen: Blood Updated: 03/07/22 1138     D-Dimer, Quantitative 0.82 mg/L (FEU)     Narrative:      Reference Range  --------------------------------------------------------------------     < 0.50   Negative Predictive Value  0.50-0.59   Indeterminate    >= 0.60   Probable VTE             A very low percentage of patients with DVT may yield D-Dimer results   below the cut-off of 0.50 mg/L FEU.  This is known to be more   prevalent in patients with distal DVT.             Results of this test should always be interpreted in conjunction with   the patient's medical history, clinical presentation and other   findings.  Clinical diagnosis should not be based on the result of   INNOVANCE D-Dimer alone.    CBC & Differential [[id number]]  (Abnormal) Collected: 03/07/22 1116    Specimen: Blood Updated: 03/07/22 1122    Narrative:      The following orders were created for panel order CBC & Differential.  Procedure                               Abnormality         Status                     ---------                               -----------         ------                     CBC Auto Differential[[id number]]        Abnormal            Final result                 Please view results for these tests on the individual orders.    CBC Auto Differential [[id number]]  (Abnormal) Collected: 03/07/22 1116    Specimen: Blood Updated: 03/07/22 1122     WBC 6.30 10*3/mm3      RBC 4.67 10*6/mm3      Hemoglobin 13.2 g/dL      Hematocrit 38.3 %      MCV 82.0 fL      MCH 28.2 pg      MCHC 34.4 g/dL      RDW 14.6 %      RDW-SD 42.4 fl      MPV 9.0 fL      Platelets 208 10*3/mm3      Neutrophil % 54.4 %      Lymphocyte % 30.6 %      Monocyte % 7.7 %      Eosinophil % 5.3 %      Basophil % 2.0 %       Neutrophils, Absolute 3.40 10*3/mm3      Lymphocytes, Absolute 1.90 10*3/mm3      Monocytes, Absolute 0.50 10*3/mm3      Eosinophils, Absolute 0.30 10*3/mm3      Basophils, Absolute 0.10 10*3/mm3      nRBC 0.4 /100 WBC           Imaging Results (Most Recent)     Procedure Component Value Units Date/Time    CT Chest Pulmonary Embolism [[id number]] Collected: 03/07/22 1400     Updated: 03/07/22 1407    Narrative:         DATE OF EXAM:  3/7/2022 1:49 PM     PROCEDURE:  CT CHEST PULMONARY EMBOLISM-     INDICATIONS:   PE suspected, low/intermediate prob, positive D-dimer     COMPARISON:   1/31/2022     TECHNIQUE:  Routine transaxial slices were obtained through chest after  administration of intravenous 100 ml of Isovue 370. Reconstructed  coronal and sagittal images were also obtained. Automated exposure  control and iterative reconstruction methods were used.      FINDINGS:  Pulmonary arteries: 20 arteries are adequately opacified. No emboli are  demonstrated     Mediastinum: No mediastinal adenopathy. Thoracic aorta normal in  caliber. Coronary artery calcifications. No pericardial effusion     Lungs/pleura: Mild interlobular septal thickening in the lung bases. No  airspace disease. No pleural effusion     Upper abdomen: Unremarkable     Bones/soft tissues: No acute bony abnormality. Bilateral gynecomastia        Impression:         1. No evidence of pulmonary embolism  2. Mild interlobular septal thickening in the lung bases suggests mild  interstitial edema  3. Calcific coronary atherosclerosis     Electronically Signed By-Jairon Mcguire On:3/7/2022 2:04 PM  This report was finalized on [id number] by  Jairon Mcguire, .    XR Chest 1 View [[id number]] Collected: 03/07/22 1153     Updated: 03/07/22 1155    Narrative:      DATE OF EXAM:  3/7/2022 11:31 AM     PROCEDURE:  XR CHEST 1 VW-     INDICATIONS:  chest palpitations       COMPARISON:  AP portable chest 1/31/2022.     TECHNIQUE:   Single radiographic view of  the chest was obtained.     FINDINGS:  No acute airspace disease. Normal heart size. No pleural effusion or  pneumothorax or acute osseous abnormality.       Impression:      No acute chest finding.     Electronically Signed By-Meliza Zabala MD On:3/7/2022 11:53 AM  This report was finalized on [id number] by  Meliza Zabala MD.        reviewed    ECG/EMG Results (most recent)     Procedure Component Value Units Date/Time    ECG 12 Lead [[id number]] Collected: 03/07/22 1042     Updated: 03/07/22 1043     QT Interval 419 ms     Narrative:      HEART RATE= 71  bpm  RR Interval= 840  ms  MN Interval= 208  ms  P Horizontal Axis= -32  deg  P Front Axis= 0  deg  QRSD Interval= 90  ms  QT Interval= 419  ms  QRS Axis= -35  deg  T Wave Axis= 48  deg  - ABNORMAL ECG -  Sinus rhythm  Borderline prolonged MN interval  Left axis deviation  Left anterior fascicular block  When compared with ECG of 24-Feb-2022 19:45:08,  No significant change  Electronically Signed By:   Date and Time of Study: 2022-03-07 10:42:31        reviewed    Results for orders placed during the hospital encounter of 01/31/22    Duplex Groin Pseudoaneurysm unilateral CAR    Interpretation Summary  · No evidence of pseudoaneurysm or AV fistula in the right groin.      Results for orders placed during the hospital encounter of 01/20/21    Adult Transesophageal Echo (FRAN) W/ Cont if Necessary Per Protocol    Interpretation Summary  · Left ventricular wall thickness is consistent with concentric hypertrophy.  · Estimated left ventricular EF = 45% Estimated left ventricular EF was in agreement with the calculated left ventricular EF. Left ventricular ejection fraction appears to be 41 - 45%. Left ventricular systolic function is low normal.  · The right atrial cavity is mildly dilated.  · With a centrally-directed jet noted.  · Estimated right ventricular systolic pressure from tricuspid regurgitation is normal (<35 mmHg).    Procedure indication  Recurrent  cardioembolic stroke and history of atrial arrhythmias and patient brought in for FRAN prior to atrial arrhythmia ablation      conscious sedation administered by anesthesia    consent obtained before procedure      Procedure note  after obtaining a valid consent patient was sedated by Anesthesia and a FRAN probe was easily placed into esophagus with multiplane imaging with 2D, color and Doppler followed by bubble study with agitated saline without any complications    FRAN  Findings    Moderate left atrial enlargement without any shunt or clot  LV ejection fraction of 45%  No thrombus  Mild MR and TR and trivial effusion    Plan  Proceed with EP study and ablation      Procedures done  Transesophageal echocardiography        Electronically signed by Nathen Olmstead MD, 01/20/21, 5:08 PM EST.      Microbiology Results (last 10 days)     Procedure Component Value - Date/Time    COVID PRE-OP / PRE-PROCEDURE SCREENING ORDER (NO ISOLATION) - Swab, Nasopharynx [[id number]]  (Normal) Collected: 03/07/22 1812    Lab Status: Final result Specimen: Swab from Nasopharynx Updated: 03/07/22 1836    Narrative:      The following orders were created for panel order COVID PRE-OP / PRE-PROCEDURE SCREENING ORDER (NO ISOLATION) - Swab, Nasopharynx.  Procedure                               Abnormality         Status                     ---------                               -----------         ------                     COVID-19,CEPHEID/WALTER,CO...[[id number]]  Normal              Final result                 Please view results for these tests on the individual orders.    COVID-19,CEPHEID/WALTER,COR/[facility]/PAD/[facility] IN-HOUSE(OR EMERGENT/ADD-ON),NP SWAB IN TRANSPORT MEDIA 3-4 HR TAT, RT-PCR - Swab, Nasopharynx [[id number]]  (Normal) Collected: 03/07/22 1812    Lab Status: Final result Specimen: Swab from Nasopharynx Updated: 03/07/22 1836     COVID19 Not Detected    Narrative:      Fact sheet for providers:  https://www.fda.gov/media/968517/download     Fact sheet for patients: https://www.fda.gov/media/769737/download  Fact sheet for providers: https://www.fda.gov/media/075312/download    Fact sheet for patients: https://www.Check I'm Here.gov/media/422632/download    Test performed by PCR.          Assessment/Plan     History of atrial fibrillation     Palpitations, intermittent with dizziness--likely secondary to SVT; sideration for atrial fibrillation: Electrophysiology cardiology consult  -History of CVA x3  -[id number] score 5    CAD, chronic with recent heart cath with recommendation for medical treatment: Continue aspirin; continue metoprolol; continue lisinopril    Diabetes type 2, chronic, uncontrolled with recent hemoglobin A1c 12: Continue Levemir; continue mealtime insulin; add sliding scale; hold Metformin    HLD, chronic: Continue atorvastatin    I discussed the patients findings and my recommendations with patient.     Discharge Diagnosis:      History of atrial fibrillation    Palpitations with history of atrial fibrillation with remote ablation; history of SVT with recent ablation: Discharged on Eliquis    Diabetes type 2, chronic, uncontrolled with recent hemoglobin A1c of 12: Discharged with outpatient endocrinology referral    Hospital Course  Patient is a 56 y.o. male presented with chief complaint of palpitations.  The patient has history of atrial fibrillation with remote ablation and recent ablation for SVT.  Patient also has history of CVA x3 with the most recent in 2019 following [facility].  The patient was seen by electrophysiology cardiologist with recommendation for 30-day event monitor and follow-up in 6 weeks.  I had a long discussion with the patient expressed his concern about current stroke.  We discussed patient's risk factors and the possible complications from anticoagulation therapy.  Patient states he had recently been started on Eliquis but had been told to quit taking the medication soon after  he started because his identified rhythm was SVT and not atrial fibrillation.  He had recent ablation for the SVT.  The patient is concerned that he may be having atrial fibrillation and is very concerned about possibility of stroke.  After discussion the patient will be discharged on Eliquis with reevaluation once a 30-day event monitor is reviewed.  The patient also has uncontrolled diabetes and would benefit from outpatient endocrinology evaluation.  No changes were made to his diabetes medications as he had recent change after hospitalization in February 2021 and the effectiveness of these changes have not been evaluated.    Past Medical History:     Past Medical History:   Diagnosis Date   • Abnormal cardiovascular stress test 1/19/2017   • Acute myocardial infarction (HCC) 2019   • Aortic aneurysm ([facility]) 6/22/2017   • Arrhythmia    • Bicuspid aortic valve 2/16/2017   • Coronary artery disease 10/29/2019   • Disease of thyroid gland    • History of coronary angioplasty with insertion of stent    • Hyperlipidemia    • Hypertension    • Obesity 9/2/2011   • Primary hyperparathyroidism ([facility]) 9/2/2011   • RUE weakness 10/29/2019    Previous residual from Stroke, but was not work prohibiting.   • Sinus arrhythmia    • Stroke ([facility])     3x   • Sustained SVT ([facility])    • Type 2 diabetes mellitus ([facility]) 10/29/2019       Past Surgical History:     Past Surgical History:   Procedure Laterality Date   • APPENDECTOMY     • BACK SURGERY     • CARDIAC CATHETERIZATION  2010   • CARDIAC CATHETERIZATION  2019   • CARDIAC CATHETERIZATION N/A 12/31/2020    Procedure: Cardiac Catheterization/Vascular Study;  Surgeon: Moris Pedro MD;  Location:  [facility];  Service: Cardiovascular;  Laterality: N/A;   • CARDIAC CATHETERIZATION N/A 2/1/2022    Procedure: Left Heart Cath;  Surgeon: Kamlesh Richardson MD;  Location:  [facility];  Service: Cardiovascular;  Laterality: N/A;   •  CARDIAC ELECTROPHYSIOLOGY PROCEDURE N/A 1/20/2021    Procedure: EP/Ablation;  Surgeon: Nathen Olmstead MD;  Location: [facility] CATH INVASIVE LOCATION;  Service: Cardiovascular;  Laterality: N/A;   • CARDIAC ELECTROPHYSIOLOGY PROCEDURE N/A 2/24/2022    Procedure: EP/Ablation-SVT Utong aware;  Surgeon: Candido Montana MD;  Location:  [facility];  Service: Cardiology;  Laterality: N/A;   • JOINT REPLACEMENT     • KNEE ARTHROSCOPY     • THYROIDECTOMY, PARTIAL  2015       Social History:   Social History     Socioeconomic History   • Marital status:    Tobacco Use   • Smoking status: Never Smoker   • Smokeless tobacco: Never Used   Vaping Use   • Vaping Use: Never used   Substance and Sexual Activity   • Alcohol use: No   • Drug use: No   • Sexual activity: Defer       Procedures Performed         Consults:   Consults     Date and Time Order Name Status Description    3/8/2022  7:36 AM Inpatient Cardiology Consult            Condition on Discharge:     Stable    Discharge Disposition  Home or Self Care    Discharge Medications     Discharge Medications      New Medications      Instructions Start Date   apixaban 5 MG tablet tablet  Commonly known as: ELIQUIS   5 mg, Oral, Every 12 Hours Scheduled         Continue These Medications      Instructions Start Date   aspirin 81 MG chewable tablet   81 mg, Oral, Daily      atorvastatin 80 MG tablet  Commonly known as: LIPITOR   80 mg, Oral, Nightly      insulin detemir 100 UNIT/ML injection  Commonly known as: LEVEMIR   30 Units, Subcutaneous, Daily      insulin lispro 100 UNIT/ML injection  Commonly known as: ADMELOG   10 Units, Subcutaneous, 3 Times Daily With Meals      lisinopril 40 MG tablet  Commonly known as: PRINIVIL,ZESTRIL   40 mg, Oral, Daily      metFORMIN 1000 MG tablet  Commonly known as: GLUCOPHAGE   2 Times Daily With Meals      metoprolol succinate  MG 24 hr tablet  Commonly known as: TOPROL-XL   100 mg, Oral, Daily       Vitamin D-3 25 MCG (1000 UT) capsule   3 capsules, Oral, Daily             Discharge Diet:     Activity at Discharge:     Follow-up Appointments  No future appointments.      Test Results Pending at Discharge       Risk for Readmission (LACE) Score: 5 (3/8/2022  6:01 AM)          EDWINA Quintero  03/08/22  10:55 EST

## 2022-03-08 NOTE — CASE MANAGEMENT/SOCIAL WORK
Discharge Planning Assessment  [facility]     Patient Name: Preet Jones  MRN: [id number]  Today's Date: 3/8/2022    Admit Date: 3/7/2022     Discharge Needs Assessment     Row Name 03/08/22 1222       Living Environment    People in Home spouse    Name(s) of People in Home Wife-Ariana    Current Living Arrangements home    Primary Care Provided by self    Provides Primary Care For no one    Family Caregiver if Needed spouse    Quality of Family Relationships helpful;involved;supportive    Able to Return to Prior Arrangements yes       Resource/Environmental Concerns    Resource/Environmental Concerns none    Transportation Concerns none       Transition Planning    Patient/Family Anticipates Transition to home;home with family    Patient/Family Anticipated Services at Transition none    Transportation Anticipated car, drives self;family or friend will provide       Discharge Needs Assessment    Readmission Within the Last 30 Days no previous admission in last 30 days    Equipment Currently Used at Home none    Concerns to be Addressed denies needs/concerns at this time;no discharge needs identified    Anticipated Changes Related to Illness none    Equipment Needed After Discharge none    Provided Post Acute Provider List? N/A    Provided Post Acute Provider Quality & Resource List? N/A               Discharge Plan     Row Name 03/08/22 1222       Plan    Plan DC plan: return home.    Patient/Family in Agreement with Plan yes    Plan Comments Called and spoke to patient over the phone. He lives at home with his wife, drives, and is independent with ADL's. PCP and pharmacy confirmed. Denies DME use at home. No needs/services identified. Wife is coming to pick pt up for discharge.               Demographic Summary     Row Name 03/08/22 1221       General Information    Admission Type observation    Arrived From emergency department    Referral Source admission list    Reason for Consult discharge planning;care  coordination/care conference    Preferred Language English       Contact Information    Permission Granted to Share Info With                Functional Status     Row Name 03/08/22 1222       Functional Status    Usual Activity Tolerance good    Current Activity Tolerance good       Functional Status, IADL    Medications independent    Meal Preparation independent    Housekeeping independent    Laundry independent    Shopping independent              Phone communication or documentation only - no physical contact with patient or family.    Megan Naegele, RN      Office Phone: [phone]  Office Cell: [phone]

## 2022-03-10 LAB — QT INTERVAL: 419 MS

## 2022-03-22 ENCOUNTER — TELEPHONE (OUTPATIENT)
Dept: CARDIOLOGY | Facility: CLINIC | Age: 57
End: 2022-03-22

## 2022-03-22 NOTE — TELEPHONE ENCOUNTER
Caller: MAYAR Goodwin     Best call back number:  [phone]     What is the best time to reach you: ANY TIME     Who are you requesting to speak with (clinical staff, provider,  specific staff member): STAFF     What was the call regarding: PATIENT HAD A MISSED CALL ABOUT A REFERRAL. SAW THAT THERE IS A REFERRAL FOR HIM TO SEE  FOR AN EP STUDY.     Do you require a callback: YES

## 2022-03-24 ENCOUNTER — TELEPHONE (OUTPATIENT)
Dept: CARDIOLOGY | Facility: CLINIC | Age: 57
End: 2022-03-24

## 2022-04-06 ENCOUNTER — TELEPHONE (OUTPATIENT)
Dept: CARDIOLOGY | Facility: CLINIC | Age: 57
End: 2022-04-06

## 2022-04-06 NOTE — TELEPHONE ENCOUNTER
I reviewed strips from event monitor generated on 4/5/2022 at 4:43 PM showing onset of atrial fibrillation.  I called the patient, could not get a hold of him and left a voicemail.  We will try to call him again tomorrow.

## 2022-04-06 NOTE — TELEPHONE ENCOUNTER
Pt called back per Dr. Montana request and stated his blood sugar had dropped and still having palps and he is stating he isn't feeling well. Any activity gets SOA and lightheaded. /101.

## 2022-04-12 LAB
Lab: 66
TOAL ENROLLMENT DAYS: 30

## 2022-04-12 PROCEDURE — 93272 ECG/REVIEW INTERPRET ONLY: CPT | Performed by: INTERNAL MEDICINE

## 2022-04-13 ENCOUNTER — OFFICE VISIT (OUTPATIENT)
Dept: CARDIOLOGY | Facility: CLINIC | Age: 57
End: 2022-04-13

## 2022-04-13 VITALS
HEIGHT: 70 IN | OXYGEN SATURATION: 94 % | DIASTOLIC BLOOD PRESSURE: 95 MMHG | BODY MASS INDEX: 27.77 KG/M2 | WEIGHT: 194 LBS | HEART RATE: 89 BPM | SYSTOLIC BLOOD PRESSURE: 151 MMHG

## 2022-04-13 DIAGNOSIS — I25.10 CORONARY ARTERY DISEASE INVOLVING NATIVE CORONARY ARTERY OF NATIVE HEART WITHOUT ANGINA PECTORIS: Primary | Chronic | ICD-10-CM

## 2022-04-13 DIAGNOSIS — I47.1 PSVT (PAROXYSMAL SUPRAVENTRICULAR TACHYCARDIA): ICD-10-CM

## 2022-04-13 DIAGNOSIS — I10 PRIMARY HYPERTENSION: Chronic | ICD-10-CM

## 2022-04-13 DIAGNOSIS — I48.92 ATRIAL FLUTTER WITH RAPID VENTRICULAR RESPONSE: ICD-10-CM

## 2022-04-13 PROCEDURE — 99214 OFFICE O/P EST MOD 30 MIN: CPT | Performed by: INTERNAL MEDICINE

## 2022-04-13 RX ORDER — SEMAGLUTIDE 1.34 MG/ML
INJECTION, SOLUTION SUBCUTANEOUS
COMMUNITY
Start: 2022-03-22 | End: 2022-07-18

## 2022-04-13 RX ORDER — LANCETS 33 GAUGE
EACH MISCELLANEOUS 3 TIMES DAILY
COMMUNITY
Start: 2022-03-22 | End: 2022-07-18

## 2022-04-13 RX ORDER — BLOOD SUGAR DIAGNOSTIC
STRIP MISCELLANEOUS 4 TIMES DAILY
COMMUNITY
Start: 2022-03-22 | End: 2022-07-18

## 2022-04-13 RX ORDER — PEN NEEDLE, DIABETIC 32GX 5/32"
NEEDLE, DISPOSABLE MISCELLANEOUS 4 TIMES DAILY
COMMUNITY
Start: 2022-03-22 | End: 2022-07-18

## 2022-04-13 RX ORDER — GLUCAGON INJECTION, SOLUTION 1 MG/.2ML
INJECTION, SOLUTION SUBCUTANEOUS
COMMUNITY
Start: 2022-03-22 | End: 2022-07-18

## 2022-04-13 RX ORDER — DILTIAZEM HYDROCHLORIDE 180 MG/1
180 CAPSULE, COATED, EXTENDED RELEASE ORAL DAILY
Qty: 30 CAPSULE | Refills: 3 | Status: SHIPPED | OUTPATIENT
Start: 2022-04-13 | End: 2022-07-21 | Stop reason: HOSPADM

## 2022-04-13 NOTE — PROGRESS NOTES
Progress note      Name: Preet Jones ADMIT: (Not on file)   : 1965  PCP: Celine Garcia MD    MRN: [id number] LOS: 0 days   AGE/SEX: 56 y.o. male  ROOM: Room/bed info not found     Chief Complaint   Patient presents with   • PSVT     Monitor f/u       Subjective       History of present illness  Preet Jones is a 56-year-old male patient with history of coronary artery disease status post PCI to the marginal on 2017, atrial fibrillation status post cryoablation and CTI on 2021, also hypertension, dyslipidemia, diabetes, atypical AVNRT ablation on 2022, here today for follow-up.  Patient continues to have palpitations and for that reason a event monitor was placed.  He denies any exertional chest pain or shortness of breath but when the palpitations happen he feels very tired afterwards.  No syncopal episodes.    Past Medical History:   Diagnosis Date   • Abnormal cardiovascular stress test 2017   • Acute myocardial infarction (HCC) 2019   • Aortic aneurysm ([facility]) 2017   • Arrhythmia    • Bicuspid aortic valve 2017   • Coronary artery disease 10/29/2019   • Disease of thyroid gland    • History of coronary angioplasty with insertion of stent    • Hyperlipidemia    • Hypertension    • Obesity 2011   • Primary hyperparathyroidism ([facility]) 2011   • RUE weakness 10/29/2019    Previous residual from Stroke, but was not work prohibiting.   • Sinus arrhythmia    • Stroke ([facility])     3x   • Sustained SVT ([facility])    • Type 2 diabetes mellitus ([facility]) 10/29/2019     Past Surgical History:   Procedure Laterality Date   • APPENDECTOMY     • BACK SURGERY     • CARDIAC CATHETERIZATION     • CARDIAC CATHETERIZATION     • CARDIAC CATHETERIZATION N/A 2020    Procedure: Cardiac Catheterization/Vascular Study;  Surgeon: Moris Pedro MD;  Location: [facility] CATH INVASIVE LOCATION;  Service: Cardiovascular;  Laterality: N/A;   • CARDIAC CATHETERIZATION N/A  2/1/2022    Procedure: Left Heart Cath;  Surgeon: Kamlesh Richardson MD;  Location: [facility] CATH INVASIVE LOCATION;  Service: Cardiovascular;  Laterality: N/A;   • CARDIAC ELECTROPHYSIOLOGY PROCEDURE N/A 1/20/2021    Procedure: EP/Ablation;  Surgeon: Nathen Olmstead MD;  Location:  [facility] CATH INVASIVE LOCATION;  Service: Cardiovascular;  Laterality: N/A;   • CARDIAC ELECTROPHYSIOLOGY PROCEDURE N/A 2/24/2022    Procedure: EP/Ablation-SVT [place];  Surgeon: Candido Montana MD;  Location: [facility] CATH INVASIVE LOCATION;  Service: Cardiology;  Laterality: N/A;   • JOINT REPLACEMENT     • KNEE ARTHROSCOPY     • THYROIDECTOMY, PARTIAL  2015     Family History   Problem Relation Age of Onset   • Heart disease Mother    • Parkinsonism Father    • Cancer Father      Social History     Tobacco Use   • Smoking status: Never Smoker   • Smokeless tobacco: Never Used   Vaping Use   • Vaping Use: Never used   Substance Use Topics   • Alcohol use: No   • Drug use: No     (Not in a hospital admission)    Allergies:  Morphine, Isosorbide, Nitrofuran derivatives, and Nitroglycerin      Physical Exam  VITALS REVIEWED    General:      well developed, in no acute distress.    Head:      normocephalic and atraumatic.    Eyes:      PERRL/EOM intact, conjunctiva and sclera clear with out nystagmus.    Neck:      no masses, thyromegaly,  trachea central with normal respiratory effort and PMI displaced laterally  Lungs:      Clear to auscultation bilaterally  Heart:       Regular rate and rhythm  Msk:      no deformity or scoliosis noted of thoracic or lumbar spine.    Pulses:      pulses normal in all 4 extremities.    Extremities:       No lower extremity edema  Neurologic:      no focal deficits.   alert oriented x3  Skin:      intact without lesions or rashes.    Psych:      alert and cooperative; normal mood and affect; normal attention span and concentration.      Result Review :               Pertinent cardiac  workup    1. Heart catheterization 2/1/2022 40 to 50% in-stent stenosis of the first marginal, distal circumflex and left PDA had 80% stenosis, not amenable for intervention.  Ejection fraction 55%  2. Holter monitor on 1/26/2022 for 6 days showed an episode of supraventricular tachycardia with heart rate sustained at 200 bpm lasting 2 minutes.  3. Event monitor for 28 days on 3/8/2022 showed multiple runs of atrial ectopy suggestive of atrial tachycardia but also short bursts of atrial fibrillation.  Rates were up in the 180s during the arrhythmia.  Also 14 beat run of ventricular tachycardia was seen.        Procedures        Assessment and Plan      Preet Jones is a 56-year-old male patient who has coronary artery disease, diabetes, hypertension, he does have history of atrial arrhythmias status post A. 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  Patient however continues to have arrhythmias which are symptomatic and are seen on the event monitor.  Reviewing of the strips is more suggestive of short bursts of atrial tachycardia.  For now would like to work on medical therapy, we will stop the long-acting metoprolol and switch to short acting 25 mg twice a day and also add Cardizem  once a day.  I will see him in follow-up in 3 months.  Advised the patient to stay on Eliquis.    Diagnoses and all orders for this visit:    1. Coronary artery disease involving native coronary artery of native heart without angina pectoris (Primary)    2. Primary hypertension    3. PSVT (paroxysmal supraventricular tachycardia) (HCC)    4. Atrial flutter with rapid ventricular response (HCC)  Overview:  Added automatically from request for surgery [id number]      Other orders  -     metoprolol tartrate (LOPRESSOR) 25 MG tablet; Take 1 tablet by mouth 2 (Two) Times a Day.  Dispense: 60 tablet; Refill: 3  -     dilTIAZem CD (Cardizem CD) 180 MG 24 hr capsule; Take 1 capsule by mouth Daily.   Dispense: 30 capsule; Refill: 3           No follow-ups on file.  Patient was given instructions and counseling regarding his condition or for health maintenance advice. Please see specific information pulled into the AVS if appropriate.

## 2022-04-21 ENCOUNTER — TELEPHONE (OUTPATIENT)
Dept: CARDIOLOGY | Facility: CLINIC | Age: 57
End: 2022-04-21

## 2022-04-21 NOTE — TELEPHONE ENCOUNTER
Spoke with patient. Instructed pt to increase metoprolol to 50 mg BID and take a 25mg dose now. Pt agreeable. All questions answered.

## 2022-04-21 NOTE — TELEPHONE ENCOUNTER
Spoke with Dr. Montana to [facility] pt is calling [facility] to figure out what to do he is now having chest pains and shortness of breath. He advsd to have Meliza CRESPO call pt to f/u and see what is going on because he is in the middle of procedures currently.

## 2022-04-21 NOTE — TELEPHONE ENCOUNTER
S/w pt. Pt c/o rapid heart rate beginning at 0230 today. He stated it did subside for a little while prior to getting up for work then came back. Has some dull pain and tingling in fingers L>R. D/w Dr. Montana and will increase metoprolol to 50mg BID and take a 25mg dose now. LVM for pt to return call.

## 2022-04-25 ENCOUNTER — APPOINTMENT (OUTPATIENT)
Dept: GENERAL RADIOLOGY | Facility: HOSPITAL | Age: 57
End: 2022-04-25

## 2022-04-25 ENCOUNTER — HOSPITAL ENCOUNTER (OUTPATIENT)
Facility: HOSPITAL | Age: 57
Setting detail: OBSERVATION
Discharge: HOME | End: 2022-04-26
Attending: EMERGENCY MEDICINE | Admitting: EMERGENCY MEDICINE

## 2022-04-25 DIAGNOSIS — R00.2 PALPITATIONS: Primary | ICD-10-CM

## 2022-04-25 LAB
ALBUMIN SERPL-MCNC: 3.4 G/DL (ref 3.5–5.2)
ALBUMIN/GLOB SERPL: 1.1 G/DL
ALP SERPL-CCNC: 95 U/L (ref 39–117)
ALT SERPL W P-5'-P-CCNC: 17 U/L (ref 1–41)
ANION GAP SERPL CALCULATED.3IONS-SCNC: 9 MMOL/L (ref 5–15)
AST SERPL-CCNC: 14 U/L (ref 1–40)
BASOPHILS # BLD AUTO: 0.1 10*3/MM3 (ref 0–0.2)
BASOPHILS NFR BLD AUTO: 2 % (ref 0–1.5)
BILIRUB SERPL-MCNC: 0.3 MG/DL (ref 0–1.2)
BUN SERPL-MCNC: 16 MG/DL (ref 6–20)
BUN/CREAT SERPL: 19.8 (ref 7–25)
CALCIUM SPEC-SCNC: 8.8 MG/DL (ref 8.6–10.5)
CHLORIDE SERPL-SCNC: 105 MMOL/L (ref 98–107)
CO2 SERPL-SCNC: 22 MMOL/L (ref 22–29)
CREAT SERPL-MCNC: 0.81 MG/DL (ref 0.76–1.27)
DEPRECATED RDW RBC AUTO: 42.9 FL (ref 37–54)
EGFRCR SERPLBLD CKD-EPI 2021: 102.2 ML/MIN/1.73
EOSINOPHIL # BLD AUTO: 1 10*3/MM3 (ref 0–0.4)
EOSINOPHIL NFR BLD AUTO: 15.7 % (ref 0.3–6.2)
ERYTHROCYTE [DISTWIDTH] IN BLOOD BY AUTOMATED COUNT: 14.6 % (ref 12.3–15.4)
GLOBULIN UR ELPH-MCNC: 3.1 GM/DL
GLUCOSE BLDC GLUCOMTR-MCNC: 185 MG/DL (ref 70–105)
GLUCOSE SERPL-MCNC: 182 MG/DL (ref 65–99)
HCT VFR BLD AUTO: 38 % (ref 37.5–51)
HGB BLD-MCNC: 12.7 G/DL (ref 13–17.7)
HOLD SPECIMEN: NORMAL
HOLD SPECIMEN: NORMAL
LYMPHOCYTES # BLD AUTO: 1.4 10*3/MM3 (ref 0.7–3.1)
LYMPHOCYTES NFR BLD AUTO: 22.2 % (ref 19.6–45.3)
MAGNESIUM SERPL-MCNC: 1.8 MG/DL (ref 1.6–2.6)
MCH RBC QN AUTO: 27.9 PG (ref 26.6–33)
MCHC RBC AUTO-ENTMCNC: 33.5 G/DL (ref 31.5–35.7)
MCV RBC AUTO: 83.5 FL (ref 79–97)
MONOCYTES # BLD AUTO: 0.4 10*3/MM3 (ref 0.1–0.9)
MONOCYTES NFR BLD AUTO: 7 % (ref 5–12)
NEUTROPHILS NFR BLD AUTO: 3.4 10*3/MM3 (ref 1.7–7)
NEUTROPHILS NFR BLD AUTO: 53.1 % (ref 42.7–76)
NRBC BLD AUTO-RTO: 0.1 /100 WBC (ref 0–0.2)
PLATELET # BLD AUTO: 239 10*3/MM3 (ref 140–450)
PMV BLD AUTO: 9.8 FL (ref 6–12)
POTASSIUM SERPL-SCNC: 3.9 MMOL/L (ref 3.5–5.2)
PROT SERPL-MCNC: 6.5 G/DL (ref 6–8.5)
RBC # BLD AUTO: 4.55 10*6/MM3 (ref 4.14–5.8)
SARS-COV-2 RNA RESP QL NAA+PROBE: NOT DETECTED
SODIUM SERPL-SCNC: 136 MMOL/L (ref 136–145)
TROPONIN T SERPL-MCNC: 0.01 NG/ML (ref 0–0.03)
TROPONIN T SERPL-MCNC: 0.02 NG/ML (ref 0–0.03)
TSH SERPL DL<=0.05 MIU/L-ACNC: 2.03 UIU/ML (ref 0.27–4.2)
WBC NRBC COR # BLD: 6.3 10*3/MM3 (ref 3.4–10.8)
WHOLE BLOOD HOLD SPECIMEN: NORMAL

## 2022-04-25 PROCEDURE — 71045 X-RAY EXAM CHEST 1 VIEW: CPT

## 2022-04-25 PROCEDURE — U0003 INFECTIOUS AGENT DETECTION BY NUCLEIC ACID (DNA OR RNA); SEVERE ACUTE RESPIRATORY SYNDROME CORONAVIRUS 2 (SARS-COV-2) (CORONAVIRUS DISEASE [COVID-19]), AMPLIFIED PROBE TECHNIQUE, MAKING USE OF HIGH THROUGHPUT TECHNOLOGIES AS DESCRIBED BY CMS-2020-01-R: HCPCS | Performed by: EMERGENCY MEDICINE

## 2022-04-25 PROCEDURE — G0378 HOSPITAL OBSERVATION PER HR: HCPCS

## 2022-04-25 PROCEDURE — 99214 OFFICE O/P EST MOD 30 MIN: CPT | Performed by: INTERNAL MEDICINE

## 2022-04-25 PROCEDURE — 96376 TX/PRO/DX INJ SAME DRUG ADON: CPT

## 2022-04-25 PROCEDURE — 80053 COMPREHEN METABOLIC PANEL: CPT | Performed by: EMERGENCY MEDICINE

## 2022-04-25 PROCEDURE — 99284 EMERGENCY DEPT VISIT MOD MDM: CPT

## 2022-04-25 PROCEDURE — 84484 ASSAY OF TROPONIN QUANT: CPT | Performed by: EMERGENCY MEDICINE

## 2022-04-25 PROCEDURE — 84484 ASSAY OF TROPONIN QUANT: CPT | Performed by: NURSE PRACTITIONER

## 2022-04-25 PROCEDURE — 82962 GLUCOSE BLOOD TEST: CPT

## 2022-04-25 PROCEDURE — 93005 ELECTROCARDIOGRAM TRACING: CPT | Performed by: EMERGENCY MEDICINE

## 2022-04-25 PROCEDURE — 84443 ASSAY THYROID STIM HORMONE: CPT | Performed by: EMERGENCY MEDICINE

## 2022-04-25 PROCEDURE — 85025 COMPLETE CBC W/AUTO DIFF WBC: CPT | Performed by: EMERGENCY MEDICINE

## 2022-04-25 PROCEDURE — 96374 THER/PROPH/DIAG INJ IV PUSH: CPT

## 2022-04-25 PROCEDURE — 93005 ELECTROCARDIOGRAM TRACING: CPT

## 2022-04-25 PROCEDURE — 83735 ASSAY OF MAGNESIUM: CPT | Performed by: EMERGENCY MEDICINE

## 2022-04-25 PROCEDURE — C9803 HOPD COVID-19 SPEC COLLECT: HCPCS

## 2022-04-25 RX ORDER — SOTALOL HYDROCHLORIDE 80 MG/1
80 TABLET ORAL EVERY 12 HOURS SCHEDULED
Status: DISCONTINUED | OUTPATIENT
Start: 2022-04-25 | End: 2022-04-26 | Stop reason: HOSPADM

## 2022-04-25 RX ORDER — INSULIN LISPRO 100 [IU]/ML
0-7 INJECTION, SOLUTION INTRAVENOUS; SUBCUTANEOUS
Status: DISCONTINUED | OUTPATIENT
Start: 2022-04-26 | End: 2022-04-26 | Stop reason: HOSPADM

## 2022-04-25 RX ORDER — NICOTINE POLACRILEX 4 MG
15 LOZENGE BUCCAL
Status: DISCONTINUED | OUTPATIENT
Start: 2022-04-25 | End: 2022-04-26 | Stop reason: HOSPADM

## 2022-04-25 RX ORDER — INSULIN LISPRO 100 [IU]/ML
0-7 INJECTION, SOLUTION INTRAVENOUS; SUBCUTANEOUS AS NEEDED
Status: DISCONTINUED | OUTPATIENT
Start: 2022-04-25 | End: 2022-04-26 | Stop reason: HOSPADM

## 2022-04-25 RX ORDER — DEXTROSE MONOHYDRATE 25 G/50ML
25 INJECTION, SOLUTION INTRAVENOUS
Status: DISCONTINUED | OUTPATIENT
Start: 2022-04-25 | End: 2022-04-26 | Stop reason: HOSPADM

## 2022-04-25 RX ORDER — METOPROLOL TARTRATE 5 MG/5ML
2.5 INJECTION INTRAVENOUS
Status: COMPLETED | OUTPATIENT
Start: 2022-04-25 | End: 2022-04-25

## 2022-04-25 RX ORDER — SODIUM CHLORIDE 0.9 % (FLUSH) 0.9 %
10 SYRINGE (ML) INJECTION AS NEEDED
Status: DISCONTINUED | OUTPATIENT
Start: 2022-04-25 | End: 2022-04-26 | Stop reason: HOSPADM

## 2022-04-25 RX ORDER — ATORVASTATIN CALCIUM 40 MG/1
80 TABLET, FILM COATED ORAL NIGHTLY
Status: DISCONTINUED | OUTPATIENT
Start: 2022-04-25 | End: 2022-04-26 | Stop reason: HOSPADM

## 2022-04-25 RX ORDER — ASPIRIN 81 MG/1
81 TABLET, CHEWABLE ORAL DAILY
Status: DISCONTINUED | OUTPATIENT
Start: 2022-04-26 | End: 2022-04-26 | Stop reason: HOSPADM

## 2022-04-25 RX ORDER — DILTIAZEM HYDROCHLORIDE 180 MG/1
180 CAPSULE, COATED, EXTENDED RELEASE ORAL DAILY
Status: DISCONTINUED | OUTPATIENT
Start: 2022-04-26 | End: 2022-04-26 | Stop reason: HOSPADM

## 2022-04-25 RX ORDER — OLANZAPINE 10 MG/2ML
1 INJECTION, POWDER, LYOPHILIZED, FOR SOLUTION INTRAMUSCULAR
Status: DISCONTINUED | OUTPATIENT
Start: 2022-04-25 | End: 2022-04-26 | Stop reason: HOSPADM

## 2022-04-25 RX ORDER — LISINOPRIL 20 MG/1
40 TABLET ORAL DAILY
Status: DISCONTINUED | OUTPATIENT
Start: 2022-04-26 | End: 2022-04-26 | Stop reason: HOSPADM

## 2022-04-25 RX ADMIN — METOPROLOL TARTRATE 2.5 MG: 1 INJECTION, SOLUTION INTRAVENOUS at 13:13

## 2022-04-25 RX ADMIN — SOTALOL HYDROCHLORIDE 80 MG: 80 TABLET ORAL at 20:39

## 2022-04-25 RX ADMIN — APIXABAN 5 MG: 5 TABLET, FILM COATED ORAL at 20:39

## 2022-04-25 RX ADMIN — ATORVASTATIN CALCIUM 80 MG: 40 TABLET, FILM COATED ORAL at 20:39

## 2022-04-25 RX ADMIN — METOPROLOL TARTRATE 2.5 MG: 1 INJECTION, SOLUTION INTRAVENOUS at 14:45

## 2022-04-25 RX ADMIN — Medication 10 ML: at 20:40

## 2022-04-25 NOTE — ED PROVIDER NOTES
Subjective   Chief complaint heart racing palpitations lightheaded tightness in chest    History of present illness this is a 57-year-old male with a history of a previous stroke myocardial infarction with a stent placement history of SVT and 3 ablations who reports that he has had troubles over the last couple weeks of some increasing palpitations and lightheadedness.  He has worn an event monitor for 28 days he has been placed on Eliquis and medications have been adjusted.  He continues over the last 3 days to have worsening palpitations lightheadedness some tightness in his chest but denies pain no syncope.  There is no neck arm or jaw pain.  Does feel little bit short of breath.  Nothing really seems to make this better or worse ongoing for few weeks but worse last 3 days patient has been at home and treat as an outpatient and followed by cardiology symptoms are moderate to severe reports that he was told by his cardiologist if he was not better by Sunday to go to the hospital.  No fever chills leg pain or swelling or foreign travels or recent long car ride plane or immobilization.          Review of Systems   Constitutional: Negative for chills and fever.   HENT: Negative for congestion and sinus pressure.    Eyes: Negative for photophobia and visual disturbance.   Respiratory: Positive for chest tightness and shortness of breath.    Cardiovascular: Positive for palpitations. Negative for chest pain.   Gastrointestinal: Negative for abdominal pain and vomiting.   Endocrine: Negative for cold intolerance and heat intolerance.   Genitourinary: Negative for difficulty urinating and dysuria.   Musculoskeletal: Negative for back pain and neck pain.   Skin: Negative for color change and rash.   Neurological: Positive for light-headedness. Negative for dizziness and syncope.   Psychiatric/Behavioral: Negative for agitation and behavioral problems.       Past Medical History:   Diagnosis Date   • Abnormal  cardiovascular stress test 1/19/2017   • Acute myocardial infarction (HCC) 2019   • Aortic aneurysm (HCC) 6/22/2017   • Arrhythmia    • Bicuspid aortic valve 2/16/2017   • Coronary artery disease 10/29/2019   • Disease of thyroid gland    • History of coronary angioplasty with insertion of stent    • Hyperlipidemia    • Hypertension    • Obesity 9/2/2011   • Primary hyperparathyroidism (HCC) 9/2/2011   • RUE weakness 10/29/2019    Previous residual from Stroke, but was not work prohibiting.   • Sinus arrhythmia    • Stroke ([facility])     3x   • Sustained SVT ([facility])    • Type 2 diabetes mellitus ([facility]) 10/29/2019       Allergies   Allergen Reactions   • Morphine GI Intolerance   • Isosorbide Headache   • Nitrofuran Derivatives Headache   • Nitroglycerin Unknown - Low Severity       Past Surgical History:   Procedure Laterality Date   • APPENDECTOMY     • BACK SURGERY     • CARDIAC CATHETERIZATION  2010   • CARDIAC CATHETERIZATION  2019   • CARDIAC CATHETERIZATION N/A 12/31/2020    Procedure: Cardiac Catheterization/Vascular Study;  Surgeon: Moris Pedro MD;  Location: [facility] CATH INVASIVE LOCATION;  Service: Cardiovascular;  Laterality: N/A;   • CARDIAC CATHETERIZATION N/A 2/1/2022    Procedure: Left Heart Cath;  Surgeon: Kamlesh Richardson MD;  Location: [facility] CATH INVASIVE LOCATION;  Service: Cardiovascular;  Laterality: N/A;   • CARDIAC ELECTROPHYSIOLOGY PROCEDURE N/A 1/20/2021    Procedure: EP/Ablation;  Surgeon: Nathen Olmstead MD;  Location: [facility] CATH INVASIVE LOCATION;  Service: Cardiovascular;  Laterality: N/A;   • CARDIAC ELECTROPHYSIOLOGY PROCEDURE N/A 2/24/2022    Procedure: EP/Ablation-SVT [facility];  Surgeon: Candido Montana MD;  Location: [facility] CATH INVASIVE LOCATION;  Service: Cardiology;  Laterality: N/A;   • JOINT REPLACEMENT     • KNEE ARTHROSCOPY     • THYROIDECTOMY, PARTIAL  2015       Family History   Problem Relation Age of Onset   • Heart disease Mother    •  Parkinsonism Father    • Cancer Father        Social History     Socioeconomic History   • Marital status:    Tobacco Use   • Smoking status: Never Smoker   • Smokeless tobacco: Never Used   Vaping Use   • Vaping Use: Never used   Substance and Sexual Activity   • Alcohol use: No   • Drug use: No   • Sexual activity: Defer     Prior to Admission medications    Medication Sig Start Date End Date Taking? Authorizing Provider   apixaban (ELIQUIS) 5 MG tablet tablet Take 1 tablet by mouth Every 12 (Twelve) Hours. Indications: Atrial Fibrillation 3/8/22   Latonya Quezada APRN   aspirin 81 MG chewable tablet Chew 81 mg Daily.    Ramsey Nye MD   atorvastatin (LIPITOR) 80 MG tablet Take 1 tablet by mouth Every Night. 2/4/21   Moris Pedro MD   BD Pen Needle Barbara 2nd Gen 32G X 4 MM misc 4 (Four) Times a Day. use as directed 3/22/22   Ramsey Nye MD   Cholecalciferol (VITAMIN D-3) 25 MCG (1000 UT) capsule Take 3 capsules by mouth Daily. 6/22/17   Ramsey Nye MD   dilTIAZem CD (Cardizem CD) 180 MG 24 hr capsule Take 1 capsule by mouth Daily. 4/13/22   ChemchiriCandido rai MD   Gvoke HypoPen 1-Pack 1 MG/0.2ML solution auto-injector INJECT 1 MG SUBQ AS DIRECTED 3/22/22   Ramsey Nye MD   insulin detemir (LEVEMIR) 100 UNIT/ML injection Inject 30 Units under the skin into the appropriate area as directed Daily.    Ramsey Nye MD   insulin lispro (ADMELOG) 100 UNIT/ML injection Inject 10 Units under the skin into the appropriate area as directed 3 (Three) Times a Day With Meals.  Patient taking differently: Inject 8 Units under the skin into the appropriate area as directed 3 (Three) Times a Day With Meals. 2/2/22   Jacky Pedroza, DO   Lancets (OneTouch Delica Plus Fnvorb25A) misc 3 (Three) Times a Day. as directed 3/22/22   Ramsey Nye MD   lisinopril (PRINIVIL,ZESTRIL) 40 MG tablet Take 40 mg by mouth Daily. 12/23/20   Ramsey Nye MD    metFORMIN (GLUCOPHAGE) 1000 MG tablet 2 (Two) Times a Day With Meals. 10/26/21   Ramsey Nye MD   metoprolol tartrate (LOPRESSOR) 25 MG tablet Take 2 tablets by mouth 2 (Two) Times a Day. 4/21/22   Meliza Rodríguez APRN   OneTouch Verio test strip 4 (Four) Times a Day. use as directed 3/22/22   Ramsey Nye MD   Ozempic, 0.25 or 0.5 MG/DOSE, 2 MG/1.5ML solution pen-injector INJECT 0.5 MG SUBQ ONCE WEEKLY 3/22/22   Ramsey Nye MD   nitroglycerin (NITROSTAT) 0.4 MG SL tablet Place 1 tablet under the tongue Every 5 (Five) Minutes As Needed for Chest Pain. 1 under the tongue as needed for angina, may repeat q5mins for up three doses 2/2/22 3/8/22  Yoli Baumann APRN             Objective   Physical Exam  Constitutional 57-year-old male awake alert no acute distress heart rate in the 90s and temperature of 97.5 blood pressure 159/99 sats at 98% on room air.  Normal sinus rhythm on the monitor.  HEENT extraocular muscles are intact pupils equal round reactive sclera clear.  Neck supple no adenopathy no JVD no meningeal signs no bruits.  Lungs clear no retraction heart regular no murmur rub abdomen soft without tenderness good bowel sounds no peritoneal findings.  Extremities pulses equal, extremities no edema cords or Homans' sign or evidence of DVT.  Skin warm and dry without rashes neurologic awake alert follows commands no facial asymmetry speech normal  Procedures           ED Course      Results for orders placed or performed during the hospital encounter of 04/25/22   Comprehensive Metabolic Panel    Specimen: Blood   Result Value Ref Range    Glucose 182 (H) 65 - 99 mg/dL    BUN 16 6 - 20 mg/dL    Creatinine 0.81 0.76 - 1.27 mg/dL    Sodium 136 136 - 145 mmol/L    Potassium 3.9 3.5 - 5.2 mmol/L    Chloride 105 98 - 107 mmol/L    CO2 22.0 22.0 - 29.0 mmol/L    Calcium 8.8 8.6 - 10.5 mg/dL    Total Protein 6.5 6.0 - 8.5 g/dL    Albumin 3.40 (L) 3.50 - 5.20 g/dL    ALT (SGPT) 17 1 - 41  U/L    AST (SGOT) 14 1 - 40 U/L    Alkaline Phosphatase 95 39 - 117 U/L    Total Bilirubin 0.3 0.0 - 1.2 mg/dL    Globulin 3.1 gm/dL    A/G Ratio 1.1 g/dL    BUN/Creatinine Ratio 19.8 7.0 - 25.0    Anion Gap 9.0 5.0 - 15.0 mmol/L    eGFR 102.2 >60.0 mL/min/1.73   Magnesium    Specimen: Blood   Result Value Ref Range    Magnesium 1.8 1.6 - 2.6 mg/dL   TSH    Specimen: Blood   Result Value Ref Range    TSH 2.030 0.270 - 4.200 uIU/mL   Troponin    Specimen: Blood   Result Value Ref Range    Troponin T 0.021 0.000 - 0.030 ng/mL   CBC Auto Differential    Specimen: Blood   Result Value Ref Range    WBC 6.30 3.40 - 10.80 10*3/mm3    RBC 4.55 4.14 - 5.80 10*6/mm3    Hemoglobin 12.7 (L) 13.0 - 17.7 g/dL    Hematocrit 38.0 37.5 - 51.0 %    MCV 83.5 79.0 - 97.0 fL    MCH 27.9 26.6 - 33.0 pg    MCHC 33.5 31.5 - 35.7 g/dL    RDW 14.6 12.3 - 15.4 %    RDW-SD 42.9 37.0 - 54.0 fl    MPV 9.8 6.0 - 12.0 fL    Platelets 239 140 - 450 10*3/mm3    Neutrophil % 53.1 42.7 - 76.0 %    Lymphocyte % 22.2 19.6 - 45.3 %    Monocyte % 7.0 5.0 - 12.0 %    Eosinophil % 15.7 (H) 0.3 - 6.2 %    Basophil % 2.0 (H) 0.0 - 1.5 %    Neutrophils, Absolute 3.40 1.70 - 7.00 10*3/mm3    Lymphocytes, Absolute 1.40 0.70 - 3.10 10*3/mm3    Monocytes, Absolute 0.40 0.10 - 0.90 10*3/mm3    Eosinophils, Absolute 1.00 (H) 0.00 - 0.40 10*3/mm3    Basophils, Absolute 0.10 0.00 - 0.20 10*3/mm3    nRBC 0.1 0.0 - 0.2 /100 WBC   ECG 12 Lead   Result Value Ref Range    QT Interval 438 ms   Green Top (Gel)   Result Value Ref Range    Extra Tube Hold for add-ons.    Gold Top - SST   Result Value Ref Range    Extra Tube Hold for add-ons.    Light Blue Top   Result Value Ref Range    Extra Tube hold for add-on      XR Chest 1 View    Result Date: 4/25/2022   Vascular congestion without overt edema.  No evidence of significant pleural effusion and no pneumothorax  Electronically Signed By-Todd Ny On:4/25/2022 7:02 AM This report was finalized on [id number] by  Todd  Long, .    Medications   sodium chloride 0.9 % flush 10 mL (has no administration in time range)   sodium chloride 0.9 % flush 10 mL (has no administration in time range)     Chest x-ray on my review without acute findings     EKG my interpretation normal sinus rhythm rate of 84 PACs QTC of 507 no acute ST elevation abnormal EKG but really no significant change from previous                                        MDM  Number of Diagnoses or Management Options  Palpitations: established and worsening  Diagnosis management comments: Negative decision making.  Patient IV established placed on monitor with sinus rhythm EKG shows sinus rhythm some PACs.  This is unchanged from previous EKG patient underwent a chest x-ray on my review was unremarkable.  Labs obtained reviewed by me blood sugar 182 otherwise chemistries unremarkable tacit magnesium normal TSH normal troponin 0.021 CBC unremarkable hemoglobin 12.7.  Patient remained in a sinus rhythm on the monitor this point he had occasional PVCs and some PACs.  But no heart rate over about 105.  Record reviewed he had a heart catheter here in February 2022 which was stable no intervention was done at about 50% in-stent occlusion but he is currently being treated medically.  We talked about the findings I do not see any evidence of acute myocardial infarction DVT or pulm embolism or dissection.  He was referred here by his cardiologist if no improvement in fact he is feeling worse.  He was placed in observation provider notified cardiac consultation placed stable unremarkable ER course       Amount and/or Complexity of Data Reviewed  Clinical lab tests: reviewed  Tests in the radiology section of CPT®: reviewed  Discuss the patient with other providers: yes    Risk of Complications, Morbidity, and/or Mortality  Presenting problems: high  Diagnostic procedures: high  Management options: high    Patient Progress  Patient progress: stable      Final diagnoses:   Palpitations        ED Disposition  ED Disposition     ED Disposition   Decision to Admit    Condition   --    Comment   Level of Care: Telemetry [5]   Admitting Physician: THALIA DE LEON [[id number]]   Attending Physician: THALIA DE LEON [[id number]]               No follow-up provider specified.       Medication List      No changes were made to your prescriptions during this visit.          Thalia De Leon MD  04/25/22 0707

## 2022-04-25 NOTE — PLAN OF CARE
Goal Outcome Evaluation:  Patient is alert and oriented, able to make needs known to staff. Patient has no complaints of pain or discomfort noted at this time. Will continue to monitor.

## 2022-04-26 VITALS
RESPIRATION RATE: 18 BRPM | BODY MASS INDEX: 26.71 KG/M2 | WEIGHT: 190.8 LBS | SYSTOLIC BLOOD PRESSURE: 142 MMHG | TEMPERATURE: 98.3 F | OXYGEN SATURATION: 98 % | HEIGHT: 71 IN | HEART RATE: 75 BPM | DIASTOLIC BLOOD PRESSURE: 88 MMHG

## 2022-04-26 PROBLEM — R00.2 PALPITATIONS: Status: RESOLVED | Noted: 2022-01-31 | Resolved: 2022-04-26

## 2022-04-26 LAB
GLUCOSE BLDC GLUCOMTR-MCNC: 198 MG/DL (ref 70–105)
GLUCOSE BLDC GLUCOMTR-MCNC: 230 MG/DL (ref 70–105)

## 2022-04-26 PROCEDURE — 93005 ELECTROCARDIOGRAM TRACING: CPT | Performed by: INTERNAL MEDICINE

## 2022-04-26 PROCEDURE — 93010 ELECTROCARDIOGRAM REPORT: CPT | Performed by: INTERNAL MEDICINE

## 2022-04-26 PROCEDURE — 82962 GLUCOSE BLOOD TEST: CPT

## 2022-04-26 PROCEDURE — 63710000001 INSULIN LISPRO (HUMAN) PER 5 UNITS: Performed by: NURSE PRACTITIONER

## 2022-04-26 PROCEDURE — 99214 OFFICE O/P EST MOD 30 MIN: CPT | Performed by: INTERNAL MEDICINE

## 2022-04-26 PROCEDURE — G0378 HOSPITAL OBSERVATION PER HR: HCPCS

## 2022-04-26 RX ORDER — SOTALOL HYDROCHLORIDE 80 MG/1
80 TABLET ORAL EVERY 12 HOURS SCHEDULED
Qty: 60 TABLET | Refills: 1 | Status: SHIPPED | OUTPATIENT
Start: 2022-04-26 | End: 2022-04-28

## 2022-04-26 RX ADMIN — LISINOPRIL 40 MG: 20 TABLET ORAL at 08:19

## 2022-04-26 RX ADMIN — SOTALOL HYDROCHLORIDE 80 MG: 80 TABLET ORAL at 08:18

## 2022-04-26 RX ADMIN — DILTIAZEM HYDROCHLORIDE 180 MG: 180 CAPSULE, COATED, EXTENDED RELEASE ORAL at 08:19

## 2022-04-26 RX ADMIN — ASPIRIN 81 MG: 81 TABLET, CHEWABLE ORAL at 08:18

## 2022-04-26 RX ADMIN — APIXABAN 5 MG: 5 TABLET, FILM COATED ORAL at 08:19

## 2022-04-26 RX ADMIN — INSULIN LISPRO 3 UNITS: 100 INJECTION, SOLUTION INTRAVENOUS; SUBCUTANEOUS at 08:18

## 2022-04-26 RX ADMIN — INSULIN LISPRO 2 UNITS: 100 INJECTION, SOLUTION INTRAVENOUS; SUBCUTANEOUS at 11:27

## 2022-04-26 NOTE — PLAN OF CARE
Goal Outcome Evaluation:  Plan of Care Reviewed With: patient        Progress: improving  Outcome Evaluation: patient discharging with sotalol

## 2022-04-26 NOTE — PROGRESS NOTES
"    Reason for follow-up: Palpitations     Patient Care Team:  Celine Garcia MD as PCP - General (Internal Medicine)  Yoli Baumann APRN as Nurse Practitioner (Nurse Practitioner)  Kamlesh Richardson MD as Consulting Physician (Cardiology)    Subjective .   Preet Jones is feeling better today, no issues overnight     ROS    Morphine, Isosorbide, Nitrofuran derivatives, and Nitroglycerin    Scheduled Meds:apixaban, 5 mg, Oral, Q12H  aspirin, 81 mg, Oral, Daily  atorvastatin, 80 mg, Oral, Nightly  dilTIAZem CD, 180 mg, Oral, Daily  insulin lispro, 0-7 Units, Subcutaneous, TID AC  lisinopril, 40 mg, Oral, Daily  sotalol, 80 mg, Oral, Q12H      Continuous Infusions:   PRN Meds:.dextrose  •  dextrose  •  glucagon (human recombinant)  •  insulin lispro **AND** insulin lispro  •  sodium chloride  •  [COMPLETED] Insert peripheral IV **AND** sodium chloride      VITAL SIGNS  Vitals:    04/25/22 1716 04/25/22 2039 04/26/22 0001 04/26/22 0706   BP: (!) 159/104 (!) 168/108 141/95 138/94   BP Location: Left arm  Right arm Right arm   Patient Position: Lying  Lying Lying   Pulse: 94 98 106 84   Resp: 17  18 18   Temp: 97.8 °F (36.6 °C)  97.9 °F (36.6 °C) 98.3 °F (36.8 °C)   TempSrc: Oral  Oral Oral   SpO2: 96%  90% 95%   Weight: 86.5 kg (190 lb 12.8 oz)      Height: 180.3 cm (71\")          Flowsheet Rows    Flowsheet Row First Filed Value   Admission Height 177.8 cm (70\") Documented at 04/25/2022 0517   Admission Weight 87.6 kg (193 lb 2 oz) Documented at 04/25/2022 0517             Physical Exam  VITALS REVIEWED    General:      well developed, in no acute distress.    Head:      normocephalic and atraumatic.    Eyes:      PERRL/EOM intact, conjunctiva and sclera clear with out nystagmus.    Neck:      no masses, thyromegaly,  trachea central with normal respiratory effort and PMI displaced laterally  Lungs:      Clear  Heart:       Regular rate and rhythm  Msk:      no deformity or scoliosis noted of thoracic " or lumbar spine.    Pulses:      pulses normal in all 4 extremities.    Extremities:       No lower extremity edema  Neurologic:      no focal deficits.   alert oriented x3  Skin:      intact without lesions or rashes.    Psych:      alert and cooperative; normal mood and affect; normal attention span and concentration.          LAB RESULTS (LAST 7 DAYS)    CBC  Results from last 7 days   Lab Units 04/25/22  0550   WBC 10*3/mm3 6.30   RBC 10*6/mm3 4.55   HEMOGLOBIN g/dL 12.7*   HEMATOCRIT % 38.0   MCV fL 83.5   PLATELETS 10*3/mm3 239       BMP  Results from last 7 days   Lab Units 04/25/22  0550   SODIUM mmol/L 136   POTASSIUM mmol/L 3.9   CHLORIDE mmol/L 105   CO2 mmol/L 22.0   BUN mg/dL 16   CREATININE mg/dL 0.81   GLUCOSE mg/dL 182*   MAGNESIUM mg/dL 1.8       CMP   Results from last 7 days   Lab Units 04/25/22  0550   SODIUM mmol/L 136   POTASSIUM mmol/L 3.9   CHLORIDE mmol/L 105   CO2 mmol/L 22.0   BUN mg/dL 16   CREATININE mg/dL 0.81   GLUCOSE mg/dL 182*   ALBUMIN g/dL 3.40*   BILIRUBIN mg/dL 0.3   ALK PHOS U/L 95   AST (SGOT) U/L 14   ALT (SGPT) U/L 17         BNP        TROPONIN  Results from last 7 days   Lab Units 04/25/22 2012   TROPONIN T ng/mL 0.010       CoAg        Creatinine Clearance  Estimated Creatinine Clearance: 121.6 mL/min (by C-G formula based on SCr of 0.81 mg/dL).    ABG          EKG    I personally reviewed the patient's EKG/Telemetry data: Sinus rhythm      Assessment/Plan       Palpitations      Preet Jones is a 57-year-old male patient who has coronary artery disease, diabetes, hypertension, history of atrial arrhythmias status post A. 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  Patient continues to have palpitations and that is why he presented back to the ER.  His arrhythmia seems to be runs of atrial tachycardia.   Yesterday we switched metoprolol over to sotalol 80 twice daily.  EKG was checked today QT segment is okay.  He can be  discharged home today with sotalol 80 twice daily along with his home dose of Cardizem and Eliquis.  I will see him in follow-up as scheduled.    I discussed the patients findings and my recommendations with patient and agrees with the outlined plan.    Candido Montana MD  04/26/22  11:12 EDT

## 2022-04-26 NOTE — CONSULTS
HP      Name: Preet Jones ADMIT: 2022   : 1965  PCP: Celine Garcia MD    MRN: [id number] LOS: 0 days   AGE/SEX: 57 y.o. male  ROOM: 112/1     Chief Complaint   Patient presents with   • Irregular Heart Beat       Subjective        History of present illness  Preet Jones is a 56-year-old male patient with history of coronary artery disease status post PCI to the marginal on 2017, atrial fibrillation status post cryoablation and CTI on 2021, also hypertension, dyslipidemia, diabetes, atypical AVNRT ablation on 2022.  Unfortunately the patient continues to have bursts of what seems to be atrial tachycardia resulting in palpitations.  During his last office visit we modified his therapy with addition of metoprolol 25 twice daily on top of p.o. Cardizem.  Patient presented back to the ER due to repeated palpitations, chest discomfort.    Past Medical History:   Diagnosis Date   • Abnormal cardiovascular stress test 2017   • Acute myocardial infarction (HCC) 2019   • Aortic aneurysm ([facility]) 2017   • Arrhythmia    • Bicuspid aortic valve 2017   • Coronary artery disease 10/29/2019   • Disease of thyroid gland    • History of coronary angioplasty with insertion of stent    • Hyperlipidemia    • Hypertension    • Obesity 2011   • Primary hyperparathyroidism ([facility]) 2011   • RUE weakness 10/29/2019    Previous residual from Stroke, but was not work prohibiting.   • Sinus arrhythmia    • Stroke ([facility])     3x   • Sustained SVT ([facility])    • Type 2 diabetes mellitus ([facility]) 10/29/2019     Past Surgical History:   Procedure Laterality Date   • APPENDECTOMY     • BACK SURGERY     • CARDIAC CATHETERIZATION     • CARDIAC CATHETERIZATION     • CARDIAC CATHETERIZATION N/A 2020    Procedure: Cardiac Catheterization/Vascular Study;  Surgeon: Moris Pedro MD;  Location: [facility] CATH INVASIVE LOCATION;  Service: Cardiovascular;  Laterality: N/A;   •  CARDIAC CATHETERIZATION N/A 2/1/2022    Procedure: Left Heart Cath;  Surgeon: Kamlesh Richardson MD;  Location:  [facility] CATH INVASIVE LOCATION;  Service: Cardiovascular;  Laterality: N/A;   • CARDIAC ELECTROPHYSIOLOGY PROCEDURE N/A 1/20/2021    Procedure: EP/Ablation;  Surgeon: Nathen Olmstead MD;  Location:  [facility] CATH INVASIVE LOCATION;  Service: Cardiovascular;  Laterality: N/A;   • CARDIAC ELECTROPHYSIOLOGY PROCEDURE N/A 2/24/2022    Procedure: EP/Ablation-SVT Ut[place] aware;  Surgeon: Candido Montana MD;  Location:  [facility] CATH INVASIVE LOCATION;  Service: Cardiology;  Laterality: N/A;   • JOINT REPLACEMENT     • KNEE ARTHROSCOPY     • THYROIDECTOMY, PARTIAL  2015     Family History   Problem Relation Age of Onset   • Heart disease Mother    • Parkinsonism Father    • Cancer Father      Social History     Tobacco Use   • Smoking status: Never Smoker   • Smokeless tobacco: Never Used   Vaping Use   • Vaping Use: Never used   Substance Use Topics   • Alcohol use: No   • Drug use: No     Medications Prior to Admission   Medication Sig Dispense Refill Last Dose   • apixaban (ELIQUIS) 5 MG tablet tablet Take 1 tablet by mouth Every 12 (Twelve) Hours. Indications: Atrial Fibrillation 60 tablet 1 4/25/2022 at Unknown time   • aspirin 81 MG chewable tablet Chew 81 mg Daily.   4/25/2022 at Unknown time   • atorvastatin (LIPITOR) 80 MG tablet Take 1 tablet by mouth Every Night. 90 tablet 1 4/24/2022 at Unknown time   • BD Pen Needle Barbara 2nd Gen 32G X 4 MM misc 4 (Four) Times a Day. use as directed   4/25/2022 at Unknown time   • Cholecalciferol (VITAMIN D-3) 25 MCG (1000 UT) capsule Take 3 capsules by mouth Daily.   4/24/2022 at Unknown time   • dilTIAZem CD (Cardizem CD) 180 MG 24 hr capsule Take 1 capsule by mouth Daily. 30 capsule 3 4/25/2022 at Unknown time   • insulin lispro (ADMELOG) 100 UNIT/ML injection Inject 10 Units under the skin into the appropriate area as directed 3 (Three) Times a Day With  Meals. (Patient taking differently: Inject 8 Units under the skin into the appropriate area as directed 3 (Three) Times a Day With Meals.) 10 mL 2 4/25/2022 at Unknown time   • Lancets (OneTouch Delica Plus [id number]) misc 3 (Three) Times a Day. as directed   4/25/2022 at Unknown time   • lisinopril (PRINIVIL,ZESTRIL) 40 MG tablet Take 40 mg by mouth Daily.   4/25/2022 at Unknown time   • metFORMIN (GLUCOPHAGE) 1000 MG tablet 2 (Two) Times a Day With Meals.   4/25/2022 at Unknown time   • metoprolol tartrate (LOPRESSOR) 25 MG tablet Take 2 tablets by mouth 2 (Two) Times a Day. 60 tablet 3 4/25/2022 at Unknown time   • OneTouch Verio test strip 4 (Four) Times a Day. use as directed   4/25/2022 at Unknown time   • Ozempic, 0.25 or 0.5 MG/DOSE, 2 MG/1.5ML solution pen-injector INJECT 0.5 MG SUBQ ONCE WEEKLY   Past Week at Unknown time   • Gvoke HypoPen 1-Pack 1 MG/0.2ML solution auto-injector INJECT 1 MG SUBQ AS DIRECTED (Patient not taking: Reported on 4/25/2022)   Not Taking at Unknown time   • insulin detemir (LEVEMIR) 100 UNIT/ML injection Inject 30 Units under the skin into the appropriate area as directed Daily. (Patient not taking: Reported on 4/25/2022)   Not Taking at Unknown time     Allergies:  Morphine, Isosorbide, Nitrofuran derivatives, and Nitroglycerin    Review of systems    Constitutional: Negative.    Respiratory and cardiovascular: As detailed in HPI section.  Gastrointestinal: Negative for constipation, nausea and vomiting negative for abdominal distention, abdominal pain and diarrhea.   Genitourinary: Negative for difficulty urinating and flank pain.   Musculoskeletal: Negative for arthralgias, joint swelling and myalgias.   Skin: Negative for color change, rash and wound.   Neurological: Negative for dizziness, syncope, weakness and headaches.   Hematological: Negative for adenopathy.   Psychiatric/Behavioral: Negative for confusion.   All other systems reviewed and are negative.       Physical  Exam  VITALS REVIEWED    General:      well developed, in no acute distress.    Head:      normocephalic and atraumatic.    Eyes:      PERRL/EOM intact, conjunctiva and sclera clear with out nystagmus.    Neck:      no masses, thyromegaly,  trachea central with normal respiratory effort and PMI displaced laterally  Lungs:      Clear to auscultation bilaterally  Heart:       Regular rate and rhythm  Msk:      no deformity or scoliosis noted of thoracic or lumbar spine.    Pulses:      pulses normal in all 4 extremities.    Extremities:       No lower extremity edema  Neurologic:      no focal deficits.   alert oriented x3  Skin:      intact without lesions or rashes.    Psych:      alert and cooperative; normal mood and affect; normal attention span and concentration.      Result Review :               Pertinent cardiac workup    1. Heart catheterization 2/1/2022 40 to 50% in-stent stenosis of the first marginal, distal circumflex and left PDA had 80% stenosis, not amenable for intervention.  Ejection fraction 55%  2. Holter monitor on 1/26/2022 for 6 days showed an episode of supraventricular tachycardia with heart rate sustained at 200 bpm lasting 2 minutes.  3. Event monitor for 28 days on 3/8/2022 showed multiple runs of atrial ectopy suggestive of atrial tachycardia but also short bursts of atrial fibrillation.  Rates were up in the 180s during the arrhythmia.  Also 14 beat run of ventricular tachycardia was seen.          Assessment and Plan         Palpitations      Preet Jones is a 57-year-old male patient who has coronary artery disease, diabetes, hypertension, history of atrial arrhythmias status post A. 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  Patient continues to have palpitations and that is why he presented back to the ER.  His arrhythmia seems to be runs of atrial tachycardia.  I would like to substitute his metoprolol with sotalol to help suppress the  arrhythmia.  We will continue Cardizem as well.  We will get an EKG 2 hours after tomorrow's morning dose of sotalol.  If patient continues to have recurrent palpitations despite antiarrhythmics then he will need to have a repeat EP study.      No follow-ups on file.  Patient was given instructions and counseling regarding his condition or for health maintenance advice. Please see specific information pulled into the AVS if appropriate.

## 2022-04-26 NOTE — H&P
[facility] H&P    Patient Name: Preet Jones  : 1965  MRN: [id number]  Primary Care Physician: Celine Garcia MD  Date of admission: 2022     Patient Care Team:  Celine Garcia MD as PCP - General (Internal Medicine)  Yoli Baumann APRN as Nurse Practitioner (Nurse Practitioner)  Kamlesh Richardson MD as Consulting Physician (Cardiology)          Subjective   History Present Illness     Chief Complaint:   Chief Complaint   Patient presents with   • Irregular Heart Beat         Mr. Jones is a 57 y.o.  presents to [facility] complaining of palpitations.       57 year old male who presents to the ER with a chief complaint of palpitations with elevated heart rate which have been intermittent, but recurrent over the last 48 hours and associated with shortness of breath and chest discomfort. The patient has a history of afib with RVR and ablation x 3,once in the remote past and twice this year. He has a history of CVA with minimal residual deficit.       Review of Systems   Constitutional: Negative for chills, diaphoresis and fever.   Cardiovascular: Positive for chest pain, dyspnea on exertion and irregular heartbeat. Negative for leg swelling.   Respiratory: Positive for shortness of breath.    Gastrointestinal: Negative for nausea.   All other systems reviewed and are negative.          Personal History     Past Medical History:   Past Medical History:   Diagnosis Date   • Abnormal cardiovascular stress test 2017   • Acute myocardial infarction (HCC)    • Aortic aneurysm (HCC) 2017   • Arrhythmia    • Bicuspid aortic valve 2017   • Coronary artery disease 10/29/2019   • Disease of thyroid gland    • History of coronary angioplasty with insertion of stent    • Hyperlipidemia    • Hypertension    • Obesity 2011   • Primary hyperparathyroidism (HCC) 2011   • RUE weakness 10/29/2019    Previous residual from Stroke, but was not work  prohibiting.   • Sinus arrhythmia    • Stroke (HCC)     3x   • Sustained SVT (HCC)    • Type 2 diabetes mellitus (HCC) 10/29/2019       Surgical History:      Past Surgical History:   Procedure Laterality Date   • APPENDECTOMY     • BACK SURGERY     • CARDIAC CATHETERIZATION  2010   • CARDIAC CATHETERIZATION  2019   • CARDIAC CATHETERIZATION N/A 12/31/2020    Procedure: Cardiac Catheterization/Vascular Study;  Surgeon: Moris Pedro MD;  Location:  [facility] CATH INVASIVE LOCATION;  Service: Cardiovascular;  Laterality: N/A;   • CARDIAC CATHETERIZATION N/A 2/1/2022    Procedure: Left Heart Cath;  Surgeon: Kamlesh Richardson MD;  Location:  [facility] CATH INVASIVE LOCATION;  Service: Cardiovascular;  Laterality: N/A;   • CARDIAC ELECTROPHYSIOLOGY PROCEDURE N/A 1/20/2021    Procedure: EP/Ablation;  Surgeon: Nathen Olmstead MD;  Location:  [facility] CATH INVASIVE LOCATION;  Service: Cardiovascular;  Laterality: N/A;   • CARDIAC ELECTROPHYSIOLOGY PROCEDURE N/A 2/24/2022    Procedure: EP/Ablation-SVT Utong aware;  Surgeon: Candido Montana MD;  Location: [facility] CATH INVASIVE LOC[facility];  Service: Cardiology;  Laterality: N/A;   • JOINT REPLACEMENT     • KNEE ARTHROSCOPY     • THYROIDECTOMY, PARTIAL  2015           Family History: family history includes Cancer in his father; Heart disease in his mother; Parkinsonism in his father. Otherwise pertinent FHx was reviewed and unremarkable.     Social History:  reports that he has never smoked. He has never used smokeless tobacco. He reports that he does not drink alcohol and does not use drugs.      Medications:  Prior to Admission medications    Medication Sig Start Date End Date Taking? Authorizing Provider   apixaban (ELIQUIS) 5 MG tablet tablet Take 1 tablet by mouth Every 12 (Twelve) Hours. Indications: Atrial Fibrillation 3/8/22  Yes Latonya Quezada APRN   aspirin 81 MG chewable tablet Chew 81 mg Daily.   Yes Provider, MD Ramsey   atorvastatin (LIPITOR)  80 MG tablet Take 1 tablet by mouth Every Night. 2/4/21  Yes Morsi Pedro MD   BD Pen Needle Barbara 2nd Gen 32G X 4 MM misc 4 (Four) Times a Day. use as directed 3/22/22  Yes Ramsey Nye MD   Cholecalciferol (VITAMIN D-3) 25 MCG (1000 UT) capsule Take 3 capsules by mouth Daily. 6/22/17  Yes Ramsey Nye MD   dilTIAZem CD (Cardizem CD) 180 MG 24 hr capsule Take 1 capsule by mouth Daily. 4/13/22  Yes Candido Montana MD   insulin lispro (ADMELOG) 100 UNIT/ML injection Inject 10 Units under the skin into the appropriate area as directed 3 (Three) Times a Day With Meals.  Patient taking differently: Inject 8 Units under the skin into the appropriate area as directed 3 (Three) Times a Day With Meals. 2/2/22  Yes Jacky Pedroza, DO   Lancets (OneTouch Delica Plus [id number]) misc 3 (Three) Times a Day. as directed 3/22/22  Yes Ramsey Nye MD   lisinopril (PRINIVIL,ZESTRIL) 40 MG tablet Take 40 mg by mouth Daily. 12/23/20  Yes Ramsey Nye MD   metFORMIN (GLUCOPHAGE) 1000 MG tablet 2 (Two) Times a Day With Meals. 10/26/21  Yes Ramsey Nye MD   metoprolol tartrate (LOPRESSOR) 25 MG tablet Take 2 tablets by mouth 2 (Two) Times a Day. 4/21/22  Yes Meliza Rodríguez APRN   OneTouch Verio test strip 4 (Four) Times a Day. use as directed 3/22/22  Yes Ramsey Nye MD   Ozempic, 0.25 or 0.5 MG/DOSE, 2 MG/1.5ML solution pen-injector INJECT 0.5 MG SUBQ ONCE WEEKLY 3/22/22  Yes Ramsey Ney MD Gvoke HypoPen 1-Pack 1 MG/0.2ML solution auto-injector INJECT 1 MG SUBQ AS DIRECTED  Patient not taking: Reported on 4/25/2022 3/22/22   Ramsey Nye MD   insulin detemir (LEVEMIR) 100 UNIT/ML injection Inject 30 Units under the skin into the appropriate area as directed Daily.  Patient not taking: Reported on 4/25/2022    Provider, MD Ramsey   nitroglycerin (NITROSTAT) 0.4 MG SL tablet Place 1 tablet under the tongue Every 5 (Five) Minutes  As Needed for Chest Pain. 1 under the tongue as needed for angina, may repeat q5mins for up three doses 2/2/22 3/8/22  Yoli Baumann, EDWINA       Allergies:    Allergies   Allergen Reactions   • Morphine GI Intolerance   • Isosorbide Headache   • Nitrofuran Derivatives Headache   • Nitroglycerin Unknown - Low Severity       Objective   Objective     Vital Signs  Temp:  [97.5 °F (36.4 °C)-97.8 °F (36.6 °C)] 97.8 °F (36.6 °C)  Heart Rate:  [] 94  Resp:  [16-18] 17  BP: (139-163)/() 159/104  SpO2:  [94 %-99 %] 96 %  on   ;   Device (Oxygen Therapy): room air  Body mass index is 26.61 kg/m².    Physical Exam  Vitals and nursing note reviewed.   Constitutional:       Appearance: Normal appearance. He is not ill-appearing.   HENT:      Head: Normocephalic and atraumatic.      Right Ear: External ear normal.      Left Ear: External ear normal.      Nose: Nose normal.      Mouth/Throat:      Mouth: Mucous membranes are moist.   Eyes:      General: No scleral icterus.        Right eye: No discharge.         Left eye: No discharge.      Extraocular Movements: Extraocular movements intact.      Conjunctiva/sclera: Conjunctivae normal.      Pupils: Pupils are equal, round, and reactive to light.   Cardiovascular:      Rate and Rhythm: Normal rate and regular rhythm.      Pulses: Normal pulses.      Heart sounds: Normal heart sounds. No murmur heard.  Pulmonary:      Effort: Pulmonary effort is normal.      Breath sounds: Normal breath sounds.   Abdominal:      General: Bowel sounds are normal.      Palpations: Abdomen is soft.   Musculoskeletal:         General: Normal range of motion.      Cervical back: Normal range of motion and neck supple.      Left lower leg: No edema.   Skin:     General: Skin is warm and dry.      Capillary Refill: Capillary refill takes less than 2 seconds.   Neurological:      General: No focal deficit present.      Mental Status: He is alert and oriented to person, place, and time.    Psychiatric:         Mood and Affect: Mood normal.         Behavior: Behavior normal.         Thought Content: Thought content normal.         Judgment: Judgment normal.           Results Review:  I have personally reviewed most recent cardiac tracings, lab results and radiology images and interpretations and agree with findings.    Results from last 7 days   Lab Units 04/25/22  0550   WBC 10*3/mm3 6.30   HEMOGLOBIN g/dL 12.7*   HEMATOCRIT % 38.0   PLATELETS 10*3/mm3 239     Results from last 7 days   Lab Units 04/25/22  0550   SODIUM mmol/L 136   POTASSIUM mmol/L 3.9   CHLORIDE mmol/L 105   CO2 mmol/L 22.0   BUN mg/dL 16   CREATININE mg/dL 0.81   GLUCOSE mg/dL 182*   CALCIUM mg/dL 8.8   ALT (SGPT) U/L 17   AST (SGOT) U/L 14   TROPONIN T ng/mL 0.021     Estimated Creatinine Clearance: 121.6 mL/min (by C-G formula based on SCr of 0.81 mg/dL).  Brief Urine Lab Results     None          Microbiology Results (last 10 days)     Procedure Component Value - Date/Time    COVID PRE-OP / PRE-PROCEDURE SCREENING ORDER (NO ISOLATION) - Swab, Nasopharynx [[id number]]  (Normal) Collected: 04/25/22 0815    Lab Status: Final result Specimen: Swab from Nasopharynx Updated: 04/25/22 0857    Narrative:      The following orders were created for panel order COVID PRE-OP / PRE-PROCEDURE SCREENING ORDER (NO ISOLATION) - Swab, Nasopharynx.  Procedure                               Abnormality         Status                     ---------                               -----------         ------                     COVID-19,CEPHEID/WALTER,CO...[[id number]]  Normal              Final result                 Please view results for these tests on the individual orders.    COVID-19,CEPHEID/WALTER,COR/[facility]/PAD/[facility] IN-HOUSE(OR EMERGENT/ADD-ON),NP SWAB IN TRANSPORT MEDIA 3-4 HR TAT, RT-PCR - Swab, Nasopharynx [[id number]]  (Normal) Collected: 04/25/22 0815    Lab Status: Final result Specimen: Swab from Nasopharynx Updated: 04/25/22 0857     COVID19 Not  Detected    Narrative:      Fact sheet for providers: https://www.fda.gov/media/979366/download     Fact sheet for patients: https://www.fda.gov/media/210296/download  Fact sheet for providers: https://www.fda.gov/media/088635/download     Fact sheet for patients: https://www.fda.gov/media/576774/download          ECG/EMG Results (most recent)     Procedure Component Value Units Date/Time    ECG 12 Lead [[id number]] Collected: 04/25/22 0529     Updated: 04/25/22 0530     QT Interval 438 ms     Narrative:      HEART RATE= 100  bpm  RR Interval= 656  ms  NV Interval= 191  ms  P Horizontal Axis= 42  deg  P Front Axis= -5  deg  QRSD Interval= 85  ms  QT Interval= 438  ms  QRS Axis= -29  deg  T Wave Axis= 49  deg  - ABNORMAL ECG -  Sinus rhythm  Atrial premature complexes  Left anterior fascicular block  Prolonged QT interval  When compared with ECG of 07-Mar-2022 10:42:31,  Significant rate increase  Significant repolarization change  Significant axis, voltage or hypertrophy change  Electronically Signed By:   Date and Time of Study: 2022-04-25 05:29:01          Results for orders placed during the hospital encounter of 01/31/22    Duplex Groin Pseudoaneurysm unilateral CAR    Interpretation Summary  · No evidence of pseudoaneurysm or AV fistula in the right groin.      Results for orders placed during the hospital encounter of 01/20/21    Adult Transesophageal Echo (FRAN) W/ Cont if Necessary Per Protocol    Interpretation Summary  · Left ventricular wall thickness is consistent with concentric hypertrophy.  · Estimated left ventricular EF = 45% Estimated left ventricular EF was in agreement with the calculated left ventricular EF. Left ventricular ejection fraction appears to be 41 - 45%. Left ventricular systolic function is low normal.  · The right atrial cavity is mildly dilated.  · With a centrally-directed jet noted.  · Estimated right ventricular systolic pressure from tricuspid regurgitation is normal (<35  mmHg).    Procedure indication  Recurrent cardioembolic stroke and history of atrial arrhythmias and patient brought in for FRAN prior to atrial arrhythmia ablation      conscious sedation administered by anesthesia    consent obtained before procedure      Procedure note  after obtaining a valid consent patient was sedated by Anesthesia and a FRAN probe was easily placed into esophagus with multiplane imaging with 2D, color and Doppler followed by bubble study with agitated saline without any complications    FRAN  Findings    Moderate left atrial enlargement without any shunt or clot  LV ejection fraction of 45%  No thrombus  Mild MR and TR and trivial effusion    Plan  Proceed with EP study and ablation      Procedures done  Transesophageal echocardiography        Electronically signed by Nathen Olmstead MD, 01/20/21, 5:08 PM EST.      XR Chest 1 View    Result Date: 4/25/2022   Vascular congestion without overt edema.  No evidence of significant pleural effusion and no pneumothorax  Electronically Signed By-Todd Ny On:4/25/2022 7:02 AM This report was finalized on [id number] by  Todd Ny, .        Estimated Creatinine Clearance: 121.6 mL/min (by C-G formula based on SCr of 0.81 mg/dL).    Assessment/Plan   Assessment/Plan       Active Hospital Problems    Diagnosis  POA   • Palpitations [R00.2]  Yes      Resolved Hospital Problems   No resolved problems to display.     Atrial fibrillation, intermittent with RVR: cardiology consult; hold metoprolol as cardiology started Sotalol; continue diltiazem; continue Eliquis    CAD with Stroke secondary prophylaxis: continue aspirin    Hypertension, chornic: continue lisinopril; hold metoprolol while on BETAPACE     HLD, chronic: continue atorvastatin     Diabetes, type 2, chronic: continue mealtime insulin; add sliding scale; hold metformin; hold ozmepic as it is non formulary at this facility      VTE Prophylaxis -   Mechanical Order History:     None       Pharmalogical Order History:      Ordered     Dose Route Frequency Stop    04/25/22 2013  apixaban (ELIQUIS) tablet 5 mg         5 mg PO Every 12 Hours Scheduled --                CODE STATUS:    Code Status and Medical Interventions:   Ordered at: 04/25/22 1258     Code Status (Patient has no pulse and is not breathing):    CPR (Attempt to Resuscitate)     Medical Interventions (Patient has pulse or is breathing):    Full Support       This patient has been examined wearing personal protective equipment.     I discussed the patient's findings and my recommendations with patient.      Signature:Electronically signed by EDWINA Quintero, 04/25/22, 8:33 PM EDT.

## 2022-04-26 NOTE — DISCHARGE INSTRUCTIONS
Sotalol has been shown to prolong the effects of low blood sugar (Hypoglycemia) in some people.  Be aware if you have low blood sugar you may need to monitor blood glucose longer than any previous episodes.

## 2022-04-26 NOTE — DISCHARGE SUMMARY
[facility]  DISCHARGE SUMMARY        Prepared For PCP:  Celine Garcia MD    Patient Name: Preet Jones  : 1965  MRN: [id number]      Date of Admission:   2022    Date of Discharge:  2022    Length of stay:  LOS: 0 days     Hospital Course     Presenting Problem:   Palpitations [R00.2]      Active Hospital Problems   No active problems to display.      Resolved Hospital Problems    Diagnosis Date Resolved POA   • Palpitations [R00.2] 2022 Yes           Hospital Course:  Preet Jones is a 57 y.o. male who presented to the ER with a chief complaint of palpitations.  The patient has a history of atrial fibrillation with prior ablation.  The patient was seen by electrophysiology cardiologist with recommendation for change in beta-blocker to sotalol.  The patient tolerated the medication and repeat EKG did not have significant changes with .  Patient will be discharged with prescription for sotalol 80 mg every 12 hours, #60, refills 1 and follow-up with Dr. Montana in 1 month.  The patient reports that he feels good and has had no episodes of palpitations overnight or this morning.  The patient is agreeable with the plan and eager for discharge.          Recommendation for Outpatient Providers:             Reasons For Change In Medications and Indications for New Medications:    Sotalol 80 mg every 12 hours, #60, refills 1, the patient will follow-up with electrophysiology cardiologist for refills    Stop metoprolol because of the addition of sotalol      Day of Discharge     HPI:   57 year old male who presents to the ER with a chief complaint of palpitations with elevated heart rate which have been intermittent, but recurrent over the last 48 hours and associated with shortness of breath and chest discomfort. The patient has a history of afib with RVR and ablation x 3,once in the remote past and twice this year. He has a history of CVA with minimal residual  deficit.     Vital Signs:   Temp:  [97.8 °F (36.6 °C)-98.3 °F (36.8 °C)] 98.3 °F (36.8 °C)  Heart Rate:  [] 75  Resp:  [17-18] 18  BP: (138-168)/() 142/88     ROS:  Review of Systems   All other systems reviewed and are negative.      Physical Exam  Vitals and nursing note reviewed.   Constitutional:       Appearance: Normal appearance. He is not ill-appearing.   HENT:      Head: Normocephalic and atraumatic.      Right Ear: External ear normal.      Left Ear: External ear normal.      Nose: Nose normal.      Mouth/Throat:      Mouth: Mucous membranes are moist.   Eyes:      General: No scleral icterus.        Right eye: No discharge.         Left eye: No discharge.      Extraocular Movements: Extraocular movements intact.      Conjunctiva/sclera: Conjunctivae normal.      Pupils: Pupils are equal, round, and reactive to light.   Cardiovascular:      Rate and Rhythm: Normal rate and regular rhythm.      Pulses: Normal pulses.      Heart sounds: Normal heart sounds. No murmur heard.  Pulmonary:      Effort: Pulmonary effort is normal.      Breath sounds: Normal breath sounds.   Abdominal:      General: Bowel sounds are normal.      Palpations: Abdomen is soft.   Musculoskeletal:         General: Normal range of motion.      Cervical back: Normal range of motion and neck supple.      Left lower leg: No edema.   Skin:     General: Skin is warm and dry.      Capillary Refill: Capillary refill takes less than 2 seconds.   Neurological:      General: No focal deficit present.      Mental Status: He is alert and oriented to person, place, and time.   Psychiatric:         Mood and Affect: Mood normal.         Behavior: Behavior normal.         Thought Content: Thought content normal.         Judgment: Judgment normal.     Pertinent  and/or Most Recent Results     Results from last 7 days   Lab Units 04/25/22  0550   WBC 10*3/mm3 6.30   HEMOGLOBIN g/dL 12.7*   HEMATOCRIT % 38.0   PLATELETS 10*3/mm3 239   SODIUM  mmol/L 136   POTASSIUM mmol/L 3.9   CHLORIDE mmol/L 105   CO2 mmol/L 22.0   BUN mg/dL 16   CREATININE mg/dL 0.81   GLUCOSE mg/dL 182*   CALCIUM mg/dL 8.8     Results from last 7 days   Lab Units 04/25/22  0550   BILIRUBIN mg/dL 0.3   ALK PHOS U/L 95   ALT (SGPT) U/L 17   AST (SGOT) U/L 14           Invalid input(s): TG, LDLCALC, LDLREALC  Results from last 7 days   Lab Units 04/25/22 2012 04/25/22  0550   TSH uIU/mL  --  2.030   TROPONIN T ng/mL 0.010 0.021       Brief Urine Lab Results     None          Microbiology Results Abnormal     Procedure Component Value - Date/Time    COVID PRE-OP / PRE-PROCEDURE SCREENING ORDER (NO ISOLATION) - Swab, Nasopharynx [[id number]]  (Normal) Collected: 04/25/22 0815    Lab Status: Final result Specimen: Swab from Nasopharynx Updated: 04/25/22 0857    Narrative:      The following orders were created for panel order COVID PRE-OP / PRE-PROCEDURE SCREENING ORDER (NO ISOLATION) - Swab, Nasopharynx.  Procedure                               Abnormality         Status                     ---------                               -----------         ------                     COVID-19,CEPHEID/WALTER,CO...[[id number]]  Normal              Final result                 Please view results for these tests on the individual orders.    COVID-19,CEPHEID/WALTER,COR/IDANIA/PAD/LYDIA IN-HOUSE(OR EMERGENT/ADD-ON),NP SWAB IN TRANSPORT MEDIA 3-4 HR TAT, RT-PCR - Swab, Nasopharynx [[id number]]  (Normal) Collected: 04/25/22 0815    Lab Status: Final result Specimen: Swab from Nasopharynx Updated: 04/25/22 0857     COVID19 Not Detected    Narrative:      Fact sheet for providers: https://www.fda.gov/media/581930/download     Fact sheet for patients: https://www.fda.gov/media/323715/download  Fact sheet for providers: https://www.fda.gov/media/653480/download     Fact sheet for patients: https://www.fda.gov/media/678625/download          XR Chest 1 View    Result Date: 4/25/2022  Impression:  Vascular congestion  without overt edema.  No evidence of significant pleural effusion and no pneumothorax  Electronically Signed By-Todd Ny On:4/25/2022 7:02 AM This report was finalized on [id number] by  Todd Ny, .      Results for orders placed during the hospital encounter of 01/31/22    Duplex Groin Pseudoaneurysm unilateral CAR    Interpretation Summary  · No evidence of pseudoaneurysm or AV fistula in the right groin.      Results for orders placed during the hospital encounter of 01/31/22    Duplex Groin Pseudoaneurysm unilateral CAR    Interpretation Summary  · No evidence of pseudoaneurysm or AV fistula in the right groin.      Results for orders placed during the hospital encounter of 01/20/21    Adult Transesophageal Echo (FRAN) W/ Cont if Necessary Per Protocol    Interpretation Summary  · Left ventricular wall thickness is consistent with concentric hypertrophy.  · Estimated left ventricular EF = 45% Estimated left ventricular EF was in agreement with the calculated left ventricular EF. Left ventricular ejection fraction appears to be 41 - 45%. Left ventricular systolic function is low normal.  · The right atrial cavity is mildly dilated.  · With a centrally-directed jet noted.  · Estimated right ventricular systolic pressure from tricuspid regurgitation is normal (<35 mmHg).    Procedure indication  Recurrent cardioembolic stroke and history of atrial arrhythmias and patient brought in for FRAN prior to atrial arrhythmia ablation      conscious sedation administered by anesthesia    consent obtained before procedure      Procedure note  after obtaining a valid consent patient was sedated by Anesthesia and a FRAN probe was easily placed into esophagus with multiplane imaging with 2D, color and Doppler followed by bubble study with agitated saline without any complications    FRAN  Findings    Moderate left atrial enlargement without any shunt or clot  LV ejection fraction of 45%  No thrombus  Mild MR and TR and  trivial effusion    Plan  Proceed with EP study and ablation      Procedures done  Transesophageal echocardiography        Electronically signed by Nathen Olmstead MD, 01/20/21, 5:08 PM EST.              Test Results Pending at Discharge  Pending Labs     Order Current Status    [place] Top In process    [place] Draw In process            Procedures Performed           Consults:   Consults     Date and Time Order Name Status Description    4/25/2022  7:29 AM Cardiology (on-call MD unless specified)              Discharge Details        Discharge Medications      New Medications      Instructions Start Date   sotalol 80 MG tablet  Commonly known as: BETAPACE   80 mg, Oral, Every 12 Hours Scheduled         Changes to Medications      Instructions Start Date   insulin lispro 100 UNIT/ML injection  Commonly known as: ADMELOG  What changed: how much to take   10 Units, Subcutaneous, 3 Times Daily With Meals         Continue These Medications      Instructions Start Date   apixaban 5 MG tablet tablet  Commonly known as: ELIQUIS   5 mg, Oral, Every 12 Hours Scheduled      aspirin 81 MG chewable tablet   81 mg, Oral, Daily      atorvastatin 80 MG tablet  Commonly known as: LIPITOR   80 mg, Oral, Nightly      BD Pen Needle Barbara 2nd Gen 32G X 4 MM misc  Generic drug: Insulin Pen Needle   4 Times Daily, use as directed      dilTIAZem  MG 24 hr capsule  Commonly known as: Cardizem CD   180 mg, Oral, Daily      Gvoke HypoPen 1-Pack 1 MG/0.2ML solution auto-injector  Generic drug: Glucagon   INJECT 1 MG SUBQ AS DIRECTED      lisinopril 40 MG tablet  Commonly known as: PRINIVIL,ZESTRIL   40 mg, Oral, Daily      metFORMIN 1000 MG tablet  Commonly known as: GLUCOPHAGE   2 Times Daily With Meals      OneTouch Delica Plus [id number] misc   3 Times Daily, as directed      OneTouch Verio test strip  Generic drug: glucose blood   4 Times Daily, use as directed      Ozempic (0.25 or 0.5 MG/DOSE) 2 MG/1.5ML solution  pen-injector  Generic drug: Semaglutide(0.25 or 0.5MG/DOS)   INJECT 0.5 MG SUBQ ONCE WEEKLY      Vitamin D-3 25 MCG (1000 UT) capsule   3 capsules, Oral, Daily         Stop These Medications    metoprolol tartrate 25 MG tablet  Commonly known as: LOPRESSOR            Allergies   Allergen Reactions   • Morphine GI Intolerance   • Isosorbide Headache   • Nitrofuran Derivatives Headache   • Nitroglycerin Unknown - Low Severity         Discharge Disposition:  Home or Self Care    Diet:  Hospital:  Diet Order   Procedures   • Diet Cardiac; Healthy Heart         Discharge Activity: As tolerated; return to work Thursday        CODE STATUS:    Code Status and Medical Interventions:   Ordered at: 04/25/22 1258     Code Status (Patient has no pulse and is not breathing):    CPR (Attempt to Resuscitate)     Medical Interventions (Patient has pulse or is breathing):    Full Support         Follow-up Appointments  No future appointments.          Condition on Discharge:      Stable      This patient has been examined wearing appropriate Personal Protective Equipment. 04/26/22      Electronically signed by EDWINA Quintero, 04/26/22, 12:35 PM EDT.      Time: I spent  60  minutes on this discharge activity which included face-to-face encounter with the patient/reviewing the data in the system/coordination of the care with the nursing staff as well as consultants/documentation/entering orders.

## 2022-04-27 LAB
QT INTERVAL: 438 MS
QT INTERVAL: 446 MS

## 2022-04-27 NOTE — CASE MANAGEMENT/SOCIAL WORK
Case Management Discharge Note      Final Note: DC home prior to CM assessment.         Selected Continued Care - Discharged on 4/26/2022 Admission date: 4/25/2022 - Discharge disposition: Home or Self Care     Transportation Services  Private: Car    Final Discharge Disposition Code: 01 - home or self-care

## 2022-04-28 ENCOUNTER — TELEPHONE (OUTPATIENT)
Dept: CARDIOLOGY | Facility: CLINIC | Age: 57
End: 2022-04-28

## 2022-04-28 RX ORDER — SOTALOL HYDROCHLORIDE 80 MG/1
40 TABLET ORAL EVERY 12 HOURS SCHEDULED
Qty: 60 TABLET | Refills: 1
Start: 2022-04-28 | End: 2022-09-08

## 2022-04-28 NOTE — TELEPHONE ENCOUNTER
Patient says he woke up on the floor, had passed out. He was checking his BP before passing out and he says it was dropping. It was 108/60 last time he checked it. He feels like he has no energy and is lightheaded ever since getting out of the hospital and switching metoprolol to sotalol. Please advise

## 2022-04-28 NOTE — TELEPHONE ENCOUNTER
Spoke with patient, advised him to decrease sotalol to 40 twice a day and monitor his blood pressure and heart rate.

## 2022-04-29 NOTE — TELEPHONE ENCOUNTER
PATIENT WOULD LIKE WORK NOTE TO COVER HIM FROM HIS HOSPITAL DISCHARGE TILL Monday. HE HAS HAD MEDICATION ISSUES AND PATIENT SAID DR BERTRAND TOLD HIM TO WAIT TILL Monday TO GO BACK TO WORK.

## 2022-06-17 ENCOUNTER — TELEPHONE (OUTPATIENT)
Dept: CARDIOLOGY | Facility: CLINIC | Age: 57
End: 2022-06-17

## 2022-06-17 NOTE — TELEPHONE ENCOUNTER
MAURICE WITH [employer] CALLED REGARDING PATIENTS DISABILITY CLAIMS. HE WANTS TO KNOW WHAT HIS RETURN TO WORK DATE IS. [phone]- CLAIM # [id number]

## 2022-07-18 ENCOUNTER — APPOINTMENT (OUTPATIENT)
Dept: CARDIOLOGY | Facility: HOSPITAL | Age: 57
End: 2022-07-18

## 2022-07-18 ENCOUNTER — APPOINTMENT (OUTPATIENT)
Dept: CT IMAGING | Facility: HOSPITAL | Age: 57
End: 2022-07-18

## 2022-07-18 ENCOUNTER — APPOINTMENT (OUTPATIENT)
Dept: GENERAL RADIOLOGY | Facility: HOSPITAL | Age: 57
End: 2022-07-18

## 2022-07-18 ENCOUNTER — HOSPITAL ENCOUNTER (INPATIENT)
Facility: HOSPITAL | Age: 57
LOS: 3 days | Discharge: HOME | End: 2022-07-21
Attending: EMERGENCY MEDICINE | Admitting: FAMILY MEDICINE

## 2022-07-18 DIAGNOSIS — I50.21 ACUTE SYSTOLIC CONGESTIVE HEART FAILURE: ICD-10-CM

## 2022-07-18 DIAGNOSIS — I50.9 ACUTE ON CHRONIC CONGESTIVE HEART FAILURE, UNSPECIFIED HEART FAILURE TYPE: Primary | ICD-10-CM

## 2022-07-18 DIAGNOSIS — R07.9 CHEST PAIN, UNSPECIFIED TYPE: ICD-10-CM

## 2022-07-18 PROBLEM — J90 BILATERAL PLEURAL EFFUSION: Status: ACTIVE | Noted: 2022-07-18

## 2022-07-18 PROBLEM — I48.92 ATRIAL FLUTTER WITH RAPID VENTRICULAR RESPONSE: Status: RESOLVED | Noted: 2021-02-04 | Resolved: 2022-07-18

## 2022-07-18 PROBLEM — I63.512 ARTERIAL ISCHEMIC STROKE, MCA (MIDDLE CEREBRAL ARTERY), LEFT, ACUTE: Status: RESOLVED | Noted: 2019-10-29 | Resolved: 2022-07-18

## 2022-07-18 PROBLEM — I49.8 ATRIAL ARRHYTHMIA: Chronic | Status: ACTIVE | Noted: 2022-07-18

## 2022-07-18 PROBLEM — I50.33 ACUTE ON CHRONIC DIASTOLIC CHF (CONGESTIVE HEART FAILURE) (HCC): Status: ACTIVE | Noted: 2022-07-18

## 2022-07-18 PROBLEM — E11.65 TYPE 2 DIABETES MELLITUS WITH HYPERGLYCEMIA, WITHOUT LONG-TERM CURRENT USE OF INSULIN (HCC): Chronic | Status: ACTIVE | Noted: 2019-10-29

## 2022-07-18 PROBLEM — E87.6 HYPOKALEMIA: Status: RESOLVED | Noted: 2020-11-05 | Resolved: 2022-07-18

## 2022-07-18 PROBLEM — I47.10 PAROXYSMAL SVT (SUPRAVENTRICULAR TACHYCARDIA): Status: RESOLVED | Noted: 2021-01-08 | Resolved: 2022-07-18

## 2022-07-18 PROBLEM — I47.1 PSVT (PAROXYSMAL SUPRAVENTRICULAR TACHYCARDIA) (HCC): Chronic | Status: ACTIVE | Noted: 2020-12-30

## 2022-07-18 PROBLEM — I25.810 CORONARY ARTERY DISEASE INVOLVING CORONARY BYPASS GRAFT OF NATIVE HEART: Chronic | Status: ACTIVE | Noted: 2020-12-23

## 2022-07-18 PROBLEM — R06.09 DYSPNEA ON EXERTION: Status: ACTIVE | Noted: 2022-07-18

## 2022-07-18 PROBLEM — R47.81 SLURRED SPEECH: Status: RESOLVED | Noted: 2019-10-29 | Resolved: 2022-07-18

## 2022-07-18 PROBLEM — I47.1 PAROXYSMAL SVT (SUPRAVENTRICULAR TACHYCARDIA) (HCC): Status: RESOLVED | Noted: 2021-01-08 | Resolved: 2022-07-18

## 2022-07-18 PROBLEM — R00.2 PALPITATIONS: Status: ACTIVE | Noted: 2022-07-18

## 2022-07-18 PROBLEM — E66.3 OVERWEIGHT (BMI 25.0-29.9): Status: ACTIVE | Noted: 2022-07-18

## 2022-07-18 PROBLEM — F32.A DEPRESSION: Status: ACTIVE | Noted: 2020-12-30

## 2022-07-18 PROBLEM — I47.10 PSVT (PAROXYSMAL SUPRAVENTRICULAR TACHYCARDIA): Chronic | Status: ACTIVE | Noted: 2020-12-30

## 2022-07-18 LAB
ALBUMIN SERPL-MCNC: 3.7 G/DL (ref 3.5–5.2)
ALBUMIN/GLOB SERPL: 1.2 G/DL
ALP SERPL-CCNC: 126 U/L (ref 39–117)
ALT SERPL W P-5'-P-CCNC: 18 U/L (ref 1–41)
ANION GAP SERPL CALCULATED.3IONS-SCNC: 11 MMOL/L (ref 5–15)
AST SERPL-CCNC: 14 U/L (ref 1–40)
B PARAPERT DNA SPEC QL NAA+PROBE: NOT DETECTED
B PERT DNA SPEC QL NAA+PROBE: NOT DETECTED
BASOPHILS # BLD AUTO: 0 10*3/MM3 (ref 0–0.2)
BASOPHILS NFR BLD AUTO: 0.2 % (ref 0–1.5)
BILIRUB SERPL-MCNC: 0.3 MG/DL (ref 0–1.2)
BUN SERPL-MCNC: 17 MG/DL (ref 6–20)
BUN/CREAT SERPL: 18.7 (ref 7–25)
C PNEUM DNA NPH QL NAA+NON-PROBE: NOT DETECTED
CALCIUM SPEC-SCNC: 9.2 MG/DL (ref 8.6–10.5)
CHLORIDE SERPL-SCNC: 100 MMOL/L (ref 98–107)
CHOLEST SERPL-MCNC: 163 MG/DL (ref 0–200)
CO2 SERPL-SCNC: 24 MMOL/L (ref 22–29)
CREAT SERPL-MCNC: 0.91 MG/DL (ref 0.76–1.27)
D DIMER PPP FEU-MCNC: 1.57 MG/L (FEU) (ref 0–0.59)
DEPRECATED RDW RBC AUTO: 41.6 FL (ref 37–54)
EGFRCR SERPLBLD CKD-EPI 2021: 97.7 ML/MIN/1.73
EOSINOPHIL # BLD AUTO: 0.5 10*3/MM3 (ref 0–0.4)
EOSINOPHIL NFR BLD AUTO: 8.3 % (ref 0.3–6.2)
ERYTHROCYTE [DISTWIDTH] IN BLOOD BY AUTOMATED COUNT: 14.3 % (ref 12.3–15.4)
FLUAV SUBTYP SPEC NAA+PROBE: NOT DETECTED
FLUBV RNA ISLT QL NAA+PROBE: NOT DETECTED
GLOBULIN UR ELPH-MCNC: 3.1 GM/DL
GLUCOSE BLDC GLUCOMTR-MCNC: 184 MG/DL (ref 70–105)
GLUCOSE BLDC GLUCOMTR-MCNC: 229 MG/DL (ref 70–105)
GLUCOSE BLDC GLUCOMTR-MCNC: 251 MG/DL (ref 70–105)
GLUCOSE BLDC GLUCOMTR-MCNC: 277 MG/DL (ref 70–105)
GLUCOSE SERPL-MCNC: 313 MG/DL (ref 65–99)
HADV DNA SPEC NAA+PROBE: NOT DETECTED
HCOV 229E RNA SPEC QL NAA+PROBE: NOT DETECTED
HCOV HKU1 RNA SPEC QL NAA+PROBE: NOT DETECTED
HCOV NL63 RNA SPEC QL NAA+PROBE: NOT DETECTED
HCOV OC43 RNA SPEC QL NAA+PROBE: NOT DETECTED
HCT VFR BLD AUTO: 38.9 % (ref 37.5–51)
HDLC SERPL-MCNC: 24 MG/DL (ref 40–60)
HGB BLD-MCNC: 13 G/DL (ref 13–17.7)
HMPV RNA NPH QL NAA+NON-PROBE: NOT DETECTED
HPIV1 RNA ISLT QL NAA+PROBE: NOT DETECTED
HPIV2 RNA SPEC QL NAA+PROBE: NOT DETECTED
HPIV3 RNA NPH QL NAA+PROBE: NOT DETECTED
HPIV4 P GENE NPH QL NAA+PROBE: NOT DETECTED
LDLC SERPL CALC-MCNC: 110 MG/DL (ref 0–100)
LDLC/HDLC SERPL: 4.43 {RATIO}
LYMPHOCYTES # BLD AUTO: 1.5 10*3/MM3 (ref 0.7–3.1)
LYMPHOCYTES NFR BLD AUTO: 23.4 % (ref 19.6–45.3)
M PNEUMO IGG SER IA-ACNC: NOT DETECTED
MAGNESIUM SERPL-MCNC: 2.2 MG/DL (ref 1.6–2.6)
MCH RBC QN AUTO: 27.8 PG (ref 26.6–33)
MCHC RBC AUTO-ENTMCNC: 33.5 G/DL (ref 31.5–35.7)
MCV RBC AUTO: 83 FL (ref 79–97)
MONOCYTES # BLD AUTO: 0.5 10*3/MM3 (ref 0.1–0.9)
MONOCYTES NFR BLD AUTO: 7.7 % (ref 5–12)
NEUTROPHILS NFR BLD AUTO: 3.9 10*3/MM3 (ref 1.7–7)
NEUTROPHILS NFR BLD AUTO: 60.4 % (ref 42.7–76)
NRBC BLD AUTO-RTO: 0.1 /100 WBC (ref 0–0.2)
NT-PROBNP SERPL-MCNC: 2484 PG/ML (ref 0–900)
PLATELET # BLD AUTO: 222 10*3/MM3 (ref 140–450)
PMV BLD AUTO: 9.7 FL (ref 6–12)
POTASSIUM SERPL-SCNC: 3.7 MMOL/L (ref 3.5–5.2)
PROCALCITONIN SERPL-MCNC: 0.07 NG/ML (ref 0–0.25)
PROT SERPL-MCNC: 6.8 G/DL (ref 6–8.5)
RBC # BLD AUTO: 4.68 10*6/MM3 (ref 4.14–5.8)
RHINOVIRUS RNA SPEC NAA+PROBE: NOT DETECTED
RSV RNA NPH QL NAA+NON-PROBE: NOT DETECTED
SARS-COV-2 RNA NPH QL NAA+NON-PROBE: NOT DETECTED
SODIUM SERPL-SCNC: 135 MMOL/L (ref 136–145)
TRIGL SERPL-MCNC: 164 MG/DL (ref 0–150)
TROPONIN T SERPL-MCNC: 0.03 NG/ML (ref 0–0.03)
TROPONIN T SERPL-MCNC: 0.03 NG/ML (ref 0–0.03)
TSH SERPL DL<=0.05 MIU/L-ACNC: 2.37 UIU/ML (ref 0.27–4.2)
VLDLC SERPL-MCNC: 29 MG/DL (ref 5–40)
WBC NRBC COR # BLD: 6.4 10*3/MM3 (ref 3.4–10.8)

## 2022-07-18 PROCEDURE — 83880 ASSAY OF NATRIURETIC PEPTIDE: CPT | Performed by: EMERGENCY MEDICINE

## 2022-07-18 PROCEDURE — 93005 ELECTROCARDIOGRAM TRACING: CPT | Performed by: EMERGENCY MEDICINE

## 2022-07-18 PROCEDURE — 25010000002 FUROSEMIDE PER 20 MG: Performed by: NURSE PRACTITIONER

## 2022-07-18 PROCEDURE — G0378 HOSPITAL OBSERVATION PER HR: HCPCS

## 2022-07-18 PROCEDURE — 0 IOPAMIDOL PER 1 ML: Performed by: EMERGENCY MEDICINE

## 2022-07-18 PROCEDURE — 0202U NFCT DS 22 TRGT SARS-COV-2: CPT | Performed by: EMERGENCY MEDICINE

## 2022-07-18 PROCEDURE — 80061 LIPID PANEL: CPT | Performed by: NURSE PRACTITIONER

## 2022-07-18 PROCEDURE — 82962 GLUCOSE BLOOD TEST: CPT

## 2022-07-18 PROCEDURE — 36415 COLL VENOUS BLD VENIPUNCTURE: CPT | Performed by: NURSE PRACTITIONER

## 2022-07-18 PROCEDURE — 93306 TTE W/DOPPLER COMPLETE: CPT

## 2022-07-18 PROCEDURE — 63710000001 INSULIN LISPRO (HUMAN) PER 5 UNITS: Performed by: NURSE PRACTITIONER

## 2022-07-18 PROCEDURE — 83735 ASSAY OF MAGNESIUM: CPT | Performed by: EMERGENCY MEDICINE

## 2022-07-18 PROCEDURE — 85379 FIBRIN DEGRADATION QUANT: CPT | Performed by: EMERGENCY MEDICINE

## 2022-07-18 PROCEDURE — 99222 1ST HOSP IP/OBS MODERATE 55: CPT | Performed by: NURSE PRACTITIONER

## 2022-07-18 PROCEDURE — 84484 ASSAY OF TROPONIN QUANT: CPT | Performed by: EMERGENCY MEDICINE

## 2022-07-18 PROCEDURE — 85025 COMPLETE CBC W/AUTO DIFF WBC: CPT | Performed by: EMERGENCY MEDICINE

## 2022-07-18 PROCEDURE — 71045 X-RAY EXAM CHEST 1 VIEW: CPT

## 2022-07-18 PROCEDURE — 71275 CT ANGIOGRAPHY CHEST: CPT

## 2022-07-18 PROCEDURE — 83036 HEMOGLOBIN GLYCOSYLATED A1C: CPT | Performed by: NURSE PRACTITIONER

## 2022-07-18 PROCEDURE — 93306 TTE W/DOPPLER COMPLETE: CPT | Performed by: INTERNAL MEDICINE

## 2022-07-18 PROCEDURE — 84484 ASSAY OF TROPONIN QUANT: CPT | Performed by: NURSE PRACTITIONER

## 2022-07-18 PROCEDURE — 25010000002 FUROSEMIDE PER 20 MG: Performed by: EMERGENCY MEDICINE

## 2022-07-18 PROCEDURE — 63710000001 INSULIN GLARGINE PER 5 UNITS: Performed by: NURSE PRACTITIONER

## 2022-07-18 PROCEDURE — 80053 COMPREHEN METABOLIC PANEL: CPT | Performed by: EMERGENCY MEDICINE

## 2022-07-18 PROCEDURE — 84443 ASSAY THYROID STIM HORMONE: CPT | Performed by: EMERGENCY MEDICINE

## 2022-07-18 PROCEDURE — 99284 EMERGENCY DEPT VISIT MOD MDM: CPT

## 2022-07-18 PROCEDURE — 99214 OFFICE O/P EST MOD 30 MIN: CPT | Performed by: INTERNAL MEDICINE

## 2022-07-18 PROCEDURE — 84145 PROCALCITONIN (PCT): CPT | Performed by: EMERGENCY MEDICINE

## 2022-07-18 RX ORDER — INSULIN LISPRO 100 [IU]/ML
0-9 INJECTION, SOLUTION INTRAVENOUS; SUBCUTANEOUS
Status: DISCONTINUED | OUTPATIENT
Start: 2022-07-18 | End: 2022-07-21 | Stop reason: HOSPADM

## 2022-07-18 RX ORDER — OLANZAPINE 10 MG/2ML
1 INJECTION, POWDER, LYOPHILIZED, FOR SOLUTION INTRAMUSCULAR AS NEEDED
Status: DISCONTINUED | OUTPATIENT
Start: 2022-07-18 | End: 2022-07-21 | Stop reason: HOSPADM

## 2022-07-18 RX ORDER — BISACODYL 5 MG/1
5 TABLET, DELAYED RELEASE ORAL DAILY PRN
Status: DISCONTINUED | OUTPATIENT
Start: 2022-07-18 | End: 2022-07-21 | Stop reason: HOSPADM

## 2022-07-18 RX ORDER — LISINOPRIL 20 MG/1
40 TABLET ORAL
Status: DISCONTINUED | OUTPATIENT
Start: 2022-07-18 | End: 2022-07-20

## 2022-07-18 RX ORDER — INSULIN LISPRO 100 [IU]/ML
0-9 INJECTION, SOLUTION INTRAVENOUS; SUBCUTANEOUS AS NEEDED
Status: DISCONTINUED | OUTPATIENT
Start: 2022-07-18 | End: 2022-07-21 | Stop reason: HOSPADM

## 2022-07-18 RX ORDER — BISACODYL 10 MG
10 SUPPOSITORY, RECTAL RECTAL DAILY PRN
Status: DISCONTINUED | OUTPATIENT
Start: 2022-07-18 | End: 2022-07-21 | Stop reason: HOSPADM

## 2022-07-18 RX ORDER — ATORVASTATIN CALCIUM 40 MG/1
80 TABLET, FILM COATED ORAL NIGHTLY
Status: DISCONTINUED | OUTPATIENT
Start: 2022-07-18 | End: 2022-07-21 | Stop reason: HOSPADM

## 2022-07-18 RX ORDER — NICOTINE POLACRILEX 4 MG
15 LOZENGE BUCCAL
Status: DISCONTINUED | OUTPATIENT
Start: 2022-07-18 | End: 2022-07-21 | Stop reason: HOSPADM

## 2022-07-18 RX ORDER — IPRATROPIUM BROMIDE AND ALBUTEROL SULFATE 2.5; .5 MG/3ML; MG/3ML
3 SOLUTION RESPIRATORY (INHALATION) EVERY 4 HOURS PRN
Status: DISCONTINUED | OUTPATIENT
Start: 2022-07-18 | End: 2022-07-21 | Stop reason: HOSPADM

## 2022-07-18 RX ORDER — DILTIAZEM HYDROCHLORIDE 180 MG/1
180 CAPSULE, COATED, EXTENDED RELEASE ORAL
Status: DISCONTINUED | OUTPATIENT
Start: 2022-07-18 | End: 2022-07-20

## 2022-07-18 RX ORDER — PEDIATRIC MULTIVITAMIN NO.17
1 TABLET,CHEWABLE ORAL DAILY
COMMUNITY

## 2022-07-18 RX ORDER — LABETALOL HYDROCHLORIDE 5 MG/ML
10 INJECTION, SOLUTION INTRAVENOUS EVERY 4 HOURS PRN
Status: DISCONTINUED | OUTPATIENT
Start: 2022-07-18 | End: 2022-07-21 | Stop reason: HOSPADM

## 2022-07-18 RX ORDER — SODIUM CHLORIDE 0.9 % (FLUSH) 0.9 %
10 SYRINGE (ML) INJECTION AS NEEDED
Status: DISCONTINUED | OUTPATIENT
Start: 2022-07-18 | End: 2022-07-21 | Stop reason: HOSPADM

## 2022-07-18 RX ORDER — GUAIFENESIN 600 MG/1
1200 TABLET, EXTENDED RELEASE ORAL 2 TIMES DAILY PRN
COMMUNITY
Start: 2022-07-15

## 2022-07-18 RX ORDER — ACETAMINOPHEN 650 MG/1
650 SUPPOSITORY RECTAL EVERY 4 HOURS PRN
Status: DISCONTINUED | OUTPATIENT
Start: 2022-07-18 | End: 2022-07-21 | Stop reason: HOSPADM

## 2022-07-18 RX ORDER — ACETAMINOPHEN 325 MG/1
650 TABLET ORAL EVERY 4 HOURS PRN
Status: DISCONTINUED | OUTPATIENT
Start: 2022-07-18 | End: 2022-07-21 | Stop reason: HOSPADM

## 2022-07-18 RX ORDER — ASPIRIN 81 MG/1
81 TABLET ORAL DAILY
Status: DISCONTINUED | OUTPATIENT
Start: 2022-07-19 | End: 2022-07-21 | Stop reason: HOSPADM

## 2022-07-18 RX ORDER — DEXTROSE MONOHYDRATE 25 G/50ML
25 INJECTION, SOLUTION INTRAVENOUS
Status: DISCONTINUED | OUTPATIENT
Start: 2022-07-18 | End: 2022-07-21 | Stop reason: HOSPADM

## 2022-07-18 RX ORDER — ACETAMINOPHEN 160 MG/5ML
650 SOLUTION ORAL EVERY 4 HOURS PRN
Status: DISCONTINUED | OUTPATIENT
Start: 2022-07-18 | End: 2022-07-21 | Stop reason: HOSPADM

## 2022-07-18 RX ORDER — SODIUM CHLORIDE 0.9 % (FLUSH) 0.9 %
10 SYRINGE (ML) INJECTION EVERY 12 HOURS SCHEDULED
Status: DISCONTINUED | OUTPATIENT
Start: 2022-07-18 | End: 2022-07-21 | Stop reason: HOSPADM

## 2022-07-18 RX ORDER — POLYETHYLENE GLYCOL 3350 17 G/17G
17 POWDER, FOR SOLUTION ORAL DAILY PRN
Status: DISCONTINUED | OUTPATIENT
Start: 2022-07-18 | End: 2022-07-21 | Stop reason: HOSPADM

## 2022-07-18 RX ORDER — SOTALOL HYDROCHLORIDE 80 MG/1
40 TABLET ORAL EVERY 12 HOURS SCHEDULED
Status: DISCONTINUED | OUTPATIENT
Start: 2022-07-18 | End: 2022-07-21 | Stop reason: HOSPADM

## 2022-07-18 RX ORDER — ONDANSETRON 4 MG/1
4 TABLET, FILM COATED ORAL EVERY 6 HOURS PRN
Status: DISCONTINUED | OUTPATIENT
Start: 2022-07-18 | End: 2022-07-21 | Stop reason: HOSPADM

## 2022-07-18 RX ORDER — MAGNESIUM SULFATE HEPTAHYDRATE 40 MG/ML
2 INJECTION, SOLUTION INTRAVENOUS AS NEEDED
Status: DISCONTINUED | OUTPATIENT
Start: 2022-07-18 | End: 2022-07-21 | Stop reason: HOSPADM

## 2022-07-18 RX ORDER — MAGNESIUM SULFATE 1 G/100ML
1 INJECTION INTRAVENOUS AS NEEDED
Status: DISCONTINUED | OUTPATIENT
Start: 2022-07-18 | End: 2022-07-21 | Stop reason: HOSPADM

## 2022-07-18 RX ORDER — FUROSEMIDE 10 MG/ML
40 INJECTION INTRAMUSCULAR; INTRAVENOUS ONCE
Status: COMPLETED | OUTPATIENT
Start: 2022-07-18 | End: 2022-07-18

## 2022-07-18 RX ORDER — AMOXICILLIN 250 MG
2 CAPSULE ORAL 2 TIMES DAILY PRN
Status: DISCONTINUED | OUTPATIENT
Start: 2022-07-18 | End: 2022-07-21 | Stop reason: HOSPADM

## 2022-07-18 RX ORDER — POTASSIUM CHLORIDE 1.5 G/1.77G
40 POWDER, FOR SOLUTION ORAL AS NEEDED
Status: DISCONTINUED | OUTPATIENT
Start: 2022-07-18 | End: 2022-07-21 | Stop reason: HOSPADM

## 2022-07-18 RX ORDER — NITROGLYCERIN 0.4 MG/1
0.4 TABLET SUBLINGUAL
Status: DISCONTINUED | OUTPATIENT
Start: 2022-07-18 | End: 2022-07-21 | Stop reason: HOSPADM

## 2022-07-18 RX ORDER — ASPIRIN 81 MG/1
324 TABLET, CHEWABLE ORAL ONCE
Status: COMPLETED | OUTPATIENT
Start: 2022-07-18 | End: 2022-07-18

## 2022-07-18 RX ORDER — KETOROLAC TROMETHAMINE 15 MG/ML
15 INJECTION, SOLUTION INTRAMUSCULAR; INTRAVENOUS EVERY 6 HOURS PRN
Status: DISCONTINUED | OUTPATIENT
Start: 2022-07-18 | End: 2022-07-20

## 2022-07-18 RX ORDER — POTASSIUM CHLORIDE 20 MEQ/1
40 TABLET, EXTENDED RELEASE ORAL AS NEEDED
Status: DISCONTINUED | OUTPATIENT
Start: 2022-07-18 | End: 2022-07-21 | Stop reason: HOSPADM

## 2022-07-18 RX ORDER — FUROSEMIDE 10 MG/ML
40 INJECTION INTRAMUSCULAR; INTRAVENOUS EVERY 8 HOURS
Status: DISCONTINUED | OUTPATIENT
Start: 2022-07-18 | End: 2022-07-21

## 2022-07-18 RX ORDER — POTASSIUM CHLORIDE 7.45 MG/ML
10 INJECTION INTRAVENOUS
Status: DISCONTINUED | OUTPATIENT
Start: 2022-07-18 | End: 2022-07-21 | Stop reason: HOSPADM

## 2022-07-18 RX ORDER — AZITHROMYCIN 250 MG/1
250 TABLET, FILM COATED ORAL DAILY
COMMUNITY
Start: 2022-07-15 | End: 2022-07-21 | Stop reason: HOSPADM

## 2022-07-18 RX ORDER — ALUMINA, MAGNESIA, AND SIMETHICONE 2400; 2400; 240 MG/30ML; MG/30ML; MG/30ML
15 SUSPENSION ORAL EVERY 6 HOURS PRN
Status: DISCONTINUED | OUTPATIENT
Start: 2022-07-18 | End: 2022-07-21 | Stop reason: HOSPADM

## 2022-07-18 RX ORDER — CHOLECALCIFEROL (VITAMIN D3) 125 MCG
5 CAPSULE ORAL NIGHTLY PRN
Status: DISCONTINUED | OUTPATIENT
Start: 2022-07-18 | End: 2022-07-21 | Stop reason: HOSPADM

## 2022-07-18 RX ORDER — ONDANSETRON 2 MG/ML
4 INJECTION INTRAMUSCULAR; INTRAVENOUS EVERY 6 HOURS PRN
Status: DISCONTINUED | OUTPATIENT
Start: 2022-07-18 | End: 2022-07-21 | Stop reason: HOSPADM

## 2022-07-18 RX ADMIN — IOPAMIDOL 100 ML: 755 INJECTION, SOLUTION INTRAVENOUS at 06:18

## 2022-07-18 RX ADMIN — Medication 10 ML: at 09:00

## 2022-07-18 RX ADMIN — APIXABAN 5 MG: 5 TABLET, FILM COATED ORAL at 21:35

## 2022-07-18 RX ADMIN — APIXABAN 5 MG: 5 TABLET, FILM COATED ORAL at 13:20

## 2022-07-18 RX ADMIN — SOTALOL HYDROCHLORIDE 40 MG: 80 TABLET ORAL at 23:12

## 2022-07-18 RX ADMIN — FUROSEMIDE 40 MG: 10 INJECTION, SOLUTION INTRAMUSCULAR; INTRAVENOUS at 07:32

## 2022-07-18 RX ADMIN — ASPIRIN 324 MG: 81 TABLET, CHEWABLE ORAL at 07:32

## 2022-07-18 RX ADMIN — NITROGLYCERIN 1 INCH: 20 OINTMENT TOPICAL at 07:32

## 2022-07-18 RX ADMIN — INSULIN LISPRO 2 UNITS: 100 INJECTION, SOLUTION INTRAVENOUS; SUBCUTANEOUS at 13:20

## 2022-07-18 RX ADMIN — ATORVASTATIN CALCIUM 80 MG: 40 TABLET, FILM COATED ORAL at 21:35

## 2022-07-18 RX ADMIN — ACETAMINOPHEN 650 MG: 325 TABLET, FILM COATED ORAL at 21:01

## 2022-07-18 RX ADMIN — INSULIN GLARGINE 10 UNITS: 100 INJECTION, SOLUTION SUBCUTANEOUS at 21:35

## 2022-07-18 RX ADMIN — INSULIN LISPRO 4 UNITS: 100 INJECTION, SOLUTION INTRAVENOUS; SUBCUTANEOUS at 18:22

## 2022-07-18 RX ADMIN — FUROSEMIDE 40 MG: 10 INJECTION, SOLUTION INTRAMUSCULAR; INTRAVENOUS at 23:08

## 2022-07-18 RX ADMIN — Medication 10 ML: at 21:36

## 2022-07-18 RX ADMIN — FUROSEMIDE 40 MG: 10 INJECTION, SOLUTION INTRAMUSCULAR; INTRAVENOUS at 13:21

## 2022-07-18 RX ADMIN — LISINOPRIL 40 MG: 20 TABLET ORAL at 13:20

## 2022-07-18 RX ADMIN — INSULIN LISPRO 6 UNITS: 100 INJECTION, SOLUTION INTRAVENOUS; SUBCUTANEOUS at 10:04

## 2022-07-18 RX ADMIN — DILTIAZEM HYDROCHLORIDE 180 MG: 180 CAPSULE, COATED, EXTENDED RELEASE ORAL at 13:40

## 2022-07-18 NOTE — CONSULTS
"Diabetes Education  Assessment/Teaching    Patient Name:  Preet Jones  YOB: 1965  MRN: [id number]  Admit Date:  7/18/2022      Assessment Date:  7/18/2022  Flowsheet Row Most Recent Value   General Information     Referral From: Blood glucose, MD order  [MD consult for blood glucose >200 and admission blood sugar 313.]   Height 180.3 cm (71\")   Height Method Stated   Weight 88.9 kg (195 lb 15.8 oz)   Pregnancy Assessment    Diabetes History    What type of diabetes do you have? Type 2   Length of Diabetes Diagnosis 6 - 10 years  [Diagnosed 6-7 years ago.]   Current DM knowledge fair   Have you had diabetes education/teaching in the past? yes   When and where was your diabetes education? Inpatient hospitalization at [facility] on 2/2/2022   Do you test your blood sugar at home? yes   Frequency of checks 1X a day   Meter type unsure of name but about 5 months old   Who performs the test? patient   Typical readings 150-180   Have you had low blood sugar? (<70mg/dl) no   Have you had high blood sugar? (>140mg/dl) yes   How often do you have high blood sugar? frequently   When was your last high blood sugar? Admission blood sugar 313   Education Preferences    What areas of diabetes would you like to learn about? avoiding high blood sugar, diabetes complications, medications for diabetes   Nutrition Information    Assessment Topics    Taking Medication - Assessment Needs education   Problem Solving - Assessment Needs education   Reducing Risk - Assessment Needs education   DM Goals    Taking Medication - Goal Today   Problem Solving - Goal Today   Reducing Risk - Goal Today          Flowsheet Row Most Recent Value   DM Education Needs    Meter Has own   Frequency of Testing Daily   Medication Oral  [At home patient stated he takes Metformin 1000 mg BID]   Problem Solving Hyperglycemia, Signs, Symptoms, Treatment   Reducing Risks A1C testing  [A1c ordered but not resulted.]   Discharge Plan Home   Motivation " Moderate   Teaching Method Discussion, Handouts   Patient Response Verbalized understanding            Other Comments: A1c info sheet given with discussion on A1c target and healthy blood sugar range. Patient stated he has an appointment with [facility] endocrinologist next week. Patient stated he was on Ozempic, Humalog, and Levemir but started having severe diarrhea and was taken off all of them about a month ago. Discussed with patient that he is being started on Lantus in the hospital and that we could start a prescription for Lantus to let him trial to see how he does with it prior to ENDO appointment. Patient agreeable stating he has plenty of pen needles at home. Patient has no further questions or concerns related to diabetes at this time.  Electronically signed by:  Halima Goldberg RN  07/18/22 14:52 EDT   No

## 2022-07-18 NOTE — CONSULTS
[facility]   CONSULT NOTE    Referring Provider: Dr. Sidhu    Patient Care Team:  Celine Garcia MD as PCP - General (Internal Medicine)  Candido Montana MD as Consulting Physician (Cardiology)    Reason for Consultation: congestive heart failure    Chief complaint: shortness of breath    History of present illness:  Preet Jones is a 57 y.o. male with past medical history of  Acute MI, CAD, hypertension, Atrial flutter, type 2 diabetes who presented to the ED with shortness of breath and lower extremity edema. Patient reports having increasing shortness of breath, weight gain, and congestion over the past week. He reports PND and lower extremity edema since returning from [place] several days ago. He went to  on Friday and was diagnosed with bronchitis and given a z-christianne which did not help his symptoms. He reports increase in cough, not being able to take a deep breath, and chest pain. Patient denies dizziness, lightheadedness, nausea, diaphoresis.    Labs reviewed troponin negative, BNP 2484, Cr 0.91, alk phos 126, TSH normal, D-dimer 1.57  CT PE protocol was negative for PE, but showed acute congestive heart failure, pleural effusions, and pulmonary edema  Echo results pending    Review of Systems   Cardiovascular: Positive for chest pain, dyspnea on exertion, irregular heartbeat, leg swelling and paroxysmal nocturnal dyspnea. Negative for near-syncope and palpitations.   Respiratory: Positive for cough and shortness of breath.    Gastrointestinal: Negative for nausea and vomiting.   Neurological: Negative for dizziness and light-headedness.       History  Past Medical History:   Diagnosis Date   • Abnormal cardiovascular stress test 01/19/2017   • Acute myocardial infarction ([facility]) 2019   • Arterial ischemic stroke, MCA (middle cerebral artery), left, acute ([facility]) 10/29/2019   • Atrial flutter with rapid ventricular response ([facility]) 02/04/2021   • Bicuspid aortic  valve 02/16/2017   • Coronary artery disease involving native coronary artery of native heart    • Depression 12/30/2020   • History of coronary angioplasty with insertion of stent    • Hyperlipidemia    • Hypertension    • Hyperthyroidism    • Obesity 09/02/2011   • Paroxysmal SVT (supraventricular tachycardia) (HCC) 01/08/2021   • RUE weakness 10/29/2019    Previous residual from Stroke, but was not work prohibiting.   • Seasonal allergic rhinitis 12/23/2020   • Sinus arrhythmia    • Sustained SVT (HCC)    • Type 2 diabetes mellitus (HCC)    • Vitamin D deficiency 11/05/2020       Past Surgical History:   Procedure Laterality Date   • APPENDECTOMY     • BACK SURGERY     • CARDIAC CATHETERIZATION  2010   • CARDIAC CATHETERIZATION  2019   • CARDIAC CATHETERIZATION N/A 12/31/2020    Procedure: Cardiac Catheterization/Vascular Study;  Surgeon: Moris Pedro MD;  Location:  [facility];  Service: Cardiovascular;  Laterality: N/A;   • CARDIAC CATHETERIZATION N/A 2/1/2022    Procedure: Left Heart Cath;  Surgeon: Kamlesh Richardson MD;  Location:  [facility]VASIVE [facility];  Service: Cardiovascular;  Laterality: N/A;   • CARDIAC ELECTROPHYSIOLOGY PROCEDURE N/A 1/20/2021    Procedure: EP/Ablation;  Surgeon: Nathen Olmstead MD;  Location:  [facility] CATH INVASIVE [facility];  Service: Cardiovascular;  Laterality: N/A;   • CARDIAC ELECTROPHYSIOLOGY PROCEDURE N/A 2/24/2022    Procedure: EP/Ablation-SVT Utong aware;  Surgeon: Candido Montana MD;  Location:  [facility] CATH INVASIVE LOC[facility];  Service: Cardiology;  Laterality: N/A;   • JOINT REPLACEMENT     • KNEE ARTHROSCOPY     • THYROIDECTOMY, PARTIAL  2015       Family History   Problem Relation Age of Onset   • Heart disease Mother    • Parkinsonism Father    • Cancer Father        Social History     Tobacco Use   • Smoking status: Never Smoker   • Smokeless tobacco: Never Used   Vaping Use   • Vaping Use: Never used   Substance Use  "Topics   • Alcohol use: Not Currently   • Drug use: No        (Not in a hospital admission)        Morphine, Ozempic (0.25 or 0.5 mg-dose) [semaglutide(0.25 or 0.5mg-dos)], Isosorbide, Nitrofuran derivatives, and Nitroglycerin    Scheduled Meds:  apixaban, 5 mg, Oral, Q12H  [START ON 7/19/2022] aspirin, 81 mg, Oral, Daily  atorvastatin, 80 mg, Oral, Nightly  dilTIAZem CD, 180 mg, Oral, Q24H  furosemide, 40 mg, Intravenous, Q8H  insulin glargine, 10 Units, Subcutaneous, Nightly  insulin lispro, 0-9 Units, Subcutaneous, TID AC  lisinopril, 40 mg, Oral, Q24H  sodium chloride, 10 mL, Intravenous, Q12H  sotalol, 40 mg, Oral, Q12H        Continuous Infusions:       PRN Meds:  •  acetaminophen **OR** acetaminophen **OR** acetaminophen  •  aluminum-magnesium hydroxide-simethicone  •  senna-docusate sodium **AND** polyethylene glycol **AND** bisacodyl **AND** bisacodyl  •  dextrose  •  dextrose  •  glucagon (human recombinant)  •  insulin lispro **AND** insulin lispro  •  ipratropium-albuterol  •  ketorolac  •  labetalol  •  magnesium sulfate **OR** magnesium sulfate in D5W 1g/100mL (PREMIX)  •  melatonin  •  nitroglycerin  •  ondansetron **OR** ondansetron  •  potassium chloride **OR** potassium chloride **OR** potassium chloride  •  sodium chloride      VITAL SIGNS  Vitals:    07/18/22 1000 07/18/22 1101 07/18/22 1201 07/18/22 1302   BP:  158/94 136/93 131/80   Pulse: 95 85 79 83   Resp:       Temp:       TempSrc:       SpO2: 92% 97% 95% 96%   Weight:       Height:           Flowsheet Rows    Flowsheet Row First Filed Value   Admission Height 180.3 cm (71\") Documented at 07/18/2022 0416   Admission Weight 88.9 kg (195 lb 15.8 oz) Documented at 07/18/2022 0416           TELEMETRY: atrial fibrillation    Physical Exam:  Vitals reviewed.   Constitutional:       General: Awake.      Appearance: Well-developed. Acutely ill-appearing.      Interventions: Nasal cannula in place.   Eyes:      Conjunctiva/sclera: Conjunctivae " normal.   Pulmonary:      Effort: Pulmonary effort is normal.      Comments: Decreased breath sounds in bilateral lower bases  Cardiovascular:      Tachycardia present. Irregularly irregular rhythm. Normal S1. Normal S2.      Murmurs: There is no murmur.   Pulses:     Intact distal pulses.   Edema:     Peripheral edema present.  Abdominal:      General: Bowel sounds are normal.      Palpations: Abdomen is soft.   Skin:     General: Skin is warm.   Neurological:      General: No focal deficit present.      Mental Status: Alert and oriented to person, place and time.   Psychiatric:         Behavior: Behavior is cooperative.            LAB RESULTS (LAST 7 DAYS)    CBC  Results from last 7 days   Lab Units 07/18/22  0455   WBC 10*3/mm3 6.40   RBC 10*6/mm3 4.68   HEMOGLOBIN g/dL 13.0   HEMATOCRIT % 38.9   MCV fL 83.0   PLATELETS 10*3/mm3 222       BMP  Results from last 7 days   Lab Units 07/18/22  0455   SODIUM mmol/L 135*   POTASSIUM mmol/L 3.7   CHLORIDE mmol/L 100   CO2 mmol/L 24.0   BUN mg/dL 17   CREATININE mg/dL 0.91   GLUCOSE mg/dL 313*   MAGNESIUM mg/dL 2.2       CMP   Results from last 7 days   Lab Units 07/18/22  0455   SODIUM mmol/L 135*   POTASSIUM mmol/L 3.7   CHLORIDE mmol/L 100   CO2 mmol/L 24.0   BUN mg/dL 17   CREATININE mg/dL 0.91   GLUCOSE mg/dL 313*   ALBUMIN g/dL 3.70   BILIRUBIN mg/dL 0.3   ALK PHOS U/L 126*   AST (SGOT) U/L 14   ALT (SGPT) U/L 18       ProBNP        TROPONIN  Results from last 7 days   Lab Units 07/18/22  1048   TROPONIN T ng/mL 0.026       Creatinine Clearance  Estimated Creatinine Clearance: 111.3 mL/min (by C-G formula based on SCr of 0.91 mg/dL).      Radiology  XR Chest 1 View    Result Date: 7/18/2022  1. Limited by low lung volumes. 2. Bibasal atelectasis, edema, and/or airspace disease. 3. Cardiomegaly. Electronically signed by:  Erick Carey D.O.  7/18/2022 4:38 AM    CT Angiogram Chest Pulmonary Embolism    Result Date: 7/18/2022  1.  Acute congestive heart failure  given the presence of pleural effusions, pulmonary edema, and cardiomegaly. 2.  No pulmonary embolism. 3.  Redemonstration of nonspecific mediastinal lymph nodes. Electronically signed by:  Erick Carey D.O.  7/18/2022 4:43 AM      EKG        I personally viewed and interpreted the patient's EKG/Telemetry data:    ECHOCARDIOGRAM:  Results for orders placed during the hospital encounter of 01/20/21    Adult Transesophageal Echo (FRAN) W/ Cont if Necessary Per Protocol    Interpretation Summary  · Left ventricular wall thickness is consistent with concentric hypertrophy.  · Estimated left ventricular EF = 45% Estimated left ventricular EF was in agreement with the calculated left ventricular EF. Left ventricular ejection fraction appears to be 41 - 45%. Left ventricular systolic function is low normal.  · The right atrial cavity is mildly dilated.  · With a centrally-directed jet noted.  · Estimated right ventricular systolic pressure from tricuspid regurgitation is normal (<35 mmHg).    Procedure indication  Recurrent cardioembolic stroke and history of atrial arrhythmias and patient brought in for FRAN prior to atrial arrhythmia ablation      conscious sedation administered by anesthesia    consent obtained before procedure      Procedure note  after obtaining a valid consent patient was sedated by Anesthesia and a FRAN probe was easily placed into esophagus with multiplane imaging with 2D, color and Doppler followed by bubble study with agitated saline without any complications    FRAN  Findings    Moderate left atrial enlargement without any shunt or clot  LV ejection fraction of 45%  No thrombus  Mild MR and TR and trivial effusion    Plan  Proceed with EP study and ablation      Procedures done  Transesophageal echocardiography        Electronically signed by Nathen Olmstead MD, 01/20/21, 5:08 PM EST.      STRESS MYOVIEW:  Results from 2/1/2022 study:  · Left ventricular ejection fraction is mildly reduced.  (Calculated EF = 47%).  · Myocardial perfusion imaging indicates a medium-sized, moderately severe area of ischemia located in the anterior wall, apex and lateral wall.  · Impressions are consistent with a high risk study.      CARDIAC CATHETERIZATION:  Results from 2/1/2022 study:    1. Patient has 40 to 50% in-stent narrowing in first marginal.  Mid circumflex right after the marginal has borderline 60% narrowing.  Distal circumflex and left PDA has 80% narrowing.      OTHER:         Assessment & Plan       Acute CHF (congestive heart failure) ([facility])    Hyperlipidemia    Primary hypertension    Overweight (BMI 25.0-29.9)    Type 2 diabetes mellitus with hyperglycemia, without long-term current use of insulin ([facility])    History of cerebrovascular accident    Coronary artery disease involving native coronary artery of native heart    Atrial arrhythmia    Acute chest pain    Palpitations    Dyspnea on exertion    Bilateral pleural effusion      PLAN  Continue lasix 40mg q8 hrs  Echo to evaluate heart function  Restart home cardiac medications  Consult Heart Failure Navigator  Telemetry to monitor rhythm    I discussed the patients findings and my recommendations with patient and nurse.  Further recommendations per Dr. Montana.    EDWINA Mena  07/18/22  14:21 EDT   Electronically signed by EDWINA Mena, 07/18/22, 3:05 PM EDT.        Addendum:    Mr. Jones was seen and examined, I am in agreement with the note outlined by nurse practitioner Meliza Rodríguez and I would add the following.  Preet is a 57-year-old male patient was history of coronary artery disease status post PCI to the marginal on 1/26/2017, atrial fibrillation status post cryoablation and CTI on 1/28/2021, SVT ablation for AVNRT on 2/24/2022, also has hypertension, dyslipidemia and diabetes.  Patient presented to the hospital with complaints of shortness of breath, orthopnea which has been going on for couple of weeks.  He was  found to be in CHF with elevated BNP levels of 2400, lower extremity edema, as well as pleural effusions and cardiomegaly.  Echocardiogram also showed ejection fraction of 35 to 40%.  Patient had had a heart catheterization in February 2022 which had shown moderate diffuse coronary disease.  In light of the recent developments of his symptoms, low ejection fraction, it is probably best to reassess his coronary anatomy.  Dr. Richardson was contacted and patient will have a repeat heart catheterization.  His rhythm remains sinus during this hospitalization.

## 2022-07-18 NOTE — H&P
"    [facility] Medicine Services      Patient Name: Preet Jones  : 1965  MRN: [id number]  Primary Care Physician:  Celine Garcia MD  Date of admission: 2022      Subjective      Chief Complaint:  Palpitations, chest pain, dyspnea on exertion    History of Present Illness: Preet Jones is a 57 y.o. male with a history of CAD s/p stent placement, HTN, HLD, Type 2 DM, CVA, atrial arrhythmias s/p a-fib and flutter ablation who presented to the ER at [facility] on 2022 complaining of palpitations, chest pain, and dyspnea on exertion. The patient states his symptoms started approximately 2 weeks ago, but have gotten worse over the past 3 days. He states 3 days ago he saw the nurse practitioner at his PCP's office and was diagnosed with acute bronchitis. He states he was prescribed azithromycin and Mucinex. He states his symptoms have worsened despite taking the medications prescribed. He denies a history of congestive heart failure, and he is not on a diuretic at home. He reports some recent ankle swelling and feeling bloated. He states his blood pressure is normally well controlled, but it was elevated at his PCP's office. He states he has an upcoming appointment with his cardiologist on 22. He denies any exacerbating or alleviating factors.     The patient is a nonsmoker and denies alcohol or illicit drug use. He reports a family history of heart disease in his mother.     Workup in the ER revealed acute CHF with bilateral pleural effusions. The patient was given Lasix 40 mg IV and aspirin 324 mg PO. He also had 1\" nitro paste applied. He was admitted to the Hospitalist group for further evaluation and treatment.       Review of Systems   HENT: Positive for congestion.    Cardiovascular: Positive for chest pain, dyspnea on exertion, leg swelling and palpitations.   Respiratory: Positive for cough. Negative for sputum production.    Gastrointestinal: " Positive for bloating.   All other systems reviewed and are negative.       Personal History     Past Medical History:   Diagnosis Date   • Abnormal cardiovascular stress test 01/19/2017   • Acute myocardial infarction ([facility]) 2019   • Arterial ischemic stroke, MCA (middle cerebral artery), left, acute ([facility]) 10/29/2019   • Atrial flutter with rapid ventricular response ([facility]) 02/04/2021   • Bicuspid aortic valve 02/16/2017   • Coronary artery disease involving native coronary artery of native heart    • Depression 12/30/2020   • History of coronary angioplasty with insertion of stent    • Hyperlipidemia    • Hypertension    • Hyperthyroidism    • Obesity 09/02/2011   • Paroxysmal SVT (supraventricular tachycardia) ([facility]) 01/08/2021   • RUE weakness 10/29/2019    Previous residual from Stroke, but was not work prohibiting.   • Seasonal allergic rhinitis 12/23/2020   • Sinus arrhythmia    • Sustained SVT ([facility])    • Type 2 diabetes mellitus ([facility])    • Vitamin D deficiency 11/05/2020       Past Surgical History:   Procedure Laterality Date   • APPENDECTOMY     • BACK SURGERY     • CARDIAC CATHETERIZATION  2010   • CARDIAC CATHETERIZATION  2019   • CARDIAC CATHETERIZATION N/A 12/31/2020    Procedure: Cardiac Catheterization/Vascular Study;  Surgeon: Moris Pedro MD;  Location: [facility] CATH INVASIVE LOCATION;  Service: Cardiovascular;  Laterality: N/A;   • CARDIAC CATHETERIZATION N/A 2/1/2022    Procedure: Left Heart Cath;  Surgeon: Kamlesh Richardson MD;  Location: [facility] CATH INVASIVE LOCATION;  Service: Cardiovascular;  Laterality: N/A;   • CARDIAC ELECTROPHYSIOLOGY PROCEDURE N/A 1/20/2021    Procedure: EP/Ablation;  Surgeon: Nathen Olmstead MD;  Location: [facility] CATH INVASIVE LOCATION;  Service: Cardiovascular;  Laterality: N/A;   • CARDIAC ELECTROPHYSIOLOGY PROCEDURE N/A 2/24/2022    Procedure: EP/Ablation-SVT Utong aware;  Surgeon: Candido Montana MD;  Location: [facility] CATH INVASIVE LOCATION;   Service: Cardiology;  Laterality: N/A;   • JOINT REPLACEMENT     • KNEE ARTHROSCOPY     • THYROIDECTOMY, PARTIAL  2015       Family History: family history includes Cancer in his father; Heart disease in his mother; Parkinsonism in his father. Otherwise pertinent FHx was reviewed and not pertinent to current issue.    Social History:  reports that he has never smoked. He has never used smokeless tobacco. He reports previous alcohol use. He reports that he does not use drugs.    Home Medications:  Prior to Admission Medications     Prescriptions Last Dose Informant Patient Reported? Taking?    apixaban (ELIQUIS) 5 MG tablet tablet 7/17/2022  No Yes    Take 1 tablet by mouth Every 12 (Twelve) Hours. Indications: Atrial Fibrillation    aspirin 81 MG chewable tablet 7/17/2022  Yes Yes    Chew 81 mg Daily.    atorvastatin (LIPITOR) 80 MG tablet 7/17/2022 Self No Yes    Take 1 tablet by mouth Every Night.    azithromycin (ZITHROMAX) 250 MG tablet 7/17/2022  Yes Yes    Take 250 mg by mouth Daily.    Cholecalciferol (VITAMIN D-3) 25 MCG (1000 UT) capsule 7/17/2022  Yes Yes    Take 3 capsules by mouth Daily.    guaiFENesin (MUCINEX) 600 MG 12 hr tablet 7/17/2022  Yes Yes    Take 1,200 mg by mouth 2 (Two) Times a Day As Needed for Cough.    lisinopril (PRINIVIL,ZESTRIL) 40 MG tablet 7/17/2022  Yes Yes    Take 40 mg by mouth Daily.    metFORMIN (GLUCOPHAGE) 1000 MG tablet 7/17/2022 Self Yes Yes    2 (Two) Times a Day With Meals.    Pediatric Multiple Vitamins (Multivitamin Childrens) chewable tablet 7/17/2022  Yes Yes    Chew 1 tablet Daily.    BD Pen Needle Barbara 2nd Gen 32G X 4 MM misc   Yes No    4 (Four) Times a Day. use as directed    dilTIAZem CD (Cardizem CD) 180 MG 24 hr capsule   No No    Take 1 capsule by mouth Daily.    Gvoke HypoPen 1-Pack 1 MG/0.2ML solution auto-injector  Self Yes No    INJECT 1 MG SUBQ AS DIRECTED    Patient not taking:  No sig reported    insulin lispro (ADMELOG) 100 UNIT/ML injection   No No     Inject 10 Units under the skin into the appropriate area as directed 3 (Three) Times a Day With Meals.    Patient taking differently:  Inject 8 Units under the skin into the appropriate area as directed 3 (Three) Times a Day With Meals.    Lancets (OneTouch Delica Plus [id number]) misc   Yes No    3 (Three) Times a Day. as directed    OneTouch Verio test strip   Yes No    4 (Four) Times a Day. use as directed    Ozempic, 0.25 or 0.5 MG/DOSE, 2 MG/1.5ML solution pen-injector   Yes No    INJECT 0.5 MG SUBQ ONCE WEEKLY    sotalol (BETAPACE) 80 MG tablet   No No    Take 0.5 tablets by mouth Every 12 (Twelve) Hours.            Allergies:  Allergies   Allergen Reactions   • Morphine GI Intolerance   • Ozempic (0.25 Or 0.5 Mg-Dose) [Semaglutide(0.25 Or 0.5mg-Dos)] Diarrhea   • Isosorbide Headache   • Nitrofuran Derivatives Headache   • Nitroglycerin Headache       Objective      Vitals:   Temp:  [97.5 °F (36.4 °C)] 97.5 °F (36.4 °C)  Heart Rate:  [] 88  Resp:  [20] 20  BP: (151-169)/(100-115) 155/106    Physical Exam  Vitals reviewed.   Constitutional:       Appearance: He is overweight.   HENT:      Head: Normocephalic and atraumatic.      Mouth/Throat:      Mouth: Mucous membranes are moist.   Eyes:      Extraocular Movements: Extraocular movements intact.      Conjunctiva/sclera: Conjunctivae normal.      Pupils: Pupils are equal, round, and reactive to light.   Cardiovascular:      Rate and Rhythm: Normal rate and regular rhythm. Occasional extrasystoles are present.     Pulses: Normal pulses.   Pulmonary:      Effort: Pulmonary effort is normal.      Breath sounds: Examination of the right-lower field reveals rales. Examination of the left-lower field reveals rales. Rales present.      Comments: On room air  Abdominal:      General: Bowel sounds are normal. There is no distension.      Palpations: Abdomen is soft.      Tenderness: There is no abdominal tenderness.   Musculoskeletal:         General: Normal  range of motion.      Cervical back: Normal range of motion and neck supple.      Right lower le+ Edema present.      Left lower le+ Edema present.   Skin:     General: Skin is warm and dry.      Capillary Refill: Capillary refill takes less than 2 seconds.   Neurological:      Mental Status: He is alert and oriented to person, place, and time.   Psychiatric:         Mood and Affect: Mood normal.         Behavior: Behavior normal.          Result Review    Result Review:  I have personally reviewed the results from the time of this admission to 2022 09:34 EDT and agree with these findings:  [x]  Laboratory  [x]  Microbiology  [x]  Radiology  [x]  EKG/Telemetry   []  Cardiology/Vascular   []  Pathology  [x]  Old records  []  Other:    Most notable findings include:   Troponin 0.025, proBNP 2,484, dimer 1.57, Na 135, K 3.7, glucose 313, TSH 2.370, Mg 2.2    RVP negative    CTA chest:  1.  Acute congestive heart failure given the presence of pleural effusions, pulmonary edema, and cardiomegaly.  2.  No pulmonary embolism.  3.  Redemonstration of nonspecific mediastinal lymph nodes.      Assessment & Plan        Active Hospital Problems:  Active Hospital Problems    Diagnosis    • **Acute CHF (congestive heart failure) (HCC)    • Dyspnea on exertion    • Acute chest pain    • Palpitations    • Overweight (BMI 25.0-29.9)    • Atrial arrhythmia    • Bilateral pleural effusion    • Coronary artery disease involving native coronary artery of native heart    • Hyperlipidemia    • Primary hypertension    • Type 2 diabetes mellitus with hyperglycemia, without long-term current use of insulin (HCC)    • History of cerebrovascular accident      Plan:     Acute CHF (congestive heart failure) with Bilateral pleural effusion  -unsure of type---obtain 2D echo  -given Lasix 40 mg IV in ER; continue q8h  -monitor I&Os and daily weight  -continue CCB and ACE-I  -2 g Na diet  -consult HF Nurse Navigator and cardiac  rehab    Acute chest pain  Palpitations  Dyspnea on exertion  -likely secondary to above, r/o ACS  -check serial troponin  -cardiology consulted  -continuous cardiac monitoring     Atrial arrhythmia, h/o a-fib and flutter ablation  -currently SR  -continue diltiazem, sotalol, and Eliquis     Coronary artery disease involving native coronary artery of native heart, h/o stent placement / Hyperlipidemia / Primary hypertension, chronic   -BP uncontrolled---PRN IV labetalol  -continue aspirin, Eliquis, statin, lisinopril, and diltiazem  -closely monitor BP    Overweight (BMI 25.0-29.9)  -BMI 27.33 on admission  -encourage lifestyle modifications     Type 2 diabetes mellitus with hyperglycemia, without long-term current use of insulin   -uncontrolled  -check A1c  -accu checks AC&HS with SSI  -hold metformin while inpatient  -Lantus 10 units nightly while inpatient; adjust as needed  -diabetic consistent carb diet  -consult diabetes educator   -needs outpatient referral to endocrinology     History of cerebrovascular accident  -continue aspirin, Eliquis, and statin           DVT prophylaxis:  Medical DVT prophylaxis orders are present.    CODE STATUS:    Code Status (Patient has no pulse and is not breathing): CPR (Attempt to Resuscitate)  Medical Interventions (Patient has pulse or is breathing): Full Support    Admission Status:  I believe this patient meets inpatient status.    I discussed the patient's findings and my recommendations with patient.    This patient has been examined wearing appropriate Personal Protective Equipment. 07/18/22      Signature: Electronically signed by EDWINA Reddy, 07/18/22, 9:52 AM EDT.

## 2022-07-19 LAB
ANION GAP SERPL CALCULATED.3IONS-SCNC: 11 MMOL/L (ref 5–15)
BASOPHILS # BLD AUTO: 0.1 10*3/MM3 (ref 0–0.2)
BASOPHILS NFR BLD AUTO: 0.9 % (ref 0–1.5)
BH CV ECHO MEAS - ACS: 2.6 CM
BH CV ECHO MEAS - AO MAX PG: 3.7 MMHG
BH CV ECHO MEAS - AO MEAN PG: 2.32 MMHG
BH CV ECHO MEAS - AO ROOT DIAM: 3.4 CM
BH CV ECHO MEAS - AO V2 MAX: 95.8 CM/SEC
BH CV ECHO MEAS - AO V2 VTI: 16.3 CM
BH CV ECHO MEAS - AVA(I,D): 2 CM2
BH CV ECHO MEAS - EDV(CUBED): 130 ML
BH CV ECHO MEAS - EDV(MOD-SP4): 103.7 ML
BH CV ECHO MEAS - EF(MOD-BP): 34 %
BH CV ECHO MEAS - EF(MOD-SP4): 34.3 %
BH CV ECHO MEAS - ESV(CUBED): 85.1 ML
BH CV ECHO MEAS - ESV(MOD-SP4): 68.1 ML
BH CV ECHO MEAS - FS: 13.2 %
BH CV ECHO MEAS - IVS/LVPW: 1 CM
BH CV ECHO MEAS - IVSD: 0.97 CM
BH CV ECHO MEAS - LA DIMENSION: 4.8 CM
BH CV ECHO MEAS - LV DIASTOLIC VOL/BSA (35-75): 49.7 CM2
BH CV ECHO MEAS - LV MASS(C)D: 178.9 GRAMS
BH CV ECHO MEAS - LV MAX PG: 0.73 MMHG
BH CV ECHO MEAS - LV MEAN PG: 0.38 MMHG
BH CV ECHO MEAS - LV SYSTOLIC VOL/BSA (12-30): 32.6 CM2
BH CV ECHO MEAS - LV V1 MAX: 42.6 CM/SEC
BH CV ECHO MEAS - LV V1 VTI: 8.2 CM
BH CV ECHO MEAS - LVIDD: 5.1 CM
BH CV ECHO MEAS - LVIDS: 4.4 CM
BH CV ECHO MEAS - LVOT AREA: 4 CM2
BH CV ECHO MEAS - LVOT DIAM: 2.26 CM
BH CV ECHO MEAS - LVPWD: 0.97 CM
BH CV ECHO MEAS - MR MAX PG: 111.5 MMHG
BH CV ECHO MEAS - MR MAX VEL: 526.8 CM/SEC
BH CV ECHO MEAS - MV A MAX VEL: 36.9 CM/SEC
BH CV ECHO MEAS - MV DEC SLOPE: 310.8 CM/SEC2
BH CV ECHO MEAS - MV DEC TIME: 0.26 MSEC
BH CV ECHO MEAS - MV E MAX VEL: 80.6 CM/SEC
BH CV ECHO MEAS - MV E/A: 2.18
BH CV ECHO MEAS - MV MAX PG: 2.7 MMHG
BH CV ECHO MEAS - MV MEAN PG: 1.33 MMHG
BH CV ECHO MEAS - MV V2 VTI: 19.7 CM
BH CV ECHO MEAS - MVA(VTI): 1.66 CM2
BH CV ECHO MEAS - PA V2 MAX: 88.7 CM/SEC
BH CV ECHO MEAS - PI END-D VEL: 129.4 CM/SEC
BH CV ECHO MEAS - RAP SYSTOLE: 15 MMHG
BH CV ECHO MEAS - RV MAX PG: 0.79 MMHG
BH CV ECHO MEAS - RV V1 MAX: 43.9 CM/SEC
BH CV ECHO MEAS - RV V1 VTI: 7.6 CM
BH CV ECHO MEAS - RVDD: 3 CM
BH CV ECHO MEAS - RVSP: 61.3 MMHG
BH CV ECHO MEAS - SI(MOD-SP4): 17.1 ML/M2
BH CV ECHO MEAS - SV(LVOT): 32.6 ML
BH CV ECHO MEAS - SV(MOD-SP4): 35.6 ML
BH CV ECHO MEAS - TR MAX PG: 46.3 MMHG
BH CV ECHO MEAS - TR MAX VEL: 339.2 CM/SEC
BUN SERPL-MCNC: 19 MG/DL (ref 6–20)
BUN/CREAT SERPL: 15.7 (ref 7–25)
CALCIUM SPEC-SCNC: 9.1 MG/DL (ref 8.6–10.5)
CHLORIDE SERPL-SCNC: 96 MMOL/L (ref 98–107)
CO2 SERPL-SCNC: 30 MMOL/L (ref 22–29)
CREAT SERPL-MCNC: 1.21 MG/DL (ref 0.76–1.27)
DEPRECATED RDW RBC AUTO: 41.1 FL (ref 37–54)
EGFRCR SERPLBLD CKD-EPI 2021: 69.4 ML/MIN/1.73
EOSINOPHIL # BLD AUTO: 0.7 10*3/MM3 (ref 0–0.4)
EOSINOPHIL NFR BLD AUTO: 9.2 % (ref 0.3–6.2)
ERYTHROCYTE [DISTWIDTH] IN BLOOD BY AUTOMATED COUNT: 14.3 % (ref 12.3–15.4)
GLUCOSE BLDC GLUCOMTR-MCNC: 130 MG/DL (ref 70–105)
GLUCOSE BLDC GLUCOMTR-MCNC: 204 MG/DL (ref 70–105)
GLUCOSE BLDC GLUCOMTR-MCNC: 236 MG/DL (ref 70–105)
GLUCOSE BLDC GLUCOMTR-MCNC: 333 MG/DL (ref 70–105)
GLUCOSE SERPL-MCNC: 231 MG/DL (ref 65–99)
HBA1C MFR BLD: 10.1 % (ref 3.5–5.6)
HCT VFR BLD AUTO: 38.9 % (ref 37.5–51)
HGB BLD-MCNC: 13.3 G/DL (ref 13–17.7)
LYMPHOCYTES # BLD AUTO: 2 10*3/MM3 (ref 0.7–3.1)
LYMPHOCYTES NFR BLD AUTO: 25.6 % (ref 19.6–45.3)
MAGNESIUM SERPL-MCNC: 1.8 MG/DL (ref 1.6–2.6)
MAXIMAL PREDICTED HEART RATE: 162 BPM
MCH RBC QN AUTO: 28 PG (ref 26.6–33)
MCHC RBC AUTO-ENTMCNC: 34.3 G/DL (ref 31.5–35.7)
MCV RBC AUTO: 81.5 FL (ref 79–97)
MONOCYTES # BLD AUTO: 0.6 10*3/MM3 (ref 0.1–0.9)
MONOCYTES NFR BLD AUTO: 7.6 % (ref 5–12)
NEUTROPHILS NFR BLD AUTO: 4.4 10*3/MM3 (ref 1.7–7)
NEUTROPHILS NFR BLD AUTO: 56.7 % (ref 42.7–76)
NRBC BLD AUTO-RTO: 0.1 /100 WBC (ref 0–0.2)
PLATELET # BLD AUTO: 220 10*3/MM3 (ref 140–450)
PMV BLD AUTO: 9.4 FL (ref 6–12)
POTASSIUM SERPL-SCNC: 3.2 MMOL/L (ref 3.5–5.2)
POTASSIUM SERPL-SCNC: 4.2 MMOL/L (ref 3.5–5.2)
RBC # BLD AUTO: 4.77 10*6/MM3 (ref 4.14–5.8)
SODIUM SERPL-SCNC: 137 MMOL/L (ref 136–145)
STRESS TARGET HR: 138 BPM
TROPONIN T SERPL-MCNC: 0.02 NG/ML (ref 0–0.03)
WBC NRBC COR # BLD: 7.8 10*3/MM3 (ref 3.4–10.8)

## 2022-07-19 PROCEDURE — 93005 ELECTROCARDIOGRAM TRACING: CPT | Performed by: FAMILY MEDICINE

## 2022-07-19 PROCEDURE — 25010000002 KETOROLAC TROMETHAMINE PER 15 MG: Performed by: NURSE PRACTITIONER

## 2022-07-19 PROCEDURE — 25010000002 FUROSEMIDE PER 20 MG: Performed by: NURSE PRACTITIONER

## 2022-07-19 PROCEDURE — 84132 ASSAY OF SERUM POTASSIUM: CPT | Performed by: FAMILY MEDICINE

## 2022-07-19 PROCEDURE — 93010 ELECTROCARDIOGRAM REPORT: CPT | Performed by: INTERNAL MEDICINE

## 2022-07-19 PROCEDURE — 99231 SBSQ HOSP IP/OBS SF/LOW 25: CPT | Performed by: NURSE PRACTITIONER

## 2022-07-19 PROCEDURE — 63710000001 INSULIN LISPRO (HUMAN) PER 5 UNITS: Performed by: NURSE PRACTITIONER

## 2022-07-19 PROCEDURE — 83735 ASSAY OF MAGNESIUM: CPT | Performed by: NURSE PRACTITIONER

## 2022-07-19 PROCEDURE — 82962 GLUCOSE BLOOD TEST: CPT

## 2022-07-19 PROCEDURE — 25010000002 MAGNESIUM SULFATE IN D5W 1G/100ML (PREMIX) 1-5 GM/100ML-% SOLUTION: Performed by: NURSE PRACTITIONER

## 2022-07-19 PROCEDURE — 80048 BASIC METABOLIC PNL TOTAL CA: CPT | Performed by: NURSE PRACTITIONER

## 2022-07-19 PROCEDURE — 99233 SBSQ HOSP IP/OBS HIGH 50: CPT | Performed by: FAMILY MEDICINE

## 2022-07-19 PROCEDURE — 85025 COMPLETE CBC W/AUTO DIFF WBC: CPT | Performed by: NURSE PRACTITIONER

## 2022-07-19 PROCEDURE — 63710000001 INSULIN GLARGINE PER 5 UNITS: Performed by: NURSE PRACTITIONER

## 2022-07-19 PROCEDURE — 84484 ASSAY OF TROPONIN QUANT: CPT | Performed by: NURSE PRACTITIONER

## 2022-07-19 RX ORDER — DILTIAZEM HYDROCHLORIDE 180 MG/1
180 CAPSULE, COATED, EXTENDED RELEASE ORAL DAILY
Status: DISCONTINUED | OUTPATIENT
Start: 2022-07-19 | End: 2022-07-19 | Stop reason: SDUPTHER

## 2022-07-19 RX ADMIN — Medication 10 ML: at 21:25

## 2022-07-19 RX ADMIN — SOTALOL HYDROCHLORIDE 40 MG: 80 TABLET ORAL at 09:26

## 2022-07-19 RX ADMIN — INSULIN LISPRO 4 UNITS: 100 INJECTION, SOLUTION INTRAVENOUS; SUBCUTANEOUS at 08:38

## 2022-07-19 RX ADMIN — ATORVASTATIN CALCIUM 80 MG: 40 TABLET, FILM COATED ORAL at 21:25

## 2022-07-19 RX ADMIN — FUROSEMIDE 40 MG: 10 INJECTION, SOLUTION INTRAMUSCULAR; INTRAVENOUS at 13:07

## 2022-07-19 RX ADMIN — FUROSEMIDE 40 MG: 10 INJECTION, SOLUTION INTRAMUSCULAR; INTRAVENOUS at 05:19

## 2022-07-19 RX ADMIN — POTASSIUM CHLORIDE 40 MEQ: 1500 TABLET, EXTENDED RELEASE ORAL at 05:19

## 2022-07-19 RX ADMIN — INSULIN GLARGINE 10 UNITS: 100 INJECTION, SOLUTION SUBCUTANEOUS at 21:26

## 2022-07-19 RX ADMIN — SOTALOL HYDROCHLORIDE 40 MG: 80 TABLET ORAL at 21:25

## 2022-07-19 RX ADMIN — DILTIAZEM HYDROCHLORIDE 180 MG: 180 CAPSULE, COATED, EXTENDED RELEASE ORAL at 08:27

## 2022-07-19 RX ADMIN — INSULIN LISPRO 4 UNITS: 100 INJECTION, SOLUTION INTRAVENOUS; SUBCUTANEOUS at 16:30

## 2022-07-19 RX ADMIN — MAGNESIUM SULFATE 1 G: 1 INJECTION INTRAVENOUS at 08:38

## 2022-07-19 RX ADMIN — FUROSEMIDE 40 MG: 10 INJECTION, SOLUTION INTRAMUSCULAR; INTRAVENOUS at 21:25

## 2022-07-19 RX ADMIN — INSULIN LISPRO 7 UNITS: 100 INJECTION, SOLUTION INTRAVENOUS; SUBCUTANEOUS at 11:11

## 2022-07-19 RX ADMIN — ASPIRIN 81 MG: 81 TABLET, COATED ORAL at 08:28

## 2022-07-19 RX ADMIN — KETOROLAC TROMETHAMINE 15 MG: 15 INJECTION, SOLUTION INTRAMUSCULAR; INTRAVENOUS at 14:18

## 2022-07-19 RX ADMIN — APIXABAN 5 MG: 5 TABLET, FILM COATED ORAL at 08:27

## 2022-07-19 RX ADMIN — LISINOPRIL 40 MG: 20 TABLET ORAL at 08:28

## 2022-07-19 RX ADMIN — Medication 10 ML: at 08:29

## 2022-07-19 RX ADMIN — POTASSIUM CHLORIDE 40 MEQ: 1500 TABLET, EXTENDED RELEASE ORAL at 09:26

## 2022-07-19 NOTE — PROGRESS NOTES
[facility] Medicine Services Daily Progress Note    Patient Name: Preet Jones  : 1965  MRN: [id number]  Primary Care Physician:  Celine Garcia MD  Date of admission: 2022      Subjective      Chief Complaint: Shortness of breath      Patient reports feeling better today but not back to normal.  Still having dyspnea with any exertion.  Still having difficulty laying back flat.  Leg swelling down.    Review of Systems   Constitutional: Positive for malaise/fatigue.   Cardiovascular: Positive for dyspnea on exertion, leg swelling and orthopnea.   All other systems reviewed and are negative.        Objective      Vitals:   Temp:  [97.2 °F (36.2 °C)-97.6 °F (36.4 °C)] 97.6 °F (36.4 °C)  Heart Rate:  [] 65  Resp:  [18-20] 18  BP: (115-156)/(74-98) 143/96    Physical Exam     General: Middle-age male lying in bed breathing comfortably on room air no acute distress  HEENT: NC/AT, EOMI, mucosa moist  Heart: Regular, rate controlled  Chest: Normal work of breathing some diminished lung sounds no crackles  Abdominal: Soft.  Nontender nondistended  Musculoskeletal: Normal ROM.  Bilateral lower extremity edema. No calf tenderness.  Neurological: AAOx3, no focal deficits  Skin: Skin is warm and dry. No rash  Psychiatric: Normal mood and affect.         Result Review    Result Review:  I have personally reviewed the results from the time of this admission to 2022 15:43 EDT and agree with these findings:  [x]  Laboratory  [x]  Microbiology  [x]  Radiology  [x]  EKG/Telemetry   []  Cardiology/Vascular   []  Pathology  []  Old records  []  Other:  Most notable findings include: Hypokalemia, metabolic alkalosis          Assessment & Plan      Brief Patient Summary:    Preet Jones is a 57 y.o. male with a history of CAD s/p stent placement, HTN, HLD, Type 2 DM, CVA, atrial arrhythmias s/p a-fib and flutter ablation who presented to the ER at [facility] on 2022  "complaining of palpitations, chest pain, and dyspnea on exertion. The patient states his symptoms started approximately 2 weeks ago, but have gotten worse over the past 3 days. He states 3 days ago he saw the nurse practitioner at his PCP's office and was diagnosed with acute bronchitis. He states he was prescribed azithromycin and Mucinex. He states his symptoms have worsened despite taking the medications prescribed. He denies a history of congestive heart failure, and he is not on a diuretic at home. He reports some recent ankle swelling and feeling bloated. He states his blood pressure is normally well controlled, but it was elevated at his PCP's office. He states he has an upcoming appointment with his cardiologist on 07/20/22. He denies any exacerbating or alleviating factors.      The patient is a nonsmoker and denies alcohol or illicit drug use. He reports a family history of heart disease in his mother.      Workup in the ER revealed acute CHF with bilateral pleural effusions. The patient was given Lasix 40 mg IV and aspirin 324 mg PO. He also had 1\" nitro paste applied. He was admitted to the Hospitalist group for further evaluation and treatment.     apixaban, 5 mg, Oral, Q12H  aspirin, 81 mg, Oral, Daily  atorvastatin, 80 mg, Oral, Nightly  dilTIAZem CD, 180 mg, Oral, Q24H  furosemide, 40 mg, Intravenous, Q8H  insulin glargine, 10 Units, Subcutaneous, Nightly  insulin lispro, 0-9 Units, Subcutaneous, TID AC  lisinopril, 40 mg, Oral, Q24H  sodium chloride, 10 mL, Intravenous, Q12H  sotalol, 40 mg, Oral, Q12H             Active Hospital Problems:  Active Hospital Problems    Diagnosis    • **Acute CHF (congestive heart failure) (HCC)    • Dyspnea on exertion    • Acute chest pain    • Palpitations    • Overweight (BMI 25.0-29.9)    • Atrial arrhythmia    • Bilateral pleural effusion    • Acute on chronic congestive heart failure, unspecified heart failure type (HCC)    • Coronary artery disease involving " native coronary artery of native heart    • Hyperlipidemia    • Primary hypertension    • Type 2 diabetes mellitus with hyperglycemia, without long-term current use of insulin (HCC)    • History of cerebrovascular accident      Plan:     Shortness of breath-with underlying palpitations and tachyarrhythmia, concern of new onset heart failure  -Follow-up echo  -Strict I's and O's  -2 g diet  -Fluid restriction  -Continue IV diuresis  -Cardiology consulted  -Cautiously resume home medication  -Heart failure educator  -Elevated proBNP    Coronary artery disease-patient with history of previous stenting  -Antiplatelet  -EKG reviewed  -Heart rate control  -Modifiable risk factors    Type 2 diabetes-aim to keep blood sugars less than 200  -A1c  -Sliding scale  -Cardiac/diabetic diet    CVA-possibly cardioembolic patient on anticoagulation  -Continue anticoagulation and antiplatelet  -Monitor neurological status    Hypertension/hyperlipidemia-chronic in nature  -Resume home medication as clinically appropriate      DVT prophylaxis:  Medical DVT prophylaxis orders are present.    CODE STATUS:    Code Status (Patient has no pulse and is not breathing): CPR (Attempt to Resuscitate)  Medical Interventions (Patient has pulse or is breathing): Full Support      Disposition:  I expect patient to be discharged in 24 to 48 hours.    This patient has been examined wearing appropriate Personal Protective Equipment and discussed with hospital infection control department. 07/19/22      Electronically signed by Darrel Champagne MD, 07/19/22, 15:43 EDT.  [facility] Hospitalist Team

## 2022-07-19 NOTE — CASE MANAGEMENT/SOCIAL WORK
Discharge Planning Assessment  [facility]     Patient Name: Preet Jones  MRN: [id number]  Today's Date: 7/19/2022    Admit Date: 7/18/2022     Discharge Needs Assessment     Row Name 07/19/22 1320       Living Environment    People in Home spouse    Name(s) of People in Home Spouse- Ariana    Current Living Arrangements home    Primary Care Provided by self    Provides Primary Care For no one    Family Caregiver if Needed spouse    Family Caregiver Names Spouse-Ariana    Quality of Family Relationships supportive;involved;helpful    Able to Return to Prior Arrangements yes       Resource/Environmental Concerns    Resource/Environmental Concerns none    Transportation Concerns none       Transition Planning    Patient/Family Anticipates Transition to home with family    Patient/Family Anticipated Services at Transition none    Transportation Anticipated family or friend will provide       Discharge Needs Assessment    Equipment Currently Used at Home nebulizer    Concerns to be Addressed discharge planning    Anticipated Changes Related to Illness none    Equipment Needed After Discharge none               Discharge Plan     Row Name 07/19/22 1322       Plan    Plan DC Plan; Routine Home    Patient/Family in Agreement with Plan yes    Plan Comments The patient reports that he lives with his spouse. PCP and Pharmacy veified. He reports that he has a nebulizer at home. He denies any other DME. He denies any HH/PT. He reports that he is IADL's at home. He denies any transportation issues and reports that his spouse will transport at discharge           Expected Discharge Date and Time     Expected Discharge Date Expected Discharge Time    Jul 21, 2022          Demographic Summary     Row Name 07/19/22 1320       General Information    Admission Type observation    Arrived From emergency department    Referral Source admission list    Reason for Consult discharge planning    Preferred Language English       Contact  Information    Permission Granted to Share Info With                Functional Status     Row Name 07/19/22 1320       Functional Status    Usual Activity Tolerance good    Current Activity Tolerance good       Functional Status, IADL    Medications independent    Meal Preparation independent    Housekeeping independent    Laundry independent    Shopping independent       Mental Status    General Appearance WDL WDL       Mental Status Summary    Recent Changes in Mental Status/Cognitive Functioning no changes       Employment/    Employment Status employed full-time         Met with patient in room wearing PPE: mask/googles  Maintained distance greater than 6 feet and spent less than 15 minutes in the room.    Eli Ryan RN     Office Phone: [phone]  Office Cell: [phone]

## 2022-07-19 NOTE — PLAN OF CARE
Goal Outcome Evaluation:  Plan of Care Reviewed With: patient        Progress: no change  Outcome Evaluation: episode of chest pain this afternoon>refused nitro, but did take toradol with some relief. To have heart cath in very near future. Glucose elevated with SS insulin. Heart failure education provided by heart failure navigator. Plans to return home

## 2022-07-19 NOTE — CONSULTS
"Heart Failure Program  Nurse Navigator  Discharge Planning    Patient Name:Preet Jones  :1965  Cardiologist:Chas  Current Admission Date: 2022   Previous Admission: 22  Admission frequency: 3 admissions in 6 months    Heart Failure history per record:    Symptoms on admission:c/o SOA, palpitations, bloating and cough past 2 weeks. Pt advises he had ablation a few months ago. Pt advises of medication adherence.       Admission Weight:  Flowsheet Rows    Flowsheet Row First Filed Value   Admission Height 180.3 cm (71\") Documented at 2022 0416   Admission Weight 88.9 kg (195 lb 15.8 oz) Documented at 2022 0416            Current Home Medications:  Prior to Admission medications    Medication Sig Start Date End Date Taking? Authorizing Provider   Acetaminophen (Tylenol) 325 MG capsule Take 650 mg by mouth Every 6 (Six) Hours As Needed.   Yes Ramsey Nye MD   apixaban (ELIQUIS) 5 MG tablet tablet Take 1 tablet by mouth Every 12 (Twelve) Hours. Indications: Atrial Fibrillation 3/8/22  Yes Latonya Quezada APRN   aspirin 81 MG chewable tablet Chew 81 mg Daily.   Yes Ramsey Nye MD   atorvastatin (LIPITOR) 80 MG tablet Take 1 tablet by mouth Every Night. 21  Yes Moris Pedro MD   azithromycin (ZITHROMAX) 250 MG tablet Take 250 mg by mouth Daily. 5 days of therapy, Started 7/15 and end 7/19 7/15/22 7/19/22 Yes Ramsey Nye MD   Cholecalciferol (VITAMIN D-3) 25 MCG (1000 UT) capsule Take 3 capsules by mouth Daily. 17  Yes Ramsey Nye MD   dilTIAZem CD (Cardizem CD) 180 MG 24 hr capsule Take 1 capsule by mouth Daily. 22  Yes Candido Montana MD   guaiFENesin (MUCINEX) 600 MG 12 hr tablet Take 1,200 mg by mouth 2 (Two) Times a Day As Needed for Cough. 7/15/22  Yes Ramsey Nye MD   lisinopril (PRINIVIL,ZESTRIL) 40 MG tablet Take 40 mg by mouth Daily. 20  Yes Ramsey Nye MD   metFORMIN (GLUCOPHAGE) " "1000 MG tablet 2 (Two) Times a Day With Meals. 10/26/21  Yes Provider, MD Ramsey   Pediatric Multiple Vitamins (Multivitamin Childrens) chewable tablet Chew 1 tablet Daily.   Yes Provider, MD Ramsey   sotalol (BETAPACE) 80 MG tablet Take 0.5 tablets by mouth Every 12 (Twelve) Hours. 4/28/22  Yes Candido Montana MD   nitroglycerin (NITROSTAT) 0.4 MG SL tablet Place 1 tablet under the tongue Every 5 (Five) Minutes As Needed for Chest Pain. 1 under the tongue as needed for angina, may repeat q5mins for up three doses 2/2/22 3/8/22  Yoli Baumann, EDWINA       Social history:   Pt lives with spouse, drives, no issues with obtaining medications identified.pt advises of eating a no added salt diet but drinks water and caffeine free diet soda daily.  \"I like soda but I have been told not to drink that as much\"    Smoking status:     Diagnostics Testing:  proBNP level: 2484    Echocardiogram:Results for orders placed during the hospital encounter of 01/20/21    Adult Transesophageal Echo (FRAN) W/ Cont if Necessary Per Protocol    Interpretation Summary  · Left ventricular wall thickness is consistent with concentric hypertrophy.  · Estimated left ventricular EF = 45% Estimated left ventricular EF was in agreement with the calculated left ventricular EF. Left ventricular ejection fraction appears to be 41 - 45%. Left ventricular systolic function is low normal.  · The right atrial cavity is mildly dilated.  · With a centrally-directed jet noted.  · Estimated right ventricular systolic pressure from tricuspid regurgitation is normal (<35 mmHg).    Procedure indication  Recurrent cardioembolic stroke and history of atrial arrhythmias and patient brought in for FRAN prior to atrial arrhythmia ablation      conscious sedation administered by anesthesia    consent obtained before procedure      Procedure note  after obtaining a valid consent patient was sedated by Anesthesia and a FRAN probe was easily placed into " esophagus with multiplane imaging with 2D, color and Doppler followed by bubble study with agitated saline without any complications    FRAN  Findings    Moderate left atrial enlargement without any shunt or clot  LV ejection fraction of 45%  No thrombus  Mild MR and TR and trivial effusion    Plan  Proceed with EP study and ablation      Procedures done  Transesophageal echocardiography        Electronically signed by Nathen Olmstead MD, 01/20/21, 5:08 PM EST.        Patient Assessment:   Pt lying in bed, resp even and unlabored, no soa with conversation, no edema     Current O2: non  Home O2: non    Education provided to patient:   yes- Heart Failure disease education  yes -Symptom identification/management  yes -Daily Weights  yes- Diet education  yes- Fluid restriction (if ordered)  yes- Activity education  yes- Medication education  na- Smoking cessation  yes- Follow-up Appointments  yes-Provided information on how to access [facility] My HF Guide/Heart Failure Interactive workbook    Acceptance of learning: acceptance, cooperative, teachback    Heart Failure education interactive teaching session time: 30 minutes    GWTG: pending echo    Identified needs/barriers:   New dx, pending echo, fluids at home - reinforced fluid limitations, I&O, daily wieghts    Intervention:   RN

## 2022-07-19 NOTE — PROGRESS NOTES
[facility]   PROGRESS NOTE    Reason for follow-up: CHF     Patient Care Team:  Celine Garcia MD as PCP - General (Internal Medicine)  Candido Montana MD as Consulting Physician (Cardiology)    Subjective    Patient seen and examined. Labs reviewed. Patient feeling better today after diuresis. No chest pain, some shortness of breath with deep breathing.     Review of Systems   Cardiovascular: Positive for dyspnea on exertion and leg swelling. Negative for chest pain, irregular heartbeat, near-syncope and palpitations.   Respiratory: Positive for cough and shortness of breath.    Gastrointestinal: Negative for nausea and vomiting.   Neurological: Negative for dizziness and light-headedness.   All other systems reviewed and are negative.      Allergies:  Morphine, Ozempic (0.25 or 0.5 mg-dose) [semaglutide(0.25 or 0.5mg-dos)], Isosorbide, Nitrofuran derivatives, and Nitroglycerin    Scheduled Meds:  apixaban, 5 mg, Oral, Q12H  aspirin, 81 mg, Oral, Daily  atorvastatin, 80 mg, Oral, Nightly  dilTIAZem CD, 180 mg, Oral, Q24H  furosemide, 40 mg, Intravenous, Q8H  insulin glargine, 10 Units, Subcutaneous, Nightly  insulin lispro, 0-9 Units, Subcutaneous, TID AC  lisinopril, 40 mg, Oral, Q24H  sodium chloride, 10 mL, Intravenous, Q12H  sotalol, 40 mg, Oral, Q12H      Continuous Infusions:     PRN Meds:  •  acetaminophen **OR** acetaminophen **OR** acetaminophen  •  aluminum-magnesium hydroxide-simethicone  •  senna-docusate sodium **AND** polyethylene glycol **AND** bisacodyl **AND** bisacodyl  •  dextrose  •  dextrose  •  glucagon (human recombinant)  •  insulin lispro **AND** insulin lispro  •  ipratropium-albuterol  •  ketorolac  •  labetalol  •  magnesium sulfate **OR** magnesium sulfate in D5W 1g/100mL (PREMIX)  •  melatonin  •  nitroglycerin  •  ondansetron **OR** ondansetron  •  potassium chloride **OR** potassium chloride **OR** potassium chloride  •  sodium  "chloride    Objective     VITAL SIGNS  Vitals:    07/18/22 2250 07/19/22 0436 07/19/22 0500 07/19/22 0700   BP: 156/98 115/74  143/96   BP Location: Right arm Right arm  Right arm   Patient Position: Lying Lying  Lying   Pulse: 78 61  65   Resp: 20 20 18   Temp: 97.2 °F (36.2 °C) 97.6 °F (36.4 °C)     TempSrc: Oral Oral     SpO2: 92% 90% 97% 97%   Weight:  86.1 kg (189 lb 13.1 oz)     Height:  180.3 cm (71\")       Flowsheet Rows    Flowsheet Row First Filed Value   Admission Height 180.3 cm (71\") Documented at 07/18/2022 0416   Admission Weight 88.9 kg (195 lb 15.8 oz) Documented at 07/18/2022 0416           TELEMETRY: sinus    Physical Exam:  Vitals reviewed.   Constitutional:       General: Awake.      Appearance: Not in distress.   Eyes:      Conjunctiva/sclera: Conjunctivae normal.   Pulmonary:      Effort: Pulmonary effort is normal.      Breath sounds: Normal breath sounds.   Cardiovascular:      Normal rate. Regular rhythm. Normal S1. Normal S2.   Pulses:     Intact distal pulses.   Edema:     Peripheral edema present.  Abdominal:      General: Bowel sounds are normal.      Palpations: Abdomen is soft.   Skin:     General: Skin is warm and dry.   Neurological:      General: No focal deficit present.      Mental Status: Alert and oriented to person, place and time.   Psychiatric:         Behavior: Behavior is cooperative.          LAB RESULTS (LAST 7 DAYS)  I have reviewed new clinical results.    CBC  Results from last 7 days   Lab Units 07/19/22  0037 07/18/22  0455   WBC 10*3/mm3 7.80 6.40   RBC 10*6/mm3 4.77 4.68   HEMOGLOBIN g/dL 13.3 13.0   HEMATOCRIT % 38.9 38.9   MCV fL 81.5 83.0   PLATELETS 10*3/mm3 220 222     CMP   Results from last 7 days   Lab Units 07/19/22  0037 07/18/22  0455   SODIUM mmol/L 137 135*   POTASSIUM mmol/L 3.2* 3.7   CHLORIDE mmol/L 96* 100   CO2 mmol/L 30.0* 24.0   BUN mg/dL 19 17   CREATININE mg/dL 1.21 0.91   GLUCOSE mg/dL 231* 313*   ALBUMIN g/dL  --  3.70   BILIRUBIN mg/dL  " --  0.3   ALK PHOS U/L  --  126*   AST (SGOT) U/L  --  14   ALT (SGPT) U/L  --  18     ProBNP      TROPONIN  Results from last 7 days   Lab Units 07/19/22  0037   TROPONIN T ng/mL 0.022     CoAg      Creatinine Clearance  Estimated Creatinine Clearance: 81 mL/min (by C-G formula based on SCr of 1.21 mg/dL).    Radiology  XR Chest 1 View    Result Date: 7/18/2022  1. Limited by low lung volumes. 2. Bibasal atelectasis, edema, and/or airspace disease. 3. Cardiomegaly. Electronically signed by:  Erick Carey D.O.  7/18/2022 4:38 AM    CT Angiogram Chest Pulmonary Embolism    Result Date: 7/18/2022  1.  Acute congestive heart failure given the presence of pleural effusions, pulmonary edema, and cardiomegaly. 2.  No pulmonary embolism. 3.  Redemonstration of nonspecific mediastinal lymph nodes. Electronically signed by:  Erick Carey D.O.  7/18/2022 4:43 AM      EKG    I personally viewed and interpreted the patient's EKG/Telemetry data:    ECHOCARDIOGRAM:  Results for orders placed during the hospital encounter of 01/20/21    Adult Transesophageal Echo (FRAN) W/ Cont if Necessary Per Protocol    Interpretation Summary  · Left ventricular wall thickness is consistent with concentric hypertrophy.  · Estimated left ventricular EF = 45% Estimated left ventricular EF was in agreement with the calculated left ventricular EF. Left ventricular ejection fraction appears to be 41 - 45%. Left ventricular systolic function is low normal.  · The right atrial cavity is mildly dilated.  · With a centrally-directed jet noted.  · Estimated right ventricular systolic pressure from tricuspid regurgitation is normal (<35 mmHg).    Procedure indication  Recurrent cardioembolic stroke and history of atrial arrhythmias and patient brought in for FRAN prior to atrial arrhythmia ablation      conscious sedation administered by anesthesia    consent obtained before procedure      Procedure note  after obtaining a valid consent patient was  sedated by Anesthesia and a FRAN probe was easily placed into esophagus with multiplane imaging with 2D, color and Doppler followed by bubble study with agitated saline without any complications    FRNA  Findings    Moderate left atrial enlargement without any shunt or clot  LV ejection fraction of 45%  No thrombus  Mild MR and TR and trivial effusion    Plan  Proceed with EP study and ablation      Procedures done  Transesophageal echocardiography        Electronically signed by Nathen Olmstead MD, 01/20/21, 5:08 PM EST.      STRESS MYOVIEW:    CARDIAC CATHETERIZATION:    OTHER:       ASSESSMENT/PLAN      Acute CHF (congestive heart failure) (HCC)    Hyperlipidemia    Primary hypertension    Overweight (BMI 25.0-29.9)    Type 2 diabetes mellitus with hyperglycemia, without long-term current use of insulin (HCC)    History of cerebrovascular accident    Coronary artery disease involving native coronary artery of native heart    Atrial arrhythmia    Acute chest pain    Palpitations    Dyspnea on exertion    Bilateral pleural effusion    Acute on chronic congestive heart failure, unspecified heart failure type (HCC)      PLAN  Continue diuresis with lasix  Echo results pending  Heart Failure Navigator to see today  Will need consistent reinforcement of HF education    I discussed the patients findings and my recommendations with patient and nurse.  Further recommendations per Dr. Montana.    EDWINA Mena  07/19/22  10:18 EDT   Electronically signed by EDWINA Mena, 07/19/22, 10:21 AM EDT.

## 2022-07-19 NOTE — PAYOR COMM NOTE
UTILIZATION REVIEW  HERMILO ALBARRAN RN   PH:   FAX: [phone]    [facility]  NPI# [id number]  TID # [id number]  ===========================    CLINICAL REVIEW  7/18/22 ADMITTED AS OBSERVATION  7/19/22- CONVERTED TO INPATIENT STATUS    =============================    Indicia® Completed 7/19/2022 10:31       Criteria Set Name - Subset   Heart Failure RRG - Inpatient Care       Selected?   Yes - Inpatient Care is selected for the Heart Failure RRG criteria set.      Criteria Review      Inpatient Care    Most Recent : Hermilo Albarran Most Recent Date: 7/19/2022 10:31:47 EDST    (X) Admission is indicated for  1 or more  of the following :       ( ) Pulmonary edema that is persistent, as indicated by  ALL  of the following :          (X) Has not improved sufficiently with observation care (eg, appropriately dosed IV diuretics)          [A]       (X) Tachypnea that persists despite observation care       (X) Dyspnea (above baseline) that persists despite observation care    Notes:    7/19/2022 10:31:47 EDST by Hermilo Albarran    Subject: Admission            7/19:     STILL ON IV LASIX TID      SOME RR OF 20 THIS AM      STILL SOB PER CARDS NOTE                 Preet Jones (57 y.o. Male)             Date of Birth   1965    Social Security Number       Address   [address] [place] [address] [address] [place] [facility]    Home Phone   [phone]    MRN   [id number]       Islam   None    Marital Status                               Admission Date   7/18/22    Admission Type   Emergency    Admitting Provider   Adele Sidhu MD    Attending Provider   Darrel Champagne MD    Department, Room/Bed   [facility] 2B MEDICAL INPATIENT, 222/1       Discharge Date       Discharge Disposition       Discharge Destination                               Attending Provider: Darrel Champagne MD    Allergies: Morphine, Ozempic (0.25 Or 0.5 Mg-dose) [Semaglutide(0.25 Or 0.5mg-dos)],  "Isosorbide, Nitrofuran Derivatives, Nitroglycerin    Isolation: None   Infection: None   Code Status: CPR   Advance Care Planning Activity    Ht: 180.3 cm (71\")   Wt: 86.1 kg (189 lb 13.1 oz)    Admission Cmt: None   Principal Problem: Acute CHF (congestive heart failure) (HCC) [I50.9]                 Active Insurance as of 2022     Primary Coverage     Payor Plan Insurance Group Employer/Plan Group    [facility] [id number]     Payor Plan Address Payor Plan Phone Number Payor Plan Fax Number Effective Dates    [address] [address] [phone]  2021 - None Entered    [facility] [id number]       Subscriber Name Subscriber Birth Date Member ID       THALIA ROMAN 1965 [id number]                 Emergency Contacts      (Rel.) Home Phone Work Phone Mobile Phone    JETT ROMAN (Spouse) [phone] -- --               History & Physical      Althea Bautista APRN at [phone]     Attestation signed by Adele Sidhu MD at 22 [phone]    Attending Physician Attestation    I have reviewed the mid-level provider documentation, agree with the documentation, medical decision making and treatment plan as outlined by the mid-level provider. I have reviewed this documentation and agree.                      [facility] Medicine Services      Patient Name: Thalia Roman  : 1965  MRN: [id number]  Primary Care Physician:  Celine Garcia MD  Date of admission: 2022      Subjective      Chief Complaint:  Palpitations, chest pain, dyspnea on exertion    History of Present Illness: Thalia Roman is a 57 y.o. male with a history of CAD s/p stent placement, HTN, HLD, Type 2 DM, CVA, atrial arrhythmias s/p a-fib and flutter ablation who presented [facility] the [facility] complaining of palpitations, chest pain, and dyspnea on exertion. The patient states his symptoms started approximately 2 weeks ago, but have gotten worse over the past 3 days. He " "states 3 days ago he saw the nurse practitioner at his PCP's office and was diagnosed with acute bronchitis. He states he was prescribed azithromycin and Mucinex. He states his symptoms have worsened despite taking the medications prescribed. He denies a history of congestive heart failure, and he is not on a diuretic at home. He reports some recent ankle swelling and feeling bloated. He states his blood pressure is normally well controlled, but it was elevated at his PCP's office. He states he has an upcoming appointment with his cardiologist on 07/20/22. He denies any exacerbating or alleviating factors.     The patient is a nonsmoker and denies alcohol or illicit drug use. He reports a family history of heart disease in his mother.     Workup in the ER revealed acute CHF with bilateral pleural effusions. The patient was given Lasix 40 mg IV and aspirin 324 mg PO. He also had 1\" nitro paste applied. He was admitted to the Hospitalist group for further evaluation and treatment.       Review of Systems   HENT: Positive for congestion.    Cardiovascular: Positive for chest pain, dyspnea on exertion, leg swelling and palpitations.   Respiratory: Positive for cough. Negative for sputum production.    Gastrointestinal: Positive for bloating.   All other systems reviewed and are negative.       Personal History     Past Medical History:   Diagnosis Date   • Abnormal cardiovascular stress test 01/19/2017   • Acute myocardial infarction ([facility]) 2019   • Arterial ischemic stroke, MCA (middle cerebral artery), left, acute ([facility]) 10/29/2019   • Atrial flutter with rapid ventricular response ([facility]) 02/04/2021   • Bicuspid aortic valve 02/16/2017   • Coronary artery disease involving native coronary artery of native heart    • Depression 12/30/2020   • History of coronary angioplasty with insertion of stent    • Hyperlipidemia    • Hypertension    • Hyperthyroidism    • Obesity 09/02/2011   • Paroxysmal SVT (supraventricular " tachycardia) ([facility]) 01/08/2021   • RUE weakness 10/29/2019    Previous residual from Stroke, but was not work prohibiting.   • Seasonal allergic rhinitis 12/23/2020   • Sinus arrhythmia    • Sustained SVT ([facility])    • Type 2 diabetes mellitus ([facility])    • Vitamin D deficiency 11/05/2020       Past Surgical History:   Procedure Laterality Date   • APPENDECTOMY     • BACK SURGERY     • CARDIAC CATHETERIZATION  2010   • CARDIAC CATHETERIZATION  2019   • CARDIAC CATHETERIZATION N/A 12/31/2020    Procedure: Cardiac Catheterization/Vascular Study;  Surgeon: Moris Pedro MD;  Location:  [facility] C[facility] INVASIVE [facility];  Service: Cardiovascular;  Laterality: N/A;   • CARDIAC CATHETERIZATION N/A 2/1/2022    Procedure: Left Heart Cath;  Surgeon: Kamlesh Richardson MD;  Location:  [facility] CATH INVASIVE LOCATION;  Service: Cardiovascular;  Laterality: N/A;   • CARDIAC ELECTROPHYSIOLOGY PROCEDURE N/A 1/20/2021    Procedure: EP/Ablation;  Surgeon: Nathen Olmstead MD;  Location:  [facility] CATH INVASIVE LOCATION;  Service: Cardiovascular;  Laterality: N/A;   • CARDIAC ELECTROPHYSIOLOGY PROCEDURE N/A 2/24/2022    Procedure: EP/Ablation-SVT Utong aware;  Surgeon: Candido Montana MD;  Location:  [facility] CATH INVASIVE LOCATION;  Service: Cardiology;  Laterality: N/A;   • JOINT REPLACEMENT     • KNEE ARTHROSCOPY     • THYROIDECTOMY, PARTIAL  2015       Family History: family history includes Cancer in his father; Heart disease in his mother; Parkinsonism in his father. Otherwise pertinent FHx was reviewed and not pertinent to current issue.    Social History:  reports that he has never smoked. He has never used smokeless tobacco. He reports previous alcohol use. He reports that he does not use drugs.    Home Medications:  Prior to Admission Medications     Prescriptions Last Dose Informant Patient Reported? Taking?    apixaban (ELIQUIS) 5 MG tablet tablet 7/17/2022  No Yes    Take 1 tablet by mouth Every 12 (Twelve) Hours.  Indications: Atrial Fibrillation    aspirin 81 MG chewable tablet 7/17/2022  Yes Yes    Chew 81 mg Daily.    atorvastatin (LIPITOR) 80 MG tablet 7/17/2022 Self No Yes    Take 1 tablet by mouth Every Night.    azithromycin (ZITHROMAX) 250 MG tablet 7/17/2022  Yes Yes    Take 250 mg by mouth Daily.    Cholecalciferol (VITAMIN D-3) 25 MCG (1000 UT) capsule 7/17/2022  Yes Yes    Take 3 capsules by mouth Daily.    guaiFENesin (MUCINEX) 600 MG 12 hr tablet 7/17/2022  Yes Yes    Take 1,200 mg by mouth 2 (Two) Times a Day As Needed for Cough.    lisinopril (PRINIVIL,ZESTRIL) 40 MG tablet 7/17/2022  Yes Yes    Take 40 mg by mouth Daily.    metFORMIN (GLUCOPHAGE) 1000 MG tablet 7/17/2022 Self Yes Yes    2 (Two) Times a Day With Meals.    Pediatric Multiple Vitamins (Multivitamin Childrens) chewable tablet 7/17/2022  Yes Yes    Chew 1 tablet Daily.    BD Pen Needle Barbara 2nd Gen 32G X 4 MM misc   Yes No    4 (Four) Times a Day. use as directed    dilTIAZem CD (Cardizem CD) 180 MG 24 hr capsule   No No    Take 1 capsule by mouth Daily.    Gvoke HypoPen 1-Pack 1 MG/0.2ML solution auto-injector  Self Yes No    INJECT 1 MG SUBQ AS DIRECTED    Patient not taking:  No sig reported    insulin lispro (ADMELOG) 100 UNIT/ML injection   No No    Inject 10 Units under the skin into the appropriate area as directed 3 (Three) Times a Day With Meals.    Patient taking differently:  Inject 8 Units under the skin into the appropriate area as directed 3 (Three) Times a Day With Meals.    Lancets (OneTouch Delica Plus [id number]) misc   Yes No    3 (Three) Times a Day. as directed    OneTouch Verio test strip   Yes No    4 (Four) Times a Day. use as directed    Ozempic, 0.25 or 0.5 MG/DOSE, 2 MG/1.5ML solution pen-injector   Yes No    INJECT 0.5 MG SUBQ ONCE WEEKLY    sotalol (BETAPACE) 80 MG tablet   No No    Take 0.5 tablets by mouth Every 12 (Twelve) Hours.            Allergies:  Allergies   Allergen Reactions   • Morphine GI Intolerance   •  Ozempic (0.25 Or 0.5 Mg-Dose) [Semaglutide(0.25 Or 0.5mg-Dos)] Diarrhea   • Isosorbide Headache   • Nitrofuran Derivatives Headache   • Nitroglycerin Headache       Objective      Vitals:   Temp:  [97.5 °F (36.4 °C)] 97.5 °F (36.4 °C)  Heart Rate:  [] 88  Resp:  [20] 20  BP: (151-169)/(100-115) 155/106    Physical Exam  Vitals reviewed.   Constitutional:       Appearance: He is overweight.   HENT:      Head: Normocephalic and atraumatic.      Mouth/Throat:      Mouth: Mucous membranes are moist.   Eyes:      Extraocular Movements: Extraocular movements intact.      Conjunctiva/sclera: Conjunctivae normal.      Pupils: Pupils are equal, round, and reactive to light.   Cardiovascular:      Rate and Rhythm: Normal rate and regular rhythm. Occasional extrasystoles are present.     Pulses: Normal pulses.   Pulmonary:      Effort: Pulmonary effort is normal.      Breath sounds: Examination of the right-lower field reveals rales. Examination of the left-lower field reveals rales. Rales present.      Comments: On room air  Abdominal:      General: Bowel sounds are normal. There is no distension.      Palpations: Abdomen is soft.      Tenderness: There is no abdominal tenderness.   Musculoskeletal:         General: Normal range of motion.      Cervical back: Normal range of motion and neck supple.      Right lower le+ Edema present.      Left lower le+ Edema present.   Skin:     General: Skin is warm and dry.      Capillary Refill: Capillary refill takes less than 2 seconds.   Neurological:      Mental Status: He is alert and oriented to person, place, and time.   Psychiatric:         Mood and Affect: Mood normal.         Behavior: Behavior normal.          Result Review    Result Review:  I have personally reviewed the results from the time of this admission to 2022 09:34 EDT and agree with these findings:  [x]  Laboratory  [x]  Microbiology  [x]  Radiology  [x]  EKG/Telemetry   []  Cardiology/Vascular    []  Pathology  [x]  Old records  []  Other:    Most notable findings include:   Troponin 0.025, proBNP 2,484, dimer 1.57, Na 135, K 3.7, glucose 313, TSH 2.370, Mg 2.2    RVP negative    CTA chest:  1.  Acute congestive heart failure given the presence of pleural effusions, pulmonary edema, and cardiomegaly.  2.  No pulmonary embolism.  3.  Redemonstration of nonspecific mediastinal lymph nodes.      Assessment & Plan        Active Hospital Problems:  Active Hospital Problems    Diagnosis    • **Acute CHF (congestive heart failure) (HCC)    • Dyspnea on exertion    • Acute chest pain    • Palpitations    • Overweight (BMI 25.0-29.9)    • Atrial arrhythmia    • Bilateral pleural effusion    • Coronary artery disease involving native coronary artery of native heart    • Hyperlipidemia    • Primary hypertension    • Type 2 diabetes mellitus with hyperglycemia, without long-term current use of insulin (HCC)    • History of cerebrovascular accident      Plan:     Acute CHF (congestive heart failure) with Bilateral pleural effusion  -unsure of type---obtain 2D echo  -given Lasix 40 mg IV in ER; continue q8h  -monitor I&Os and daily weight  -continue CCB and ACE-I  -2 g Na diet  -consult HF Nurse Navigator and cardiac rehab    Acute chest pain  Palpitations  Dyspnea on exertion  -likely secondary to above, r/o ACS  -check serial troponin  -cardiology consulted  -continuous cardiac monitoring     Atrial arrhythmia, h/o a-fib and flutter ablation  -currently SR  -continue diltiazem, sotalol, and Eliquis     Coronary artery disease involving native coronary artery of native heart, h/o stent placement / Hyperlipidemia / Primary hypertension, chronic   -BP uncontrolled---PRN IV labetalol  -continue aspirin, Eliquis, statin, lisinopril, and diltiazem  -closely monitor BP    Overweight (BMI 25.0-29.9)  -BMI 27.33 on admission  -encourage lifestyle modifications     Type 2 diabetes mellitus with hyperglycemia, without long-term  current use of insulin   -uncontrolled  -check A1c  -accu checks AC&HS with SSI  -hold metformin while inpatient  -Lantus 10 units nightly while inpatient; adjust as needed  -diabetic consistent carb diet  -consult diabetes educator   -needs outpatient referral to endocrinology     History of cerebrovascular accident  -continue aspirin, Eliquis, and statin           DVT prophylaxis:  Medical DVT prophylaxis orders are present.    CODE STATUS:    Code Status (Patient has no pulse and is not breathing): CPR (Attempt to Resuscitate)  Medical Interventions (Patient has pulse or is breathing): Full Support    Admission Status:  I believe this patient meets inpatient status.    I discussed the patient's findings and my recommendations with patient.    This patient has been examined wearing appropriate Personal Protective Equipment. 07/18/22      Signature: Electronically signed by EDWINA Reddy, 07/18/22, 9:52 AM EDT.      Electronically signed by Adele Sidhu MD at 07/18/22 09          Emergency Department Notes      Preet Erickson MD at 07/18/22 4531          Subjective   57-year-old male with a history of CAD, A. fib/a flutter with previous of ablation complains of intermittent palpitations, fast and irregular for the past 2 weeks.  Patient has had associated productive cough with some dyspnea and congestion without fever.  Patient was seen by a nurse practitioner on Friday and empirically placed on a Z-Herbert.  Patient feels like he is doing worse.  No chest pain currently, chest pain is left-sided and dull, mild to moderate.  Patient used his son's albuterol inhaler and felt that that helped the wheezing.          Review of Systems   Constitutional: Negative.    HENT: Positive for congestion.    Respiratory: Positive for cough, shortness of breath and wheezing.    Cardiovascular: Positive for chest pain, palpitations and leg swelling.   Gastrointestinal: Negative.    Genitourinary: Negative.     Musculoskeletal: Negative.    Skin: Negative.    Neurological: Negative.    Psychiatric/Behavioral: Negative.    All other systems reviewed and are negative.      Past Medical History:   Diagnosis Date   • Abnormal cardiovascular stress test 01/19/2017   • Acute myocardial infarction ([facility]) 2019   • Arterial ischemic stroke, MCA (middle cerebral artery), left, acute ([facility]) 10/29/2019   • Atrial flutter with rapid ventricular response ([facility]) 02/04/2021   • Bicuspid aortic valve 02/16/2017   • Coronary artery disease involving native coronary artery of native heart    • Depression 12/30/2020   • History of coronary angioplasty with insertion of stent    • Hyperlipidemia    • Hypertension    • Hyperthyroidism    • Obesity 09/02/2011   • Paroxysmal SVT (supraventricular tachycardia) ([facility]) 01/08/2021   • RUE weakness 10/29/2019    Previous residual from Stroke, but was not work prohibiting.   • Seasonal allergic rhinitis 12/23/2020   • Sinus arrhythmia    • Sustained SVT ([facility])    • Type 2 diabetes mellitus ([facility])    • Vitamin D deficiency 11/05/2020       Allergies   Allergen Reactions   • Morphine GI Intolerance   • Ozempic (0.25 Or 0.5 Mg-Dose) [Semaglutide(0.25 Or 0.5mg-Dos)] Diarrhea   • Isosorbide Headache   • Nitrofuran Derivatives Headache   • Nitroglycerin Headache       Past Surgical History:   Procedure Laterality Date   • APPENDECTOMY     • BACK SURGERY     • CARDIAC CATHETERIZATION  2010   • CARDIAC CATHETERIZATION  2019   • CARDIAC CATHETERIZATION N/A 12/31/2020    Procedure: Cardiac Catheterization/Vascular Study;  Surgeon: Moris Pedro MD;  Location: [facility] CATH INVASIVE LOCATION;  Service: Cardiovascular;  Laterality: N/A;   • CARDIAC CATHETERIZATION N/A 2/1/2022    Procedure: Left Heart Cath;  Surgeon: Kamlesh Richardson MD;  Location: [facility] CATH INVASIVE LOCATION;  Service: Cardiovascular;  Laterality: N/A;   • CARDIAC ELECTROPHYSIOLOGY PROCEDURE N/A 1/20/2021    Procedure:  EP/Ablation;  Surgeon: Nathen Olmstead MD;  Location: [facility] CATH INVASIVE LOCATION;  Service: Cardiovascular;  Laterality: N/A;   • CARDIAC ELECTROPHYSIOLOGY PROCEDURE N/A 2/24/2022    Procedure: EP/Ablation-SVT Utong aware;  Surgeon: Candido Montana MD;  Location: [facility] CATH INVASIVE LOCATION;  Service: Cardiology;  Laterality: N/A;   • JOINT REPLACEMENT     • KNEE ARTHROSCOPY     • THYROIDECTOMY, PARTIAL  2015       Family History   Problem Relation Age of Onset   • Heart disease Mother    • Parkinsonism Father    • Cancer Father        Social History     Socioeconomic History   • Marital status:    Tobacco Use   • Smoking status: Never Smoker   • Smokeless tobacco: Never Used   Vaping Use   • Vaping Use: Never used   Substance and Sexual Activity   • Alcohol use: Not Currently   • Drug use: No   • Sexual activity: Defer           Objective   Physical Exam  Constitutional:       Appearance: Normal appearance.   HENT:      Head: Normocephalic and atraumatic.      Mouth/Throat:      Mouth: Mucous membranes are moist.      Pharynx: Oropharynx is clear.   Eyes:      Pupils: Pupils are equal, round, and reactive to light.   Cardiovascular:      Rate and Rhythm: Tachycardia present.      Comments: Occasional extrasystole, seen as PVCs on the monitor.  Pulmonary:      Effort: Pulmonary effort is normal.      Comments: Bibasilar rales  Abdominal:      General: Bowel sounds are normal. There is no distension.      Palpations: Abdomen is soft.      Tenderness: There is no abdominal tenderness.   Musculoskeletal:      Comments: Trace pedal edema bilaterally, no calf tenderness   Skin:     General: Skin is warm and dry.      Capillary Refill: Capillary refill takes less than 2 seconds.   Neurological:      General: No focal deficit present.      Mental Status: He is alert and oriented to person, place, and time.   Psychiatric:         Mood and Affect: Mood normal.         Behavior: Behavior normal.          Procedures          ED Course  ED Course as of 07/18/22 2307   Mon Jul 18, 2022   0428 EKG interpretation: Sinus tachycardia, rate 102 with occasional PVC,  [JR]      ED Course User Index  [JR] Preet Erickson MD                                           MDM  Number of Diagnoses or Management Options  Acute on chronic congestive heart failure, unspecified heart failure type (HCC)  Chest pain, unspecified type  Diagnosis management comments: Results for orders placed or performed during the hospital encounter of 07/18/22  -Respiratory Panel PCR w/COVID-19(SARS-CoV-2) [facility] In-House, NP Swab in UTM/VTM, 3-4 HR TAT - Swab, Nasopharynx:   Specimen: Nasopharynx; Swab       Result                      Value             Ref Range           ADENOVIRUS, PCR             Not Detected      Not Detected        Coronavirus 229E            Not Detected      Not Detected        Coronavirus HKU1            Not Detected      Not Detected        Coronavirus NL63            Not Detected      Not Detected        Coronavirus OC43            Not Detected      Not Detected        COVID19                     Not Detected      Not Detected*       Human Metapneumovirus       Not Detected      Not Detected        Human Rhinovirus/Enter*     Not Detected      Not Detected        Influenza A PCR             Not Detected      Not Detected        Influenza B PCR             Not Detected      Not Detected        Parainfluenza Virus 1       Not Detected      Not Detected        Parainfluenza Virus 2       Not Detected      Not Detected        Parainfluenza Virus 3       Not Detected      Not Detected        Parainfluenza Virus 4       Not Detected      Not Detected        RSV, PCR                    Not Detected      Not Detected        Bordetella pertussis p*     Not Detected      Not Detected        Bordetella parapertuss*     Not Detected      Not Detected        Chlamydophila pneumoni*     Not Detected       Not Detected        Mycoplasma pneumo by P*     Not Detected      Not Detected   -Procalcitonin:   Specimen: Blood       Result                      Value             Ref Range           Procalcitonin               0.07              0.00 - 0.25 *  -D-dimer, Quantitative:   Specimen: Blood       Result                      Value             Ref Range           D-Dimer, Quantitative       1.57 (H)          0.00 - 0.59 *  -Magnesium:   Specimen: Blood       Result                      Value             Ref Range           Magnesium                   2.2               1.6 - 2.6 mg*  -TSH:   Specimen: Blood       Result                      Value             Ref Range           TSH                         2.370             0.270 - 4.20*  -Comprehensive Metabolic Panel:   Specimen: Blood       Result                      Value             Ref Range           Glucose                     313 (H)           65 - 99 mg/dL       BUN                         17                6 - 20 mg/dL        Creatinine                  0.91              0.76 - 1.27 *       Sodium                      135 (L)           136 - 145 mm*       Potassium                   3.7               3.5 - 5.2 mm*       Chloride                    100               98 - 107 mmo*       CO2                         24.0              22.0 - 29.0 *       Calcium                     9.2               8.6 - 10.5 m*       Total Protein               6.8               6.0 - 8.5 g/*       Albumin                     3.70              3.50 - 5.20 *       ALT (SGPT)                  18                1 - 41 U/L          AST (SGOT)                  14                1 - 40 U/L          Alkaline Phosphatase        126 (H)           39 - 117 U/L        Total Bilirubin             0.3               0.0 - 1.2 mg*       Globulin                    3.1               gm/dL               A/G Ratio                   1.2               g/dL                BUN/Creatinine Ratio         18.7              7.0 - 25.0          Anion Gap                   11.0              5.0 - 15.0 m*       eGFR                        97.7              >60.0 mL/min*  -CBC Auto Differential:   Specimen: Blood       Result                      Value             Ref Range           WBC                         6.40              3.40 - 10.80*       RBC                         4.68              4.14 - 5.80 *       Hemoglobin                  13.0              13.0 - 17.7 *       Hematocrit                  38.9              37.5 - 51.0 %       MCV                         83.0              79.0 - 97.0 *       MCH                         27.8              26.6 - 33.0 *       MCHC                        33.5              31.5 - 35.7 *       RDW                         14.3              12.3 - 15.4 %       RDW-SD                      41.6              37.0 - 54.0 *       MPV                         9.7               6.0 - 12.0 fL       Platelets                   222               140 - 450 10*       Neutrophil %                60.4              42.7 - 76.0 %       Lymphocyte %                23.4              19.6 - 45.3 %       Monocyte %                  7.7               5.0 - 12.0 %        Eosinophil %                8.3 (H)           0.3 - 6.2 %         Basophil %                  0.2               0.0 - 1.5 %         Neutrophils, Absolute       3.90              1.70 - 7.00 *       Lymphocytes, Absolute       1.50              0.70 - 3.10 *       Monocytes, Absolute         0.50              0.10 - 0.90 *       Eosinophils, Absolute       0.50 (H)          0.00 - 0.40 *       Basophils, Absolute         0.00              0.00 - 0.20 *       nRBC                        0.1               0.0 - 0.2 /1*  -Troponin:   Specimen: Blood       Result                      Value             Ref Range           Troponin T                  0.025             0.000 - 0.03*  -BNP:   Specimen: Blood       Result                       Value             Ref Range           proBNP                      2,484.0 (H)       0.0 - 900.0 *  -Troponin:   Specimen: Blood       Result                      Value             Ref Range           Troponin T                  0.026             0.000 - 0.03*  -Lipid Panel:   Specimen: Blood       Result                      Value             Ref Range           Total Cholesterol           163               0 - 200 mg/dL       Triglycerides               164 (H)           0 - 150 mg/dL       HDL Cholesterol             24 (L)            40 - 60 mg/dL       LDL Cholesterol             110 (H)           0 - 100 mg/dL       VLDL Cholesterol            29                5 - 40 mg/dL        LDL/HDL Ratio               4.43                             -POC Glucose Once:   Specimen: Blood       Result                      Value             Ref Range           Glucose                     277 (H)           70 - 105 mg/*  -POC Glucose Once:   Specimen: Blood       Result                      Value             Ref Range           Glucose                     184 (H)           70 - 105 mg/*  -POC Glucose Once:   Specimen: Blood       Result                      Value             Ref Range           Glucose                     251 (H)           70 - 105 mg/*  -POC Glucose Once:   Specimen: Blood       Result                      Value             Ref Range           Glucose                     229 (H)           70 - 105 mg/*  -ECG 12 Lead:        Result                      Value             Ref Range           QT Interval                 358               ms             -Adult Transthoracic Echo Complete W/ Cont if Necessary Per Protocol:        Result                      Value             Ref Range           Target HR (85%)             138               bpm                 Max. Pred. HR (100%)        162               bpm                 ACS                         2.6               cm                  Ao root diam                 3.4               cm                  Ao pk luciana                   95.8              cm/sec              Ao V2 VTI                   16.3              cm                  TEDDY(I,D)                    2.00              cm2                 EDV(cubed)                  130.0             ml                  EDV(MOD-sp4)                103.7             ml                  EF(MOD-bp)                  34.0              %                   EF(MOD-sp4)                 34.3              %                   ESV(cubed)                  85.1              ml                  ESV(MOD-sp4)                68.1              ml                  IVS/LVPW                    1.00              cm                  LV mass(C)d                 178.9             grams               LV V1 max PG                0.73              mmHg                LV V1 mean PG               0.38              mmHg                LV V1 max                   42.6              cm/sec              LVPWd                       0.97              cm                  MR max PG                   111.5             mmHg                MV dec slope                310.8             cm/sec2             MV dec time                 0.26              msec                MV V2 VTI                   19.7              cm                  MVA(VTI)                    1.66              cm2                 PA V2 max                   88.7              cm/sec              PI end-d luciana                129.4             cm/sec              RAP systole                 15.0              mmHg                RV V1 max PG                0.79              mmHg                RV V1 max                   43.9              cm/sec              RV V1 VTI                   7.6               cm                  RVIDd                       3.0               cm                  RVSP(TR)                    61.3              mmHg                SI(MOD-sp4)                 17.1              ml/m2                SV(LVOT)                    32.6              ml                  SV(MOD-sp4)                 35.6              ml                  TR max PG                   46.3              mmHg                Ao max PG                   3.7               mmHg                Ao mean PG                  2.32              mmHg                FS                          13.2              %                   IVSd                        0.97              cm                  LA dimension (2D)           4.8               cm                  LV V1 VTI                   8.2               cm                  LVIDd                       5.1               cm                  LVIDs                       4.4               cm                  LVOT area                   4.0               cm2                 LVOT diam                   2.26              cm                  MV E/A                      2.18                                  MV max PG                   2.7               mmHg                MV mean PG                  1.33              mmHg                MR max luciana                  526.8             cm/sec              MV A max luciana                36.9              cm/sec              MV E max luciana                80.6              cm/sec              TR max luciana                  339.2             cm/sec              LV [place] Vol (BSA corre*     49.7              cm2                 LV Sys Vol (BSA correc*     32.6              cm2            CT Angiogram Chest Pulmonary Embolism   Final Result        1.  Acute congestive heart failure given the presence of pleural effusions, pulmonary edema, and cardiomegaly.    2.  No pulmonary embolism.    3.  Redemonstration of nonspecific mediastinal lymph nodes.            Electronically signed by:  Erick Carey D.O.      7/18/2022 4:43 AM     XR Chest 1 View   Final Result    1. Limited by low lung volumes.    2. Bibasal atelectasis, edema, and/or airspace disease.    3.  Cardiomegaly.        Electronically signed by:  Erick Carey D.O.      7/18/2022 4:38 AM       Patient with CHF exacerbation with large bilateral pleural effusion seen on CT, no current chest pain.  Will admit for diuresis and further evaluation and treatment.       Amount and/or Complexity of Data Reviewed  Clinical lab tests: reviewed and ordered  Tests in the radiology section of CPT®: ordered and reviewed  Tests in the medicine section of CPT®: ordered and reviewed  Decide to obtain previous medical records or to obtain history from someone other than the patient: yes  Discuss the patient with other providers: yes        Final diagnoses:   Acute on chronic congestive heart failure, unspecified heart failure type (HCC)   Chest pain, unspecified type       ED Disposition  ED Disposition     ED Disposition   Decision to Admit    Condition   --    Comment   Level of Care: Telemetry [5]   Diagnosis: Acute on chronic congestive heart failure, unspecified heart failure type (HCC) [[id number]]   Admitting Physician: RAJWINDER CARDONA [[id number]]   Attending Physician: RAJWINDER CARDONA [[id number]]               No follow-up provider specified.       Medication List      No changes were made to your prescriptions during this visit.          Preet Erickson MD  07/18/22 2308      Electronically signed by Preet Erickson MD at 07/18/22 2308       Vital Signs (last 2 days)     Date/Time Temp Temp src Pulse Resp BP Patient Position SpO2    07/19/22 0700 -- -- 65 18 143/96 Lying 97    07/19/22 0500 -- -- -- -- -- -- 97    07/19/22 0436 97.6 (36.4) Oral 61 20 115/74 Lying 90    07/18/22 2250 97.2 (36.2) Oral 78 20 156/98 Lying 92    07/18/22 2104 -- -- 79 -- 142/79 -- --    07/18/22 2058 -- -- 85 -- -- -- 96    07/18/22 2049 -- -- 76 -- -- -- 96    07/18/22 2003 -- -- 86 -- -- -- 96    07/18/22 1715 -- -- 115 -- -- -- 92    07/18/22 1601 -- -- 83 -- 136/94 -- 94    07/18/22 1459 -- -- 101 -- 146/95 -- 98    07/18/22 1302 -- -- 83 -- 131/80  -- 96    07/18/22 1201 -- -- 79 -- 136/93 -- 95    07/18/22 1101 -- -- 85 -- 158/94 -- 97    07/18/22 1000 -- -- 95 -- -- -- 92    07/18/22 0901 -- -- 88 -- 155/106 -- 95    07/18/22 0811 -- -- -- -- -- -- 96    07/18/22 0810 -- -- 113 -- 169/115 -- --    07/18/22 0801 -- -- 94 -- 167/109 -- 96    07/18/22 0701 -- -- 93 -- 161/108 -- 95    07/18/22 0418 97.5 (36.4) -- -- -- 151/100 -- --    07/18/22 0416 -- Oral 107 20 -- Sitting 98           Physician Progress Notes (last 48 hours)      Meliza Rodríguez APRN at 07/19/22 1018          [facility]   PROGRESS NOTE    Reason for follow-up: CHF     Patient Care Team:  Celine Garcia MD as PCP - General (Internal Medicine)  Candido Montana MD as Consulting Physician (Cardiology)    Subjective    Patient seen and examined. Labs reviewed. Patient feeling better today after diuresis. No chest pain, some shortness of breath with deep breathing.     Review of Systems   Cardiovascular: Positive for dyspnea on exertion and leg swelling. Negative for chest pain, irregular heartbeat, near-syncope and palpitations.   Respiratory: Positive for cough and shortness of breath.    Gastrointestinal: Negative for nausea and vomiting.   Neurological: Negative for dizziness and light-headedness.   All other systems reviewed and are negative.      Allergies:  Morphine, Ozempic (0.25 or 0.5 mg-dose) [semaglutide(0.25 or 0.5mg-dos)], Isosorbide, Nitrofuran derivatives, and Nitroglycerin    Scheduled Meds:  apixaban, 5 mg, Oral, Q12H  aspirin, 81 mg, Oral, Daily  atorvastatin, 80 mg, Oral, Nightly  dilTIAZem CD, 180 mg, Oral, Q24H  furosemide, 40 mg, Intravenous, Q8H  insulin glargine, 10 Units, Subcutaneous, Nightly  insulin lispro, 0-9 Units, Subcutaneous, TID AC  lisinopril, 40 mg, Oral, Q24H  sodium chloride, 10 mL, Intravenous, Q12H  sotalol, 40 mg, Oral, Q12H      Continuous Infusions:     PRN Meds:  •  acetaminophen **OR** acetaminophen **OR**  "acetaminophen  •  aluminum-magnesium hydroxide-simethicone  •  senna-docusate sodium **AND** polyethylene glycol **AND** bisacodyl **AND** bisacodyl  •  dextrose  •  dextrose  •  glucagon (human recombinant)  •  insulin lispro **AND** insulin lispro  •  ipratropium-albuterol  •  ketorolac  •  labetalol  •  magnesium sulfate **OR** magnesium sulfate in D5W 1g/100mL (PREMIX)  •  melatonin  •  nitroglycerin  •  ondansetron **OR** ondansetron  •  potassium chloride **OR** potassium chloride **OR** potassium chloride  •  sodium chloride    Objective     VITAL SIGNS  Vitals:    07/18/22 2250 07/19/22 0436 07/19/22 0500 07/19/22 0700   BP: 156/98 115/74  143/96   BP Location: Right arm Right arm  Right arm   Patient Position: Lying Lying  Lying   Pulse: 78 61  65   Resp: 20 20  18   Temp: 97.2 °F (36.2 °C) 97.6 °F (36.4 °C)     TempSrc: Oral Oral     SpO2: 92% 90% 97% 97%   Weight:  86.1 kg (189 lb 13.1 oz)     Height:  180.3 cm (71\")       Flowsheet Rows    Flowsheet Row First Filed Value   Admission Height 180.3 cm (71\") Documented at 07/18/2022 0416   Admission Weight 88.9 kg (195 lb 15.8 oz) Documented at 07/18/2022 0416           TELEMETRY: sinus    Physical Exam:  Vitals reviewed.   Constitutional:       General: Awake.      Appearance: Not in distress.   Eyes:      Conjunctiva/sclera: Conjunctivae normal.   Pulmonary:      Effort: Pulmonary effort is normal.      Breath sounds: Normal breath sounds.   Cardiovascular:      Normal rate. Regular rhythm. Normal S1. Normal S2.   Pulses:     Intact distal pulses.   Edema:     Peripheral edema present.  Abdominal:      General: Bowel sounds are normal.      Palpations: Abdomen is soft.   Skin:     General: Skin is warm and dry.   Neurological:      General: No focal deficit present.      Mental Status: Alert and oriented to person, place and time.   Psychiatric:         Behavior: Behavior is cooperative.          LAB RESULTS (LAST 7 DAYS)  I have reviewed new clinical " results.    CBC  Results from last 7 days   Lab Units 07/19/22  0037 07/18/22  0455   WBC 10*3/mm3 7.80 6.40   RBC 10*6/mm3 4.77 4.68   HEMOGLOBIN g/dL 13.3 13.0   HEMATOCRIT % 38.9 38.9   MCV fL 81.5 83.0   PLATELETS 10*3/mm3 220 222     CMP   Results from last 7 days   Lab Units 07/19/22 0037 07/18/22 0455   SODIUM mmol/L 137 135*   POTASSIUM mmol/L 3.2* 3.7   CHLORIDE mmol/L 96* 100   CO2 mmol/L 30.0* 24.0   BUN mg/dL 19 17   CREATININE mg/dL 1.21 0.91   GLUCOSE mg/dL 231* 313*   ALBUMIN g/dL  --  3.70   BILIRUBIN mg/dL  --  0.3   ALK PHOS U/L  --  126*   AST (SGOT) U/L  --  14   ALT (SGPT) U/L  --  18     ProBNP      TROPONIN  Results from last 7 days   Lab Units 07/19/22 0037   TROPONIN T ng/mL 0.022     CoAg      Creatinine Clearance  Estimated Creatinine Clearance: 81 mL/min (by C-G formula based on SCr of 1.21 mg/dL).    Radiology  XR Chest 1 View    Result Date: 7/18/2022  1. Limited by low lung volumes. 2. Bibasal atelectasis, edema, and/or airspace disease. 3. Cardiomegaly. Electronically signed by:  Erick Carey D.O.  7/18/2022 4:38 AM    CT Angiogram Chest Pulmonary Embolism    Result Date: 7/18/2022  1.  Acute congestive heart failure given the presence of pleural effusions, pulmonary edema, and cardiomegaly. 2.  No pulmonary embolism. 3.  Redemonstration of nonspecific mediastinal lymph nodes. Electronically signed by:  Erick Carey D.O.  7/18/2022 4:43 AM      EKG    I personally viewed and interpreted the patient's EKG/Telemetry data:    ECHOCARDIOGRAM:  Results for orders placed during the hospital encounter of 01/20/21    Adult Transesophageal Echo (FRAN) W/ Cont if Necessary Per Protocol    Interpretation Summary  · Left ventricular wall thickness is consistent with concentric hypertrophy.  · Estimated left ventricular EF = 45% Estimated left ventricular EF was in agreement with the calculated left ventricular EF. Left ventricular ejection fraction appears to be 41 - 45%. Left  ventricular systolic function is low normal.  · The right atrial cavity is mildly dilated.  · With a centrally-directed jet noted.  · Estimated right ventricular systolic pressure from tricuspid regurgitation is normal (<35 mmHg).    Procedure indication  Recurrent cardioembolic stroke and history of atrial arrhythmias and patient brought in for FRAN prior to atrial arrhythmia ablation      conscious sedation administered by anesthesia    consent obtained before procedure      Procedure note  after obtaining a valid consent patient was sedated by Anesthesia and a FRAN probe was easily placed into esophagus with multiplane imaging with 2D, color and Doppler followed by bubble study with agitated saline without any complications    FRAN  Findings    Moderate left atrial enlargement without any shunt or clot  LV ejection fraction of 45%  No thrombus  Mild MR and TR and trivial effusion    Plan  Proceed with EP study and ablation      Procedures done  Transesophageal echocardiography        Electronically signed by Nathen Olmstead MD, 01/20/21, 5:08 PM EST.      STRESS MYOVIEW:    CARDIAC CATHETERIZATION:    OTHER:       ASSESSMENT/PLAN      Acute CHF (congestive heart failure) (HCC)    Hyperlipidemia    Primary hypertension    Overweight (BMI 25.0-29.9)    Type 2 diabetes mellitus with hyperglycemia, without long-term current use of insulin (HCC)    History of cerebrovascular accident    Coronary artery disease involving native coronary artery of native heart    Atrial arrhythmia    Acute chest pain    Palpitations    Dyspnea on exertion    Bilateral pleural effusion    Acute on chronic congestive heart failure, unspecified heart failure type (HCC)      PLAN  Continue diuresis with lasix  Echo results pending  Heart Failure Navigator to see today  Will need consistent reinforcement of HF education    I discussed the patients findings and my recommendations with patient and nurse.  Further recommendations per   Nan.    EDWINA Mena  07/19/22  10:18 EDT   Electronically signed by EDWINA Mena, 07/19/22, 10:21 AM EDT.      Electronically signed by Meliza Rodríguez APRN at 07/19/22 1022          Consult Notes (last 24 hours)      Meliza Rodríguez APRN at 07/18/22 1421          [facility] [place] [facility]   CONSULT NOTE    Referring Provider: Dr. Sidhu    Patient Care Team:  Celine Garcia MD as PCP - General (Internal Medicine)  Candido Montana MD as Consulting Physician (Cardiology)    Reason for Consultation: congestive heart failure    Chief complaint: shortness of breath    History of present illness:  Preet Jones is a 57 y.o. male with past medical history of  Acute MI, CAD, hypertension, Atrial flutter, type 2 diabetes who presented to the ED with shortness of breath and lower extremity edema. Patient reports having increasing shortness of breath, weight gain, and congestion over the past week. He reports PND and lower extremity edema since returning from [place] several days ago. He went to  on Friday and was diagnosed with bronchitis and given a z-christianne which did not help his symptoms. He reports increase in cough, not being able to take a deep breath, and chest pain. Patient denies dizziness, lightheadedness, nausea, diaphoresis.    Labs reviewed troponin negative, BNP 2484, Cr 0.91, alk phos 126, TSH normal, D-dimer 1.57  CT PE protocol was negative for PE, but showed acute congestive heart failure, pleural effusions, and pulmonary edema  Echo results pending    Review of Systems   Cardiovascular: Positive for chest pain, dyspnea on exertion, irregular heartbeat, leg swelling and paroxysmal nocturnal dyspnea. Negative for near-syncope and palpitations.   Respiratory: Positive for cough and shortness of breath.    Gastrointestinal: Negative for nausea and vomiting.   Neurological: Negative for dizziness and light-headedness.       History  Past Medical  History:   Diagnosis Date   • Abnormal cardiovascular stress test 01/19/2017   • Acute myocardial infarction ([facility]) 2019   • Arterial ischemic stroke, MCA (middle cerebral artery), left, acute ([facility]) 10/29/2019   • Atrial flutter with rapid ventricular response ([facility]) 02/04/2021   • Bicuspid aortic valve 02/16/2017   • Coronary artery disease involving native coronary artery of native heart    • Depression 12/30/2020   • History of coronary angioplasty with insertion of stent    • Hyperlipidemia    • Hypertension    • Hyperthyroidism    • Obesity 09/02/2011   • Paroxysmal SVT (supraventricular tachycardia) ([facility]) 01/08/2021   • RUE weakness 10/29/2019    Previous residual from Stroke, but was not work prohibiting.   • Seasonal allergic rhinitis 12/23/2020   • Sinus arrhythmia    • Sustained SVT ([facility])    • Type 2 diabetes mellitus ([facility])    • Vitamin D deficiency 11/05/2020       Past Surgical History:   Procedure Laterality Date   • APPENDECTOMY     • BACK SURGERY     • CARDIAC CATHETERIZATION  2010   • CARDIAC CATHETERIZATION  2019   • CARDIAC CATHETERIZATION N/A 12/31/2020    Procedure: Cardiac Catheterization/Vascular Study;  Surgeon: Moris Pedro MD;  Location: [facility] CATH INVASIVE LOCATION;  Service: Cardiovascular;  Laterality: N/A;   • CARDIAC CATHETERIZATION N/A 2/1/2022    Procedure: Left Heart Cath;  Surgeon: Kamlesh Richardson MD;  Location: [facility] CATH INVASIVE LOCATION;  Service: Cardiovascular;  Laterality: N/A;   • CARDIAC ELECTROPHYSIOLOGY PROCEDURE N/A 1/20/2021    Procedure: EP/Ablation;  Surgeon: Nathen Olmstead MD;  Location: [facility] CATH INVASIVE LOCATION;  Service: Cardiovascular;  Laterality: N/A;   • CARDIAC ELECTROPHYSIOLOGY PROCEDURE N/A 2/24/2022    Procedure: EP/Ablation-SVT [place] aware;  Surgeon: Candido Montana MD;  Location: [facility] CATH INVASIVE LOCATION;  Service: Cardiology;  Laterality: N/A;   • JOINT REPLACEMENT     • KNEE ARTHROSCOPY     • THYROIDECTOMY,  "PARTIAL  2015       Family History   Problem Relation Age of Onset   • Heart disease Mother    • Parkinsonism Father    • Cancer Father        Social History     Tobacco Use   • Smoking status: Never Smoker   • Smokeless tobacco: Never Used   Vaping Use   • Vaping Use: Never used   Substance Use Topics   • Alcohol use: Not Currently   • Drug use: No        (Not in a hospital admission)        Morphine, Ozempic (0.25 or 0.5 mg-dose) [semaglutide(0.25 or 0.5mg-dos)], Isosorbide, Nitrofuran derivatives, and Nitroglycerin    Scheduled Meds:  apixaban, 5 mg, Oral, Q12H  [START ON 7/19/2022] aspirin, 81 mg, Oral, Daily  atorvastatin, 80 mg, Oral, Nightly  dilTIAZem CD, 180 mg, Oral, Q24H  furosemide, 40 mg, Intravenous, Q8H  insulin glargine, 10 Units, Subcutaneous, Nightly  insulin lispro, 0-9 Units, Subcutaneous, TID AC  lisinopril, 40 mg, Oral, Q24H  sodium chloride, 10 mL, Intravenous, Q12H  sotalol, 40 mg, Oral, Q12H        Continuous Infusions:       PRN Meds:  •  acetaminophen **OR** acetaminophen **OR** acetaminophen  •  aluminum-magnesium hydroxide-simethicone  •  senna-docusate sodium **AND** polyethylene glycol **AND** bisacodyl **AND** bisacodyl  •  dextrose  •  dextrose  •  glucagon (human recombinant)  •  insulin lispro **AND** insulin lispro  •  ipratropium-albuterol  •  ketorolac  •  labetalol  •  magnesium sulfate **OR** magnesium sulfate in D5W 1g/100mL (PREMIX)  •  melatonin  •  nitroglycerin  •  ondansetron **OR** ondansetron  •  potassium chloride **OR** potassium chloride **OR** potassium chloride  •  sodium chloride      VITAL SIGNS  Vitals:    07/18/22 1000 07/18/22 1101 07/18/22 1201 07/18/22 1302   BP:  158/94 136/93 131/80   Pulse: 95 85 79 83   Resp:       Temp:       TempSrc:       SpO2: 92% 97% 95% 96%   Weight:       Height:           Flowsheet Rows    Flowsheet Row First Filed Value   Admission Height 180.3 cm (71\") Documented at 07/18/2022 0416   Admission Weight 88.9 kg (195 lb 15.8 oz) " Documented at 07/18/2022 0416           TELEMETRY: atrial fibrillation    Physical Exam:  Vitals reviewed.   Constitutional:       General: Awake.      Appearance: Well-developed. Acutely ill-appearing.      Interventions: Nasal cannula in place.   Eyes:      Conjunctiva/sclera: Conjunctivae normal.   Pulmonary:      Effort: Pulmonary effort is normal.      Comments: Decreased breath sounds in bilateral lower bases  Cardiovascular:      Tachycardia present. Irregularly irregular rhythm. Normal S1. Normal S2.      Murmurs: There is no murmur.   Pulses:     Intact distal pulses.   Edema:     Peripheral edema present.  Abdominal:      General: Bowel sounds are normal.      Palpations: Abdomen is soft.   Skin:     General: Skin is warm.   Neurological:      General: No focal deficit present.      Mental Status: Alert and oriented to person, place and time.   Psychiatric:         Behavior: Behavior is cooperative.            LAB RESULTS (LAST 7 DAYS)    CBC  Results from last 7 days   Lab Units 07/18/22  0455   WBC 10*3/mm3 6.40   RBC 10*6/mm3 4.68   HEMOGLOBIN g/dL 13.0   HEMATOCRIT % 38.9   MCV fL 83.0   PLATELETS 10*3/mm3 222       BMP  Results from last 7 days   Lab Units 07/18/22  0455   SODIUM mmol/L 135*   POTASSIUM mmol/L 3.7   CHLORIDE mmol/L 100   CO2 mmol/L 24.0   BUN mg/dL 17   CREATININE mg/dL 0.91   GLUCOSE mg/dL 313*   MAGNESIUM mg/dL 2.2       CMP   Results from last 7 days   Lab Units 07/18/22  0455   SODIUM mmol/L 135*   POTASSIUM mmol/L 3.7   CHLORIDE mmol/L 100   CO2 mmol/L 24.0   BUN mg/dL 17   CREATININE mg/dL 0.91   GLUCOSE mg/dL 313*   ALBUMIN g/dL 3.70   BILIRUBIN mg/dL 0.3   ALK PHOS U/L 126*   AST (SGOT) U/L 14   ALT (SGPT) U/L 18       ProBNP        TROPONIN  Results from last 7 days   Lab Units 07/18/22  1048   TROPONIN T ng/mL 0.026       Creatinine Clearance  Estimated Creatinine Clearance: 111.3 mL/min (by C-G formula based on SCr of 0.91 mg/dL).      Radiology  XR Chest 1 View    Result  Date: 7/18/2022  1. Limited by low lung volumes. 2. Bibasal atelectasis, edema, and/or airspace disease. 3. Cardiomegaly. Electronically signed by:  Erick Carey D.O.  7/18/2022 4:38 AM    CT Angiogram Chest Pulmonary Embolism    Result Date: 7/18/2022  1.  Acute congestive heart failure given the presence of pleural effusions, pulmonary edema, and cardiomegaly. 2.  No pulmonary embolism. 3.  Redemonstration of nonspecific mediastinal lymph nodes. Electronically signed by:  Erick Carey D.O.  7/18/2022 4:43 AM      EKG        I personally viewed and interpreted the patient's EKG/Telemetry data:    ECHOCARDIOGRAM:  Results for orders placed during the hospital encounter of 01/20/21    Adult Transesophageal Echo (FRAN) W/ Cont if Necessary Per Protocol    Interpretation Summary  · Left ventricular wall thickness is consistent with concentric hypertrophy.  · Estimated left ventricular EF = 45% Estimated left ventricular EF was in agreement with the calculated left ventricular EF. Left ventricular ejection fraction appears to be 41 - 45%. Left ventricular systolic function is low normal.  · The right atrial cavity is mildly dilated.  · With a centrally-directed jet noted.  · Estimated right ventricular systolic pressure from tricuspid regurgitation is normal (<35 mmHg).    Procedure indication  Recurrent cardioembolic stroke and history of atrial arrhythmias and patient brought in for FRAN prior to atrial arrhythmia ablation      conscious sedation administered by anesthesia    consent obtained before procedure      Procedure note  after obtaining a valid consent patient was sedated by Anesthesia and a FRAN probe was easily placed into esophagus with multiplane imaging with 2D, color and Doppler followed by bubble study with agitated saline without any complications    FRAN  Findings    Moderate left atrial enlargement without any shunt or clot  LV ejection fraction of 45%  No thrombus  Mild MR and TR and trivial  effusion    Plan  Proceed with EP study and ablation      Procedures done  Transesophageal echocardiography        Electronically signed by Nathen Olmstead MD, 01/20/21, 5:08 PM EST.      STRESS MYOVIEW:  Results from 2/1/2022 study:  · Left ventricular ejection fraction is mildly reduced. (Calculated EF = 47%).  · Myocardial perfusion imaging indicates a medium-sized, moderately severe area of ischemia located in the anterior wall, apex and lateral wall.  · Impressions are consistent with a high risk study.      CARDIAC CATHETERIZATION:  Results from 2/1/2022 study:    1. Patient has 40 to 50% in-stent narrowing in first marginal.  Mid circumflex right after the marginal has borderline 60% narrowing.  Distal circumflex and left PDA has 80% narrowing.      OTHER:         Assessment & Plan       Acute CHF (congestive heart failure) ([facility])    Hyperlipidemia    Primary hypertension    Overweight (BMI 25.0-29.9)    Type 2 diabetes mellitus with hyperglycemia, without long-term current use of insulin ([facility])    History of cerebrovascular accident    Coronary artery disease involving native coronary artery of native heart    Atrial arrhythmia    Acute chest pain    Palpitations    Dyspnea on exertion    Bilateral pleural effusion      PLAN  Continue lasix 40mg q8 hrs  Echo to evaluate heart function  Restart home cardiac medications  Consult Heart Failure Navigator  Telemetry to monitor rhythm    I discussed the patients findings and my recommendations with patient and nurse.  Further recommendations per Dr. Montana.    EDWINA Mean  07/18/22  14:21 EDT   Electronically signed by EDWINA Mena, 07/18/22, 3:05 PM EDT.      Electronically signed by Meliza Rodríguez APRN at 07/18/22 5149

## 2022-07-20 PROBLEM — I50.9 ACUTE CHF (CONGESTIVE HEART FAILURE) (HCC): Chronic | Status: ACTIVE | Noted: 2022-07-18

## 2022-07-20 PROBLEM — I50.23 ACUTE ON CHRONIC HFREF (HEART FAILURE WITH REDUCED EJECTION FRACTION) (HCC): Status: ACTIVE | Noted: 2022-07-20

## 2022-07-20 PROBLEM — I25.10 CAD S/P PERCUTANEOUS CORONARY ANGIOPLASTY: Status: ACTIVE | Noted: 2022-07-20

## 2022-07-20 PROBLEM — R06.09 DYSPNEA ON EXERTION: Chronic | Status: ACTIVE | Noted: 2022-07-18

## 2022-07-20 PROBLEM — I25.10 CAD S/P PERCUTANEOUS CORONARY ANGIOPLASTY: Chronic | Status: ACTIVE | Noted: 2022-07-20

## 2022-07-20 PROBLEM — Z98.61 CAD S/P PERCUTANEOUS CORONARY ANGIOPLASTY: Chronic | Status: ACTIVE | Noted: 2022-07-20

## 2022-07-20 PROBLEM — I50.23 ACUTE ON CHRONIC HFREF (HEART FAILURE WITH REDUCED EJECTION FRACTION) (HCC): Chronic | Status: ACTIVE | Noted: 2022-07-20

## 2022-07-20 PROBLEM — Z98.61 CAD S/P PERCUTANEOUS CORONARY ANGIOPLASTY: Status: ACTIVE | Noted: 2022-07-20

## 2022-07-20 LAB
ANION GAP SERPL CALCULATED.3IONS-SCNC: 10 MMOL/L (ref 5–15)
BUN SERPL-MCNC: 26 MG/DL (ref 6–20)
BUN/CREAT SERPL: 19.8 (ref 7–25)
CALCIUM SPEC-SCNC: 9 MG/DL (ref 8.6–10.5)
CHLORIDE SERPL-SCNC: 98 MMOL/L (ref 98–107)
CO2 SERPL-SCNC: 29 MMOL/L (ref 22–29)
CREAT SERPL-MCNC: 1.31 MG/DL (ref 0.76–1.27)
EGFRCR SERPLBLD CKD-EPI 2021: 63.1 ML/MIN/1.73
GLUCOSE BLDC GLUCOMTR-MCNC: 160 MG/DL (ref 70–105)
GLUCOSE BLDC GLUCOMTR-MCNC: 253 MG/DL (ref 70–105)
GLUCOSE BLDC GLUCOMTR-MCNC: 87 MG/DL (ref 70–105)
GLUCOSE SERPL-MCNC: 380 MG/DL (ref 65–99)
MAGNESIUM SERPL-MCNC: 2.6 MG/DL (ref 1.6–2.6)
POTASSIUM SERPL-SCNC: 3.9 MMOL/L (ref 3.5–5.2)
SODIUM SERPL-SCNC: 137 MMOL/L (ref 136–145)

## 2022-07-20 PROCEDURE — C1894 INTRO/SHEATH, NON-LASER: HCPCS | Performed by: INTERNAL MEDICINE

## 2022-07-20 PROCEDURE — 93010 ELECTROCARDIOGRAM REPORT: CPT | Performed by: INTERNAL MEDICINE

## 2022-07-20 PROCEDURE — B2111ZZ FLUOROSCOPY OF MULTIPLE CORONARY ARTERIES USING LOW OSMOLAR CONTRAST: ICD-10-PCS | Performed by: INTERNAL MEDICINE

## 2022-07-20 PROCEDURE — 25010000002 FENTANYL CITRATE (PF) 100 MCG/2ML SOLUTION: Performed by: INTERNAL MEDICINE

## 2022-07-20 PROCEDURE — 25010000002 FUROSEMIDE PER 20 MG: Performed by: INTERNAL MEDICINE

## 2022-07-20 PROCEDURE — 93005 ELECTROCARDIOGRAM TRACING: CPT | Performed by: FAMILY MEDICINE

## 2022-07-20 PROCEDURE — 82962 GLUCOSE BLOOD TEST: CPT

## 2022-07-20 PROCEDURE — 93458 L HRT ARTERY/VENTRICLE ANGIO: CPT | Performed by: INTERNAL MEDICINE

## 2022-07-20 PROCEDURE — 99152 MOD SED SAME PHYS/QHP 5/>YRS: CPT | Performed by: INTERNAL MEDICINE

## 2022-07-20 PROCEDURE — C1769 GUIDE WIRE: HCPCS | Performed by: INTERNAL MEDICINE

## 2022-07-20 PROCEDURE — 0 IOPAMIDOL PER 1 ML: Performed by: INTERNAL MEDICINE

## 2022-07-20 PROCEDURE — C1760 CLOSURE DEV, VASC: HCPCS | Performed by: INTERNAL MEDICINE

## 2022-07-20 PROCEDURE — B2151ZZ FLUOROSCOPY OF LEFT HEART USING LOW OSMOLAR CONTRAST: ICD-10-PCS | Performed by: INTERNAL MEDICINE

## 2022-07-20 PROCEDURE — 99232 SBSQ HOSP IP/OBS MODERATE 35: CPT | Performed by: FAMILY MEDICINE

## 2022-07-20 PROCEDURE — 99232 SBSQ HOSP IP/OBS MODERATE 35: CPT | Performed by: INTERNAL MEDICINE

## 2022-07-20 PROCEDURE — 63710000001 INSULIN LISPRO (HUMAN) PER 5 UNITS: Performed by: NURSE PRACTITIONER

## 2022-07-20 PROCEDURE — 80048 BASIC METABOLIC PNL TOTAL CA: CPT | Performed by: NURSE PRACTITIONER

## 2022-07-20 PROCEDURE — 25010000002 MIDAZOLAM PER 1 MG: Performed by: INTERNAL MEDICINE

## 2022-07-20 PROCEDURE — 36415 COLL VENOUS BLD VENIPUNCTURE: CPT | Performed by: NURSE PRACTITIONER

## 2022-07-20 PROCEDURE — 83735 ASSAY OF MAGNESIUM: CPT | Performed by: NURSE PRACTITIONER

## 2022-07-20 PROCEDURE — 4A023N7 MEASUREMENT OF CARDIAC SAMPLING AND PRESSURE, LEFT HEART, PERCUTANEOUS APPROACH: ICD-10-PCS | Performed by: INTERNAL MEDICINE

## 2022-07-20 PROCEDURE — 63710000001 INSULIN GLARGINE PER 5 UNITS: Performed by: INTERNAL MEDICINE

## 2022-07-20 PROCEDURE — 25010000002 KETOROLAC TROMETHAMINE PER 15 MG: Performed by: NURSE PRACTITIONER

## 2022-07-20 PROCEDURE — 25010000002 FUROSEMIDE PER 20 MG: Performed by: NURSE PRACTITIONER

## 2022-07-20 RX ORDER — METOPROLOL SUCCINATE 25 MG/1
25 TABLET, EXTENDED RELEASE ORAL
Status: DISCONTINUED | OUTPATIENT
Start: 2022-07-20 | End: 2022-07-20

## 2022-07-20 RX ORDER — ACETAMINOPHEN 325 MG/1
650 TABLET ORAL EVERY 4 HOURS PRN
Status: DISCONTINUED | OUTPATIENT
Start: 2022-07-20 | End: 2022-07-20

## 2022-07-20 RX ORDER — SODIUM CHLORIDE 9 MG/ML
100 INJECTION, SOLUTION INTRAVENOUS CONTINUOUS
Status: DISCONTINUED | OUTPATIENT
Start: 2022-07-20 | End: 2022-07-20

## 2022-07-20 RX ORDER — FENTANYL CITRATE 50 UG/ML
INJECTION, SOLUTION INTRAMUSCULAR; INTRAVENOUS AS NEEDED
Status: DISCONTINUED | OUTPATIENT
Start: 2022-07-20 | End: 2022-07-20 | Stop reason: HOSPADM

## 2022-07-20 RX ORDER — LIDOCAINE HYDROCHLORIDE 20 MG/ML
INJECTION, SOLUTION INFILTRATION; PERINEURAL AS NEEDED
Status: DISCONTINUED | OUTPATIENT
Start: 2022-07-20 | End: 2022-07-20 | Stop reason: HOSPADM

## 2022-07-20 RX ORDER — MIDAZOLAM HYDROCHLORIDE 1 MG/ML
INJECTION INTRAMUSCULAR; INTRAVENOUS AS NEEDED
Status: DISCONTINUED | OUTPATIENT
Start: 2022-07-20 | End: 2022-07-20 | Stop reason: HOSPADM

## 2022-07-20 RX ORDER — SODIUM CHLORIDE 9 MG/ML
10 INJECTION, SOLUTION INTRAVENOUS CONTINUOUS
Status: DISCONTINUED | OUTPATIENT
Start: 2022-07-20 | End: 2022-07-21 | Stop reason: HOSPADM

## 2022-07-20 RX ADMIN — INSULIN GLARGINE 10 UNITS: 100 INJECTION, SOLUTION SUBCUTANEOUS at 20:45

## 2022-07-20 RX ADMIN — Medication 10 ML: at 09:07

## 2022-07-20 RX ADMIN — INSULIN LISPRO 2 UNITS: 100 INJECTION, SOLUTION INTRAVENOUS; SUBCUTANEOUS at 12:42

## 2022-07-20 RX ADMIN — DILTIAZEM HYDROCHLORIDE 180 MG: 180 CAPSULE, COATED, EXTENDED RELEASE ORAL at 09:07

## 2022-07-20 RX ADMIN — KETOROLAC TROMETHAMINE 15 MG: 15 INJECTION, SOLUTION INTRAMUSCULAR; INTRAVENOUS at 05:48

## 2022-07-20 RX ADMIN — FUROSEMIDE 40 MG: 10 INJECTION, SOLUTION INTRAMUSCULAR; INTRAVENOUS at 20:35

## 2022-07-20 RX ADMIN — ATORVASTATIN CALCIUM 80 MG: 40 TABLET, FILM COATED ORAL at 20:34

## 2022-07-20 RX ADMIN — INSULIN LISPRO 6 UNITS: 100 INJECTION, SOLUTION INTRAVENOUS; SUBCUTANEOUS at 09:07

## 2022-07-20 RX ADMIN — APIXABAN 5 MG: 5 TABLET, FILM COATED ORAL at 20:35

## 2022-07-20 RX ADMIN — ASPIRIN 81 MG: 81 TABLET, COATED ORAL at 09:07

## 2022-07-20 RX ADMIN — FUROSEMIDE 40 MG: 10 INJECTION, SOLUTION INTRAMUSCULAR; INTRAVENOUS at 05:41

## 2022-07-20 RX ADMIN — Medication 10 ML: at 20:41

## 2022-07-20 RX ADMIN — SOTALOL HYDROCHLORIDE 40 MG: 80 TABLET ORAL at 20:35

## 2022-07-20 RX ADMIN — LISINOPRIL 40 MG: 20 TABLET ORAL at 09:07

## 2022-07-20 RX ADMIN — SODIUM CHLORIDE 10 ML/HR: 9 INJECTION, SOLUTION INTRAVENOUS at 20:34

## 2022-07-20 NOTE — CONSULTS
Acknowledge cardiac rehab evaluation. Does not meet criteria for phase 2 insurance coverage. EF 36-40%. (>35%). Thank you.

## 2022-07-20 NOTE — PROGRESS NOTES
[facility]   PROGRESS NOTE    Reason for follow-up: CHF     Patient Care Team:  Celine Garcia MD as PCP - General (Internal Medicine)  Candido Montana MD as Consulting Physician (Cardiology)    Subjective    Patient seen and examined. Labs reviewed. Patient feeling better today after diuresis. No chest pain, some shortness of breath with deep breathing.     Review of Systems   Cardiovascular: Positive for dyspnea on exertion and leg swelling. Negative for chest pain, irregular heartbeat, near-syncope and palpitations.   Respiratory: Positive for cough and shortness of breath.    Gastrointestinal: Negative for nausea and vomiting.   Neurological: Negative for dizziness and light-headedness.   All other systems reviewed and are negative.      Allergies:  Morphine, Ozempic (0.25 or 0.5 mg-dose) [semaglutide(0.25 or 0.5mg-dos)], Isosorbide, Nitrofuran derivatives, and Nitroglycerin    Scheduled Meds:  apixaban, 5 mg, Oral, Q12H  aspirin, 81 mg, Oral, Daily  atorvastatin, 80 mg, Oral, Nightly  dilTIAZem CD, 180 mg, Oral, Q24H  furosemide, 40 mg, Intravenous, Q8H  insulin glargine, 10 Units, Subcutaneous, Nightly  insulin lispro, 0-9 Units, Subcutaneous, TID AC  lisinopril, 40 mg, Oral, Q24H  sodium chloride, 10 mL, Intravenous, Q12H  sotalol, 40 mg, Oral, Q12H      Continuous Infusions:  sodium chloride, 100 mL/hr      PRN Meds:  •  acetaminophen **OR** acetaminophen **OR** acetaminophen  •  acetaminophen  •  aluminum-magnesium hydroxide-simethicone  •  senna-docusate sodium **AND** polyethylene glycol **AND** bisacodyl **AND** bisacodyl  •  dextrose  •  dextrose  •  glucagon (human recombinant)  •  insulin lispro **AND** insulin lispro  •  ipratropium-albuterol  •  labetalol  •  magnesium sulfate **OR** magnesium sulfate in D5W 1g/100mL (PREMIX)  •  melatonin  •  nitroglycerin  •  ondansetron **OR** ondansetron  •  potassium chloride **OR** potassium chloride **OR** potassium  "chloride  •  sodium chloride    Objective     VITAL SIGNS  Vitals:    07/20/22 1700 07/20/22 1715 07/20/22 1730 07/20/22 1801   BP: 109/70 115/81 115/80 139/93   BP Location:       Patient Position:       Pulse: 63 64 66 68   Resp:    18   Temp:    98.4 °F (36.9 °C)   TempSrc:       SpO2: 99% 96% 98% 94%   Weight:       Height:         Flowsheet Rows    Flowsheet Row First Filed Value   Admission Height 180.3 cm (71\") Documented at 07/18/2022 0416   Admission Weight 88.9 kg (195 lb 15.8 oz) Documented at 07/18/2022 0416           TELEMETRY: sinus    Physical Exam:  Vitals reviewed.   Constitutional:       General: Awake.      Appearance: Not in distress.   Eyes:      Conjunctiva/sclera: Conjunctivae normal.   Pulmonary:      Effort: Pulmonary effort is normal.      Breath sounds: Normal breath sounds.   Cardiovascular:      Normal rate. Regular rhythm. Normal S1. Normal S2.   Pulses:     Intact distal pulses.   Edema:     Peripheral edema present.  Abdominal:      General: Bowel sounds are normal.      Palpations: Abdomen is soft.   Skin:     General: Skin is warm and dry.   Neurological:      General: No focal deficit present.      Mental Status: Alert and oriented to person, place and time.   Psychiatric:         Behavior: Behavior is cooperative.          LAB RESULTS (LAST 7 DAYS)  I have reviewed new clinical results.    CBC  Results from last 7 days   Lab Units 07/19/22  0037 07/18/22  0455   WBC 10*3/mm3 7.80 6.40   RBC 10*6/mm3 4.77 4.68   HEMOGLOBIN g/dL 13.3 13.0   HEMATOCRIT % 38.9 38.9   MCV fL 81.5 83.0   PLATELETS 10*3/mm3 220 222     CMP   Results from last 7 days   Lab Units 07/20/22  0026 07/19/22  1335 07/19/22  0037 07/18/22  0455   SODIUM mmol/L 137  --  137 135*   POTASSIUM mmol/L 3.9 4.2 3.2* 3.7   CHLORIDE mmol/L 98  --  96* 100   CO2 mmol/L 29.0  --  30.0* 24.0   BUN mg/dL 26*  --  19 17   CREATININE mg/dL 1.31*  --  1.21 0.91   GLUCOSE mg/dL 380*  --  231* 313*   ALBUMIN g/dL  --   --   --  " 3.70   BILIRUBIN mg/dL  --   --   --  0.3   ALK PHOS U/L  --   --   --  126*   AST (SGOT) U/L  --   --   --  14   ALT (SGPT) U/L  --   --   --  18     ProBNP      TROPONIN  Results from last 7 days   Lab Units 07/19/22  0037   TROPONIN T ng/mL 0.022     CoAg      Creatinine Clearance  Estimated Creatinine Clearance: 74.5 mL/min (A) (by C-G formula based on SCr of 1.31 mg/dL (H)).    Radiology  No radiology results for the last day    EKG    I personally viewed and interpreted the patient's EKG/Telemetry data:    ECHOCARDIOGRAM:  Results for orders placed during the hospital encounter of 07/18/22    Adult Transthoracic Echo Complete W/ Cont if Necessary Per Protocol    Interpretation Summary  · The left ventricular cavity is moderately dilated.  · Left ventricular ejection fraction appears to be 36 - 40%.  · Moderate tricuspid valve regurgitation is present.  · Estimated right ventricular systolic pressure from tricuspid regurgitation is moderately elevated (45-55 mmHg).  · Mildly reduced right ventricular systolic function noted.  · Left ventricular diastolic function is consistent with (grade II w/high LAP) pseudonormalization.  · There is a left pleural effusion.      STRESS MYOVIEW:    CARDIAC CATHETERIZATION:    OTHER:       ASSESSMENT/PLAN      Acute CHF (congestive heart failure) ([facility])    Hyperlipidemia    Primary hypertension    Overweight (BMI 25.0-29.9)    Type 2 diabetes mellitus with hyperglycemia, without long-term current use of insulin ([facility])    History of cerebrovascular accident    Coronary artery disease involving native coronary artery of native heart    Atrial arrhythmia    Acute chest pain    Palpitations    Dyspnea on exertion    Bilateral pleural effusion    Acute on chronic congestive heart failure, unspecified heart failure type ([facility])    Acute on chronic HFrEF (heart failure with reduced ejection fraction) ([facility])    CAD S/P percutaneous coronary angioplasty      PLAN  Telemetry is revealing  sinus rhythm  Diuresis as needed and tolerated  Monitor renal function and urine output  Patient has previous history of known coronary artery disease PCI and is now developing LV dysfunction.  Discussed with Dr. Mary Lou Montana will proceed with cardiac cath.  Risk benefits alternatives explained.  Patient underwent cardiac cath which revealed severe LV dysfunction and noncritical CAD.  Continue medical management and risk factor modification.    Kamlesh Richardson MD  07/20/22  18:10 EDT

## 2022-07-20 NOTE — PLAN OF CARE
Problem: Adult Inpatient Plan of Care  Goal: Absence of Hospital-Acquired Illness or Injury  Intervention: Identify and Manage Fall Risk  Recent Flowsheet Documentation  Taken 7/20/2022 0247 by Glendy Abrams RN  Safety Promotion/Fall Prevention:   assistive device/personal items within reach   clutter free environment maintained   fall prevention program maintained   lighting adjusted   nonskid shoes/slippers when out of bed   room organization consistent   safety round/check completed  Taken 7/20/2022 0020 by Glendy Abrams RN  Safety Promotion/Fall Prevention:   assistive device/personal items within reach   clutter free environment maintained   fall prevention program maintained   lighting adjusted   nonskid shoes/slippers when out of bed   room organization consistent   safety round/check completed  Taken 7/19/2022 2217 by Glendy Abrams RN  Safety Promotion/Fall Prevention:   assistive device/personal items within reach   clutter free environment maintained   fall prevention program maintained   lighting adjusted   nonskid shoes/slippers when out of bed   room organization consistent   safety round/check completed  Taken 7/19/2022 2015 by Glendy Abrams RN  Safety Promotion/Fall Prevention:   assistive device/personal items within reach   clutter free environment maintained   fall prevention program maintained   lighting adjusted   nonskid shoes/slippers when out of bed   room organization consistent   safety round/check completed  Intervention: Prevent Skin Injury  Recent Flowsheet Documentation  Taken 7/20/2022 0247 by Glendy Abrams RN  Body Position: position changed independently  Taken 7/20/2022 0020 by Glendy Abrams RN  Body Position: position changed independently  Taken 7/19/2022 2217 by Glendy Abrams RN  Body Position: position changed independently  Taken 7/19/2022 2015 by Glendy Abrams RN  Body Position: position changed independently  Intervention: Prevent and Manage VTE (Venous Thromboembolism)  Risk  Recent Flowsheet Documentation  Taken 7/20/2022 0247 by Glendy Abrams RN  Activity Management:   activity adjusted per tolerance   activity encouraged  Taken 7/20/2022 0020 by Glendy Abrams RN  Activity Management:   activity adjusted per tolerance   activity encouraged  Taken 7/19/2022 2217 by Glendy Abrams RN  Activity Management:   activity adjusted per tolerance   activity encouraged  Taken 7/19/2022 2044 by Glendy Abrams RN  Activity Management:   activity adjusted per tolerance   activity encouraged   up ad mesfin  Taken 7/19/2022 2015 by Glendy Abrams RN  Activity Management:   activity adjusted per tolerance   activity encouraged  Intervention: Prevent Infection  Recent Flowsheet Documentation  Taken 7/20/2022 0247 by Glendy Abrams RN  Infection Prevention:   hand hygiene promoted   personal protective equipment utilized  Taken 7/20/2022 0020 by Glendy Abrams RN  Infection Prevention:   hand hygiene promoted   personal protective equipment utilized  Taken 7/19/2022 2217 by Glendy Abrams RN  Infection Prevention:   hand hygiene promoted   personal protective equipment utilized  Taken 7/19/2022 2015 by Glendy Abrams RN  Infection Prevention:   hand hygiene promoted   personal protective equipment utilized  Goal: Optimal Comfort and Wellbeing  Intervention: Provide Person-Centered Care  Recent Flowsheet Documentation  Taken 7/19/2022 2015 by Glendy Abrams RN  Trust Relationship/Rapport: care explained     Problem: Fall Injury Risk  Goal: Absence of Fall and Fall-Related Injury  Intervention: Identify and Manage Contributors  Recent Flowsheet Documentation  Taken 7/20/2022 0247 by Glendy Abrams RN  Medication Review/Management: medications reviewed  Taken 7/20/2022 0020 by Glendy Abrams RN  Medication Review/Management: medications reviewed  Taken 7/19/2022 2217 by Glendy Abrams RN  Medication Review/Management: medications reviewed  Taken 7/19/2022 2015 by Glendy Abrams RN  Medication Review/Management:  medications reviewed  Intervention: Promote Injury-Free Environment  Recent Flowsheet Documentation  Taken 7/20/2022 0247 by Glendy Abrams, JENNIFER  Safety Promotion/Fall Prevention:   assistive device/personal items within reach   clutter free environment maintained   fall prevention program maintained   lighting adjusted   nonskid shoes/slippers when out of bed   room organization consistent   safety round/check completed  Taken 7/20/2022 0020 by Glendy Abrams RN  Safety Promotion/Fall Prevention:   assistive device/personal items within reach   clutter free environment maintained   fall prevention program maintained   lighting adjusted   nonskid shoes/slippers when out of bed   room organization consistent   safety round/check completed  Taken 7/19/2022 2217 by Glendy Abrams RN  Safety Promotion/Fall Prevention:   assistive device/personal items within reach   clutter free environment maintained   fall prevention program maintained   lighting adjusted   nonskid shoes/slippers when out of bed   room organization consistent   safety round/check completed  Taken 7/19/2022 2015 by Glendy Abrams RN  Safety Promotion/Fall Prevention:   assistive device/personal items within reach   clutter free environment maintained   fall prevention program maintained   lighting adjusted   nonskid shoes/slippers when out of bed   room organization consistent   safety round/check completed     Problem: Adjustment to Illness (Heart Failure)  Goal: Optimal Coping  Intervention: Support Psychosocial Response  Recent Flowsheet Documentation  Taken 7/19/2022 2015 by Glendy Abrams RN  Family/Support System Care: support provided     Problem: Functional Ability Impaired (Heart Failure)  Goal: Optimal Functional Ability  Intervention: Optimize Functional Ability  Recent Flowsheet Documentation  Taken 7/20/2022 0247 by Glendy Abrams, RN  Activity Management:   activity adjusted per tolerance   activity encouraged  Taken 7/20/2022 0020 by Jose Alberto  JENNIFER Pike  Activity Management:   activity adjusted per tolerance   activity encouraged  Taken 7/19/2022 2217 by Glendy Abrams RN  Activity Management:   activity adjusted per tolerance   activity encouraged  Taken 7/19/2022 2044 by Glendy Abrams RN  Activity Management:   activity adjusted per tolerance   activity encouraged   up ad mesfin  Taken 7/19/2022 2015 by Glendy Abrams RN  Activity Management:   activity adjusted per tolerance   activity encouraged     Problem: COPD (Chronic Obstructive Pulmonary Disease) Comorbidity  Goal: Maintenance of COPD Symptom Control  Intervention: Maintain COPD-Symptom Control  Recent Flowsheet Documentation  Taken 7/20/2022 0247 by Glendy Abrams RN  Medication Review/Management: medications reviewed  Taken 7/20/2022 0020 by Glendy Abrams RN  Medication Review/Management: medications reviewed  Taken 7/19/2022 2217 by Glendy Abrams RN  Medication Review/Management: medications reviewed  Taken 7/19/2022 2015 by Glendy Abrams RN  Medication Review/Management: medications reviewed     Problem: Heart Failure Comorbidity  Goal: Maintenance of Heart Failure Symptom Control  Intervention: Maintain Heart Failure-Management  Recent Flowsheet Documentation  Taken 7/20/2022 0247 by Glendy Abrams RN  Medication Review/Management: medications reviewed  Taken 7/20/2022 0020 by Glendy Abrams RN  Medication Review/Management: medications reviewed  Taken 7/19/2022 2217 by Glendy Abrams RN  Medication Review/Management: medications reviewed  Taken 7/19/2022 2015 by Glendy Abrams RN  Medication Review/Management: medications reviewed   Goal Outcome Evaluation:        Pt with no complaints of chest pain this shift. Heart cath 7/20. Unknown when at this time.Pt NPO since MN. Pt resting at this time. Will continue to monitor.

## 2022-07-20 NOTE — PLAN OF CARE
Goal Outcome Evaluation:           Progress: no change  Outcome Evaluation: No complaints of chest pain, just achy pain. No pain medication required. Patient currently down for heart cath, see results when availble. Will continue to monitor.

## 2022-07-20 NOTE — PROGRESS NOTES
Heart Failure Program  Nurse Navigator  Discharge Planning: Follow-up Note    Patient Name:Preet Jones  :1965  Current Admission Date: 2022       Last 3 Weights:  Wt Readings from Last 3 Encounters:   22 85.7 kg (188 lb 15 oz)   22 86.5 kg (190 lb 12.8 oz)   22 88 kg (194 lb)       Intake and Output totals: I/O last 3 completed shifts:  In: 1190 [P.O.:1090; I.V.:100]  Out: 2150 [Urine:2150]  No intake/output data recorded.          Patient Assessment:   pt lying in bed, resp even and unlabored, no soa with conversation, no edema      Patient Education:   review hf s/s and diagnosis,     Review HF Education provided to patient:  yes-Symptoms worsening  yes-Prescribed medications  yes-HF self-care  yes-Follow-up Appointments       Acceptance of learning: acceptance, cooperative, teachback    Heart Failure education interactive teaching session time: 30 minutes      Identified needs/barriers:   Pending heart cath, I&O, daily weights    Intervention follow-up:

## 2022-07-20 NOTE — PROGRESS NOTES
[facility] Daily Progress Note    Patient Name: Preet Jones  : 1965  MRN: [id number]  Primary Care Physician:  Celine Garcia MD  Date of admission: 2022      Subjective      Chief Complaint: Shortness of breath    Patient reports overall feeling better.  Patient had some abdominal pain overnight was given Toradol.  Patient going for left heart cath today for evaluation of ischemic cardiomyopathy.    Review of Systems   Constitutional: Positive for malaise/fatigue.   Cardiovascular: Positive for dyspnea on exertion, leg swelling and orthopnea.   All other systems reviewed and are negative.        Objective      Vitals:   Temp:  [97.6 °F (36.4 °C)-97.7 °F (36.5 °C)] 97.6 °F (36.4 °C)  Heart Rate:  [63-69] 69  Resp:  [16-20] 18  BP: (126-128)/(84-91) 128/91    Physical Exam     General: Middle-age male lying in bed breathing comfortably on room air no acute distress  HEENT: NC/AT, EOMI, mucosa moist  Heart: Regular, rate controlled  Chest: Normal work of breathing some diminished lung sounds no crackles  Abdominal: Soft.  Nontender nondistended  Musculoskeletal: Normal ROM.  Bilateral lower extremity edema significantly improved. No calf tenderness.  Neurological: AAOx3, no focal deficits  Skin: Skin is warm and dry. No rash  Psychiatric: Normal mood and affect.         Result Review    Result Review:  I have personally reviewed the results from the time of this admission to 2022 12:01 EDT and agree with these findings:  [x]  Laboratory  [x]  Microbiology  [x]  Radiology  [x]  EKG/Telemetry   []  Cardiology/Vascular   []  Pathology  []  Old records  []  Other:  Most notable findings include: Hypokalemia, metabolic alkalosis, renal insufficiency          Assessment & Plan      Brief Patient Summary:    Preet Jones is a 57 y.o. male with a history of CAD s/p stent placement, HTN, HLD, Type 2 DM, CVA, atrial arrhythmias s/p a-fib and flutter  "ablation who presented to the ER at [facility] on 7/18/2022 complaining of palpitations, chest pain, and dyspnea on exertion. The patient states his symptoms started approximately 2 weeks ago, but have gotten worse over the past 3 days. He states 3 days ago he saw the nurse practitioner at his PCP's office and was diagnosed with acute bronchitis. He states he was prescribed azithromycin and Mucinex. He states his symptoms have worsened despite taking the medications prescribed. He denies a history of congestive heart failure, and he is not on a diuretic at home. He reports some recent ankle swelling and feeling bloated. He states his blood pressure is normally well controlled, but it was elevated at his PCP's office. He states he has an upcoming appointment with his cardiologist on 07/20/22. He denies any exacerbating or alleviating factors.      The patient is a nonsmoker and denies alcohol or illicit drug use. He reports a family history of heart disease in his mother.      Workup in the ER revealed acute CHF with bilateral pleural effusions. The patient was given Lasix 40 mg IV and aspirin 324 mg PO. He also had 1\" nitro paste applied. He was admitted to the Hospitalist group for further evaluation and treatment.     7/19/2022: Patient reports feeling better today but not back to normal.  Still having dyspnea with any exertion.  Still having difficulty laying back flat.  Leg swelling down.      apixaban, 5 mg, Oral, Q12H  aspirin, 81 mg, Oral, Daily  atorvastatin, 80 mg, Oral, Nightly  dilTIAZem CD, 180 mg, Oral, Q24H  furosemide, 40 mg, Intravenous, Q8H  insulin glargine, 10 Units, Subcutaneous, Nightly  insulin lispro, 0-9 Units, Subcutaneous, TID AC  lisinopril, 40 mg, Oral, Q24H  sodium chloride, 10 mL, Intravenous, Q12H  sotalol, 40 mg, Oral, Q12H             Active Hospital Problems:  Active Hospital Problems    Diagnosis    • **Acute CHF (congestive heart failure) (HCC)    • Dyspnea on exertion  "   • Acute chest pain    • Palpitations    • Overweight (BMI 25.0-29.9)    • Atrial arrhythmia    • Bilateral pleural effusion    • Acute on chronic congestive heart failure, unspecified heart failure type ([facility])    • Coronary artery disease involving native coronary artery of native heart    • Hyperlipidemia    • Primary hypertension    • Type 2 diabetes mellitus with hyperglycemia, without long-term current use of insulin ([facility])    • History of cerebrovascular accident      Plan:     Shortness of breath-with underlying palpitations and tachyarrhythmia, concern of new onset heart failure  -Follow-up echo  -Strict I's and O's  -2 g diet  -Fluid restriction  -Continue IV diuresis  -Cardiology consulted  -Cautiously resume home medication  -Heart failure educator  -Elevated proBNP    Renal insufficiency-likely due to diuresis  -Given patient getting contrast for cardiac cath stop any NSAIDs  -Daily labs  -For any further pain please use non-NSAID    Coronary artery disease-patient with history of previous stenting  -Antiplatelet  -EKG reviewed  -Heart rate control  -Modifiable risk factors    Type 2 diabetes-aim to keep blood sugars less than 200  -A1c  -Sliding scale  -Cardiac/diabetic diet    CVA-possibly cardioembolic patient on anticoagulation  -Continue anticoagulation and antiplatelet  -Monitor neurological status    Hypertension/hyperlipidemia-chronic in nature  -Resume home medication as clinically appropriate      DVT prophylaxis:  Medical DVT prophylaxis orders are present.    CODE STATUS:    Code Status (Patient has no pulse and is not breathing): CPR (Attempt to Resuscitate)  Medical Interventions (Patient has pulse or is breathing): Full Support      Disposition:  I expect patient to be discharged in 24 to 48 hours.    This patient has been examined wearing appropriate Personal Protective Equipment and discussed with hospital infection control department. 07/20/22      Electronically signed by Darrel  Daniele Champagne MD, 07/20/22, 12:01 EDT.  [facility]ist [facility]

## 2022-07-21 ENCOUNTER — READMISSION MANAGEMENT (OUTPATIENT)
Dept: CALL CENTER | Facility: HOSPITAL | Age: 57
End: 2022-07-21

## 2022-07-21 VITALS
BODY MASS INDEX: 26.08 KG/M2 | SYSTOLIC BLOOD PRESSURE: 131 MMHG | HEIGHT: 71 IN | HEART RATE: 71 BPM | WEIGHT: 186.29 LBS | OXYGEN SATURATION: 97 % | RESPIRATION RATE: 16 BRPM | DIASTOLIC BLOOD PRESSURE: 86 MMHG | TEMPERATURE: 97.7 F

## 2022-07-21 LAB
ANION GAP SERPL CALCULATED.3IONS-SCNC: 12 MMOL/L (ref 5–15)
BUN SERPL-MCNC: 28 MG/DL (ref 6–20)
BUN/CREAT SERPL: 23.7 (ref 7–25)
CALCIUM SPEC-SCNC: 9 MG/DL (ref 8.6–10.5)
CHLORIDE SERPL-SCNC: 97 MMOL/L (ref 98–107)
CO2 SERPL-SCNC: 28 MMOL/L (ref 22–29)
CREAT SERPL-MCNC: 1.18 MG/DL (ref 0.76–1.27)
DEPRECATED RDW RBC AUTO: 42.4 FL (ref 37–54)
EGFRCR SERPLBLD CKD-EPI 2021: 71.5 ML/MIN/1.73
ERYTHROCYTE [DISTWIDTH] IN BLOOD BY AUTOMATED COUNT: 14.6 % (ref 12.3–15.4)
GLUCOSE BLDC GLUCOMTR-MCNC: 282 MG/DL (ref 70–105)
GLUCOSE BLDC GLUCOMTR-MCNC: 308 MG/DL (ref 70–105)
GLUCOSE SERPL-MCNC: 214 MG/DL (ref 65–99)
HCT VFR BLD AUTO: 40.8 % (ref 37.5–51)
HGB BLD-MCNC: 13.7 G/DL (ref 13–17.7)
MAGNESIUM SERPL-MCNC: 2 MG/DL (ref 1.6–2.6)
MCH RBC QN AUTO: 27.8 PG (ref 26.6–33)
MCHC RBC AUTO-ENTMCNC: 33.6 G/DL (ref 31.5–35.7)
MCV RBC AUTO: 82.7 FL (ref 79–97)
PLATELET # BLD AUTO: 273 10*3/MM3 (ref 140–450)
PMV BLD AUTO: 10 FL (ref 6–12)
POTASSIUM SERPL-SCNC: 3.5 MMOL/L (ref 3.5–5.2)
QT INTERVAL: 480 MS
RBC # BLD AUTO: 4.93 10*6/MM3 (ref 4.14–5.8)
SODIUM SERPL-SCNC: 137 MMOL/L (ref 136–145)
WBC NRBC COR # BLD: 9.4 10*3/MM3 (ref 3.4–10.8)

## 2022-07-21 PROCEDURE — 25010000002 FUROSEMIDE PER 20 MG: Performed by: INTERNAL MEDICINE

## 2022-07-21 PROCEDURE — 97162 PT EVAL MOD COMPLEX 30 MIN: CPT

## 2022-07-21 PROCEDURE — 63710000001 INSULIN LISPRO (HUMAN) PER 5 UNITS: Performed by: INTERNAL MEDICINE

## 2022-07-21 PROCEDURE — 93005 ELECTROCARDIOGRAM TRACING: CPT | Performed by: INTERNAL MEDICINE

## 2022-07-21 PROCEDURE — 99239 HOSP IP/OBS DSCHRG MGMT >30: CPT | Performed by: FAMILY MEDICINE

## 2022-07-21 PROCEDURE — 82962 GLUCOSE BLOOD TEST: CPT

## 2022-07-21 PROCEDURE — 85027 COMPLETE CBC AUTOMATED: CPT | Performed by: INTERNAL MEDICINE

## 2022-07-21 PROCEDURE — 36415 COLL VENOUS BLD VENIPUNCTURE: CPT | Performed by: INTERNAL MEDICINE

## 2022-07-21 PROCEDURE — 99233 SBSQ HOSP IP/OBS HIGH 50: CPT | Performed by: INTERNAL MEDICINE

## 2022-07-21 PROCEDURE — 83735 ASSAY OF MAGNESIUM: CPT | Performed by: INTERNAL MEDICINE

## 2022-07-21 PROCEDURE — 93010 ELECTROCARDIOGRAM REPORT: CPT | Performed by: INTERNAL MEDICINE

## 2022-07-21 PROCEDURE — 80048 BASIC METABOLIC PNL TOTAL CA: CPT | Performed by: INTERNAL MEDICINE

## 2022-07-21 RX ORDER — FUROSEMIDE 40 MG/1
40 TABLET ORAL DAILY
Qty: 30 TABLET | Refills: 0 | Status: SHIPPED | OUTPATIENT
Start: 2022-07-22 | End: 2022-08-18 | Stop reason: SDUPTHER

## 2022-07-21 RX ORDER — FUROSEMIDE 40 MG/1
40 TABLET ORAL DAILY
Status: DISCONTINUED | OUTPATIENT
Start: 2022-07-22 | End: 2022-07-21 | Stop reason: HOSPADM

## 2022-07-21 RX ORDER — INSULIN GLARGINE 100 [IU]/ML
10 INJECTION, SOLUTION SUBCUTANEOUS NIGHTLY
Qty: 5 PEN | Refills: 0 | Status: SHIPPED | OUTPATIENT
Start: 2022-07-21 | End: 2022-12-15

## 2022-07-21 RX ADMIN — FUROSEMIDE 40 MG: 10 INJECTION, SOLUTION INTRAMUSCULAR; INTRAVENOUS at 13:18

## 2022-07-21 RX ADMIN — FUROSEMIDE 40 MG: 10 INJECTION, SOLUTION INTRAMUSCULAR; INTRAVENOUS at 05:45

## 2022-07-21 RX ADMIN — SOTALOL HYDROCHLORIDE 40 MG: 80 TABLET ORAL at 08:27

## 2022-07-21 RX ADMIN — INSULIN LISPRO 6 UNITS: 100 INJECTION, SOLUTION INTRAVENOUS; SUBCUTANEOUS at 08:27

## 2022-07-21 RX ADMIN — Medication 10 ML: at 08:26

## 2022-07-21 RX ADMIN — INSULIN LISPRO 7 UNITS: 100 INJECTION, SOLUTION INTRAVENOUS; SUBCUTANEOUS at 11:22

## 2022-07-21 RX ADMIN — ASPIRIN 81 MG: 81 TABLET, COATED ORAL at 08:27

## 2022-07-21 RX ADMIN — APIXABAN 5 MG: 5 TABLET, FILM COATED ORAL at 08:27

## 2022-07-21 NOTE — THERAPY EVALUATION
Patient Name: Preet Jones  : 1965    MRN: [id number]                              Today's Date: 2022       Admit Date: 2022    Visit Dx:     ICD-10-CM ICD-9-CM   1. Acute on chronic congestive heart failure, unspecified heart failure type ([facility])  I50.9 428.0   2. Chest pain, unspecified type  R07.9 786.50   3. Acute systolic congestive heart failure ([facility])  I50.21 428.21     428.0     Patient Active Problem List   Diagnosis   • Hyperlipidemia   • Primary hypertension   • Overweight (BMI 25.0-29.9)   • Type 2 diabetes mellitus with hyperglycemia, without long-term current use of insulin ([facility])   • RUE weakness   • Facial droop, left sided   • Vitamin D deficiency   • Snoring   • History of cerebrovascular accident   • Seasonal allergic rhinitis   • Coronary artery disease involving native coronary artery of native heart   • Atrial arrhythmia   • Depression   • Abnormal nuclear stress test   • Acute chest pain   • Palpitations   • Acute CHF (congestive heart failure) ([facility])   • Dyspnea on exertion   • Bilateral pleural effusion   • Acute on chronic congestive heart failure, unspecified heart failure type ([facility])   • Acute on chronic HFrEF (heart failure with reduced ejection fraction) ([facility])   • CAD S/P percutaneous coronary angioplasty     Past Medical History:   Diagnosis Date   • Abnormal cardiovascular stress test 2017   • Acute myocardial infarction ([facility])    • Arterial ischemic stroke, MCA (middle cerebral artery), left, acute ([facility]) 10/29/2019   • Atrial flutter with rapid ventricular response ([facility]) 2021   • Bicuspid aortic valve 2017   • Coronary artery disease involving native coronary artery of native heart    • Depression 2020   • History of coronary angioplasty with insertion of stent    • Hyperlipidemia    • Hypertension    • Hyperthyroidism    • Obesity 2011   • Paroxysmal SVT (supraventricular tachycardia) ([facility]) 2021   • RUE weakness 10/29/2019     Previous residual from Stroke, but was not work prohibiting.   • Seasonal allergic rhinitis 12/23/2020   • Sinus arrhythmia    • Sustained SVT (HCC)    • Type 2 diabetes mellitus (HCC)    • Vitamin D deficiency 11/05/2020     Past Surgical History:   Procedure Laterality Date   • APPENDECTOMY     • BACK SURGERY     • CARDIAC CATHETERIZATION  2010   • CARDIAC CATHETERIZATION  2019   • CARDIAC CATHETERIZATION N/A 12/31/2020    Procedure: Cardiac Catheterization/Vascular Study;  Surgeon: Moris Pedro MD;  Location:  [facility];  Service: Cardiovascular;  Laterality: N/A;   • CARDIAC CATHETERIZATION N/A 2/1/2022    Procedure: Left Heart Cath;  Surgeon: Kamlesh Richardson MD;  Location:  [facility];  Service: Cardiovascular;  Laterality: N/A;   • CARDIAC CATHETERIZATION N/A 7/20/2022    Procedure: Left Heart Cath;  Surgeon: Kamlesh Richardson MD;  Location:  [facility];  Service: Cardiovascular;  Laterality: N/A;   • CARDIAC ELECTROPHYSIOLOGY PROCEDURE N/A 1/20/2021    Procedure: EP/Ablation;  Surgeon: Nathen Olmstead MD;  Location:  [facility];  Service: Cardiovascular;  Laterality: N/A;   • CARDIAC ELECTROPHYSIOLOGY PROCEDURE N/A 2/24/2022    Procedure: EP/Ablation-SVT [place] aware;  Surgeon: Candido Montana MD;  Location:  [facility];  Service: Cardiology;  Laterality: N/A;   • JOINT REPLACEMENT     • KNEE ARTHROSCOPY     • THYROIDECTOMY, PARTIAL  2015      General Information     Row Name 07/21/22 1544          Physical Therapy Time and Intention    Document Type evaluation  -KB     Mode of Treatment physical therapy  -KB     Row Name 07/21/22 1541          General Information    Patient Profile Reviewed yes  -KB     Prior Level of Function independent:;ADL's;community mobility;driving;work  -KB     Existing Precautions/Restrictions no known precautions/restrictions  -KB     Barriers to Rehab  none identified  -KB     Row Name 07/21/22 1540          Living Environment    People in Home spouse  -KB     Row Name 07/21/22 1540          Home Main Entrance    Number of Stairs, Main Entrance eight  -KB     Stair Railings, Main Entrance railings safe and in good condition  -KB     Row Name 07/21/22 1540          Cognition    Orientation Status (Cognition) oriented x 4  -KB     Row Name 07/21/22 1540          Safety Issues, Functional Mobility    Impairments Affecting Function (Mobility) endurance/activity tolerance;shortness of breath  -KB           User Key  (r) = Recorded By, (t) = Taken By, (c) = Cosigned By    Initials Name Provider Type    Anna Marie Pizarro, PT Physical Therapist               Mobility     Row Name 07/21/22 1541          Bed Mobility    Bed Mobility supine-sit  -KB     Supine-Sit [place] (Bed Mobility) independent  -KB     Row Name 07/21/22 1541          Sit-Stand Transfer    Sit-Stand [place] (Transfers) independent  -KB     Row Name 07/21/22 1541          Gait/Stairs (Locomotion)    [place] Level (Gait) supervision  -KB     Distance in Feet (Gait) 250 ft  -KB     Right Sided Gait Deviations --  slight R LE ER  -KB     Comment, (Gait/Stairs) Pt ambulated without LOB or risk for falls  -KB           User Key  (r) = Recorded By, (t) = Taken By, (c) = Cosigned By    Initials Name Provider Type    Anna Marie Pizarro, PT Physical Therapist               Obj/Interventions     Row Name 07/21/22 1543          Range of Motion Comprehensive    General Range of Motion no range of motion deficits identified  -KB     Row Name 07/21/22 1543          Strength Comprehensive (MMT)    Comment, General Manual Muscle Testing (MMT) Assessment LE grossly 4/5  -KB     Row Name 07/21/22 1543          Balance    Balance Assessment sitting static balance;sitting dynamic balance;standing static balance;standing dynamic balance  -KB     Static Sitting Balance independent  -KB     Dynamic Sitting Balance  independent  -KB     Position, Sitting Balance unsupported;sitting edge of bed  -KB     Static Standing Balance independent  -KB     Dynamic Standing Balance supervision  -KB     Position/Device Used, Standing Balance unsupported  -KB     Comment, Balance Static standing balance assesment- narrow NEPTALI EO/EC > 30 seconds, Tandem stance <15 seconds bilaterally, single leg stance <5 seconds bilaterally  -     Row Name 07/21/22 1543          Sensory Assessment (Somatosensory)    Sensory Assessment (Somatosensory) sensation intact  -           User Key  (r) = Recorded By, (t) = Taken By, (c) = Cosigned By    Initials Name Provider Type    Anna Marie Pizarro, PT Physical Therapist               Goals/Plan    No documentation.                Clinical Impression     Row Name 07/21/22 1544          Pain    Pretreatment Pain Rating 0/10 - no pain  -     Posttreatment Pain Rating 0/10 - no pain  -     Row Name 07/21/22 1544          Plan of Care Review    Plan of Care Reviewed With patient  -     Outcome Evaluation 58 y/o M presents with c/o palpitations, chest pain and dyspnea on exertion. PMH of acute MI, CAD, HTN, atrial flutter and type 2 diabetes. Dx with acute CHF with bilateral pleural effusions. Left heart cath 7/20. At baseline, pt lives in two story house with wife. Pt is independent with ADLs and mobility. Pt works and drives. This date, pt independent form supine to sit and to transition to stand. Pt ambulated community distance with supervision and no LOB. Static and dynamic balance assessed with no risk for falls. Pt had previous stroke causing R sided weakness but strength is equal bilaterally with MMT. Pt denies hx of falls and neuropathy. Pt reports feeling a little weak from lying in bed but appears to be functioning at baseline. Anticipate d/c routine home when medically stable.  -[place]     Row Name 07/21/22 1544          Therapy Assessment/Plan (PT)    Criteria for Skilled Interventions Met (PT) no  problems identified which require skilled intervention  -KB     Therapy Frequency (PT) evaluation only  -KB     Row Name 07/21/22 1544          Vital Signs    O2 Delivery Pre Treatment room air  -KB     O2 Delivery Intra Treatment room air  -KB     O2 Delivery Post Treatment room air  -KB     Row Name 07/21/22 1545 07/21/22 1544       Positioning and Restraints    Pre-Treatment Position -- in bed  -KB    Post Treatment Position -- bed  -KB    In Bed call light within reach  -KB sitting EOB;notified nsg;with other staff  -KB          User Key  (r) = Recorded By, (t) = Taken By, (c) = Cosigned By    Initials Name Provider Type    Anna Marie Pizarro, PT Physical Therapist               Outcome Measures     Row Name 07/21/22 1546 07/21/22 0815       How much help from another person do you currently need...    Turning from your back to your side while in flat bed without using bedrails? 4  -KB 4  -JW    Moving from lying on back to sitting on the side of a flat bed without bedrails? 4  -KB 4  -JW    Moving to and from a bed to a chair (including a wheelchair)? 4  -KB 4  -JW    Standing up from a chair using your arms (e.g., wheelchair, bedside chair)? 4  -KB 4  -JW    Climbing 3-5 steps with a railing? 4  -KB 4  -JW    To walk in hospital room? 4  -KB 4  -JW    AM-PAC 6 Clicks Score (PT) 24  -KB 24  -JW    Highest level of mobility 8 --> Walked 250 feet or more  -KB 8 --> Walked 250 feet or more  -    Row Name 07/21/22 1546          Functional Assessment    Outcome Measure Options AM-PAC 6 Clicks Basic Mobility (PT)  -KB           User Key  (r) = Recorded By, (t) = Taken By, (c) = Cosigned By    Initials Name Provider Type    Pratik Hernandez, RN Registered Nurse    Anna Marie Pizarro, PT Physical Therapist                             Physical Therapy Education                 Title: PT OT SLP Therapies (Done)     Topic: Physical Therapy (Done)     Point: Mobility training (Done)     Learning Progress Summary            Patient Acceptance, E, VU by VIV at 7/21/2022 1546                   Point: Home exercise program (Done)     Learning Progress Summary           Patient Acceptance, E, VU by VIV at 7/21/2022 1546                   Point: Body mechanics (Done)     Learning Progress Summary           Patient Acceptance, E, VU by VIV at 7/21/2022 1546                               User Key     Initials Effective Dates Name Provider Type [facility] 05/25/22 -  Anna Marie Payan, PT Physical Therapist PT              PT Recommendation and Plan     Plan of Care Reviewed With: patient  Outcome Evaluation: 56 y/o M presents with c/o palpitations, chest pain and dyspnea on exertion. PMH of acute MI, CAD, HTN, atrial flutter and type 2 diabetes. Dx with acute CHF with bilateral pleural effusions. Left heart cath 7/20. At baseline, pt lives in two story house with wife. Pt is independent with ADLs and mobility. Pt works and drives. This date, pt independent form supine to sit and to transition to stand. Pt ambulated community distance with supervision and no LOB. Static and dynamic balance assessed with no risk for falls. Pt had previous stroke causing R sided weakness but strength is equal bilaterally with MMT. Pt denies hx of falls and neuropathy. Pt reports feeling a little weak from lying in bed but appears to be functioning at baseline. Anticipate d/c routine home when medically stable.     Time Calculation:    PT Charges     Row Name 07/21/22 1547             Time Calculation    Start Time 1511  -KB      Stop Time 1530  -KB      Time Calculation (min) 19 min  -KB      PT Received On 07/21/22  -KB              Time Calculation- PT    Total Timed Code Minutes- PT 0 minute(s)  -KB            User Key  (r) = Recorded By, (t) = Taken By, (c) = Cosigned By    Initials Name Provider Type    Anna Marie Pizarro, PT Physical Therapist              Therapy Charges for Today     Code Description Service Date Service Provider Modifiers Qty     [id number]  PT EVAL MOD COMPLEXITY 3 7/21/2022 Anna Marie Payan, PT GP 1          PT G-Codes  Outcome Measure Options: AM-PAC 6 Clicks Basic Mobility (PT)  AM-PAC 6 Clicks Score (PT): 24    ANNA MARIE PAYAN, PT  7/21/2022

## 2022-07-21 NOTE — PROGRESS NOTES
"    Reason for follow-up: CHF     Patient Care Team:  Celine Garcia MD as PCP - General (Internal Medicine)  Candido Montana MD as Consulting Physician (Cardiology)    Subjective .   Preet Jones is doing better today     ROS    Morphine, Ozempic (0.25 or 0.5 mg-dose) [semaglutide(0.25 or 0.5mg-dos)], Isosorbide, Nitrofuran derivatives, and Nitroglycerin    Scheduled Meds:apixaban, 5 mg, Oral, Q12H  aspirin, 81 mg, Oral, Daily  atorvastatin, 80 mg, Oral, Nightly  [START ON 7/22/2022] furosemide, 40 mg, Oral, Daily  insulin glargine, 10 Units, Subcutaneous, Nightly  insulin lispro, 0-9 Units, Subcutaneous, TID AC  [START ON 7/22/2022] sacubitril-valsartan, 1 tablet, Oral, Q12H  sodium chloride, 10 mL, Intravenous, Q12H  sotalol, 40 mg, Oral, Q12H      Continuous Infusions:sodium chloride, 10 mL/hr, Last Rate: 10 mL/hr (07/20/22 2034)      PRN Meds:.•  acetaminophen **OR** acetaminophen **OR** acetaminophen  •  aluminum-magnesium hydroxide-simethicone  •  senna-docusate sodium **AND** polyethylene glycol **AND** bisacodyl **AND** bisacodyl  •  dextrose  •  dextrose  •  glucagon (human recombinant)  •  insulin lispro **AND** insulin lispro  •  ipratropium-albuterol  •  labetalol  •  magnesium sulfate **OR** magnesium sulfate in D5W 1g/100mL (PREMIX)  •  melatonin  •  nitroglycerin  •  ondansetron **OR** ondansetron  •  potassium chloride **OR** potassium chloride **OR** potassium chloride  •  sodium chloride      VITAL SIGNS  Vitals:    07/20/22 2109 07/21/22 0359 07/21/22 0827 07/21/22 1300   BP: 146/90 112/74 135/89 131/86   BP Location: Left arm Left arm  Left arm   Patient Position: Lying Lying  Sitting   Pulse: 80 69 69 71   Resp: 16 14  16   Temp: 97.6 °F (36.4 °C) 97.8 °F (36.6 °C)  97.7 °F (36.5 °C)   TempSrc: Oral Oral  Oral   SpO2: 97% 95%  97%   Weight:  84.5 kg (186 lb 4.6 oz)     Height:           Flowsheet Rows    Flowsheet Row First Filed Value   Admission Height 180.3 cm (71\") Documented at " 07/18/2022 0416   Admission Weight 88.9 kg (195 lb 15.8 oz) Documented at 07/18/2022 0416             Physical Exam  VITALS REVIEWED    General:      well developed, in no acute distress.    Head:      normocephalic and atraumatic.    Eyes:      PERRL/EOM intact, conjunctiva and sclera clear with out nystagmus.    Neck:      no masses, thyromegaly,  trachea central with normal respiratory effort and PMI displaced laterally  Lungs:      Clear  Heart:       regular rate and rhythm  Msk:      no deformity or scoliosis noted of thoracic or lumbar spine.    Pulses:      pulses normal in all 4 extremities.    Extremities:       no lower extremity edema  Neurologic:      no focal deficits.   alert oriented x3  Skin:      intact without lesions or rashes.    Psych:      alert and cooperative; normal mood and affect; normal attention span and concentration.          LAB RESULTS (LAST 7 DAYS)    CBC  Results from last 7 days   Lab Units 07/21/22  0022 07/19/22 0037 07/18/22  0455   WBC 10*3/mm3 9.40 7.80 6.40   RBC 10*6/mm3 4.93 4.77 4.68   HEMOGLOBIN g/dL 13.7 13.3 13.0   HEMATOCRIT % 40.8 38.9 38.9   MCV fL 82.7 81.5 83.0   PLATELETS 10*3/mm3 273 220 222       BMP  Results from last 7 days   Lab Units 07/21/22  0022 07/20/22  0026 07/19/22 1335 07/19/22 0037 07/18/22  0455   SODIUM mmol/L 137 137  --  137 135*   POTASSIUM mmol/L 3.5 3.9 4.2 3.2* 3.7   CHLORIDE mmol/L 97* 98  --  96* 100   CO2 mmol/L 28.0 29.0  --  30.0* 24.0   BUN mg/dL 28* 26*  --  19 17   CREATININE mg/dL 1.18 1.31*  --  1.21 0.91   GLUCOSE mg/dL 214* 380*  --  231* 313*   MAGNESIUM mg/dL 2.0 2.6  --  1.8 2.2       CMP   Results from last 7 days   Lab Units 07/21/22  0022 07/20/22  0026 07/19/22  1335 07/19/22  0037 07/18/22  0455   SODIUM mmol/L 137 137  --  137 135*   POTASSIUM mmol/L 3.5 3.9 4.2 3.2* 3.7   CHLORIDE mmol/L 97* 98  --  96* 100   CO2 mmol/L 28.0 29.0  --  30.0* 24.0   BUN mg/dL 28* 26*  --  19 17   CREATININE mg/dL 1.18 1.31*  --  1.21  0.91   GLUCOSE mg/dL 214* 380*  --  231* 313*   ALBUMIN g/dL  --   --   --   --  3.70   BILIRUBIN mg/dL  --   --   --   --  0.3   ALK PHOS U/L  --   --   --   --  126*   AST (SGOT) U/L  --   --   --   --  14   ALT (SGPT) U/L  --   --   --   --  18         BNP        TROPONIN  Results from last 7 days   Lab Units 07/19/22  0037   TROPONIN T ng/mL 0.022       CoAg        Creatinine Clearance  Estimated Creatinine Clearance: 81.6 mL/min (by C-G formula based on SCr of 1.18 mg/dL).    ABG          EKG    I personally reviewed the patient's EKG/Telemetry data: Sinus rhythm      Assessment & Plan       Acute CHF (congestive heart failure) ([facility])    Hyperlipidemia    Primary hypertension    Overweight (BMI 25.0-29.9)    Type 2 diabetes mellitus with hyperglycemia, without long-term current use of insulin (HCC)    History of cerebrovascular accident    Coronary artery disease involving native coronary artery of native heart    Atrial arrhythmia    Acute chest pain    Palpitations    Dyspnea on exertion    Bilateral pleural effusion    Acute on chronic congestive heart failure, unspecified heart failure type (HCC)    Acute on chronic HFrEF (heart failure with reduced ejection fraction) ([facility])    CAD S/P percutaneous coronary angioplasty      Preet Jones is a 57-year-old male patient who has history of coronary artery disease status post PCI to the marginal on 1/26/2017, A. fib status post cryoablation and CTI on 1/28/2021, SVT ablation for AVNRT on 2/24/2022, also hypertension, dyslipidemia and diabetes.  Patient presented for CHF and was found to have low ejection fraction, he had heart catheterization on 7/20/2022 which showed stable CAD and no PCI was performed.  We will switch lisinopril over to Entresto, he is already on sotalol for arrhythmia and I do not want to change that to other beta-blockers.  Also he needs to be on Lasix upon discharge.  I will follow him as outpatient within a month and hopefully in  follow-up echocardiograms his ejection fraction will improve if not then we will need to talk about the defibrillator.  Patient should be able to go home with a prescription for Entresto.    I discussed the patients findings and my recommendations with patient and agrees with the outlined plan.    Candido Montana MD  07/21/22  15:39 EDT

## 2022-07-21 NOTE — PAYOR COMM NOTE
"Clinical UD/Requesting IP determination  RE: Thalia Roman  1965  Pdg Ref # [id number]/Case #[id number]    AUTHORIZATION PENDING - Pt remains in Hospital 22.  PLEASE FORWARD DETERMINATION TO FOLLOWING CONTACT:    CATRACHITA CROCKETT LPN UR  Utilization Review Nurse  [facility]  Direct & confidential phone # [phone]  Fax # [phone]    Roman Landon (57 y.o. Male)             Date of Birth   1965    Social Security Number       Address   [address] [place] [address]    Home Phone   [phone]    MRN   [id number]       Taoist   None    Marital Status                               Admission Date   22    Admission Type   Emergency    Admitting Provider   Darrel Champagne MD    Attending Provider   Darrel Champagne MD    Department, Room/Bed   [facility] 2B MEDICAL INPATIENT,        Discharge Date       Discharge Disposition       Discharge Destination                               Attending Provider: Darrel Champagne MD    Allergies: Morphine, Ozempic (0.25 Or 0.5 Mg-dose) [Semaglutide(0.25 Or 0.5mg-dos)], Isosorbide, Nitrofuran Derivatives, Nitroglycerin    Isolation: None   Infection: None   Code Status: CPR   Advance Care Planning Activity    Ht: 180.3 cm (71\")   Wt: 84.5 kg (186 lb 4.6 oz)    Admission Cmt: None   Principal Problem: Acute CHF (congestive heart failure) (HCC) [I50.9]                 Active Insurance as of 2022     Primary Coverage     Payor Plan Insurance Group Employer/Plan Group    [id number]     Payor Plan Address Payor Plan Phone Number Payor Plan Fax Number Effective Dates    [address] [address] [phone]  2021 - None Entered    [facility] [id number]       Subscriber Name Subscriber Birth Date Member ID       THALIA ROMAN 1965 [id number]                 Emergency Contacts      (Rel.) Home Phone Work Phone Mobile Phone    JETT ROMAN (Spouse) " [phone] -- --               Physician Progress Notes (last 48 hours)      Kamlesh Richardson MD at 07/20/22 1810          [facility]   PROGRESS NOTE    Reason for follow-up: CHF     Patient Care Team:  Celine Garcia MD as PCP - General (Internal Medicine)  Candido Montana MD as Consulting Physician (Cardiology)    Subjective    Patient seen and examined. Labs reviewed. Patient feeling better today after diuresis. No chest pain, some shortness of breath with deep breathing.     Review of Systems   Cardiovascular: Positive for dyspnea on exertion and leg swelling. Negative for chest pain, irregular heartbeat, near-syncope and palpitations.   Respiratory: Positive for cough and shortness of breath.    Gastrointestinal: Negative for nausea and vomiting.   Neurological: Negative for dizziness and light-headedness.   All other systems reviewed and are negative.      Allergies:  Morphine, Ozempic (0.25 or 0.5 mg-dose) [semaglutide(0.25 or 0.5mg-dos)], Isosorbide, Nitrofuran derivatives, and Nitroglycerin    Scheduled Meds:  apixaban, 5 mg, Oral, Q12H  aspirin, 81 mg, Oral, Daily  atorvastatin, 80 mg, Oral, Nightly  dilTIAZem CD, 180 mg, Oral, Q24H  furosemide, 40 mg, Intravenous, Q8H  insulin glargine, 10 Units, Subcutaneous, Nightly  insulin lispro, 0-9 Units, Subcutaneous, TID AC  lisinopril, 40 mg, Oral, Q24H  sodium chloride, 10 mL, Intravenous, Q12H  sotalol, 40 mg, Oral, Q12H      Continuous Infusions:  sodium chloride, 100 mL/hr      PRN Meds:  •  acetaminophen **OR** acetaminophen **OR** acetaminophen  •  acetaminophen  •  aluminum-magnesium hydroxide-simethicone  •  senna-docusate sodium **AND** polyethylene glycol **AND** bisacodyl **AND** bisacodyl  •  dextrose  •  dextrose  •  glucagon (human recombinant)  •  insulin lispro **AND** insulin lispro  •  ipratropium-albuterol  •  labetalol  •  magnesium sulfate **OR** magnesium sulfate in D5W 1g/100mL  "(PREMIX)  •  melatonin  •  nitroglycerin  •  ondansetron **OR** ondansetron  •  potassium chloride **OR** potassium chloride **OR** potassium chloride  •  sodium chloride    Objective     VITAL SIGNS  Vitals:    07/20/22 1700 07/20/22 1715 07/20/22 1730 07/20/22 1801   BP: 109/70 115/81 115/80 139/93   BP Location:       Patient Position:       Pulse: 63 64 66 68   Resp:    18   Temp:    98.4 °F (36.9 °C)   TempSrc:       SpO2: 99% 96% 98% 94%   Weight:       Height:         Flowsheet Rows    Flowsheet Row First Filed Value   Admission Height 180.3 cm (71\") Documented at 07/18/2022 0416   Admission Weight 88.9 kg (195 lb 15.8 oz) Documented at 07/18/2022 0416           TELEMETRY: sinus    Physical Exam:  Vitals reviewed.   Constitutional:       General: Awake.      Appearance: Not in distress.   Eyes:      Conjunctiva/sclera: Conjunctivae normal.   Pulmonary:      Effort: Pulmonary effort is normal.      Breath sounds: Normal breath sounds.   Cardiovascular:      Normal rate. Regular rhythm. Normal S1. Normal S2.   Pulses:     Intact distal pulses.   Edema:     Peripheral edema present.  Abdominal:      General: Bowel sounds are normal.      Palpations: Abdomen is soft.   Skin:     General: Skin is warm and dry.   Neurological:      General: No focal deficit present.      Mental Status: Alert and oriented to person, place and time.   Psychiatric:         Behavior: Behavior is cooperative.          LAB RESULTS (LAST 7 DAYS)  I have reviewed new clinical results.    CBC  Results from last 7 days   Lab Units 07/19/22  0037 07/18/22  0455   WBC 10*3/mm3 7.80 6.40   RBC 10*6/mm3 4.77 4.68   HEMOGLOBIN g/dL 13.3 13.0   HEMATOCRIT % 38.9 38.9   MCV fL 81.5 83.0   PLATELETS 10*3/mm3 220 222     CMP   Results from last 7 days   Lab Units 07/20/22  0026 07/19/22  1335 07/19/22  0037 07/18/22  0455   SODIUM mmol/L 137  --  137 135*   POTASSIUM mmol/L 3.9 4.2 3.2* 3.7   CHLORIDE mmol/L 98  --  96* 100   CO2 mmol/L 29.0  --  " 30.0* 24.0   BUN mg/dL 26*  --  19 17   CREATININE mg/dL 1.31*  --  1.21 0.91   GLUCOSE mg/dL 380*  --  231* 313*   ALBUMIN g/dL  --   --   --  3.70   BILIRUBIN mg/dL  --   --   --  0.3   ALK PHOS U/L  --   --   --  126*   AST (SGOT) U/L  --   --   --  14   ALT (SGPT) U/L  --   --   --  18     ProBNP      TROPONIN  Results from last 7 days   Lab Units 07/19/22  0037   TROPONIN T ng/mL 0.022     CoAg      Creatinine Clearance  Estimated Creatinine Clearance: 74.5 mL/min (A) (by C-G formula based on SCr of 1.31 mg/dL (H)).    Radiology  No radiology results for the last day    EKG    I personally viewed and interpreted the patient's EKG/Telemetry data:    ECHOCARDIOGRAM:  Results for orders placed during the hospital encounter of 07/18/22    Adult Transthoracic Echo Complete W/ Cont if Necessary Per Protocol    Interpretation Summary  · The left ventricular cavity is moderately dilated.  · Left ventricular ejection fraction appears to be 36 - 40%.  · Moderate tricuspid valve regurgitation is present.  · Estimated right ventricular systolic pressure from tricuspid regurgitation is moderately elevated (45-55 mmHg).  · Mildly reduced right ventricular systolic function noted.  · Left ventricular diastolic function is consistent with (grade II w/high LAP) pseudonormalization.  · There is a left pleural effusion.      STRESS MYOVIEW:    CARDIAC CATHETERIZATION:    OTHER:       ASSESSMENT/PLAN      Acute CHF (congestive heart failure) ([facility])    Hyperlipidemia    Primary hypertension    Overweight (BMI 25.0-29.9)    Type 2 diabetes mellitus with hyperglycemia, without long-term current use of insulin (HCC)    History of cerebrovascular accident    Coronary artery disease involving native coronary artery of native heart    Atrial arrhythmia    Acute chest pain    Palpitations    Dyspnea on exertion    Bilateral pleural effusion    Acute on chronic congestive heart failure, unspecified heart failure type (HCC)    Acute on  chronic HFrEF (heart failure with reduced ejection fraction) ([facility])    CAD S/P percutaneous coronary angioplasty      PLAN  Telemetry is revealing sinus rhythm  Diuresis as needed and tolerated  Monitor renal function and urine output  Patient has previous history of known coronary artery disease PCI and is now developing LV dysfunction.  Discussed with Dr. Mary Lou Montana will proceed with cardiac cath.  Risk benefits alternatives explained.  Patient underwent cardiac cath which revealed severe LV dysfunction and noncritical CAD.  Continue medical management and risk factor modification.    Kamlesh Richardson MD  22  18:10 EDT         Electronically signed by Kamlesh Richardson MD at 22 1812     Darrel Champagne MD at 22 1201              [facility] Medicine Services Daily Progress Note    Patient Name: Preet Jones  : 1965  MRN: [id number]  Primary Care Physician:  Celine Garcia MD  Date of admission: 2022      Subjective      Chief Complaint: Shortness of breath    Patient reports overall feeling better.  Patient had some abdominal pain overnight was given Toradol.  Patient going for left heart cath today for evaluation of ischemic cardiomyopathy.    Review of Systems   Constitutional: Positive for malaise/fatigue.   Cardiovascular: Positive for dyspnea on exertion, leg swelling and orthopnea.   All other systems reviewed and are negative.        Objective      Vitals:   Temp:  [97.6 °F (36.4 °C)-97.7 °F (36.5 °C)] 97.6 °F (36.4 °C)  Heart Rate:  [63-69] 69  Resp:  [16-20] 18  BP: (126-128)/(84-91) 128/91    Physical Exam     General: Middle-age male lying in bed breathing comfortably on room air no acute distress  HEENT: NC/AT, EOMI, mucosa moist  Heart: Regular, rate controlled  Chest: Normal work of breathing some diminished lung sounds no crackles  Abdominal: Soft.  Nontender nondistended  Musculoskeletal: Normal ROM.  Bilateral  "lower extremity edema significantly improved. No calf tenderness.  Neurological: AAOx3, no focal deficits  Skin: Skin is warm and dry. No rash  Psychiatric: Normal mood and affect.         Result Review    Result Review:  I have personally reviewed the results from the time of this admission to 7/20/2022 12:01 EDT and agree with these findings:  [x]  Laboratory  [x]  Microbiology  [x]  Radiology  [x]  EKG/Telemetry   []  Cardiology/Vascular   []  Pathology  []  Old records  []  Other:  Most notable findings include: Hypokalemia, metabolic alkalosis, renal insufficiency          Assessment & Plan      Brief Patient Summary:    Preet Jones is a 57 y.o. male with a history of CAD s/p stent placement, HTN, HLD, Type 2 DM, CVA, atrial arrhythmias s/p a-fib and flutter ablation who presented to the ER at [facility]2022 complaining of palpitations, chest pain, and dyspnea on exertion. The patient states his symptoms started approximately 2 weeks ago, but have gotten worse over the past 3 days. He states 3 days ago he saw the nurse practitioner at his PCP's office and was diagnosed with acute bronchitis. He states he was prescribed azithromycin and Mucinex. He states his symptoms have worsened despite taking the medications prescribed. He denies a history of congestive heart failure, and he is not on a diuretic at home. He reports some recent ankle swelling and feeling bloated. He states his blood pressure is normally well controlled, but it was elevated at his PCP's office. He states he has an upcoming appointment with his cardiologist on 07/20/22. He denies any exacerbating or alleviating factors.      The patient is a nonsmoker and denies alcohol or illicit drug use. He reports a family history of heart disease in his mother.      Workup in the ER revealed acute CHF with bilateral pleural effusions. The patient was given Lasix 40 mg IV and aspirin 324 mg PO. He also had 1\" nitro paste applied. He " was admitted to the Hospitalist group for further evaluation and treatment.     7/19/2022: Patient reports feeling better today but not back to normal.  Still having dyspnea with any exertion.  Still having difficulty laying back flat.  Leg swelling down.      apixaban, 5 mg, Oral, Q12H  aspirin, 81 mg, Oral, Daily  atorvastatin, 80 mg, Oral, Nightly  dilTIAZem CD, 180 mg, Oral, Q24H  furosemide, 40 mg, Intravenous, Q8H  insulin glargine, 10 Units, Subcutaneous, Nightly  insulin lispro, 0-9 Units, Subcutaneous, TID AC  lisinopril, 40 mg, Oral, Q24H  sodium chloride, 10 mL, Intravenous, Q12H  sotalol, 40 mg, Oral, Q12H             Active Hospital Problems:  Active Hospital Problems    Diagnosis    • **Acute CHF (congestive heart failure) ([facility])    • Dyspnea on exertion    • Acute chest pain    • Palpitations    • Overweight (BMI 25.0-29.9)    • Atrial arrhythmia    • Bilateral pleural effusion    • Acute on chronic congestive heart failure, unspecified heart failure type ([facility])    • Coronary artery disease involving native coronary artery of native heart    • Hyperlipidemia    • Primary hypertension    • Type 2 diabetes mellitus with hyperglycemia, without long-term current use of insulin ([facility])    • History of cerebrovascular accident      Plan:     Shortness of breath-with underlying palpitations and tachyarrhythmia, concern of new onset heart failure  -Follow-up echo  -Strict I's and O's  -2 g diet  -Fluid restriction  -Continue IV diuresis  -Cardiology consulted  -Cautiously resume home medication  -Heart failure educator  -Elevated proBNP    Renal insufficiency-likely due to diuresis  -Given patient getting contrast for cardiac cath stop any NSAIDs  -Daily labs  -For any further pain please use non-NSAID    Coronary artery disease-patient with history of previous stenting  -Antiplatelet  -EKG reviewed  -Heart rate control  -Modifiable risk factors    Type 2 diabetes-aim to keep blood sugars less than  200  -A1c  -Sliding scale  -Cardiac/diabetic diet    CVA-possibly cardioembolic patient on anticoagulation  -Continue anticoagulation and antiplatelet  -Monitor neurological status    Hypertension/hyperlipidemia-chronic in nature  -Resume home medication as clinically appropriate      DVT prophylaxis:  Medical DVT prophylaxis orders are present.    CODE STATUS:    Code Status (Patient has no pulse and is not breathing): CPR (Attempt to Resuscitate)  Medical Interventions (Patient has pulse or is breathing): Full Support      Disposition:  I expect patient to be discharged in 24 to 48 hours.    This patient has been examined wearing appropriate Personal Protective Equipment and discussed with hospital infection control department. 22      Electronically signed by Darrel Champagne MD, 22, 12:01 EDT.  [facility]             Electronically signed by Darrel Champagne MD at 22 1202     Darrel Champagne MD at 22 1542              [facility] Medicine Services Daily Progress Note    Patient Name: Preet Jones  : 1965  MRN: [id number]  Primary Care Physician:  Celine Garcia MD  Date of admission: 2022      Subjective      Chief Complaint: Shortness of breath      Patient reports feeling better today but not back to normal.  Still having dyspnea with any exertion.  Still having difficulty laying back flat.  Leg swelling down.    Review of Systems   Constitutional: Positive for malaise/fatigue.   Cardiovascular: Positive for dyspnea on exertion, leg swelling and orthopnea.   All other systems reviewed and are negative.        Objective      Vitals:   Temp:  [97.2 °F (36.2 °C)-97.6 °F (36.4 °C)] 97.6 °F (36.4 °C)  Heart Rate:  [] 65  Resp:  [18-20] 18  BP: (115-156)/(74-98) 143/96    Physical Exam     General: Middle-age male lying in bed breathing comfortably on room air no acute distress  HEENT: NC/AT, EOMI, mucosa  moist  Heart: Regular, rate controlled  Chest: Normal work of breathing some diminished lung sounds no crackles  Abdominal: Soft.  Nontender nondistended  Musculoskeletal: Normal ROM.  Bilateral lower extremity edema. No calf tenderness.  Neurological: AAOx3, no focal deficits  Skin: Skin is warm and dry. No rash  Psychiatric: Normal mood and affect.         Result Review    Result Review:  I have personally reviewed the results from the time of this admission to 7/19/2022 15:43 EDT and agree with these findings:  [x]  Laboratory  [x]  Microbiology  [x]  Radiology  [x]  EKG/Telemetry   []  Cardiology/Vascular   []  Pathology  []  Old records  []  Other:  Most notable findings include: Hypokalemia, metabolic alkalosis          Assessment & Plan      Brief Patient Summary:    Preet Jones is a 57 y.o. male with a history of CAD s/p stent placement, HTN, HLD, Type 2 DM, CVA, atrial arrhythmias s/p a-fib and flutter ablation who presented to the ER at [facility] on 7/18/2022 complaining of palpitations, chest pain, and dyspnea on exertion. The patient states his symptoms started approximately 2 weeks ago, but have gotten worse over the past 3 days. He states 3 days ago he saw the nurse practitioner at his PCP's office and was diagnosed with acute bronchitis. He states he was prescribed azithromycin and Mucinex. He states his symptoms have worsened despite taking the medications prescribed. He denies a history of congestive heart failure, and he is not on a diuretic at home. He reports some recent ankle swelling and feeling bloated. He states his blood pressure is normally well controlled, but it was elevated at his PCP's office. He states he has an upcoming appointment with his cardiologist on 07/20/22. He denies any exacerbating or alleviating factors.      The patient is a nonsmoker and denies alcohol or illicit drug use. He reports a family history of heart disease in his mother.      Workup in the ER  "revealed acute CHF with bilateral pleural effusions. The patient was given Lasix 40 mg IV and aspirin 324 mg PO. He also had 1\" nitro paste applied. He was admitted to the Hospitalist group for further evaluation and treatment.     apixaban, 5 mg, Oral, Q12H  aspirin, 81 mg, Oral, Daily  atorvastatin, 80 mg, Oral, Nightly  dilTIAZem CD, 180 mg, Oral, Q24H  furosemide, 40 mg, Intravenous, Q8H  insulin glargine, 10 Units, Subcutaneous, Nightly  insulin lispro, 0-9 Units, Subcutaneous, TID AC  lisinopril, 40 mg, Oral, Q24H  sodium chloride, 10 mL, Intravenous, Q12H  sotalol, 40 mg, Oral, Q12H             Active Hospital Problems:  Active Hospital Problems    Diagnosis    • **Acute CHF (congestive heart failure) (HCC)    • Dyspnea on exertion    • Acute chest pain    • Palpitations    • Overweight (BMI 25.0-29.9)    • Atrial arrhythmia    • Bilateral pleural effusion    • Acute on chronic congestive heart failure, unspecified heart failure type (HCC)    • Coronary artery disease involving native coronary artery of native heart    • Hyperlipidemia    • Primary hypertension    • Type 2 diabetes mellitus with hyperglycemia, without long-term current use of insulin (HCC)    • History of cerebrovascular accident      Plan:     Shortness of breath-with underlying palpitations and tachyarrhythmia, concern of new onset heart failure  -Follow-up echo  -Strict I's and O's  -2 g diet  -Fluid restriction  -Continue IV diuresis  -Cardiology consulted  -Cautiously resume home medication  -Heart failure educator  -Elevated proBNP    Coronary artery disease-patient with history of previous stenting  -Antiplatelet  -EKG reviewed  -Heart rate control  -Modifiable risk factors    Type 2 diabetes-aim to keep blood sugars less than 200  -A1c  -Sliding scale  -Cardiac/diabetic diet    CVA-possibly cardioembolic patient on anticoagulation  -Continue anticoagulation and antiplatelet  -Monitor neurological " status    Hypertension/hyperlipidemia-chronic in nature  -Resume home medication as clinically appropriate      DVT prophylaxis:  Medical DVT prophylaxis orders are present.    CODE STATUS:    Code Status (Patient has no pulse and is not breathing): CPR (Attempt to Resuscitate)  Medical Interventions (Patient has pulse or is breathing): Full Support      Disposition:  I expect patient to be discharged in 24 to 48 hours.    This patient has been examined wearing appropriate Personal Protective Equipment and discussed with hospital infection control department. 07/19/22      Electronically signed by Darrel Champagne MD, 07/19/22, 15:43 EDT.  [facility] Hospitalist Team             Electronically signed by Darrel Champagne MD at 07/19/22 1549     Meliza Rodríguez APRN at 07/19/22 1018          [facility]   PROGRESS NOTE    Reason for follow-up: CHF     Patient Care Team:  Celine Garcia MD as PCP - General (Internal Medicine)  Candido Montana MD as Consulting Physician (Cardiology)    Subjective    Patient seen and examined. Labs reviewed. Patient feeling better today after diuresis. No chest pain, some shortness of breath with deep breathing.     Review of Systems   Cardiovascular: Positive for dyspnea on exertion and leg swelling. Negative for chest pain, irregular heartbeat, near-syncope and palpitations.   Respiratory: Positive for cough and shortness of breath.    Gastrointestinal: Negative for nausea and vomiting.   Neurological: Negative for dizziness and light-headedness.   All other systems reviewed and are negative.      Allergies:  Morphine, Ozempic (0.25 or 0.5 mg-dose) [semaglutide(0.25 or 0.5mg-dos)], Isosorbide, Nitrofuran derivatives, and Nitroglycerin    Scheduled Meds:  apixaban, 5 mg, Oral, Q12H  aspirin, 81 mg, Oral, Daily  atorvastatin, 80 mg, Oral, Nightly  dilTIAZem CD, 180 mg, Oral, Q24H  furosemide, 40 mg, Intravenous, Q8H  insulin  "glargine, 10 Units, Subcutaneous, Nightly  insulin lispro, 0-9 Units, Subcutaneous, TID AC  lisinopril, 40 mg, Oral, Q24H  sodium chloride, 10 mL, Intravenous, Q12H  sotalol, 40 mg, Oral, Q12H      Continuous Infusions:     PRN Meds:  •  acetaminophen **OR** acetaminophen **OR** acetaminophen  •  aluminum-magnesium hydroxide-simethicone  •  senna-docusate sodium **AND** polyethylene glycol **AND** bisacodyl **AND** bisacodyl  •  dextrose  •  dextrose  •  glucagon (human recombinant)  •  insulin lispro **AND** insulin lispro  •  ipratropium-albuterol  •  ketorolac  •  labetalol  •  magnesium sulfate **OR** magnesium sulfate in D5W 1g/100mL (PREMIX)  •  melatonin  •  nitroglycerin  •  ondansetron **OR** ondansetron  •  potassium chloride **OR** potassium chloride **OR** potassium chloride  •  sodium chloride    Objective     VITAL SIGNS  Vitals:    07/18/22 2250 07/19/22 0436 07/19/22 0500 07/19/22 0700   BP: 156/98 115/74  143/96   BP Location: Right arm Right arm  Right arm   Patient Position: Lying Lying  Lying   Pulse: 78 61  65   Resp: 20 20  18   Temp: 97.2 °F (36.2 °C) 97.6 °F (36.4 °C)     TempSrc: Oral Oral     SpO2: 92% 90% 97% 97%   Weight:  86.1 kg (189 lb 13.1 oz)     Height:  180.3 cm (71\")       Flowsheet Rows    Flowsheet Row First Filed Value   Admission Height 180.3 cm (71\") Documented at 07/18/2022 0416   Admission Weight 88.9 kg (195 lb 15.8 oz) Documented at 07/18/2022 0416           TELEMETRY: sinus    Physical Exam:  Vitals reviewed.   Constitutional:       General: Awake.      Appearance: Not in distress.   Eyes:      Conjunctiva/sclera: Conjunctivae normal.   Pulmonary:      Effort: Pulmonary effort is normal.      Breath sounds: Normal breath sounds.   Cardiovascular:      Normal rate. Regular rhythm. Normal S1. Normal S2.   Pulses:     Intact distal pulses.   Edema:     Peripheral edema present.  Abdominal:      General: Bowel sounds are normal.      Palpations: Abdomen is soft.   Skin:     " General: Skin is warm and dry.   Neurological:      General: No focal deficit present.      Mental Status: Alert and oriented to person, place and time.   Psychiatric:         Behavior: Behavior is cooperative.          LAB RESULTS (LAST 7 DAYS)  I have reviewed new clinical results.    CBC  Results from last 7 days   Lab Units 07/19/22  0037 07/18/22 0455   WBC 10*3/mm3 7.80 6.40   RBC 10*6/mm3 4.77 4.68   HEMOGLOBIN g/dL 13.3 13.0   HEMATOCRIT % 38.9 38.9   MCV fL 81.5 83.0   PLATELETS 10*3/mm3 220 222     CMP   Results from last 7 days   Lab Units 07/19/22  0037 07/18/22 0455   SODIUM mmol/L 137 135*   POTASSIUM mmol/L 3.2* 3.7   CHLORIDE mmol/L 96* 100   CO2 mmol/L 30.0* 24.0   BUN mg/dL 19 17   CREATININE mg/dL 1.21 0.91   GLUCOSE mg/dL 231* 313*   ALBUMIN g/dL  --  3.70   BILIRUBIN mg/dL  --  0.3   ALK PHOS U/L  --  126*   AST (SGOT) U/L  --  14   ALT (SGPT) U/L  --  18     ProBNP      TROPONIN  Results from last 7 days   Lab Units 07/19/22  0037   TROPONIN T ng/mL 0.022     CoAg      Creatinine Clearance  Estimated Creatinine Clearance: 81 mL/min (by C-G formula based on SCr of 1.21 mg/dL).    Radiology  XR Chest 1 View    Result Date: 7/18/2022  1. Limited by low lung volumes. 2. Bibasal atelectasis, edema, and/or airspace disease. 3. Cardiomegaly. Electronically signed by:  Erick Carey D.O.  7/18/2022 4:38 AM    CT Angiogram Chest Pulmonary Embolism    Result Date: 7/18/2022  1.  Acute congestive heart failure given the presence of pleural effusions, pulmonary edema, and cardiomegaly. 2.  No pulmonary embolism. 3.  Redemonstration of nonspecific mediastinal lymph nodes. Electronically signed by:  Erick Carey D.O.  7/18/2022 4:43 AM      EKG    I personally viewed and interpreted the patient's EKG/Telemetry data:    ECHOCARDIOGRAM:  Results for orders placed during the hospital encounter of 01/20/21    Adult Transesophageal Echo (FRNA) W/ Cont if Necessary Per Protocol    Interpretation Summary  ·  Left ventricular wall thickness is consistent with concentric hypertrophy.  · Estimated left ventricular EF = 45% Estimated left ventricular EF was in agreement with the calculated left ventricular EF. Left ventricular ejection fraction appears to be 41 - 45%. Left ventricular systolic function is low normal.  · The right atrial cavity is mildly dilated.  · With a centrally-directed jet noted.  · Estimated right ventricular systolic pressure from tricuspid regurgitation is normal (<35 mmHg).    Procedure indication  Recurrent cardioembolic stroke and history of atrial arrhythmias and patient brought in for FRAN prior to atrial arrhythmia ablation      conscious sedation administered by anesthesia    consent obtained before procedure      Procedure note  after obtaining a valid consent patient was sedated by Anesthesia and a FRAN probe was easily placed into esophagus with multiplane imaging with 2D, color and Doppler followed by bubble study with agitated saline without any complications    FRAN  Findings    Moderate left atrial enlargement without any shunt or clot  LV ejection fraction of 45%  No thrombus  Mild MR and TR and trivial effusion    Plan  Proceed with EP study and ablation      Procedures done  Transesophageal echocardiography        Electronically signed by Nathen Olmstead MD, 01/20/21, 5:08 PM EST.      STRESS MYOVIEW:    CARDIAC CATHETERIZATION:    OTHER:       ASSESSMENT/PLAN      Acute CHF (congestive heart failure) (HCC)    Hyperlipidemia    Primary hypertension    Overweight (BMI 25.0-29.9)    Type 2 diabetes mellitus with hyperglycemia, without long-term current use of insulin (HCC)    History of cerebrovascular accident    Coronary artery disease involving native coronary artery of native heart    Atrial arrhythmia    Acute chest pain    Palpitations    Dyspnea on exertion    Bilateral pleural effusion    Acute on chronic congestive heart failure, unspecified heart failure type  (HCC)      PLAN  Continue diuresis with lasix  Echo results pending  Heart Failure Navigator to see today  Will need consistent reinforcement of HF education    I discussed the patients findings and my recommendations with patient and nurse.  Further recommendations per Dr. Montana.    EDWINA Mena  07/19/22  10:18 EDT   Electronically signed by EDWINA Mena, 07/19/22, 10:21 AM EDT.      Electronically signed by Meliza Rodríguez APRN at 07/19/22 1020

## 2022-07-21 NOTE — PLAN OF CARE
Goal Outcome Evaluation:  Plan of Care Reviewed With: patient        Progress: improving  Outcome Evaluation: no complaints today. Ambulated in room independently. Cont. to receive IV lasix. To d/c home when cleared by cardiology.Cath site to groin soft & clear. Dressing intact.

## 2022-07-21 NOTE — PLAN OF CARE
Goal Outcome Evaluation:  Plan of Care Reviewed With: patient        Progress: improving  Outcome Evaluation: Slept well during the shift. Cath site clear.  Ambulated without difficulty. No c/o chest pain. IV lasix continues. Will Monitor.

## 2022-07-21 NOTE — CONSULTS
Nutrition Services    Patient Name: Preet Jones  YOB: 1965  MRN: [id number]  Admission date: 7/18/2022      PPE Documentation        PPE Worn By Provider mask and eye protection   PPE Worn By Patient  None      Nutrition Counseling & Education       Reason for Visit: Criteria noted by RDN during nutrition assessment (hemoglobin a1c 10.1%)     Session topic: Diet rationale, Key food habit change, Consistent Carbohydrate Diet and Type 2 Diabetes     Session comments: Education not appropriate at this time.  Patient asleep and did not awaken to name.  RD left educational materials at bedside.       Provided print material via: [employer]-Nutrition Care Manual      Goals: Nutrition related lab values to trend towards desirable value     Monitoring/Follow Up: RD to follow up PRN    Patient to follow up PRN on outpatient basis       Electronically signed by:  Nerissa Dewey RD  07/21/22 10:49 EDT

## 2022-07-21 NOTE — PROGRESS NOTES
[facility] Medicine Services Daily Progress Note    Patient Name: Preet Jones  : 1965  MRN: [id number]  Primary Care Physician:  Celine Garcia MD  Date of admission: 2022      Subjective      Chief Complaint: Shortness of breath    Patient reports feeling slightly weak but overall improving.  Cardiac cath showing no progression of coronary disease no stenting required.  Continuing medical management.  Patient on room air at this time.    Review of Systems   Constitutional: Positive for malaise/fatigue.   Cardiovascular: Positive for dyspnea on exertion.   All other systems reviewed and are negative.        Objective      Vitals:   Temp:  [97.5 °F (36.4 °C)-98.8 °F (37.1 °C)] 97.7 °F (36.5 °C)  Heart Rate:  [61-89] 71  Resp:  [14-18] 16  BP: (109-160)/() 131/86  Flow (L/min):  [2-3] 3    Physical Exam     General: Middle-age male lying in bed breathing comfortably on room air no acute distress  HEENT: NC/AT, EOMI, mucosa moist  Heart: Regular, rate controlled  Chest: Normal work of breathing some diminished lung sounds no crackles  Abdominal: Soft.  Nontender nondistended  Musculoskeletal: Normal ROM.  No significant lower extremity edema no calf tenderness.  Neurological: AAOx3, no focal deficits  Skin: Skin is warm and dry. No rash  Psychiatric: Normal mood and affect.         Result Review    Result Review:  I have personally reviewed the results from the time of this admission to 2022 14:43 EDT and agree with these findings:  [x]  Laboratory  [x]  Microbiology  [x]  Radiology  [x]  EKG/Telemetry   []  Cardiology/Vascular   []  Pathology  []  Old records  []  Other:  Most notable findings include: AM labs unremarkable    Wounds (last 24 hours)     LDA Wound     Row Name 22 0815 22 0400 22 0000       Wound 22 1635 Right anterior greater trochanter Puncture    Wound - Properties Group Placement Date: 22  -HJ Placement Time:    -HJ Side: Right  -HJ Orientation: anterior  -HJ Location: greater trochanter  -HJ Primary Wound Type: Puncture  -HJ    Dressing Appearance dry;intact  -JW dry;intact  -DW dry;intact  -DW    Periwound Skin Turgor soft  -JW soft  -DW soft  -DW    Drainage Amount none  -JW none  -DW --    Periwound Care dry periwound area maintained  -JW -- --    Retired Wound - Properties Group Placement Date: 07/20/22  -HJ Placement Time: 1635 -HJ Side: Right  -HJ Orientation: anterior  -HJ Location: greater trochanter  -HJ Primary Wound Type: Puncture  -HJ    Retired Wound - Properties Group Date first assessed: 07/20/22  -HJ Time first assessed: 1635  -HJ Side: Right  -HJ Location: greater trochanter  -HJ Primary Wound Type: Puncture  -HJ    Row Name 07/20/22 2100 07/20/22 2000 07/20/22 1901       Wound 07/20/22 1635 Right anterior greater trochanter Puncture    Wound - Properties Group Placement Date: 07/20/22  -HJ Placement Time: 1635 -HJ Side: Right  -HJ Orientation: anterior  -HJ Location: greater trochanter  -HJ Primary Wound Type: Puncture  -HJ    Dressing Appearance dry;intact  -DW dry;intact  -DW dry;intact  -DW    Periwound Skin Turgor soft  -DW soft  -DW soft  -DW    Drainage Amount none  -DW none  -DW none  -DW    Retired Wound - Properties Group Placement Date: 07/20/22  -HJ Placement Time: 1635  -HJ Side: Right  -HJ Orientation: anterior  -HJ Location: greater trochanter  -HJ Primary Wound Type: Puncture  -HJ    Retired Wound - Properties Group Date first assessed: 07/20/22  -HJ Time first assessed: 1635  -HJ Side: Right  -HJ Location: greater trochanter  -HJ Primary Wound Type: Puncture  -HJ    Row Name 07/20/22 1820 07/20/22 1750          Wound 07/20/22 1635 Right anterior greater trochanter Puncture    Wound - Properties Group Placement Date: 07/20/22  -HJ Placement Time: 1635  -HJ Side: Right  -HJ Orientation: anterior  -HJ Location: greater trochanter  -HJ Primary Wound Type: Puncture  -HJ     Dressing  Appearance dry;intact  -HJ dry;intact;no drainage  -HJ     Closure Other (Comment)  -HJ Other (Comment)  mynx  -HJ     Periwound Skin Turgor soft  -HJ soft  -HJ     Drainage Amount none  -HJ none  -HJ     Periwound Care dry periwound area maintained  -HJ dry periwound area maintained  -HJ     Retired Wound - Properties Group Placement Date: 07/20/22  -HJ Placement Time: 1635  -HJ Side: Right  -HJ Orientation: anterior  -HJ Location: greater trochanter  -HJ Primary Wound Type: Puncture  -HJ     Retired Wound - Properties Group Date first assessed: 07/20/22  -HJ Time first assessed: 1635  -HJ Side: Right  -HJ Location: greater trochanter  -HJ Primary Wound Type: Puncture  -HJ           User Key  (r) = Recorded By, (t) = Taken By, (c) = Cosigned By    Initials Name Provider Type    Pratik Hernandez, RN Registered Nurse    Marcy Mcdaniel RN Registered Nurse    Shane Byrd RN Registered Nurse                  Assessment & Plan      Brief Patient Summary:    Preet Jones is a 57 y.o. male with a history of CAD s/p stent placement, HTN, HLD, Type 2 DM, CVA, atrial arrhythmias s/p a-fib and flutter ablation who presented to the ER at [facility] on 7/18/2022 complaining of palpitations, chest pain, and dyspnea on exertion. The patient states his symptoms started approximately 2 weeks ago, but have gotten worse over the past 3 days. He states 3 days ago he saw the nurse practitioner at his PCP's office and was diagnosed with acute bronchitis. He states he was prescribed azithromycin and Mucinex. He states his symptoms have worsened despite taking the medications prescribed. He denies a history of congestive heart failure, and he is not on a diuretic at home. He reports some recent ankle swelling and feeling bloated. He states his blood pressure is normally well controlled, but it was elevated at his PCP's office. He states he has an upcoming appointment with his cardiologist on 07/20/22. He denies  "any exacerbating or alleviating factors.      The patient is a nonsmoker and denies alcohol or illicit drug use. He reports a family history of heart disease in his mother.      Workup in the ER revealed acute CHF with bilateral pleural effusions. The patient was given Lasix 40 mg IV and aspirin 324 mg PO. He also had 1\" nitro paste applied. He was admitted to the Hospitalist group for further evaluation and treatment.     7/19/2022: Patient reports feeling better today but not back to normal.  Still having dyspnea with any exertion.  Still having difficulty laying back flat.  Leg swelling down.    7/20/2022: Patient reports overall feeling better.  Patient had some abdominal pain overnight was given Toradol.  Patient going for left heart cath today for evaluation of ischemic cardiomyopathy.      apixaban, 5 mg, Oral, Q12H  aspirin, 81 mg, Oral, Daily  atorvastatin, 80 mg, Oral, Nightly  furosemide, 40 mg, Intravenous, Q8H  insulin glargine, 10 Units, Subcutaneous, Nightly  insulin lispro, 0-9 Units, Subcutaneous, TID AC  sodium chloride, 10 mL, Intravenous, Q12H  sotalol, 40 mg, Oral, Q12H       sodium chloride, 10 mL/hr, Last Rate: 10 mL/hr (07/20/22 2034)         Active Hospital Problems:  Active Hospital Problems    Diagnosis    • **Acute CHF (congestive heart failure) ([facility])    • Acute on chronic HFrEF (heart failure with reduced ejection fraction) ([facility])    • CAD S/P percutaneous coronary angioplasty    • Dyspnea on exertion    • Acute chest pain    • Palpitations    • Overweight (BMI 25.0-29.9)    • Atrial arrhythmia    • Bilateral pleural effusion    • Acute on chronic congestive heart failure, unspecified heart failure type ([facility])    • Coronary artery disease involving native coronary artery of native heart    • Hyperlipidemia    • Primary hypertension    • Type 2 diabetes mellitus with hyperglycemia, without long-term current use of insulin ([facility])    • History of cerebrovascular accident      Plan: "     Shortness of breath-with underlying palpitations and tachyarrhythmia, concern of new onset heart failure  -Follow-up echo  -Strict I's and O's  -2 g diet  -Consider transition to oral diuretics, based on patient's systolic dysfunction would likely benefit from Aldactone  -Cardiology consulted  -Cautiously resume home medication  -Heart failure educator  -Elevated proBNP    Renal insufficiency-likely due to diuresis, has since normalized  -Given patient getting contrast for cardiac cath stop any NSAIDs  -Daily labs  -For any further pain please use non-NSAID    Coronary artery disease-patient with history of previous stenting  -Antiplatelet  -EKG reviewed  -Heart rate control  -Modifiable risk factors    Type 2 diabetes-aim to keep blood sugars less than 200  -A1c  -Sliding scale  -Cardiac/diabetic diet    CVA-possibly cardioembolic patient on anticoagulation  -Continue anticoagulation and antiplatelet  -Monitor neurological status    Hypertension/hyperlipidemia-chronic in nature  -Resume home medication as clinically appropriate      DVT prophylaxis:  Medical DVT prophylaxis orders are present.    CODE STATUS:    Code Status (Patient has no pulse and is not breathing): CPR (Attempt to Resuscitate)  Medical Interventions (Patient has pulse or is breathing): Full Support      Disposition:  I expect patient to be discharged in 24 hours    This patient has been examined wearing appropriate Personal Protective Equipment and discussed with hospital infection control department. 07/21/22      Electronically signed by Darrel Champagne MD, 07/21/22, 14:43 EDT.  [facility]ist Team

## 2022-07-21 NOTE — DISCHARGE SUMMARY
[facility] Medicine Services  DISCHARGE SUMMARY    Patient Name: Preet Jones  : 1965  MRN: [id number]    Date of Admission: 2022  Discharge Diagnosis: Systolic heart failure  Date of Discharge: 2022  Primary Care Physician: Celine Garcia MD      Presenting Problem:   Acute on chronic diastolic CHF (congestive heart failure) ([facility]) [I50.33]  Chest pain, unspecified type [R07.9]  Acute on chronic congestive heart failure, unspecified heart failure type (HCC) [I50.9]    Active and Resolved Hospital Problems:  Active Hospital Problems    Diagnosis POA   • **Acute CHF (congestive heart failure) ([facility]) [I50.9] Yes   • Acute on chronic HFrEF (heart failure with reduced ejection fraction) ([facility]) [I50.23] Unknown   • CAD S/P percutaneous coronary angioplasty [I25.10, Z98.61] Not Applicable   • Dyspnea on exertion [R06.00] Yes   • Acute chest pain [R07.9] Yes   • Palpitations [R00.2] Yes   • Overweight (BMI 25.0-29.9) [E66.3] Yes   • Atrial arrhythmia [I49.8] Yes   • Bilateral pleural effusion [J90] Yes   • Acute on chronic congestive heart failure, unspecified heart failure type (HCC) [I50.9] Yes   • Coronary artery disease involving native coronary artery of native heart [I25.10] Yes   • Hyperlipidemia [E78.5] Yes   • Primary hypertension [I10] Yes   • Type 2 diabetes mellitus with hyperglycemia, without long-term current use of insulin ([facility]) [E11.65] Yes   • History of cerebrovascular accident [Z86.73] Not Applicable      Resolved Hospital Problems   No resolved problems to display.     Shortness of breath-with underlying palpitations and tachyarrhythmia, concern of new onset heart failure  -Follow-up echo  -Strict I's and O's  -2 g diet  -Transition oral diuretics  -Stop lisinopril start Entresto  -Cardiology consulted  -Cautiously resume home medication  -Heart failure educator  -Elevated proBNP     Renal insufficiency-likely due to diuresis, has since  normalized  -Given patient getting contrast for cardiac cath stop any NSAIDs  -Daily labs  -For any further pain please use non-NSAID     Coronary artery disease-patient with history of previous stenting  -Antiplatelet  -EKG reviewed  -Heart rate control  -Modifiable risk factors     Type 2 diabetes-aim to keep blood sugars less than 200  -A1c  -Sent home on long-acting insulin  -Cardiac/diabetic diet     CVA-possibly cardioembolic patient on anticoagulation  -Continue anticoagulation and antiplatelet  -Monitor neurological status     Hypertension/hyperlipidemia-chronic in nature  -Resume home medication as clinically appropriate    Hospital Course     Hospital Course:    Preet Jones is a 57 y.o. male with a history of CAD s/p stent placement, HTN, HLD, Type 2 DM, CVA, atrial arrhythmias s/p a-fib and flutter ablation who presented to the ER at [facility] on 7/18/2022 complaining of palpitations, chest pain, and dyspnea on exertion. The patient states his symptoms started approximately 2 weeks ago, but have gotten worse over the past 3 days. He states 3 days ago he saw the nurse practitioner at his PCP's office and was diagnosed with acute bronchitis. He states he was prescribed azithromycin and Mucinex. He states his symptoms have worsened despite taking the medications prescribed. He denies a history of congestive heart failure, and he is not on a diuretic at home. He reports some recent ankle swelling and feeling bloated. He states his blood pressure is normally well controlled, but it was elevated at his PCP's office. He states he has an upcoming appointment with his cardiologist on 07/20/22. He denies any exacerbating or alleviating factors.      The patient is a nonsmoker and denies alcohol or illicit drug use. He reports a family history of heart disease in his mother.      Workup in the ER revealed acute CHF with bilateral pleural effusions. The patient was given Lasix 40 mg IV and aspirin 324  "mg PO. He also had 1\" nitro paste applied. He was admitted to the Hospitalist group for further evaluation and treatment.      7/19/2022: Patient reports feeling better today but not back to normal.  Still having dyspnea with any exertion.  Still having difficulty laying back flat.  Leg swelling down.     7/20/2022: Patient reports overall feeling better.  Patient had some abdominal pain overnight was given Toradol.  Patient going for left heart cath today for evaluation of ischemic cardiomyopathy.       7/21/2022: Patient feeling much better.  Cleared for discharge by cardiology.  Outpatient follow-up organized.  Restarted on oral diuretics.  Stop lisinopril start Entresto.  Patient discharged home in good condition with strict return precautions given.    DISCHARGE Follow Up Recommendations for labs and diagnostics:       Reasons For Change In Medications and Indications for New Medications:      Stop lisinopril  Stop Cardizem  Start Entresto  Start Lasix 40 mg daily  Lantus 10 units nightly    Day of Discharge     Vital Signs:  Temp:  [97.5 °F (36.4 °C)-98.8 °F (37.1 °C)] 97.7 °F (36.5 °C)  Heart Rate:  [61-89] 71  Resp:  [14-18] 16  BP: (109-160)/() 131/86  Flow (L/min):  [2-3] 3    Physical Exam:  Physical Exam     General: Middle-age male lying in bed breathing comfortably on room air no acute distress  HEENT: NC/AT, EOMI, mucosa moist  Heart: Regular, rate controlled  Chest: Normal work of breathing some diminished lung sounds no crackles  Abdominal: Soft.  Nontender nondistended  Musculoskeletal: Normal ROM.  No significant lower extremity edema no calf tenderness.  Neurological: AAOx3, no focal deficits  Skin: Skin is warm and dry. No rash  Psychiatric: Normal mood and affect.      Pertinent  and/or Most Recent Results     LAB RESULTS:      Lab 07/21/22  0022 07/19/22  0037 07/18/22  0455   WBC 9.40 7.80 6.40   HEMOGLOBIN 13.7 13.3 13.0   HEMATOCRIT 40.8 38.9 38.9   PLATELETS 273 220 222   NEUTROS ABS "  --  4.40 3.90   LYMPHS ABS  --  2.00 1.50   MONOS ABS  --  0.60 0.50   EOS ABS  --  0.70* 0.50*   MCV 82.7 81.5 83.0   PROCALCITONIN  --   --  0.07         Lab 07/21/22  0022 07/20/22  0026 07/19/22  1335 07/19/22  0037 07/18/22  0455   SODIUM 137 137  --  137 135*   POTASSIUM 3.5 3.9 4.2 3.2* 3.7   CHLORIDE 97* 98  --  96* 100   CO2 28.0 29.0  --  30.0* 24.0   ANION GAP 12.0 10.0  --  11.0 11.0   BUN 28* 26*  --  19 17   CREATININE 1.18 1.31*  --  1.21 0.91   EGFR 71.5 63.1  --  69.4 97.7   GLUCOSE 214* 380*  --  231* 313*   CALCIUM 9.0 9.0  --  9.1 9.2   MAGNESIUM 2.0 2.6  --  1.8 2.2   HEMOGLOBIN A1C  --   --   --   --  10.1*   TSH  --   --   --   --  2.370         Lab 07/18/22 0455   TOTAL PROTEIN 6.8   ALBUMIN 3.70   GLOBULIN 3.1   ALT (SGPT) 18   AST (SGOT) 14   BILIRUBIN 0.3   ALK PHOS 126*         Lab 07/19/22  0037 07/18/22  1048 07/18/22  0455   PROBNP  --   --  2,484.0*   TROPONIN T 0.022 0.026 0.025         Lab 07/18/22 0455   CHOLESTEROL 163   LDL CHOL 110*   HDL CHOL 24*   TRIGLYCERIDES 164*             Brief Urine Lab Results     None        Microbiology Results (last 10 days)     Procedure Component Value - Date/Time    Respiratory Panel PCR w/COVID-19(SARS-CoV-2) TRACIE/AIDA/IDANIA/PAD/COR/MAD/FRANKI In-House, NP Swab in [facility] 3-4 HR TAT - Swab, Nasopharynx [[id number]]  (Normal) Collected: 07/18/22 0455    Lab Status: Final result Specimen: Swab from Nasopharynx Updated: 07/18/22 0576     ADENOVIRUS, PCR Not Detected     Coronavirus 229E Not Detected     Coronavirus HKU1 Not Detected     Coronavirus NL63 Not Detected     Coronavirus OC43 Not Detected     COVID19 Not Detected     Human Metapneumovirus Not Detected     Human Rhinovirus/Enterovirus Not Detected     Influenza A PCR Not Detected     Influenza B PCR Not Detected     Parainfluenza Virus 1 Not Detected     Parainfluenza Virus 2 Not Detected     Parainfluenza Virus 3 Not Detected     Parainfluenza Virus 4 Not Detected     RSV, PCR Not Detected      Bordetella pertussis pcr Not Detected     Bordetella parapertussis PCR Not Detected     Chlamydophila pneumoniae PCR Not Detected     Mycoplasma pneumo by PCR Not Detected    Narrative:      In the setting of a positive respiratory panel with a viral infection PLUS a negative procalcitonin without other underlying concern for bacterial infection, consider observing off antibiotics or discontinuation of antibiotics and continue supportive care. If the respiratory panel is positive for atypical bacterial infection (Bordetella pertussis, Chlamydophila pneumoniae, or Mycoplasma pneumoniae), consider antibiotic de-escalation to target atypical bacterial infection.          XR Chest 1 View    Result Date: 7/18/2022  Impression: 1. Limited by low lung volumes. 2. Bibasal atelectasis, edema, and/or airspace disease. 3. Cardiomegaly. Electronically signed by:  Erick Carey D.O.  7/18/2022 4:38 AM    CT Angiogram Chest Pulmonary Embolism    Result Date: 7/18/2022  Impression: 1.  Acute congestive heart failure given the presence of pleural effusions, pulmonary edema, and cardiomegaly. 2.  No pulmonary embolism. 3.  Redemonstration of nonspecific mediastinal lymph nodes. Electronically signed by:  Erick Carey D.O.  7/18/2022 4:43 AM      Results for orders placed during the hospital encounter of 01/31/22    Duplex Groin Pseudoaneurysm unilateral CAR    Interpretation Summary  · No evidence of pseudoaneurysm or AV fistula in the right groin.      Results for orders placed during the hospital encounter of 01/31/22    Duplex Groin Pseudoaneurysm unilateral CAR    Interpretation Summary  · No evidence of pseudoaneurysm or AV fistula in the right groin.      Results for orders placed during the hospital encounter of 07/18/22    Adult Transthoracic Echo Complete W/ Cont if Necessary Per Protocol    Interpretation Summary  · The left ventricular cavity is moderately dilated.  · Left ventricular ejection fraction appears  to be 36 - 40%.  · Moderate tricuspid valve regurgitation is present.  · Estimated right ventricular systolic pressure from tricuspid regurgitation is moderately elevated (45-55 mmHg).  · Mildly reduced right ventricular systolic function noted.  · Left ventricular diastolic function is consistent with (grade II w/high LAP) pseudonormalization.  · There is a left pleural effusion.      Labs Pending at Discharge:      Procedures Performed  Procedure(s):  Left Heart Cath         Consults:   Consults     Date and Time Order Name Status Description    7/18/2022  7:23 AM Inpatient Cardiology Consult              Discharge Details        Discharge Medications      New Medications      Instructions Start Date   furosemide 40 MG tablet  Commonly known as: LASIX   40 mg, Oral, Daily   Start Date: July 22, 2022     Insulin Glargine 100 UNIT/ML injection pen  Commonly known as: LANTUS SOLOSTAR   10 Units, Subcutaneous, Nightly, Dx code: E11.65      sacubitril-valsartan 24-26 MG tablet  Commonly known as: ENTRESTO   1 tablet, Oral, Every 12 Hours Scheduled   Start Date: July 22, 2022        Continue These Medications      Instructions Start Date   apixaban 5 MG tablet tablet  Commonly known as: ELIQUIS   5 mg, Oral, Every 12 Hours Scheduled      aspirin 81 MG chewable tablet   81 mg, Oral, Daily      atorvastatin 80 MG tablet  Commonly known as: LIPITOR   80 mg, Oral, Nightly      guaiFENesin 600 MG 12 hr tablet  Commonly known as: MUCINEX   1,200 mg, Oral, 2 Times Daily PRN      metFORMIN 1000 MG tablet  Commonly known as: GLUCOPHAGE   2 Times Daily With Meals      Multivitamin Childrens chewable tablet   1 tablet, Oral, Daily      sotalol 80 MG tablet  Commonly known as: BETAPACE   40 mg, Oral, Every 12 Hours Scheduled      Tylenol 325 MG capsule  Generic drug: Acetaminophen   650 mg, Oral, Every 6 Hours PRN      Vitamin D-3 25 MCG (1000 UT) capsule   3 capsules, Oral, Daily         Stop These Medications    azithromycin 250  MG tablet  Commonly known as: ZITHROMAX     dilTIAZem  MG 24 hr capsule  Commonly known as: Cardizem CD     lisinopril 40 MG tablet  Commonly known as: PRINIVIL,ZESTRIL            Allergies   Allergen Reactions   • Morphine GI Intolerance   • Ozempic (0.25 Or 0.5 Mg-Dose) [Semaglutide(0.25 Or 0.5mg-Dos)] Diarrhea   • Isosorbide Headache   • Nitrofuran Derivatives Headache   • Nitroglycerin Headache         Discharge Disposition: Good  Home or Self Care    Diet:  Hospital:  Diet Order   Procedures   • Diet Cardiac, Diabetic/Consistent Carbs; Healthy Heart; Diabetic - Consistent Carb         Discharge Activity:   Activity Instructions     Activity as Tolerated              CODE STATUS:  Code Status and Medical Interventions:   Ordered at: 07/18/22 0854     Code Status (Patient has no pulse and is not breathing):    CPR (Attempt to Resuscitate)     Medical Interventions (Patient has pulse or is breathing):    Full Support         No future appointments.    Additional Instructions for the Follow-ups that You Need to Schedule     Discharge Follow-up with PCP   As directed       Currently Documented PCP:    Celine Garcia MD    PCP Phone Number:    [phone]     Follow Up Details: Please follow-up primary care in a week         Discharge Follow-up with Specified Provider: Please follow-up with cardiology in 2 to 4 weeks   As directed      To: Please follow-up with cardiology in 2 to 4 weeks               Time spent on Discharge including face to face service:  35 minutes    This patient has been examined wearing appropriate Personal Protective Equipment and discussed with hospital infection control department. 07/21/22      Signature: Electronically signed by Darrel Champagne MD, 07/21/22, 3:49 PM EDT.

## 2022-07-21 NOTE — PLAN OF CARE
Goal Outcome Evaluation:      58 y/o M presents with c/o palpitations, chest pain and dyspnea on exertion. PMH of acute MI, CAD, HTN, atrial flutter and type 2 diabetes. Dx with acute CHF with bilateral pleural effusions. Left heart cath 7/20. At baseline, pt lives in two story house with wife. Pt is independent with ADLs and mobility. Pt works and drives. This date, pt independent form supine to sit and to transition to stand. Pt ambulated community distance with supervision and no LOB. Static and dynamic balance assessed with no risk for falls. Pt had previous stroke causing R sided weakness but strength is equal bilaterally with MMT. Pt denies hx of falls and neuropathy. Pt reports feeling a little weak from lying in bed but appears to be functioning at baseline. Anticipate d/c routine home when medically stable.

## 2022-07-22 LAB — QT INTERVAL: 358 MS

## 2022-07-22 NOTE — PAYOR COMM NOTE
"Dc Notice - Requesting IP Determination  RE:  Thalia Roman  1965  Pdg Ref #[id number]  DC 22 Routine to home    AUTHORIZATION PENDING  PLEASE FORWARD DETERMINATION TO FOLLOWING CONTACT:    CATRACHITA CROCKETT LPN   Utilization Review Nurse  [facility]  Direct & confidential phone # [phone]  Fax # [phone]    Robert Landon (57 y.o. Male)             Date of Birth   1965    Social Security Number       Address   [address] [place] [address]    Home Phone   [phone]    MRN   [id number]       Restorationist   None    Marital Status                               Admission Date   22    Admission Type   Emergency    Admitting Provider   Darrel Champagne MD    Attending Provider       Department, Room/Bed   [facility] 2B MEDICAL INPATIENT,        Discharge Date   2022    Discharge Disposition   Home or Self Care    Discharge Destination                               Attending Provider: (none)   Allergies: Morphine, Ozempic (0.25 Or 0.5 Mg-dose) [Semaglutide(0.25 Or 0.5mg-dos)], Isosorbide, Nitrofuran Derivatives, Nitroglycerin    Isolation: None   Infection: None   Code Status: Prior   Advance Care Planning Activity    Ht: 180.3 cm (71\")   Wt: 84.5 kg (186 lb 4.6 oz)    Admission Cmt: None   Principal Problem: Acute CHF (congestive heart failure) (HCC) [I50.9]                 Active Insurance as of 2022     Primary Coverage     Payor Plan Insurance Group Employer/Plan Group    [facility] [id number]     Payor Plan Address Payor Plan Phone Number Payor Plan Fax Number Effective Dates    [address] [address] [phone]  2021 - None Entered    [facility] [id number]       Subscriber Name Subscriber Birth Date Member ID       THALIA ROMAN J [id number]                 Emergency Contacts      (Rel.) Home Phone Work Phone Mobile Phone    JETT ROMAN (Spouse) [phone] -- --               Discharge " Summary      Darrel Champagne MD at 22 1547                       [facility]  DISCHARGE SUMMARY    Patient Name: Preet Jones  : 1965  MRN: [id number]    Date of Admission: 2022  Discharge Diagnosis: Systolic heart failure  Date of Discharge: 2022  Primary Care Physician: Celine Garcia MD      Presenting Problem:   Acute on chronic diastolic CHF (congestive heart failure) ([facility]) [I50.33]  Chest pain, unspecified type [R07.9]  Acute on chronic congestive heart failure, unspecified heart failure type ([facility]) [I50.9]    Active and Resolved Hospital Problems:  Active Hospital Problems    Diagnosis POA   • **Acute CHF (congestive heart failure) ([facility]) [I50.9] Yes   • Acute on chronic HFrEF (heart failure with reduced ejection fraction) ([facility]) [I50.23] Unknown   • CAD S/P percutaneous coronary angioplasty [I25.10, Z98.61] Not Applicable   • Dyspnea on exertion [R06.00] Yes   • Acute chest pain [R07.9] Yes   • Palpitations [R00.2] Yes   • Overweight (BMI 25.0-29.9) [E66.3] Yes   • Atrial arrhythmia [I49.8] Yes   • Bilateral pleural effusion [J90] Yes   • Acute on chronic congestive heart failure, unspecified heart failure type ([facility]) [I50.9] Yes   • Coronary artery disease involving native coronary artery of native heart [I25.10] Yes   • Hyperlipidemia [E78.5] Yes   • Primary hypertension [I10] Yes   • Type 2 diabetes mellitus with hyperglycemia, without long-term current use of insulin ([facility]) [E11.65] Yes   • History of cerebrovascular accident [Z86.73] Not Applicable      Resolved Hospital Problems   No resolved problems to display.     Shortness of breath-with underlying palpitations and tachyarrhythmia, concern of new onset heart failure  -Follow-up echo  -Strict I's and O's  -2 g diet  -Transition oral diuretics  -Stop lisinopril start Entresto  -Cardiology consulted  -Cautiously resume home medication  -Heart failure educator  -Elevated  proBNP     Renal insufficiency-likely due to diuresis, has since normalized  -Given patient getting contrast for cardiac cath stop any NSAIDs  -Daily labs  -For any further pain please use non-NSAID     Coronary artery disease-patient with history of previous stenting  -Antiplatelet  -EKG reviewed  -Heart rate control  -Modifiable risk factors     Type 2 diabetes-aim to keep blood sugars less than 200  -A1c  -Sent home on long-acting insulin  -Cardiac/diabetic diet     CVA-possibly cardioembolic patient on anticoagulation  -Continue anticoagulation and antiplatelet  -Monitor neurological status     Hypertension/hyperlipidemia-chronic in nature  -Resume home medication as clinically appropriate    Hospital Course     Hospital Course:    Preet Jones is a 57 y.o. male with a history of CAD s/p stent placement, HTN, HLD, Type 2 DM, CVA, atrial arrhythmias s/p a-fib and flutter ablation who presented to the ER at [facility] on 7/18/2022 complaining of palpitations, chest pain, and dyspnea on exertion. The patient states his symptoms started approximately 2 weeks ago, but have gotten worse over the past 3 days. He states 3 days ago he saw the nurse practitioner at his PCP's office and was diagnosed with acute bronchitis. He states he was prescribed azithromycin and Mucinex. He states his symptoms have worsened despite taking the medications prescribed. He denies a history of congestive heart failure, and he is not on a diuretic at home. He reports some recent ankle swelling and feeling bloated. He states his blood pressure is normally well controlled, but it was elevated at his PCP's office. He states he has an upcoming appointment with his cardiologist on 07/20/22. He denies any exacerbating or alleviating factors.      The patient is a nonsmoker and denies alcohol or illicit drug use. He reports a family history of heart disease in his mother.      Workup in the ER revealed acute CHF with bilateral  "pleural effusions. The patient was given Lasix 40 mg IV and aspirin 324 mg PO. He also had 1\" nitro paste applied. He was admitted to the Hospitalist group for further evaluation and treatment.      7/19/2022: Patient reports feeling better today but not back to normal.  Still having dyspnea with any exertion.  Still having difficulty laying back flat.  Leg swelling down.     7/20/2022: Patient reports overall feeling better.  Patient had some abdominal pain overnight was given Toradol.  Patient going for left heart cath today for evaluation of ischemic cardiomyopathy.       7/21/2022: Patient feeling much better.  Cleared for discharge by cardiology.  Outpatient follow-up organized.  Restarted on oral diuretics.  Stop lisinopril start Entresto.  Patient discharged home in good condition with strict return precautions given.    DISCHARGE Follow Up Recommendations for labs and diagnostics:       Reasons For Change In Medications and Indications for New Medications:      Stop lisinopril  Stop Cardizem  Start Entresto  Start Lasix 40 mg daily  Lantus 10 units nightly    Day of Discharge     Vital Signs:  Temp:  [97.5 °F (36.4 °C)-98.8 °F (37.1 °C)] 97.7 °F (36.5 °C)  Heart Rate:  [61-89] 71  Resp:  [14-18] 16  BP: (109-160)/() 131/86  Flow (L/min):  [2-3] 3    Physical Exam:  Physical Exam     General: Middle-age male lying in bed breathing comfortably on room air no acute distress  HEENT: NC/AT, EOMI, mucosa moist  Heart: Regular, rate controlled  Chest: Normal work of breathing some diminished lung sounds no crackles  Abdominal: Soft.  Nontender nondistended  Musculoskeletal: Normal ROM.  No significant lower extremity edema no calf tenderness.  Neurological: AAOx3, no focal deficits  Skin: Skin is warm and dry. No rash  Psychiatric: Normal mood and affect.      Pertinent  and/or Most Recent Results     LAB RESULTS:      Lab 07/21/22  0022 07/19/22  0037 07/18/22  0455   WBC 9.40 7.80 6.40   HEMOGLOBIN 13.7 " 13.3 13.0   HEMATOCRIT 40.8 38.9 38.9   PLATELETS 273 220 222   NEUTROS ABS  --  4.40 3.90   LYMPHS ABS  --  2.00 1.50   MONOS ABS  --  0.60 0.50   EOS ABS  --  0.70* 0.50*   MCV 82.7 81.5 83.0   PROCALCITONIN  --   --  0.07         Lab 07/21/22  0022 07/20/22  0026 07/19/22  1335 07/19/22  0037 07/18/22  0455   SODIUM 137 137  --  137 135*   POTASSIUM 3.5 3.9 4.2 3.2* 3.7   CHLORIDE 97* 98  --  96* 100   CO2 28.0 29.0  --  30.0* 24.0   ANION GAP 12.0 10.0  --  11.0 11.0   BUN 28* 26*  --  19 17   CREATININE 1.18 1.31*  --  1.21 0.91   EGFR 71.5 63.1  --  69.4 97.7   GLUCOSE 214* 380*  --  231* 313*   CALCIUM 9.0 9.0  --  9.1 9.2   MAGNESIUM 2.0 2.6  --  1.8 2.2   HEMOGLOBIN A1C  --   --   --   --  10.1*   TSH  --   --   --   --  2.370         Lab 07/18/22 0455   TOTAL PROTEIN 6.8   ALBUMIN 3.70   GLOBULIN 3.1   ALT (SGPT) 18   AST (SGOT) 14   BILIRUBIN 0.3   ALK PHOS 126*         Lab 07/19/22  0037 07/18/22  1048 07/18/22 0455   PROBNP  --   --  2,484.0*   TROPONIN T 0.022 0.026 0.025         Lab 07/18/22 0455   CHOLESTEROL 163   LDL CHOL 110*   HDL CHOL 24*   TRIGLYCERIDES 164*             Brief Urine Lab Results     None        Microbiology Results (last 10 days)     Procedure Component Value - Date/Time    Respiratory Panel PCR w/COVID-19(SARS-CoV-2) TRACIE/AIDA/IDANIA/PAD/COR/MAD/FRANKI In-House, NP Swab in UTM/[facility], 3-4 HR TAT - Swab, Nasopharynx [[id number]]  (Normal) Collected: 07/18/22 0455    Lab Status: Final result Specimen: Swab from Nasopharynx Updated: 07/18/22 0548     ADENOVIRUS, PCR Not Detected     Coronavirus 229E Not Detected     Coronavirus HKU1 Not Detected     Coronavirus NL63 Not Detected     Coronavirus OC43 Not Detected     COVID19 Not Detected     Human Metapneumovirus Not Detected     Human Rhinovirus/Enterovirus Not Detected     Influenza A PCR Not Detected     Influenza B PCR Not Detected     Parainfluenza Virus 1 Not Detected     Parainfluenza Virus 2 Not Detected     Parainfluenza Virus 3 Not  Detected     Parainfluenza Virus 4 Not Detected     RSV, PCR Not Detected     Bordetella pertussis pcr Not Detected     Bordetella parapertussis PCR Not Detected     Chlamydophila pneumoniae PCR Not Detected     Mycoplasma pneumo by PCR Not Detected    Narrative:      In the setting of a positive respiratory panel with a viral infection PLUS a negative procalcitonin without other underlying concern for bacterial infection, consider observing off antibiotics or discontinuation of antibiotics and continue supportive care. If the respiratory panel is positive for atypical bacterial infection (Bordetella pertussis, Chlamydophila pneumoniae, or Mycoplasma pneumoniae), consider antibiotic de-escalation to target atypical bacterial infection.          XR Chest 1 View    Result Date: 7/18/2022  Impression: 1. Limited by low lung volumes. 2. Bibasal atelectasis, edema, and/or airspace disease. 3. Cardiomegaly. Electronically signed by:  Erick Carey D.O.  7/18/2022 4:38 AM    CT Angiogram Chest Pulmonary Embolism    Result Date: 7/18/2022  Impression: 1.  Acute congestive heart failure given the presence of pleural effusions, pulmonary edema, and cardiomegaly. 2.  No pulmonary embolism. 3.  Redemonstration of nonspecific mediastinal lymph nodes. Electronically signed by:  Erick Carey D.O.  7/18/2022 4:43 AM      Results for orders placed during the hospital encounter of 01/31/22    Duplex Groin Pseudoaneurysm unilateral CAR    Interpretation Summary  · No evidence of pseudoaneurysm or AV fistula in the right groin.      Results for orders placed during the hospital encounter of 01/31/22    Duplex Groin Pseudoaneurysm unilateral CAR    Interpretation Summary  · No evidence of pseudoaneurysm or AV fistula in the right groin.      Results for orders placed during the hospital encounter of 07/18/22    Adult Transthoracic Echo Complete W/ Cont if Necessary Per Protocol    Interpretation Summary  · The left ventricular  cavity is moderately dilated.  · Left ventricular ejection fraction appears to be 36 - 40%.  · Moderate tricuspid valve regurgitation is present.  · Estimated right ventricular systolic pressure from tricuspid regurgitation is moderately elevated (45-55 mmHg).  · Mildly reduced right ventricular systolic function noted.  · Left ventricular diastolic function is consistent with (grade II w/high LAP) pseudonormalization.  · There is a left pleural effusion.      Labs Pending at Discharge:      Procedures Performed  Procedure(s):  Left Heart Cath         Consults:   Consults     Date and Time Order Name Status Description    7/18/2022  7:23 AM Inpatient Cardiology Consult              Discharge Details        Discharge Medications      New Medications      Instructions Start Date   furosemide 40 MG tablet  Commonly known as: LASIX   40 mg, Oral, Daily   Start Date: July 22, 2022     Insulin Glargine 100 UNIT/ML injection pen  Commonly known as: LANTUS SOLOSTAR   10 Units, Subcutaneous, Nightly, Dx code: E11.65      sacubitril-valsartan 24-26 MG tablet  Commonly known as: ENTRESTO   1 tablet, Oral, Every 12 Hours Scheduled   Start Date: July 22, 2022        Continue These Medications      Instructions Start Date   apixaban 5 MG tablet tablet  Commonly known as: ELIQUIS   5 mg, Oral, Every 12 Hours Scheduled      aspirin 81 MG chewable tablet   81 mg, Oral, Daily      atorvastatin 80 MG tablet  Commonly known as: LIPITOR   80 mg, Oral, Nightly      guaiFENesin 600 MG 12 hr tablet  Commonly known as: MUCINEX   1,200 mg, Oral, 2 Times Daily PRN      metFORMIN 1000 MG tablet  Commonly known as: GLUCOPHAGE   2 Times Daily With Meals      Multivitamin Childrens chewable tablet   1 tablet, Oral, Daily      sotalol 80 MG tablet  Commonly known as: BETAPACE   40 mg, Oral, Every 12 Hours Scheduled      Tylenol 325 MG capsule  Generic drug: Acetaminophen   650 mg, Oral, Every 6 Hours PRN      Vitamin D-3 25 MCG (1000 UT) capsule    3 capsules, Oral, Daily         Stop These Medications    azithromycin 250 MG tablet  Commonly known as: ZITHROMAX     dilTIAZem  MG 24 hr capsule  Commonly known as: Cardizem CD     lisinopril 40 MG tablet  Commonly known as: PRINIVIL,ZESTRIL            Allergies   Allergen Reactions   • Morphine GI Intolerance   • Ozempic (0.25 Or 0.5 Mg-Dose) [Semaglutide(0.25 Or 0.5mg-Dos)] Diarrhea   • Isosorbide Headache   • Nitrofuran Derivatives Headache   • Nitroglycerin Headache         Discharge Disposition: Good  Home or Self Care    Diet:  Hospital:  Diet Order   Procedures   • Diet Cardiac, Diabetic/Consistent Carbs; Healthy Heart; Diabetic - Consistent Carb         Discharge Activity:   Activity Instructions     Activity as Tolerated              CODE STATUS:  Code Status and Medical Interventions:   Ordered at: 07/18/22 0811     Code Status (Patient has no pulse and is not breathing):    CPR (Attempt to Resuscitate)     Medical Interventions (Patient has pulse or is breathing):    Full Support         No future appointments.    Additional Instructions for the Follow-ups that You Need to Schedule     Discharge Follow-up with PCP   As directed       Currently Documented PCP:    Celine Garcia MD    PCP Phone Number:    [phone]     Follow Up Details: Please follow-up primary care in a week         Discharge Follow-up with Specified Provider: Please follow-up with cardiology in 2 to 4 weeks   As directed      To: Please follow-up with cardiology in 2 to 4 weeks               Time spent on Discharge including face to face service:  35 minutes    This patient has been examined wearing appropriate Personal Protective Equipment and discussed with hospital infection control department. 07/21/22      Signature: Electronically signed by Darrel Champagne MD, 07/21/22, 3:49 PM EDT.        Electronically signed by Darrel Champagne MD at 07/21/22 4765

## 2022-07-22 NOTE — CASE MANAGEMENT/SOCIAL WORK
Case Management Discharge Note      Final Note: Home         Selected Continued Care - Discharged on 7/21/2022 Admission date: 7/18/2022 - Discharge disposition: Home or Self Care   Transportation Services  Private: Car    Final Discharge Disposition Code: 01 - home or self-care

## 2022-07-22 NOTE — OUTREACH NOTE
Prep Survey    Flowsheet Row Responses   [facility] facility patient discharged from? [facility]   Is LACE score < 7 ? No   Emergency Room discharge w/ pulse ox? No   Eligibility Readm Mgmt   Discharge diagnosis Acute CHF    Does the patient have one of the following disease processes/diagnoses(primary or secondary)? CHF   Does the patient have Home health ordered? No   Is there a DME ordered? No   Prep survey completed? Yes          PAVAN ROLLINS - Registered Nurse

## 2022-07-25 ENCOUNTER — READMISSION MANAGEMENT (OUTPATIENT)
Dept: CALL CENTER | Facility: HOSPITAL | Age: 57
End: 2022-07-25

## 2022-07-25 NOTE — OUTREACH NOTE
CHF Week 1 Survey    Flowsheet Row Responses   [facility] patient discharged from? [facility]   Does the patient have one of the following disease processes/diagnoses(primary or secondary)? CHF   CHF Week 1 attempt successful? Yes   Call start time 1558   Call end time 1600   Discharge diagnosis Acute CHF    Is patient permission given to speak with other caregiver? Yes   List who call center can speak with wife   Meds reviewed with patient/caregiver? Yes   Is the patient having any side effects they believe may be caused by any medication additions or changes? No   Does the patient have all medications ordered at discharge? Yes   Is the patient taking all medications as directed (includes completed medication regime)? Yes   Does the patient have a primary care provider?  Yes   Does the patient have an appointment with their PCP within 7 days of discharge? Greater than 7 days   What is preventing the patient from scheduling follow up appointments within 7 days of discharge? Earlier appointment not available   Nursing Interventions Advised patient to make appointment, Verified appointment date/time/provider   Has the patient kept scheduled appointments due by today? N/A   What DME was ordered? No O2   Pulse Ox monitoring None   Psychosocial issues? No   Comments Pt c/o fatigue, has some mild dry cough, SOA whether sitting/standing. Resting, deep breathing resolves the SOA.   Did the patient receive a copy of their discharge instructions? Yes   Nursing interventions Reviewed instructions with patient   What is the patient's perception of their health status since discharge? Improving   Nursing interventions Nurse provided patient education   Is the patient weighing daily? Yes   Does the patient have scales? Yes   Daily weight interventions Education provided on importance of daily weight   Is the patient able to teach back Heart Failure diet management? Yes   If the patient is a current smoker, are they able to teach  back resources for cessation? Not a smoker   Is the patient/caregiver able to teach back the hierarchy of who to call/visit for symptoms/problems? PCP, Specialist, Home health nurse, Urgent Care, ED, 911 Yes    CHF Week 1 call completed? Yes          WILBERTO LÓPEZ - Registered Nurse

## 2022-07-29 LAB — QT INTERVAL: 422 MS

## 2022-07-31 ENCOUNTER — HOSPITAL ENCOUNTER (OUTPATIENT)
Facility: HOSPITAL | Age: 57
Setting detail: OBSERVATION
Discharge: HOME | End: 2022-08-01
Attending: EMERGENCY MEDICINE | Admitting: EMERGENCY MEDICINE

## 2022-07-31 DIAGNOSIS — Z79.4 TYPE 2 DIABETES MELLITUS WITH HYPERGLYCEMIA, WITH LONG-TERM CURRENT USE OF INSULIN: ICD-10-CM

## 2022-07-31 DIAGNOSIS — G89.18 ACUTE POSTOPERATIVE PAIN OF RIGHT GROIN: Primary | ICD-10-CM

## 2022-07-31 DIAGNOSIS — E11.65 TYPE 2 DIABETES MELLITUS WITH HYPERGLYCEMIA, WITH LONG-TERM CURRENT USE OF INSULIN: ICD-10-CM

## 2022-07-31 DIAGNOSIS — R10.31 ACUTE POSTOPERATIVE PAIN OF RIGHT GROIN: Primary | ICD-10-CM

## 2022-07-31 LAB
ANION GAP SERPL CALCULATED.3IONS-SCNC: 10 MMOL/L (ref 5–15)
APTT PPP: 27.6 SECONDS (ref 61–76.5)
BASOPHILS # BLD AUTO: 0.1 10*3/MM3 (ref 0–0.2)
BASOPHILS NFR BLD AUTO: 2.1 % (ref 0–1.5)
BUN SERPL-MCNC: 17 MG/DL (ref 6–20)
BUN/CREAT SERPL: 17 (ref 7–25)
CALCIUM SPEC-SCNC: 9.1 MG/DL (ref 8.6–10.5)
CHLORIDE SERPL-SCNC: 98 MMOL/L (ref 98–107)
CO2 SERPL-SCNC: 26 MMOL/L (ref 22–29)
CREAT SERPL-MCNC: 1 MG/DL (ref 0.76–1.27)
DEPRECATED RDW RBC AUTO: 39.8 FL (ref 37–54)
EGFRCR SERPLBLD CKD-EPI 2021: 87.2 ML/MIN/1.73
EOSINOPHIL # BLD AUTO: 0.4 10*3/MM3 (ref 0–0.4)
EOSINOPHIL NFR BLD AUTO: 7 % (ref 0.3–6.2)
ERYTHROCYTE [DISTWIDTH] IN BLOOD BY AUTOMATED COUNT: 13.9 % (ref 12.3–15.4)
GLUCOSE SERPL-MCNC: 435 MG/DL (ref 65–99)
HCT VFR BLD AUTO: 42.1 % (ref 37.5–51)
HGB BLD-MCNC: 14.3 G/DL (ref 13–17.7)
INR PPP: 0.99 (ref 0.93–1.1)
LYMPHOCYTES # BLD AUTO: 2.1 10*3/MM3 (ref 0.7–3.1)
LYMPHOCYTES NFR BLD AUTO: 37.1 % (ref 19.6–45.3)
MCH RBC QN AUTO: 27.6 PG (ref 26.6–33)
MCHC RBC AUTO-ENTMCNC: 33.9 G/DL (ref 31.5–35.7)
MCV RBC AUTO: 81.3 FL (ref 79–97)
MONOCYTES # BLD AUTO: 0.4 10*3/MM3 (ref 0.1–0.9)
MONOCYTES NFR BLD AUTO: 7.4 % (ref 5–12)
NEUTROPHILS NFR BLD AUTO: 2.6 10*3/MM3 (ref 1.7–7)
NEUTROPHILS NFR BLD AUTO: 46.4 % (ref 42.7–76)
NRBC BLD AUTO-RTO: 0.1 /100 WBC (ref 0–0.2)
PLATELET # BLD AUTO: 220 10*3/MM3 (ref 140–450)
PMV BLD AUTO: 9.4 FL (ref 6–12)
POTASSIUM SERPL-SCNC: 3.9 MMOL/L (ref 3.5–5.2)
PROTHROMBIN TIME: 10.2 SECONDS (ref 9.6–11.7)
RBC # BLD AUTO: 5.17 10*6/MM3 (ref 4.14–5.8)
SODIUM SERPL-SCNC: 134 MMOL/L (ref 136–145)
WBC NRBC COR # BLD: 5.6 10*3/MM3 (ref 3.4–10.8)

## 2022-07-31 PROCEDURE — 85610 PROTHROMBIN TIME: CPT | Performed by: NURSE PRACTITIONER

## 2022-07-31 PROCEDURE — C9803 HOPD COVID-19 SPEC COLLECT: HCPCS

## 2022-07-31 PROCEDURE — 80048 BASIC METABOLIC PNL TOTAL CA: CPT | Performed by: NURSE PRACTITIONER

## 2022-07-31 PROCEDURE — 87635 SARS-COV-2 COVID-19 AMP PRB: CPT | Performed by: EMERGENCY MEDICINE

## 2022-07-31 PROCEDURE — G0378 HOSPITAL OBSERVATION PER HR: HCPCS

## 2022-07-31 PROCEDURE — 85025 COMPLETE CBC W/AUTO DIFF WBC: CPT | Performed by: NURSE PRACTITIONER

## 2022-07-31 PROCEDURE — 99284 EMERGENCY DEPT VISIT MOD MDM: CPT

## 2022-07-31 PROCEDURE — 85730 THROMBOPLASTIN TIME PARTIAL: CPT | Performed by: NURSE PRACTITIONER

## 2022-07-31 RX ORDER — SODIUM CHLORIDE 0.9 % (FLUSH) 0.9 %
10 SYRINGE (ML) INJECTION AS NEEDED
Status: DISCONTINUED | OUTPATIENT
Start: 2022-07-31 | End: 2022-08-01 | Stop reason: HOSPADM

## 2022-07-31 RX ADMIN — SODIUM CHLORIDE 1000 ML: 9 INJECTION, SOLUTION INTRAVENOUS at 23:38

## 2022-08-01 ENCOUNTER — READMISSION MANAGEMENT (OUTPATIENT)
Dept: CALL CENTER | Facility: HOSPITAL | Age: 57
End: 2022-08-01

## 2022-08-01 ENCOUNTER — APPOINTMENT (OUTPATIENT)
Dept: CARDIOLOGY | Facility: HOSPITAL | Age: 57
End: 2022-08-01

## 2022-08-01 VITALS
DIASTOLIC BLOOD PRESSURE: 78 MMHG | BODY MASS INDEX: 25.49 KG/M2 | OXYGEN SATURATION: 97 % | HEIGHT: 71 IN | SYSTOLIC BLOOD PRESSURE: 126 MMHG | WEIGHT: 182.1 LBS | RESPIRATION RATE: 18 BRPM | TEMPERATURE: 97.7 F | HEART RATE: 65 BPM

## 2022-08-01 LAB
ANION GAP SERPL CALCULATED.3IONS-SCNC: 10 MMOL/L (ref 5–15)
ANION GAP SERPL CALCULATED.3IONS-SCNC: 12 MMOL/L (ref 5–15)
BASOPHILS # BLD AUTO: 0 10*3/MM3 (ref 0–0.2)
BASOPHILS # BLD AUTO: 0.1 10*3/MM3 (ref 0–0.2)
BASOPHILS NFR BLD AUTO: 0.3 % (ref 0–1.5)
BASOPHILS NFR BLD AUTO: 1.9 % (ref 0–1.5)
BH CV LEA RIGHT SFA PROX PSV: -67.4 CM/S
BH CV RIGHT GROIN PSA PROCEDURE SCRIPTING LRR: 1
BUN SERPL-MCNC: 13 MG/DL (ref 6–20)
BUN SERPL-MCNC: 15 MG/DL (ref 6–20)
BUN/CREAT SERPL: 15.8 (ref 7–25)
BUN/CREAT SERPL: 16.3 (ref 7–25)
CALCIUM SPEC-SCNC: 8.6 MG/DL (ref 8.6–10.5)
CALCIUM SPEC-SCNC: 9 MG/DL (ref 8.6–10.5)
CHLORIDE SERPL-SCNC: 102 MMOL/L (ref 98–107)
CHLORIDE SERPL-SCNC: 98 MMOL/L (ref 98–107)
CO2 SERPL-SCNC: 26 MMOL/L (ref 22–29)
CO2 SERPL-SCNC: 26 MMOL/L (ref 22–29)
CREAT SERPL-MCNC: 0.8 MG/DL (ref 0.76–1.27)
CREAT SERPL-MCNC: 0.95 MG/DL (ref 0.76–1.27)
DEPRECATED RDW RBC AUTO: 40.7 FL (ref 37–54)
DEPRECATED RDW RBC AUTO: 41.1 FL (ref 37–54)
EGFRCR SERPLBLD CKD-EPI 2021: 102.6 ML/MIN/1.73
EGFRCR SERPLBLD CKD-EPI 2021: 92.8 ML/MIN/1.73
EOSINOPHIL # BLD AUTO: 0.4 10*3/MM3 (ref 0–0.4)
EOSINOPHIL # BLD AUTO: 0.4 10*3/MM3 (ref 0–0.4)
EOSINOPHIL NFR BLD AUTO: 6.6 % (ref 0.3–6.2)
EOSINOPHIL NFR BLD AUTO: 6.9 % (ref 0.3–6.2)
ERYTHROCYTE [DISTWIDTH] IN BLOOD BY AUTOMATED COUNT: 14 % (ref 12.3–15.4)
ERYTHROCYTE [DISTWIDTH] IN BLOOD BY AUTOMATED COUNT: 14.4 % (ref 12.3–15.4)
GLUCOSE BLDC GLUCOMTR-MCNC: 238 MG/DL (ref 70–105)
GLUCOSE BLDC GLUCOMTR-MCNC: 278 MG/DL (ref 70–105)
GLUCOSE BLDC GLUCOMTR-MCNC: 282 MG/DL (ref 70–105)
GLUCOSE BLDC GLUCOMTR-MCNC: 365 MG/DL (ref 70–105)
GLUCOSE BLDC GLUCOMTR-MCNC: 421 MG/DL (ref 70–105)
GLUCOSE SERPL-MCNC: 308 MG/DL (ref 65–99)
GLUCOSE SERPL-MCNC: 411 MG/DL (ref 65–99)
HCT VFR BLD AUTO: 43.1 % (ref 37.5–51)
HCT VFR BLD AUTO: 43.6 % (ref 37.5–51)
HGB BLD-MCNC: 14.5 G/DL (ref 13–17.7)
HGB BLD-MCNC: 15.1 G/DL (ref 13–17.7)
LYMPHOCYTES # BLD AUTO: 1.6 10*3/MM3 (ref 0.7–3.1)
LYMPHOCYTES # BLD AUTO: 2.2 10*3/MM3 (ref 0.7–3.1)
LYMPHOCYTES NFR BLD AUTO: 24.6 % (ref 19.6–45.3)
LYMPHOCYTES NFR BLD AUTO: 34 % (ref 19.6–45.3)
MAXIMAL PREDICTED HEART RATE: 162 BPM
MCH RBC QN AUTO: 27.8 PG (ref 26.6–33)
MCH RBC QN AUTO: 28.1 PG (ref 26.6–33)
MCHC RBC AUTO-ENTMCNC: 33.7 G/DL (ref 31.5–35.7)
MCHC RBC AUTO-ENTMCNC: 34.7 G/DL (ref 31.5–35.7)
MCV RBC AUTO: 81 FL (ref 79–97)
MCV RBC AUTO: 82.4 FL (ref 79–97)
MONOCYTES # BLD AUTO: 0.5 10*3/MM3 (ref 0.1–0.9)
MONOCYTES # BLD AUTO: 0.6 10*3/MM3 (ref 0.1–0.9)
MONOCYTES NFR BLD AUTO: 8.2 % (ref 5–12)
MONOCYTES NFR BLD AUTO: 8.5 % (ref 5–12)
NEUTROPHILS NFR BLD AUTO: 3.3 10*3/MM3 (ref 1.7–7)
NEUTROPHILS NFR BLD AUTO: 3.8 10*3/MM3 (ref 1.7–7)
NEUTROPHILS NFR BLD AUTO: 50.3 % (ref 42.7–76)
NEUTROPHILS NFR BLD AUTO: 58.7 % (ref 42.7–76)
NRBC BLD AUTO-RTO: 0.1 /100 WBC (ref 0–0.2)
NRBC BLD AUTO-RTO: 0.1 /100 WBC (ref 0–0.2)
PLATELET # BLD AUTO: 211 10*3/MM3 (ref 140–450)
PLATELET # BLD AUTO: 213 10*3/MM3 (ref 140–450)
PMV BLD AUTO: 10.2 FL (ref 6–12)
PMV BLD AUTO: 9.5 FL (ref 6–12)
POTASSIUM SERPL-SCNC: 3.8 MMOL/L (ref 3.5–5.2)
POTASSIUM SERPL-SCNC: 4.2 MMOL/L (ref 3.5–5.2)
PROX PFA PSV RIGHT: 77 CM/SEC
PROX SFA PSV RIGHT: 67 CM/SEC
RBC # BLD AUTO: 5.23 10*6/MM3 (ref 4.14–5.8)
RBC # BLD AUTO: 5.38 10*6/MM3 (ref 4.14–5.8)
RIGHT GROIN CFA SYS: 71.3 CM/SEC
SARS-COV-2 RNA PNL SPEC NAA+PROBE: NOT DETECTED
SODIUM SERPL-SCNC: 134 MMOL/L (ref 136–145)
SODIUM SERPL-SCNC: 140 MMOL/L (ref 136–145)
STRESS TARGET HR: 138 BPM
WBC NRBC COR # BLD: 6.5 10*3/MM3 (ref 3.4–10.8)
WBC NRBC COR # BLD: 6.5 10*3/MM3 (ref 3.4–10.8)

## 2022-08-01 PROCEDURE — 80048 BASIC METABOLIC PNL TOTAL CA: CPT | Performed by: NURSE PRACTITIONER

## 2022-08-01 PROCEDURE — G0378 HOSPITAL OBSERVATION PER HR: HCPCS

## 2022-08-01 PROCEDURE — 82962 GLUCOSE BLOOD TEST: CPT

## 2022-08-01 PROCEDURE — 63710000001 INSULIN GLARGINE PER 5 UNITS: Performed by: NURSE PRACTITIONER

## 2022-08-01 PROCEDURE — 85025 COMPLETE CBC W/AUTO DIFF WBC: CPT | Performed by: NURSE PRACTITIONER

## 2022-08-01 PROCEDURE — 99214 OFFICE O/P EST MOD 30 MIN: CPT | Performed by: INTERNAL MEDICINE

## 2022-08-01 PROCEDURE — 93926 LOWER EXTREMITY STUDY: CPT

## 2022-08-01 PROCEDURE — 63710000001 INSULIN LISPRO (HUMAN) PER 5 UNITS: Performed by: NURSE PRACTITIONER

## 2022-08-01 RX ORDER — ONDANSETRON 2 MG/ML
4 INJECTION INTRAMUSCULAR; INTRAVENOUS EVERY 6 HOURS PRN
Status: DISCONTINUED | OUTPATIENT
Start: 2022-08-01 | End: 2022-08-01 | Stop reason: SDUPTHER

## 2022-08-01 RX ORDER — BISACODYL 10 MG
10 SUPPOSITORY, RECTAL RECTAL DAILY PRN
Status: DISCONTINUED | OUTPATIENT
Start: 2022-08-01 | End: 2022-08-01 | Stop reason: HOSPADM

## 2022-08-01 RX ORDER — ACETAMINOPHEN 325 MG/1
650 TABLET ORAL EVERY 4 HOURS PRN
Status: DISCONTINUED | OUTPATIENT
Start: 2022-08-01 | End: 2022-08-01 | Stop reason: HOSPADM

## 2022-08-01 RX ORDER — ACETAMINOPHEN 325 MG/1
650 TABLET ORAL EVERY 4 HOURS PRN
Status: DISCONTINUED | OUTPATIENT
Start: 2022-08-01 | End: 2022-08-01 | Stop reason: SDUPTHER

## 2022-08-01 RX ORDER — SODIUM CHLORIDE 0.9 % (FLUSH) 0.9 %
10 SYRINGE (ML) INJECTION EVERY 12 HOURS SCHEDULED
Status: DISCONTINUED | OUTPATIENT
Start: 2022-08-01 | End: 2022-08-01 | Stop reason: HOSPADM

## 2022-08-01 RX ORDER — POLYETHYLENE GLYCOL 3350 17 G/17G
17 POWDER, FOR SOLUTION ORAL DAILY PRN
Status: DISCONTINUED | OUTPATIENT
Start: 2022-08-01 | End: 2022-08-01 | Stop reason: HOSPADM

## 2022-08-01 RX ORDER — SODIUM CHLORIDE 0.9 % (FLUSH) 0.9 %
10 SYRINGE (ML) INJECTION AS NEEDED
Status: DISCONTINUED | OUTPATIENT
Start: 2022-08-01 | End: 2022-08-01 | Stop reason: HOSPADM

## 2022-08-01 RX ORDER — FUROSEMIDE 40 MG/1
40 TABLET ORAL DAILY
Status: DISCONTINUED | OUTPATIENT
Start: 2022-08-01 | End: 2022-08-01 | Stop reason: HOSPADM

## 2022-08-01 RX ORDER — ONDANSETRON 2 MG/ML
4 INJECTION INTRAMUSCULAR; INTRAVENOUS EVERY 6 HOURS PRN
Status: DISCONTINUED | OUTPATIENT
Start: 2022-08-01 | End: 2022-08-01 | Stop reason: HOSPADM

## 2022-08-01 RX ORDER — SOTALOL HYDROCHLORIDE 80 MG/1
40 TABLET ORAL EVERY 12 HOURS SCHEDULED
Status: DISCONTINUED | OUTPATIENT
Start: 2022-08-01 | End: 2022-08-01 | Stop reason: HOSPADM

## 2022-08-01 RX ORDER — BISACODYL 5 MG/1
5 TABLET, DELAYED RELEASE ORAL DAILY PRN
Status: DISCONTINUED | OUTPATIENT
Start: 2022-08-01 | End: 2022-08-01 | Stop reason: HOSPADM

## 2022-08-01 RX ORDER — ONDANSETRON 4 MG/1
4 TABLET, FILM COATED ORAL EVERY 6 HOURS PRN
Status: DISCONTINUED | OUTPATIENT
Start: 2022-08-01 | End: 2022-08-01 | Stop reason: HOSPADM

## 2022-08-01 RX ORDER — ATORVASTATIN CALCIUM 40 MG/1
80 TABLET, FILM COATED ORAL NIGHTLY
Status: DISCONTINUED | OUTPATIENT
Start: 2022-08-01 | End: 2022-08-01 | Stop reason: HOSPADM

## 2022-08-01 RX ORDER — ASPIRIN 81 MG/1
81 TABLET, CHEWABLE ORAL DAILY
Status: DISCONTINUED | OUTPATIENT
Start: 2022-08-01 | End: 2022-08-01 | Stop reason: HOSPADM

## 2022-08-01 RX ORDER — CHOLECALCIFEROL (VITAMIN D3) 125 MCG
5 CAPSULE ORAL NIGHTLY PRN
Status: DISCONTINUED | OUTPATIENT
Start: 2022-08-01 | End: 2022-08-01 | Stop reason: HOSPADM

## 2022-08-01 RX ORDER — DEXTROSE MONOHYDRATE 25 G/50ML
25 INJECTION, SOLUTION INTRAVENOUS
Status: DISCONTINUED | OUTPATIENT
Start: 2022-08-01 | End: 2022-08-01 | Stop reason: HOSPADM

## 2022-08-01 RX ORDER — INSULIN LISPRO 100 [IU]/ML
0-14 INJECTION, SOLUTION INTRAVENOUS; SUBCUTANEOUS AS NEEDED
Status: DISCONTINUED | OUTPATIENT
Start: 2022-08-01 | End: 2022-08-01 | Stop reason: HOSPADM

## 2022-08-01 RX ORDER — INSULIN LISPRO 100 [IU]/ML
0-14 INJECTION, SOLUTION INTRAVENOUS; SUBCUTANEOUS
Status: DISCONTINUED | OUTPATIENT
Start: 2022-08-01 | End: 2022-08-01 | Stop reason: HOSPADM

## 2022-08-01 RX ORDER — NICOTINE POLACRILEX 4 MG
15 LOZENGE BUCCAL
Status: DISCONTINUED | OUTPATIENT
Start: 2022-08-01 | End: 2022-08-01 | Stop reason: HOSPADM

## 2022-08-01 RX ORDER — OLANZAPINE 10 MG/2ML
1 INJECTION, POWDER, LYOPHILIZED, FOR SOLUTION INTRAMUSCULAR
Status: DISCONTINUED | OUTPATIENT
Start: 2022-08-01 | End: 2022-08-01 | Stop reason: HOSPADM

## 2022-08-01 RX ADMIN — Medication 10 ML: at 09:12

## 2022-08-01 RX ADMIN — FUROSEMIDE 40 MG: 40 TABLET ORAL at 09:12

## 2022-08-01 RX ADMIN — ASPIRIN 81 MG CHEWABLE TABLET 81 MG: 81 TABLET CHEWABLE at 09:12

## 2022-08-01 RX ADMIN — APIXABAN 5 MG: 5 TABLET, FILM COATED ORAL at 09:12

## 2022-08-01 RX ADMIN — INSULIN LISPRO 14 UNITS: 100 INJECTION, SOLUTION INTRAVENOUS; SUBCUTANEOUS at 11:26

## 2022-08-01 RX ADMIN — SOTALOL HYDROCHLORIDE 40 MG: 80 TABLET ORAL at 09:12

## 2022-08-01 RX ADMIN — INSULIN GLARGINE 10 UNITS: 100 INJECTION, SOLUTION SUBCUTANEOUS at 09:11

## 2022-08-01 RX ADMIN — SACUBITRIL AND VALSARTAN 1 TABLET: 24; 26 TABLET, FILM COATED ORAL at 09:12

## 2022-08-01 NOTE — OUTREACH NOTE
CHF Week 2 Survey    Flowsheet Row Responses   [facility] [place] facility patient discharged from? [facility]   Does the patient have one of the following disease processes/diagnoses(primary or secondary)? CHF   Week 2 attempt successful? No   Unsuccessful attempts Attempt 1   Revoke Readmitted          MARIA LUISA BROWN - Registered Nurse

## 2022-08-01 NOTE — PLAN OF CARE
Goal Outcome Evaluation:  Plan of Care Reviewed With: patient        Progress: improving  Outcome Evaluation: Patient had duplex of his groin which was normal. pt will be discharged home with a follow up with Dr. [place]. continue to monitor

## 2022-08-01 NOTE — PLAN OF CARE
Goal Outcome Evaluation:  Plan of Care Reviewed With: patient        Progress: improving  Outcome Evaluation: New admission from ED with right groin pain s/p cardiac cath. VSS. Patient continues to have tenderness to right groin, no hematoma or bruising noted. Patient to have duplex of groin today. Cardiology consulted for today. Will monitor.

## 2022-08-01 NOTE — CASE MANAGEMENT/SOCIAL WORK
Discharge Planning Assessment  [facility]     Patient Name: Preet Jones  MRN: [id number]  Today's Date: 8/1/2022    Admit Date: 7/31/2022     Discharge Needs Assessment     Row Name 08/01/22 1304       Living Environment    People in Home spouse    Name(s) of People in Home Ariana Jones    Current Living Arrangements home    Primary Care Provided by self    Provides Primary Care For no one    Family Caregiver if Needed spouse    Family Caregiver Names Ariana    Quality of Family Relationships helpful;involved;supportive    Able to Return to Prior Arrangements yes       Resource/Environmental Concerns    Resource/Environmental Concerns none    Transportation Concerns none       Transition Planning    Patient/Family Anticipates Transition to home with family    Patient/Family Anticipated Services at Transition none    Transportation Anticipated family or friend will provide       Discharge Needs Assessment    Readmission Within the Last 30 Days no previous admission in last 30 days    Equipment Currently Used at Home glucometer    Concerns to be Addressed no discharge needs identified;denies needs/concerns at this time    Anticipated Changes Related to Illness none    Equipment Needed After Discharge none    Provided Post Acute Provider List? N/A    Provided Post Acute Provider Quality & Resource List? N/A               Discharge Plan     Row Name 08/01/22 1304       Plan    Plan DC Plan: Anticipate Routine Home with Family    Patient/Family in Agreement with Plan yes    Provided Post Acute Provider List? N/A    Plan Comments CM spoke with patient at bedside to discuss admission assessment and discharge planning. Patient confirms PCP and pharmacy. Patient confirms he is agreeable to enrolling in meds to bed program. CM enrolled patient and updated pharmacy in [facility]. Patient denies any difficulty affording medications at this time. Patient denies any additional needs for services or DME at this time. Plan to  discharge home via private vehicle with spouse.                 Demographic Summary     Row Name 08/01/22 1302       General Information    Admission Type observation    Arrived From emergency department;home    Required Notices Provided Observation Status Notice    Referral Source admission list    Reason for Consult discharge planning    Preferred Language English       Contact Information    Permission Granted to Share Info With                Functional Status     Row Name 08/01/22 1303       Functional Status    Usual Activity Tolerance good    Current Activity Tolerance good       Functional Status, IADL    Medications independent    Meal Preparation independent    Housekeeping independent    Laundry independent    Shopping independent       Mental Status    General Appearance WDL WDL       Mental Status Summary    Recent Changes in Mental Status/Cognitive Functioning no changes       Employment/    Employment Status employed full-time;, previous service    Current or Previous Occupation other (see comments)  [employer]    Current or Previous  Service active duty, past     [employer]              Met with patient in room wearing PPE: mask,    Maintain distance greater than six feet and spent less than fifteen minutes in the room.      Coretta Jama RN     Office Phone: [phone]  Office Cell:     [phone]

## 2022-08-01 NOTE — DISCHARGE SUMMARY
"[facility]    Celine Garcia MD    CHIEF COMPLAINT:     Acute postoperative pain in right groin    HISTORY OF PRESENT ILLNESS:    [facility]    ED 7/31/22: 57-year-old male presents with a complaint of right groin pain status post stepping out of his vehicle.  He reports he \"felt a pop\".  He reports that he is 1 week status post heart cath.  He reports that he had no interventions during catheterization.  He reports that he is on Eliquis.  His cardiologist is Dr. Montana.       Past Medical History:   Diagnosis Date   • Abnormal cardiovascular stress test 01/19/2017   • Acute myocardial infarction ([facility]) 2019   • Arterial ischemic stroke, MCA (middle cerebral artery), left, acute ([facility]) 10/29/2019   • Atrial flutter with rapid ventricular response ([facility]) 02/04/2021   • Bicuspid aortic valve 02/16/2017   • Coronary artery disease involving native coronary artery of native heart    • Depression 12/30/2020   • History of coronary angioplasty with insertion of stent    • Hyperlipidemia    • Hypertension    • Hyperthyroidism    • Obesity 09/02/2011   • Paroxysmal SVT (supraventricular tachycardia) ([facility]) 01/08/2021   • RUE weakness 10/29/2019    Previous residual from Stroke, but was not work prohibiting.   • Seasonal allergic rhinitis 12/23/2020   • Sinus arrhythmia    • Sustained SVT ([facility])    • Type 2 diabetes mellitus ([facility])    • Vitamin D deficiency 11/05/2020     Past Surgical History:   Procedure Laterality Date   • APPENDECTOMY     • BACK SURGERY     • CARDIAC CATHETERIZATION  2010   • CARDIAC CATHETERIZATION  2019   • CARDIAC CATHETERIZATION N/A 12/31/2020    Procedure: Cardiac Catheterization/Vascular Study;  Surgeon: Moris Pedro MD;  Location: [facility] CATH INVASIVE LOCATION;  Service: Cardiovascular;  Laterality: N/A;   • CARDIAC CATHETERIZATION N/A 2/1/2022    Procedure: Left Heart Cath;  Surgeon: Kamlesh Richardson MD;  Location: [facility] CATH INVASIVE LOCATION;  " Service: Cardiovascular;  Laterality: N/A;   • CARDIAC CATHETERIZATION N/A 7/20/2022    Procedure: Left Heart Cath;  Surgeon: Kamlesh Richardson MD;  Location: [facility] CATH INVASIVE LOCATION;  Service: Cardiovascular;  Laterality: N/A;   • CARDIAC ELECTROPHYSIOLOGY PROCEDURE N/A 1/20/2021    Procedure: EP/Ablation;  Surgeon: Nathen Olmstead MD;  Location:  [facility] CATH INVASIVE LOCATION;  Service: Cardiovascular;  Laterality: N/A;   • CARDIAC ELECTROPHYSIOLOGY PROCEDURE N/A 2/24/2022    Procedure: EP/Ablation-SVT Utong aware;  Surgeon: Candido Montana MD;  Location:  [facility] CATH INVASIVE LOCATION;  Service: Cardiology;  Laterality: N/A;   • JOINT REPLACEMENT     • KNEE ARTHROSCOPY     • THYROIDECTOMY, PARTIAL  2015     Family History   Problem Relation Age of Onset   • Heart disease Mother    • Parkinsonism Father    • Cancer Father      Social History     Tobacco Use   • Smoking status: Never Smoker   • Smokeless tobacco: Never Used   Vaping Use   • Vaping Use: Never used   Substance Use Topics   • Alcohol use: Not Currently   • Drug use: No     Medications Prior to Admission   Medication Sig Dispense Refill Last Dose   • Acetaminophen (Tylenol) 325 MG capsule Take 650 mg by mouth Every 6 (Six) Hours As Needed.   7/31/2022 at Unknown time   • apixaban (ELIQUIS) 5 MG tablet tablet Take 1 tablet by mouth Every 12 (Twelve) Hours. Indications: Atrial Fibrillation 60 tablet 1 7/31/2022 at Unknown time   • aspirin 81 MG chewable tablet Chew 81 mg Daily.   7/31/2022 at Unknown time   • atorvastatin (LIPITOR) 80 MG tablet Take 1 tablet by mouth Every Night. 90 tablet 1 7/31/2022 at Unknown time   • Cholecalciferol (VITAMIN D-3) 25 MCG (1000 UT) capsule Take 3 capsules by mouth Daily.   7/31/2022 at Unknown time   • furosemide (LASIX) 40 MG tablet Take 1 tablet by mouth Daily for 30 days. 30 tablet 0 7/31/2022 at Unknown time   • guaiFENesin (MUCINEX) 600 MG 12 hr tablet Take 1,200 mg by mouth 2 (Two) Times a Day  As Needed for Cough.   Past Month at Unknown time   • Insulin Glargine (BASAGLAR KWIKPEN) 100 UNIT/ML injection pen Inject 10 Units under the skin into the appropriate area as directed Every Night for 30 days. 5 pen 0 7/31/2022 at Unknown time   • metFORMIN (GLUCOPHAGE) 1000 MG tablet 2 (Two) Times a Day With Meals.   7/31/2022 at Unknown time   • Pediatric Multiple Vitamins (Multivitamin Childrens) chewable tablet Chew 1 tablet Daily.   7/31/2022 at Unknown time   • sacubitril-valsartan (ENTRESTO) 24-26 MG tablet Take 1 tablet by mouth Every 12 (Twelve) Hours for 30 days. 60 tablet 0 7/31/2022 at Unknown time   • sotalol (BETAPACE) 80 MG tablet Take 0.5 tablets by mouth Every 12 (Twelve) Hours. 60 tablet 1 7/31/2022 at Unknown time     Allergies:  Morphine, Ozempic (0.25 or 0.5 mg-dose) [semaglutide(0.25 or 0.5mg-dos)], Isosorbide, Nitrofuran derivatives, and Nitroglycerin    Immunization History   Administered Date(s) Administered   • COVID-19 (PFIZER) PURPLE CAP 03/15/2021, 04/05/2021, 10/04/2021   • FluLaval/Fluarix/Fluzone >6 11/06/2020           REVIEW OF SYSTEMS:    Review of Systems   Constitutional: Negative.   HENT: Negative.    Eyes: Negative.    Cardiovascular: Negative.    Respiratory: Negative.    Endocrine: Negative.    Hematologic/Lymphatic: Negative.    Skin: Negative.    Musculoskeletal: Positive for muscle weakness and stiffness.   Gastrointestinal: Negative.    Genitourinary: Negative.    Neurological: Negative.    Psychiatric/Behavioral: Negative.    Allergic/Immunologic: Negative.        Vital Signs  Temp:  [97.7 °F (36.5 °C)-98.5 °F (36.9 °C)] 97.7 °F (36.5 °C)  Heart Rate:  [65-88] 65  Resp:  [18-20] 18  BP: (122-157)/(64-95) 126/78          Physical Exam:  Physical Exam  Vitals and nursing note reviewed.   Constitutional:       Appearance: Normal appearance.   HENT:      Head: Normocephalic and atraumatic.      Right Ear: External ear normal.      Left Ear: External ear normal.      Nose:  Nose normal.      Mouth/Throat:      Mouth: Mucous membranes are moist.      Pharynx: Oropharynx is clear.   Eyes:      Conjunctiva/sclera: Conjunctivae normal.      Pupils: Pupils are equal, round, and reactive to light.   Cardiovascular:      Rate and Rhythm: Normal rate and regular rhythm.      Pulses: Normal pulses.      Heart sounds: Normal heart sounds.   Pulmonary:      Effort: Pulmonary effort is normal.      Breath sounds: Normal breath sounds.   Abdominal:      General: Bowel sounds are normal.      Palpations: Abdomen is soft.   Musculoskeletal:         General: Normal range of motion.      Cervical back: Normal range of motion.   Skin:     General: Skin is warm.      Capillary Refill: Capillary refill takes less than 2 seconds.   Neurological:      General: No focal deficit present.      Mental Status: He is alert and oriented to person, place, and time. Mental status is at baseline.   Psychiatric:         Mood and Affect: Mood normal.         Behavior: Behavior normal.         Thought Content: Thought content normal.         Judgment: Judgment normal.         Emotional Behavior:    WNL   Debilities:   none  Results Review:    I reviewed the patient's new clinical results.  Lab Results (most recent)     Procedure Component Value Units Date/Time    POC Glucose Once [[id number]]  (Abnormal) Collected: 08/01/22 1320    Specimen: Blood Updated: 08/01/22 1322     Glucose 365 mg/dL      Comment: Serial Number: [id number]:  [id number]       POC Glucose Once [[id number]]  (Abnormal) Collected: 08/01/22 1118    Specimen: Blood Updated: 08/01/22 1119     Glucose 421 mg/dL      Comment: Serial Number: [id number]:  [id number]       Basic Metabolic Panel [[id number]]  (Abnormal) Collected: 08/01/22 0839    Specimen: Blood Updated: 08/01/22 1006     Glucose 411 mg/dL      BUN 13 mg/dL      Creatinine 0.80 mg/dL      Sodium 134 mmol/L      Potassium 3.8 mmol/L      Comment: Slight hemolysis detected by  analyzer. Results may be affected.        Chloride 98 mmol/L      CO2 26.0 mmol/L      Calcium 8.6 mg/dL      BUN/Creatinine Ratio 16.3     Anion Gap 10.0 mmol/L      eGFR 102.6 mL/min/1.73      Comment: National Kidney Foundation and American Society of Nephrology (ASN) Task Force recommended calculation based on the Chronic Kidney Disease Epidemiology Collaboration (CKD-EPI) equation refit without adjustment for race.       Narrative:      GFR Normal >60  Chronic Kidney Disease <60  Kidney Failure <15      CBC & Differential [[id number]]  (Abnormal) Collected: 08/01/22 0839    Specimen: Blood Updated: 08/01/22 0939    Narrative:      The following orders were created for panel order CBC & Differential.  Procedure                               Abnormality         Status                     ---------                               -----------         ------                     CBC Auto Differential[[id number]]        Abnormal            Final result                 Please view results for these tests on the individual orders.    CBC Auto Differential [[id number]]  (Abnormal) Collected: 08/01/22 0839    Specimen: Blood Updated: 08/01/22 0939     WBC 6.50 10*3/mm3      RBC 5.23 10*6/mm3      Hemoglobin 14.5 g/dL      Hematocrit 43.1 %      MCV 82.4 fL      MCH 27.8 pg      MCHC 33.7 g/dL      RDW 14.0 %      RDW-SD 40.7 fl      MPV 10.2 fL      Platelets 211 10*3/mm3      Neutrophil % 58.7 %      Lymphocyte % 24.6 %      Monocyte % 8.2 %      Eosinophil % 6.6 %      Basophil % 1.9 %      Neutrophils, Absolute 3.80 10*3/mm3      Lymphocytes, Absolute 1.60 10*3/mm3      Monocytes, Absolute 0.50 10*3/mm3      Eosinophils, Absolute 0.40 10*3/mm3      Basophils, Absolute 0.10 10*3/mm3      nRBC 0.1 /100 WBC     Basic Metabolic Panel [[id number]]  (Abnormal) Collected: 08/01/22 0148    Specimen: Blood Updated: 08/01/22 0231     Glucose 308 mg/dL      BUN 15 mg/dL      Creatinine 0.95 mg/dL      Sodium 140 mmol/L      Potassium  4.2 mmol/L      Chloride 102 mmol/L      CO2 26.0 mmol/L      Calcium 9.0 mg/dL      BUN/Creatinine Ratio 15.8     Anion Gap 12.0 mmol/L      eGFR 92.8 mL/min/1.73      Comment: National Kidney Foundation and American Society of Nephrology (ASN) Task Force recommended calculation based on the Chronic Kidney Disease Epidemiology Collaboration (CKD-EPI) equation refit without adjustment for race.       Narrative:      GFR Normal >60  Chronic Kidney Disease <60  Kidney Failure <15      CBC Auto Differential [[id number]]  (Abnormal) Collected: 08/01/22 0148    Specimen: Blood Updated: 08/01/22 0210     WBC 6.50 10*3/mm3      RBC 5.38 10*6/mm3      Hemoglobin 15.1 g/dL      Hematocrit 43.6 %      MCV 81.0 fL      MCH 28.1 pg      MCHC 34.7 g/dL      RDW 14.4 %      RDW-SD 41.1 fl      MPV 9.5 fL      Platelets 213 10*3/mm3      Neutrophil % 50.3 %      Lymphocyte % 34.0 %      Monocyte % 8.5 %      Eosinophil % 6.9 %      Basophil % 0.3 %      Neutrophils, Absolute 3.30 10*3/mm3      Lymphocytes, Absolute 2.20 10*3/mm3      Monocytes, Absolute 0.60 10*3/mm3      Eosinophils, Absolute 0.40 10*3/mm3      Basophils, Absolute 0.00 10*3/mm3      nRBC 0.1 /100 WBC     COVID PRE-OP / PRE-PROCEDURE SCREENING ORDER (NO ISOLATION) - Swab, Nasopharynx [[id number]]  (Normal) Collected: 07/31/22 2343    Specimen: Swab from Nasopharynx Updated: 08/01/22 0007    Narrative:      The following orders were created for panel order COVID PRE-OP / PRE-PROCEDURE SCREENING ORDER (NO ISOLATION) - Swab, Nasopharynx.  Procedure                               Abnormality         Status                     ---------                               -----------         ------                     COVID-19,CEPHEID/WALTER,CO...[[id number]]  Normal              Final result                 Please view results for these tests on the individual orders.    COVID-19,CEPHEID/WALTER,COR/[facility]/PAD/[facility] IN-HOUSE(OR EMERGENT/ADD-ON),NP SWAB IN TRANSPORT MEDIA 3-4 HR TAT,  RT-PCR - Swab, Nasopharynx [[id number]]  (Normal) Collected: 07/31/22 2343    Specimen: Swab from Nasopharynx Updated: 08/01/22 0007     COVID19 Not Detected    Narrative:      Fact sheet for providers: https://www.fda.gov/media/766171/download     Fact sheet for patients: https://www.fda.gov/media/460504/download  Fact sheet for providers: https://www.fda.gov/media/597971/download    Fact sheet for patients: https://www.fda.gov/media/001489/download    Test performed by PCR.    Protime-INR [[id number]]  (Normal) Collected: 07/31/22 2152    Specimen: Blood Updated: 07/31/22 2215     Protime 10.2 Seconds      INR 0.99    aPTT [[id number]]  (Abnormal) Collected: 07/31/22 2152    Specimen: Blood Updated: 07/31/22 2215     PTT 27.6 seconds     CBC & Differential [[id number]]  (Abnormal) Collected: 07/31/22 2152    Specimen: Blood Updated: 07/31/22 2201    Narrative:      The following orders were created for panel order CBC & Differential.  Procedure                               Abnormality         Status                     ---------                               -----------         ------                     CBC Auto Differential[[id number]]        Abnormal            Final result                 Please view results for these tests on the individual orders.          Imaging Results (Most Recent)     None        reviewed    ECG/EMG Results (most recent)     None        reviewed    Results for orders placed during the hospital encounter of 07/31/22    Duplex Pseudoaneurysm CAR    Interpretation Summary  · No evidence of pseudoaneurysm or AV fistula in the right groin.      Results for orders placed during the hospital encounter of 07/18/22    Adult Transthoracic Echo Complete W/ Cont if Necessary Per Protocol    Interpretation Summary  · The left ventricular cavity is moderately dilated.  · Left ventricular ejection fraction appears to be 36 - 40%.  · Moderate tricuspid valve regurgitation is present.  · Estimated right  ventricular systolic pressure from tricuspid regurgitation is moderately elevated (45-55 mmHg).  · Mildly reduced right ventricular systolic function noted.  · Left ventricular diastolic function is consistent with (grade II w/high LAP) pseudonormalization.  · There is a left pleural effusion.      Microbiology Results (last 10 days)     Procedure Component Value - Date/Time    COVID PRE-OP / PRE-PROCEDURE SCREENING ORDER (NO ISOLATION) - Swab, Nasopharynx [[id number]]  (Normal) Collected: 07/31/22 2343    Lab Status: Final result Specimen: Swab from Nasopharynx Updated: 08/01/22 0007    Narrative:      The following orders were created for panel order COVID PRE-OP / PRE-PROCEDURE SCREENING ORDER (NO ISOLATION) - Swab, Nasopharynx.  Procedure                               Abnormality         Status                     ---------                               -----------         ------                     COVID-19,CEPHEID/WALTER,CO...[[id number]]  Normal              Final result                 Please view results for these tests on the individual orders.    COVID-19,CEPHEID/WALTER,COR/[facility]IA/PAD/[facility]DIA IN-HOUSE(OR EMERGENT/ADD-ON),NP SWAB IN TRANSPORT MEDIA 3-4 HR TAT, RT-PCR - Swab, Nasopharynx [[id number]]  (Normal) Collected: 07/31/22 2343    Lab Status: Final result Specimen: Swab from Nasopharynx Updated: 08/01/22 0007     COVID19 Not Detected    Narrative:      Fact sheet for providers: https://www.fda.gov/media/450488/download     Fact sheet for patients: https://www.fda.gov/media/359295/download  Fact sheet for providers: https://www.fda.gov/media/625960/download    Fact sheet for patients: https://www.fda.gov/media/947027/download    Test performed by PCR.          Assessment & Plan     Coronary artery disease involving native coronary artery of native heart    Acute postoperative pain of right groin     Acute postoperative pain of right groin  - Heart cath 7/20/2022 moderate diffuse CAD, ejection fraction 30%.  - EKG  7/19/2022 sinus rhythm with first-degree AV block.  - Echo 7/18/2022 ejection fraction 35 to 40%.  - Vascular ultrasound did not show hematoma or pseudoaneursym     Coronary artery disease involving native coronary artery of native heart   - Continue Eliquis and aspirin    Diabetes mellitus type 2  -Current glucose 365  - Continue long acting insulin   - Hold PO diabetes medications  - Start SSI  - Monitor glucose AC & H    Chronic essential hypertension   - Continue home hypertensive medications     Hyperlipidemia  - Continue statin     I discussed the patients findings and my recommendations with patient and family.     Discharge Diagnosis:      Coronary artery disease involving native coronary artery of native heart    Acute postoperative pain of right groin      Hospital Course  Patient is a 57 y.o. male presented with groin injury. Patient was stepping out of truck when he felt a popping sensation in groin. Patient felt a knot in his groin and wanted to make sure he did not injury his leg post cardiac catheterization.  Vascular study found to be negative.  Patient denies chest pain, edema, dyspnea, or syncope.  Patient follows with cardiology neck scheduled appointment.  Patient to follow-up with primary care neck scheduled appointment for continued care.  If symptoms worsen, patient to call 911 or go to nearest ED.  Test and recommendations reviewed with patient and patient agree with treatment plan.    Past Medical History:     Past Medical History:   Diagnosis Date   • Abnormal cardiovascular stress test 01/19/2017   • Acute myocardial infarction ([facility]) 2019   • Arterial ischemic stroke, MCA (middle cerebral artery), left, acute ([facility]) 10/29/2019   • Atrial flutter with rapid ventricular response ([facility]) 02/04/2021   • Bicuspid aortic valve 02/16/2017   • Coronary artery disease involving native coronary artery of native heart    • Depression 12/30/2020   • History of coronary angioplasty with insertion of stent     • Hyperlipidemia    • Hypertension    • Hyperthyroidism    • Obesity 09/02/2011   • Paroxysmal SVT (supraventricular tachycardia) ([facility]) 01/08/2021   • RUE weakness 10/29/2019    Previous residual from Stroke, but was not work prohibiting.   • Seasonal allergic rhinitis 12/23/2020   • Sinus arrhythmia    • Sustained SVT ([facility])    • Type 2 diabetes mellitus ([facility])    • Vitamin D deficiency 11/05/2020       Past Surgical History:     Past Surgical History:   Procedure Laterality Date   • APPENDECTOMY     • BACK SURGERY     • CARDIAC CATHETERIZATION  2010   • CARDIAC CATHETERIZATION  2019   • CARDIAC CATHETERIZATION N/A 12/31/2020    Procedure: Cardiac Catheterization/Vascular Study;  Surgeon: Moris Pedro MD;  Location:  [facility] CATH INVASIVE LOCATION;  Service: Cardiovascular;  Laterality: N/A;   • CARDIAC CATHETERIZATION N/A 2/1/2022    Procedure: Left Heart Cath;  Surgeon: Kamlesh Richardson MD;  Location:  [facility] CATH INVASIVE LOCATION;  Service: Cardiovascular;  Laterality: N/A;   • CARDIAC CATHETERIZATION N/A 7/20/2022    Procedure: Left Heart Cath;  Surgeon: Kamlesh Richardson MD;  Location:  [facility] CATH INVASIVE LOCATION;  Service: Cardiovascular;  Laterality: N/A;   • CARDIAC ELECTROPHYSIOLOGY PROCEDURE N/A 1/20/2021    Procedure: EP/Ablation;  Surgeon: Nathen Olmstead MD;  Location:  [facility] CATH INVASIVE LOCATION;  Service: Cardiovascular;  Laterality: N/A;   • CARDIAC ELECTROPHYSIOLOGY PROCEDURE N/A 2/24/2022    Procedure: EP/Ablation-SVT Utong aware;  Surgeon: Candido Montana MD;  Location:  [facility] CATH INVASIVE LOCATION;  Service: Cardiology;  Laterality: N/A;   • JOINT REPLACEMENT     • KNEE ARTHROSCOPY     • THYROIDECTOMY, PARTIAL  2015       Social History:   Social History     Socioeconomic History   • Marital status:    Tobacco Use   • Smoking status: Never Smoker   • Smokeless tobacco: Never Used   Vaping Use   • Vaping Use: Never used   Substance and Sexual  Activity   • Alcohol use: Not Currently   • Drug use: No   • Sexual activity: Defer       Procedures Performed         Consults:   Consults     Date and Time Order Name Status Description    8/1/2022 12:42 AM Inpatient Cardiology Consult            Condition on Discharge:     Stable    Discharge Disposition  Home or Self Care    Discharge Medications     Discharge Medications      Continue These Medications      Instructions Start Date   apixaban 5 MG tablet tablet  Commonly known as: ELIQUIS   5 mg, Oral, Every 12 Hours Scheduled      aspirin 81 MG chewable tablet   81 mg, Oral, Daily      atorvastatin 80 MG tablet  Commonly known as: LIPITOR   80 mg, Oral, Nightly      BASAGLAR KWIKPEN 100 UNIT/ML injection pen   10 Units, Subcutaneous, Nightly      Entresto 24-26 MG tablet  Generic drug: sacubitril-valsartan   1 tablet, Oral, Every 12 Hours Scheduled      furosemide 40 MG tablet  Commonly known as: LASIX   40 mg, Oral, Daily      guaiFENesin 600 MG 12 hr tablet  Commonly known as: MUCINEX   1,200 mg, Oral, 2 Times Daily PRN      metFORMIN 1000 MG tablet  Commonly known as: GLUCOPHAGE   2 Times Daily With Meals      Multivitamin Childrens chewable tablet   1 tablet, Oral, Daily      sotalol 80 MG tablet  Commonly known as: BETAPACE   40 mg, Oral, Every 12 Hours Scheduled      Tylenol 325 MG capsule  Generic drug: Acetaminophen   650 mg, Oral, Every 6 Hours PRN      Vitamin D-3 25 MCG (1000 UT) capsule   3 capsules, Oral, Daily             Discharge Diet:   Diet Instructions     Diet: Cardiac, Consistent Carbohydrate      Discharge Diet:  Cardiac  Consistent Carbohydrate             Activity at Discharge:   Activity Instructions     Activity as Tolerated      Lifting Restrictions      Type of Restriction: Lifting    Lifting Restrictions: No Lifting Until Cleared By Provider    Measure Blood Pressure            Follow-up Appointments  Future Appointments   Date Time Provider Department Center   8/9/2022  2:45 PM  Candido Montana MD [facility]     Additional Instructions for the Follow-ups that You Need to Schedule     Discharge Follow-up with PCP   As directed       Currently Documented PCP:    Celine Garcia MD    PCP Phone Number:    [phone]     Follow Up Details: 7-10 days         Discharge Follow-up with Specialty: Cardiology; 1 Week   As directed      Specialty: Cardiology    Follow Up: 1 Week               Test Results Pending at Discharge       Risk for Readmission (LACE) Score: 8 (8/1/2022  6:01 AM)          EDWINA Mcdonald  08/01/22  15:34 EDT

## 2022-08-01 NOTE — ED PROVIDER NOTES
"Subjective   57-year-old male presents with a complaint of right groin pain status post stepping out of his vehicle.  He reports he \"felt a pop\".  He reports that he is 1 week status post heart cath.  He reports that he had no interventions during catheterization.  He reports that he is on Eliquis.  His cardiologist is Dr. Monatna.     1. Location: Right groin  2. Quality: Pop  3. Severity: Moderate  4. Worsening factors: Movement, palpation  5. Alleviating factors: Rest  6. Onset: PTA  7. Radiation: Denies  8. Frequency: Intermittent  9. Co-morbidities: Past Medical History:  01/19/2017: Abnormal cardiovascular stress test  2019: Acute myocardial infarction ([facility])  10/29/2019: Arterial ischemic stroke, MCA (middle cerebral artery),   left, acute ([facility])  02/04/2021: Atrial flutter with rapid ventricular response ([facility])  02/16/2017: Bicuspid aortic valve  No date: Coronary artery disease involving native coronary artery of   native heart  12/30/2020: Depression  No date: History of coronary angioplasty with insertion of stent  No date: Hyperlipidemia  No date: Hypertension  No date: Hyperthyroidism  09/02/2011: Obesity  01/08/2021: Paroxysmal SVT (supraventricular tachycardia) ([facility])  10/29/2019: RUE weakness      Comment:  Previous residual from Stroke, but was not work                prohibiting.  12/23/2020: Seasonal allergic rhinitis  No date: Sinus arrhythmia  No date: Sustained SVT ([facility])  No date: Type 2 diabetes mellitus ([facility])  11/05/2020: Vitamin D deficiency  10. Source: Patient          Review of Systems   Constitutional: Negative for activity change, chills, diaphoresis, fatigue and fever.   HENT: Negative for postnasal drip.    Respiratory: Negative for shortness of breath and wheezing.    Cardiovascular: Negative for chest pain, palpitations and leg swelling.   Gastrointestinal: Negative for abdominal pain, nausea and vomiting.   Genitourinary: Negative for flank pain.   Musculoskeletal: Negative for " arthralgias, joint swelling and myalgias.   Skin: Positive for wound (Surgical incision site). Negative for color change, pallor and rash.   Allergic/Immunologic: Negative for immunocompromised state.   Neurological: Negative for dizziness, syncope, weakness and numbness.   Hematological: Does not bruise/bleed easily.   Psychiatric/Behavioral: Negative for confusion.   All other systems reviewed and are negative.      Past Medical History:   Diagnosis Date   • Abnormal cardiovascular stress test 01/19/2017   • Acute myocardial infarction ([facility]) 2019   • Arterial ischemic stroke, MCA (middle cerebral artery), left, acute ([facility]) 10/29/2019   • Atrial flutter with rapid ventricular response ([facility]) 02/04/2021   • Bicuspid aortic valve 02/16/2017   • Coronary artery disease involving native coronary artery of native heart    • Depression 12/30/2020   • History of coronary angioplasty with insertion of stent    • Hyperlipidemia    • Hypertension    • Hyperthyroidism    • Obesity 09/02/2011   • Paroxysmal SVT (supraventricular tachycardia) ([facility]) 01/08/2021   • RUE weakness 10/29/2019    Previous residual from Stroke, but was not work prohibiting.   • Seasonal allergic rhinitis 12/23/2020   • Sinus arrhythmia    • Sustained SVT ([facility])    • Type 2 diabetes mellitus ([facility])    • Vitamin D deficiency 11/05/2020       Allergies   Allergen Reactions   • Morphine GI Intolerance   • Ozempic (0.25 Or 0.5 Mg-Dose) [Semaglutide(0.25 Or 0.5mg-Dos)] Diarrhea   • Isosorbide Headache   • Nitrofuran Derivatives Headache   • Nitroglycerin Headache       Past Surgical History:   Procedure Laterality Date   • APPENDECTOMY     • BACK SURGERY     • CARDIAC CATHETERIZATION  2010   • CARDIAC CATHETERIZATION  2019   • CARDIAC CATHETERIZATION N/A 12/31/2020    Procedure: Cardiac Catheterization/Vascular Study;  Surgeon: Moris Pedro MD;  Location: [facility] CATH INVASIVE LOCATION;  Service: Cardiovascular;  Laterality: N/A;   • CARDIAC  CATHETERIZATION N/A 2/1/2022    Procedure: Left Heart Cath;  Surgeon: Kamlesh Richardson MD;  Location: [facility] CATH INVASIVE LOCATION;  Service: Cardiovascular;  Laterality: N/A;   • CARDIAC CATHETERIZATION N/A 7/20/2022    Procedure: Left Heart Cath;  Surgeon: Kamlesh Richardson MD;  Location: [facility] CATH INVASIVE LOCATION;  Service: Cardiovascular;  Laterality: N/A;   • CARDIAC ELECTROPHYSIOLOGY PROCEDURE N/A 1/20/2021    Procedure: EP/Ablation;  Surgeon: Nathen Olmstead MD;  Location: [facility] CATH INVASIVE LOCATION;  Service: Cardiovascular;  Laterality: N/A;   • CARDIAC ELECTROPHYSIOLOGY PROCEDURE N/A 2/24/2022    Procedure: EP/Ablation-SVT [place] aware;  Surgeon: Candido Montana MD;  Location: [facility] CATH INVASIVE LOCATION;  Service: Cardiology;  Laterality: N/A;   • JOINT REPLACEMENT     • KNEE ARTHROSCOPY     • THYROIDECTOMY, PARTIAL  2015       Family History   Problem Relation Age of Onset   • Heart disease Mother    • Parkinsonism Father    • Cancer Father        Social History     Socioeconomic History   • Marital status:    Tobacco Use   • Smoking status: Never Smoker   • Smokeless tobacco: Never Used   Vaping Use   • Vaping Use: Never used   Substance and Sexual Activity   • Alcohol use: Not Currently   • Drug use: No   • Sexual activity: Defer           Objective   Physical Exam  Vitals and nursing note reviewed.   Constitutional:       General: He is awake. He is not in acute distress.     Appearance: Normal appearance. He is well-developed and normal weight. He is not ill-appearing or diaphoretic.   HENT:      Head: Normocephalic and atraumatic.   Neck:      Thyroid: No thyromegaly.      Vascular: No JVD.      Trachea: No tracheal deviation.   Cardiovascular:      Rate and Rhythm: Normal rate and regular rhythm.      Pulses: Normal pulses.           Radial pulses are 2+ on the right side and 2+ on the left side.        Femoral pulses are 2+ on the right side and 2+ on the  left side.       Dorsalis pedis pulses are 2+ on the right side and 2+ on the left side.        Posterior tibial pulses are 2+ on the right side and 2+ on the left side.      Heart sounds: Normal heart sounds, S1 normal and S2 normal. Heart sounds not distant. No murmur heard.    No friction rub. No gallop.   Pulmonary:      Effort: Pulmonary effort is normal. No respiratory distress.      Breath sounds: Normal breath sounds. No wheezing or rales.   Chest:      Chest wall: No tenderness.   Abdominal:      General: Abdomen is flat. Bowel sounds are normal. There is no distension.      Palpations: Abdomen is soft. There is no mass.      Tenderness: There is no abdominal tenderness. There is no guarding or rebound.      Hernia: No hernia is present.   Musculoskeletal:         General: Tenderness present. No swelling or signs of injury. Normal range of motion.      Cervical back: Normal range of motion and neck supple.      Right lower leg: Tenderness present. No swelling or bony tenderness. No edema.      Left lower leg: Normal. No edema.      Right foot: Normal. Normal pulse.      Left foot: Normal. Normal pulse.        Legs:       Comments: There is no overlying erythema over the incisional site of the right groin.  No drainage appreciated.    Skin:     General: Skin is warm and dry.      Capillary Refill: Capillary refill takes less than 2 seconds.   Neurological:      Mental Status: He is alert and oriented to person, place, and time.      GCS: GCS eye subscore is 4. GCS verbal subscore is 5. GCS motor subscore is 6.   Psychiatric:         Mood and Affect: Mood normal.         Behavior: Behavior normal. Behavior is cooperative.         Thought Content: Thought content normal.         Judgment: Judgment normal.         Procedures           ED Course    No radiology results for the last day  Medications   sodium chloride 0.9 % flush 10 mL (has no administration in time range)   sodium chloride 0.9 % bolus 1,000 mL  (1,000 mL Intravenous New Bag 7/31/22 4171)     Labs Reviewed   BASIC METABOLIC PANEL - Abnormal; Notable for the following components:       Result Value    Glucose 435 (*)     Sodium 134 (*)     All other components within normal limits    Narrative:     GFR Normal >60  Chronic Kidney Disease <60  Kidney Failure <15     APTT - Abnormal; Notable for the following components:    PTT 27.6 (*)     All other components within normal limits   CBC WITH AUTO DIFFERENTIAL - Abnormal; Notable for the following components:    Eosinophil % 7.0 (*)     Basophil % 2.1 (*)     All other components within normal limits   PROTIME-INR - Normal   COVID PRE-OP / PRE-PROCEDURE SCREENING ORDER (NO ISOLATION)    Narrative:     The following orders were created for panel order COVID PRE-OP / PRE-PROCEDURE SCREENING ORDER (NO ISOLATION) - Swab, Nasopharynx.  Procedure                               Abnormality         Status                     ---------                               -----------         ------                     COVID-19,CEPHEID/WALTER,CO...[[id number]]                                                   Please view results for these tests on the individual orders.   COVID-19,CEPHEID/WALTER,COR/[facility]/PAD/[facility] IN-HOUSE,NP SWAB IN TRANSPORT MEDIA 3-4 HR TAT, RT-PCR   CBC AND DIFFERENTIAL    Narrative:     The following orders were created for panel order CBC & Differential.  Procedure                               Abnormality         Status                     ---------                               -----------         ------                     CBC Auto Differential[[id number]]        Abnormal            Final result                 Please view results for these tests on the individual orders.                                            MDM  Number of Diagnoses or Management Options  Acute postoperative pain of right groin  Type 2 diabetes mellitus with hyperglycemia, with long-term current use of insulin (HCC)  Diagnosis management  comments: Chart Review: 7/18/2022 patient was admitted for heart failure.  Comorbidity: Past Medical History:  01/19/2017: Abnormal cardiovascular stress test  2019: Acute myocardial infarction ([facility])  10/29/2019: Arterial ischemic stroke, MCA (middle cerebral artery),   left, acute ([facility])  02/04/2021: Atrial flutter with rapid ventricular response ([facility])  02/16/2017: Bicuspid aortic valve  No date: Coronary artery disease involving native coronary artery of   native heart  12/30/2020: Depression  No date: History of coronary angioplasty with insertion of stent  No date: Hyperlipidemia  No date: Hypertension  No date: Hyperthyroidism  09/02/2011: Obesity  01/08/2021: Paroxysmal SVT (supraventricular tachycardia) ([facility])  10/29/2019: RUE weakness      Comment:  Previous residual from Stroke, but was not work                prohibiting.  12/23/2020: Seasonal allergic rhinitis  No date: Sinus arrhythmia  No date: Sustained SVT ([facility])  No date: Type 2 diabetes mellitus ([facility])  11/05/2020: Vitamin D deficiency    Patient undressed and placed in gown for exam.  Appropriate PPE worn during patient exam.  S1-S2 heart sounds on exam.  Lungs are clear to auscultation.  Tenderness present on the right groin. There is no overlying erythema over the incisional site of the right groin.  No drainage appreciated.  Distal pulses strong equal bilaterally 2+.  Cap refill less than 2 seconds.  CBC is essentially unremarkable.  Sodium 134 potassium 3.1 chloride 98 bicarb 26 BUN 17 creatinine 1.0 glucose 435.  Patient reports that he did take his evening dose of insulin, but ate immediately prior to coming in.  Patient was given normal saline 1 L bolus.  PT 10.2 INR 0.99 PTT 27.6.  Patient will be placed in obs for vascular study in the a.m. to rule out pseudoaneurysm.    Disposition/Treatment: Discussed results with patient, verbalized understanding.  Agreeable with plan of care.  Patient was stable upon being placed in ED observation unit.        Part of this note may be an electronic transcription/translation of spoken language to printed text using the Dragon Dictation System.            Amount and/or Complexity of Data Reviewed  Clinical lab tests: reviewed  Decide to obtain previous medical records or to obtain history from someone other than the patient: yes    Risk of Complications, Morbidity, and/or Mortality  General comments: This case was discussed with my attending, and admitting provider, Dr. Tidwell.    Patient Progress  Patient progress: stable      Final diagnoses:   Acute postoperative pain of right groin   Type 2 diabetes mellitus with hyperglycemia, with long-term current use of insulin (HCC)       ED Disposition  ED Disposition     ED Disposition   Decision to Admit    Condition   --    Comment   --             No follow-up provider specified.       Medication List      No changes were made to your prescriptions during this visit.          Larissa Ny, APRN  07/31/22 [id number]

## 2022-08-01 NOTE — CONSULTS
HP      Name: Preet Jones ADMIT: 2022   : 1965  PCP: Celine Garcia MD    MRN: [id number] LOS: 0 days   AGE/SEX: 57 y.o. male  ROOM: [facility]/1     Chief Complaint   Patient presents with   • Groin Injury     Pt states he had a heart cath last week and he stepped down out of truck tonight and something popped in his groin and now his groin hurts on right and feels a little knot now.         Subjective        History of present illness  Preet Jones is a 57-year-old male patient was history of coronary artery disease status post PCI to the [facility] on 2017, A. fib status post cryoablation and CTI on 2021, AVNRT ablation on 2022, also hypertension, dyslipidemia, diabetes, cardiomyopathy, possibly combined ischemic and nonischemic.  Patient had a heart catheterization on 2022 due to worsening ejection fraction, again no significant obstructive lesions were found.  His medications were optimized with addition of Entresto.  Patient is feeling better from CHF standpoint, denies any shortness of breath, no lower extremity edema.  He presented back to the hospital however due to pain in his right groin which happened spontaneously yesterday.  He was concerned about complication at vascular access site.    Past Medical History:   Diagnosis Date   • Abnormal cardiovascular stress test 2017   • Acute myocardial infarction ([facility])    • Arterial ischemic stroke, MCA (middle cerebral artery), left, acute ([facility]) 10/29/2019   • Atrial flutter with rapid ventricular response ([facility]) 2021   • Bicuspid aortic valve 2017   • Coronary artery disease involving native coronary artery of native heart    • Depression 2020   • History of coronary angioplasty with insertion of stent    • Hyperlipidemia    • Hypertension    • Hyperthyroidism    • Obesity 2011   • Paroxysmal SVT (supraventricular tachycardia) ([facility]) 2021   • RUE weakness 10/29/2019    Previous residual  from Stroke, but was not work prohibiting.   • Seasonal allergic rhinitis 12/23/2020   • Sinus arrhythmia    • Sustained SVT (HCC)    • Type 2 diabetes mellitus (HCC)    • Vitamin D deficiency 11/05/2020     Past Surgical History:   Procedure Laterality Date   • APPENDECTOMY     • BACK SURGERY     • CARDIAC CATHETERIZATION  2010   • CARDIAC CATHETERIZATION  2019   • CARDIAC CATHETERIZATION N/A 12/31/2020    Procedure: Cardiac Catheterization/Vascular Study;  Surgeon: Moris Pedro MD;  Location:  [facility] CATH INVASIVE LOCATION;  Service: Cardiovascular;  Laterality: N/A;   • CARDIAC CATHETERIZATION N/A 2/1/2022    Procedure: Left Heart Cath;  Surgeon: Kamlesh Richardson MD;  Location:  [facility] CATH INVASIVE LOCATION;  Service: Cardiovascular;  Laterality: N/A;   • CARDIAC CATHETERIZATION N/A 7/20/2022    Procedure: Left Heart Cath;  Surgeon: Kamlesh Richardson MD;  Location:  [facility] CATH INVASIVE LOCATION;  Service: Cardiovascular;  Laterality: N/A;   • CARDIAC ELECTROPHYSIOLOGY PROCEDURE N/A 1/20/2021    Procedure: EP/Ablation;  Surgeon: Nathen Olmstead MD;  Location:  [facility] CATH INVASIVE LOCATION;  Service: Cardiovascular;  Laterality: N/A;   • CARDIAC ELECTROPHYSIOLOGY PROCEDURE N/A 2/24/2022    Procedure: EP/Ablation-SVT [place] aware;  Surgeon: Candido Montana MD;  Location:  [facility] CATH INVASIVE LOCATION;  Service: Cardiology;  Laterality: N/A;   • JOINT REPLACEMENT     • KNEE ARTHROSCOPY     • THYROIDECTOMY, PARTIAL  2015     Family History   Problem Relation Age of Onset   • Heart disease Mother    • Parkinsonism Father    • Cancer Father      Social History     Tobacco Use   • Smoking status: Never Smoker   • Smokeless tobacco: Never Used   Vaping Use   • Vaping Use: Never used   Substance Use Topics   • Alcohol use: Not Currently   • Drug use: No     Medications Prior to Admission   Medication Sig Dispense Refill Last Dose   • Acetaminophen (Tylenol) 325 MG capsule Take 650 mg by  mouth Every 6 (Six) Hours As Needed.   7/31/2022 at Unknown time   • apixaban (ELIQUIS) 5 MG tablet tablet Take 1 tablet by mouth Every 12 (Twelve) Hours. Indications: Atrial Fibrillation 60 tablet 1 7/31/2022 at Unknown time   • aspirin 81 MG chewable tablet Chew 81 mg Daily.   7/31/2022 at Unknown time   • atorvastatin (LIPITOR) 80 MG tablet Take 1 tablet by mouth Every Night. 90 tablet 1 7/31/2022 at Unknown time   • Cholecalciferol (VITAMIN D-3) 25 MCG (1000 UT) capsule Take 3 capsules by mouth Daily.   7/31/2022 at Unknown time   • furosemide (LASIX) 40 MG tablet Take 1 tablet by mouth Daily for 30 days. 30 tablet 0 7/31/2022 at Unknown time   • guaiFENesin (MUCINEX) 600 MG 12 hr tablet Take 1,200 mg by mouth 2 (Two) Times a Day As Needed for Cough.   Past Month at Unknown time   • Insulin Glargine (BASAGLAR KWIKPEN) 100 UNIT/ML injection pen Inject 10 Units under the skin into the appropriate area as directed Every Night for 30 days. 5 pen 0 7/31/2022 at Unknown time   • metFORMIN (GLUCOPHAGE) 1000 MG tablet 2 (Two) Times a Day With Meals.   7/31/2022 at Unknown time   • Pediatric Multiple Vitamins (Multivitamin Childrens) chewable tablet Chew 1 tablet Daily.   7/31/2022 at Unknown time   • sacubitril-valsartan (ENTRESTO) 24-26 MG tablet Take 1 tablet by mouth Every 12 (Twelve) Hours for 30 days. 60 tablet 0 7/31/2022 at Unknown time   • sotalol (BETAPACE) 80 MG tablet Take 0.5 tablets by mouth Every 12 (Twelve) Hours. 60 tablet 1 7/31/2022 at Unknown time     Allergies:  Morphine, Ozempic (0.25 or 0.5 mg-dose) [semaglutide(0.25 or 0.5mg-dos)], Isosorbide, Nitrofuran derivatives, and Nitroglycerin    Review of systems    Constitutional: Negative.    Respiratory and cardiovascular: As detailed in HPI section.  Gastrointestinal: Negative for constipation, nausea and vomiting negative for abdominal distention, abdominal pain and diarrhea.   Genitourinary: Negative for difficulty urinating and flank pain.    Musculoskeletal: Negative for arthralgias, joint swelling and myalgias.   Skin: Negative for color change, rash and wound.   Neurological: Negative for dizziness, syncope, weakness and headaches.   Hematological: Negative for adenopathy.   Psychiatric/Behavioral: Negative for confusion.   All other systems reviewed and are negative.       Physical Exam  VITALS REVIEWED    General:      well developed, in no acute distress.    Head:      normocephalic and atraumatic.    Eyes:      PERRL/EOM intact, conjunctiva and sclera clear with out nystagmus.    Neck:      no masses, thyromegaly,  trachea central with normal respiratory effort and PMI displaced laterally  Lungs:      Clear to auscultation bilaterally  Heart:       Regular rate and rhythm  Msk:      no deformity or scoliosis noted of thoracic or lumbar spine.    Pulses:      pulses normal in all 4 extremities.    Extremities:       No lower extremity edema  Neurologic:      no focal deficits.   alert oriented x3  Skin:      intact without lesions or rashes.    Psych:      alert and cooperative; normal mood and affect; normal attention span and concentration.      Result Review :               Pertinent cardiac workup    1. Heart cath 7/20/2022 moderate diffuse CAD, ejection fraction 30%.  2. EKG 7/19/2022 sinus rhythm with first-degree AV block.  3. Echo 7/18/2022 ejection fraction 35 to 40%.        Assessment and Plan         Coronary artery disease involving native coronary artery of native heart    Acute postoperative pain of right groin      Preet Jones is a 57-year-old male patient who was diabetes, coronary artery disease, history of arrhythmia as detailed above, cardiomyopathy, presented due to pain in his right groin.  Patient had a heart catheterization on 7/20/2022 due to worsening EF but his CAD was stable and no PCI was performed.  His groin exam looks benign, no pulsatile mass, no hematoma.  Vascular ultrasound also did not show any hematoma or  pseudoaneurysm or any other complications.  His CHF is optimally managed right now with addition of Entresto from his previous hospitalization.  We will continue all his current medications, patient should be able to be discharged home.  I will follow him in the office as scheduled.        No follow-ups on file.  Patient was given instructions and counseling regarding his condition or for health maintenance advice. Please see specific information pulled into the AVS if appropriate.

## 2022-08-02 NOTE — OUTREACH NOTE
Prep Survey    Flowsheet Row Responses   [facility] facility patient discharged from? [facility]   Is LACE score < 7 ? No   Emergency Room discharge w/ pulse ox? No   Eligibility Readm Mgmt   Discharge diagnosis Acute postoperative pain in right groin   Does the patient have one of the following disease processes/diagnoses(primary or secondary)? Other   Does the patient have Home health ordered? No   Is there a DME ordered? No   Prep survey completed? Yes          MARIE LÓPEZ - Registered Nurse

## 2022-08-02 NOTE — CASE MANAGEMENT/SOCIAL WORK
Transportation Services  Private: Car (with spouse)    Final Discharge Disposition Code: 01 - home or self-care

## 2022-08-03 ENCOUNTER — READMISSION MANAGEMENT (OUTPATIENT)
Dept: CALL CENTER | Facility: HOSPITAL | Age: 57
End: 2022-08-03

## 2022-08-03 LAB — QT INTERVAL: 488 MS

## 2022-08-03 NOTE — OUTREACH NOTE
Medical Week 1 Survey    Flowsheet Row Responses   [facility] [place] patient discharged from? [facility]   Does the patient have one of the following disease processes/diagnoses(primary or secondary)? Other   Week 1 attempt successful? Yes   Call start time 1520   Call end time 1521   Discharge diagnosis Acute postoperative pain in right groin   Meds reviewed with patient/caregiver? Yes   Is the patient having any side effects they believe may be caused by any medication additions or changes? No   Does the patient have all medications ordered at discharge? Yes   Is the patient taking all medications as directed (includes completed medication regime)? Yes   Does the patient have a primary care provider?  Yes   Does the patient have an appointment with their PCP within 7 days of discharge? Yes   Comments regarding PCP 8/3/22   Has the patient kept scheduled appointments due by today? Yes   Has home health visited the patient within 72 hours of discharge? N/A   Psychosocial issues? No   What is the patient's perception of their health status since discharge? Improving   Week 1 call completed? Yes          PHU ROLLINS - Registered Nurse

## 2022-08-09 ENCOUNTER — OFFICE VISIT (OUTPATIENT)
Dept: CARDIOLOGY | Facility: CLINIC | Age: 57
End: 2022-08-09

## 2022-08-09 VITALS
DIASTOLIC BLOOD PRESSURE: 80 MMHG | OXYGEN SATURATION: 99 % | SYSTOLIC BLOOD PRESSURE: 149 MMHG | BODY MASS INDEX: 25.48 KG/M2 | HEART RATE: 84 BPM | HEIGHT: 71 IN | WEIGHT: 182 LBS

## 2022-08-09 DIAGNOSIS — I50.23 ACUTE ON CHRONIC HFREF (HEART FAILURE WITH REDUCED EJECTION FRACTION): Chronic | ICD-10-CM

## 2022-08-09 DIAGNOSIS — I25.10 CAD S/P PERCUTANEOUS CORONARY ANGIOPLASTY: Chronic | ICD-10-CM

## 2022-08-09 DIAGNOSIS — E11.65 TYPE 2 DIABETES MELLITUS WITH HYPERGLYCEMIA, WITHOUT LONG-TERM CURRENT USE OF INSULIN: Chronic | ICD-10-CM

## 2022-08-09 DIAGNOSIS — I10 PRIMARY HYPERTENSION: Chronic | ICD-10-CM

## 2022-08-09 DIAGNOSIS — I48.0 AF (PAROXYSMAL ATRIAL FIBRILLATION): ICD-10-CM

## 2022-08-09 DIAGNOSIS — I50.22 CHRONIC SYSTOLIC CONGESTIVE HEART FAILURE: Primary | ICD-10-CM

## 2022-08-09 DIAGNOSIS — E78.2 MIXED HYPERLIPIDEMIA: Chronic | ICD-10-CM

## 2022-08-09 DIAGNOSIS — Z98.61 CAD S/P PERCUTANEOUS CORONARY ANGIOPLASTY: Chronic | ICD-10-CM

## 2022-08-09 DIAGNOSIS — I49.8 ATRIAL ARRHYTHMIA: Chronic | ICD-10-CM

## 2022-08-09 PROCEDURE — 99214 OFFICE O/P EST MOD 30 MIN: CPT | Performed by: INTERNAL MEDICINE

## 2022-08-09 RX ORDER — SACUBITRIL AND VALSARTAN 49; 51 MG/1; MG/1
1 TABLET, FILM COATED ORAL 2 TIMES DAILY
Qty: 180 TABLET | Refills: 3 | Status: SHIPPED | OUTPATIENT
Start: 2022-08-09

## 2022-08-09 RX ORDER — METOLAZONE 2.5 MG/1
2.5 TABLET ORAL
Qty: 30 TABLET | Refills: 3 | Status: SHIPPED | OUTPATIENT
Start: 2022-08-09

## 2022-08-09 RX ORDER — POTASSIUM CHLORIDE 1.5 G/1.77G
20 POWDER, FOR SOLUTION ORAL 2 TIMES DAILY
COMMUNITY
End: 2022-09-08

## 2022-08-09 NOTE — PROGRESS NOTES
Progress note      Name: Preet Jones ADMIT: (Not on file)   : 1965  PCP: Celine Garcia MD    MRN: [id number] LOS: 0 days   AGE/SEX: 57 y.o. male  ROOM: Room/bed info not found     Chief Complaint   Patient presents with   • Follow-up     Hosp f/u       Subjective       History of present illness  Preet Jones is a 57-year-old male patient with history of coronary artery disease status post PCI to the marginal on 2017, A. fib status post cryoablation and CTI on 2021, AVNRT ablation on 2022, also hypertension, dyslipidemia, diabetes, cardiomyopathy, possibly combined ischemic and nonischemic.  Patient was admitted to the hospital in July with worsening CHF and heart catheterization was done on 2022 which showed stable CAD.  He has been on sotalol due to atrial arrhythmia with good control, Entresto was added along with Lasix.  Patient is still complaining of shortness of breath and lower extremity edema.    Past Medical History:   Diagnosis Date   • Abnormal cardiovascular stress test 2017   • Acute myocardial infarction ([facility]) 2019   • Arterial ischemic stroke, MCA (middle cerebral artery), left, acute ([facility]) 10/29/2019   • Atrial flutter with rapid ventricular response ([facility]) 2021   • Bicuspid aortic valve 2017   • CHF (congestive heart failure) ([facility])    • Coronary artery disease involving native coronary artery of native heart    • Depression 2020   • History of coronary angioplasty with insertion of stent    • Hyperlipidemia    • Hypertension    • Hyperthyroidism    • Obesity 2011   • Paroxysmal SVT (supraventricular tachycardia) ([facility]) 2021   • RUE weakness 10/29/2019    Previous residual from Stroke, but was not work prohibiting.   • Seasonal allergic rhinitis 2020   • Sinus arrhythmia    • Sustained SVT ([facility])    • Type 2 diabetes mellitus ([facility])    • Vitamin D deficiency 2020     Past Surgical History:   Procedure Laterality  Date   • APPENDECTOMY     • BACK SURGERY     • CARDIAC CATHETERIZATION  2010   • CARDIAC CATHETERIZATION  2019   • CARDIAC CATHETERIZATION N/A 12/31/2020    Procedure: Cardiac Catheterization/Vascular Study;  Surgeon: Moris Pedro MD;  Location:  [facility] CATH INVASIVE LOCATION;  Service: Cardiovascular;  Laterality: N/A;   • CARDIAC CATHETERIZATION N/A 2/1/2022    Procedure: Left Heart Cath;  Surgeon: Kamlesh Richardson MD;  Location: [facility] CATH INVASIVE LOCATION;  Service: Cardiovascular;  Laterality: N/A;   • CARDIAC CATHETERIZATION N/A 7/20/2022    Procedure: Left Heart Cath;  Surgeon: Kamlesh Richardson MD;  Location:  [facility] CATH INVASIVE LOCATION;  Service: Cardiovascular;  Laterality: N/A;   • CARDIAC ELECTROPHYSIOLOGY PROCEDURE N/A 1/20/2021    Procedure: EP/Ablation;  Surgeon: Nathen Olmstead MD;  Location: [facility] CATH INVASIVE LOCATION;  Service: Cardiovascular;  Laterality: N/A;   • CARDIAC ELECTROPHYSIOLOGY PROCEDURE N/A 2/24/2022    Procedure: EP/Ablation-SVT [place] aware;  Surgeon: Candido Montana MD;  Location: [facility] CATH INVASIVE LOCATION;  Service: Cardiology;  Laterality: N/A;   • JOINT REPLACEMENT     • KNEE ARTHROSCOPY     • THYROIDECTOMY, PARTIAL  2015     Family History   Problem Relation Age of Onset   • Heart disease Mother    • Parkinsonism Father    • Cancer Father      Social History     Tobacco Use   • Smoking status: Never Smoker   • Smokeless tobacco: Never Used   Vaping Use   • Vaping Use: Never used   Substance Use Topics   • Alcohol use: Not Currently   • Drug use: No     (Not in a hospital admission)    Allergies:  Morphine, Ozempic (0.25 or 0.5 mg-dose) [semaglutide(0.25 or 0.5mg-dos)], Isosorbide, Nitrofuran derivatives, and Nitroglycerin      Physical Exam  VITALS REVIEWED    General:      well developed, in no acute distress.    Head:      normocephalic and atraumatic.    Eyes:      PERRL/EOM intact, conjunctiva and sclera clear with out  nystagmus.    Neck:      no masses, thyromegaly,  trachea central with normal respiratory effort and PMI displaced laterally  Lungs:      Clear to auscultation bilaterally  Heart:       Regular rate and rhythm  Msk:      no deformity or scoliosis noted of thoracic or lumbar spine.   Pulses:      pulses normal in all 4 extremities.    Extremities:       No lower extremity edema  Neurologic:      no focal deficits.   alert oriented x3  Skin:      intact without lesions or rashes.    Psych:      alert and cooperative; normal mood and affect; normal attention span and concentration.      Result Review :               Pertinent cardiac workup    1. Heart cath 7/20/2022 moderate diffuse CAD, ejection fraction 30%.  2. EKG 7/19/2022 sinus rhythm with first-degree AV block.  3. Echo 7/18/2022 ejection fraction 35 to 40%.      Procedures        Assessment and Plan      Preet Jones is a 57-year-old male patient who has diabetes, coronary artery disease, history of arrhythmia as detailed above.  Here lately the patient has been struggling with CHF and decline in his ejection fraction down to about 30%.  His recent heart catheterization July 2022 showed stable CAD with no obstructive lesions.  I will go ahead and increase the Entresto to the medium dose, is already on sotalol for his arrhythmia therefore I will not add a different beta-blocker.  He is on Lasix 40 twice a day, I will add metolazone to take 3 times a week or as needed to help with his shortness of breath.  Hopefully this will improve his CHF symptoms and in a couple of months we will repeat echocardiogram and if his ejection fraction is still low then he will qualify for an ICD.  We will see him back in couple of months with an echo.    Diagnoses and all orders for this visit:    1. Chronic systolic congestive heart failure (HCC) (Primary)  -     Adult Transthoracic Echo Complete W/ Cont if Necessary Per Protocol; Future    2. Primary hypertension    3.  Mixed hyperlipidemia    4. Type 2 diabetes mellitus with hyperglycemia, without long-term current use of insulin ([facility])    5. Acute on chronic HFrEF (heart failure with reduced ejection fraction) ([facility])    6. CAD S/P percutaneous coronary angioplasty    7. Atrial arrhythmia    8. AF (paroxysmal atrial fibrillation) ([facility])    Other orders  -     sacubitril-valsartan (Entresto) 49-51 MG tablet; Take 1 tablet by mouth 2 (Two) Times a Day.  Dispense: 180 tablet; Refill: 3  -     metOLazone (ZAROXOLYN) 2.5 MG tablet; Take 1 tablet by mouth 3 (Three) Times a Week if Needed (edema).  Dispense: 30 tablet; Refill: 3           Return 2 months with echo.  Patient was given instructions and counseling regarding his condition or for health maintenance advice. Please see specific information pulled into the AVS if appropriate.

## 2022-08-10 ENCOUNTER — TELEPHONE (OUTPATIENT)
Dept: CARDIOLOGY | Facility: CLINIC | Age: 57
End: 2022-08-10

## 2022-08-10 NOTE — TELEPHONE ENCOUNTER
CALLED PATIENT. HE JUST NEEDED TO KNOW WHERE TO DROP OFF FMLA/STD PAPERWORK. I ADVISED HE CAN DROP THAT OFF AT THE OFFICE.

## 2022-08-16 DIAGNOSIS — I25.5 ISCHEMIC CARDIOMYOPATHY: Primary | ICD-10-CM

## 2022-08-18 ENCOUNTER — TELEPHONE (OUTPATIENT)
Dept: CARDIOLOGY | Facility: CLINIC | Age: 57
End: 2022-08-18

## 2022-08-18 RX ORDER — FUROSEMIDE 40 MG/1
40 TABLET ORAL 2 TIMES DAILY
Qty: 180 TABLET | Refills: 2 | Status: SHIPPED | OUTPATIENT
Start: 2022-08-18 | End: 2022-09-17

## 2022-08-18 NOTE — TELEPHONE ENCOUNTER
Incoming Refill Request      Medication requested (name and dose): furosemide, 40 mg, supposed to take 2 daily (bottle from [facility] says 1 daily), 90 day supply with refills    Pharmacy where request should be sent: [facility]    Additional details provided by patient: dose increased by Dr. DEAL    Best call back number: **[phone] *    Does the patient have less than a 3 day supply:  [x] Yes  [] No    Mary Seipel, RegSched Rep  08/18/22, 10:50 EDT    [id number]}

## 2022-08-18 NOTE — TELEPHONE ENCOUNTER
Rx Refill Note  Requested Prescriptions     Signed Prescriptions Disp Refills   • furosemide (LASIX) 40 MG tablet 180 tablet 2     Sig: Take 1 tablet by mouth 2 (Two) Times a Day for 30 days.     Authorizing Provider: FADI BERTRAND     Ordering User: KEITH RAMÍREZ      Last office visit with prescribing clinician: 8/9/2022      Next office visit with prescribing clinician: 10/11/2022            Keith Ramírez MA  08/18/22, 11:09 EDT

## 2022-08-26 ENCOUNTER — TELEPHONE (OUTPATIENT)
Dept: CARDIOLOGY | Facility: CLINIC | Age: 57
End: 2022-08-26

## 2022-08-26 NOTE — TELEPHONE ENCOUNTER
Called and advsd that [facility] paperwork was ready to be picked up and paid for. He verbally understood.

## 2022-09-07 PROBLEM — E78.2 MIXED HYPERLIPIDEMIA: Status: ACTIVE | Noted: 2019-10-29

## 2022-09-08 ENCOUNTER — OFFICE VISIT (OUTPATIENT)
Dept: CARDIOLOGY | Facility: CLINIC | Age: 57
End: 2022-09-08

## 2022-09-08 VITALS
WEIGHT: 184 LBS | BODY MASS INDEX: 25.76 KG/M2 | DIASTOLIC BLOOD PRESSURE: 76 MMHG | OXYGEN SATURATION: 98 % | HEIGHT: 71 IN | SYSTOLIC BLOOD PRESSURE: 122 MMHG | HEART RATE: 101 BPM

## 2022-09-08 DIAGNOSIS — E11.65 TYPE 2 DIABETES MELLITUS WITH HYPERGLYCEMIA, WITHOUT LONG-TERM CURRENT USE OF INSULIN: Primary | Chronic | ICD-10-CM

## 2022-09-08 DIAGNOSIS — I10 BENIGN ESSENTIAL HTN: ICD-10-CM

## 2022-09-08 DIAGNOSIS — I48.0 AF (PAROXYSMAL ATRIAL FIBRILLATION): Primary | ICD-10-CM

## 2022-09-08 DIAGNOSIS — E78.2 MIXED HYPERLIPIDEMIA: ICD-10-CM

## 2022-09-08 DIAGNOSIS — I25.119 CORONARY ARTERY DISEASE INVOLVING NATIVE CORONARY ARTERY OF NATIVE HEART WITH ANGINA PECTORIS: Chronic | ICD-10-CM

## 2022-09-08 DIAGNOSIS — I50.21 ACUTE SYSTOLIC CONGESTIVE HEART FAILURE: Chronic | ICD-10-CM

## 2022-09-08 PROCEDURE — 99214 OFFICE O/P EST MOD 30 MIN: CPT | Performed by: INTERNAL MEDICINE

## 2022-09-08 PROCEDURE — 93000 ELECTROCARDIOGRAM COMPLETE: CPT | Performed by: INTERNAL MEDICINE

## 2022-09-08 RX ORDER — CIPROFLOXACIN 500 MG/1
500 TABLET, FILM COATED ORAL 2 TIMES DAILY
COMMUNITY

## 2022-09-08 RX ORDER — SPIRONOLACTONE 25 MG/1
25 TABLET ORAL DAILY
Qty: 30 TABLET | Refills: 1 | Status: SHIPPED | OUTPATIENT
Start: 2022-09-08 | End: 2023-01-16

## 2022-09-08 RX ORDER — SULFAMETHOXAZOLE AND TRIMETHOPRIM 800; 160 MG/1; MG/1
1 TABLET ORAL 2 TIMES DAILY
COMMUNITY

## 2022-09-08 NOTE — PROGRESS NOTES
Cardiology Clinic Note  Jean Paul Zelaya MD, PhD    Subjective:     Encounter Date:09/08/2022      Patient ID: Preet Jones is a 57 y.o. male.    Chief Complaint:  Chief Complaint   Patient presents with   • Coronary Artery Disease   • Atrial Fibrillation   • Hypertension   • Congestive Heart Failure   • Hyperlipidemia   • Diabetes   • Consult       HPI:  I had the pleasure to see this 57-year-old gentleman in clinic today as a secondary referral with history of what appears to be nonischemic cardiomyopathy on top of nonobstructive coronary artery disease, he is a small stent in obtuse marginal branch.  I reviewed his left heart catheterization films which reveals mild in-stent restenosis at 40 to 50% and a small obtuse marginal branch, nonobstructive disease in the main body of the circumflex, small undersized LAD with reduced filling possibly secondary to high filling pressures at that time back in July.  RCA is small and nondominant with left dominant circulation.  His echo was reviewed and interpreted by me demonstrates an EF of around 35% with global LV hypokinesis possibly more hypokinesis at the apex regionally, left atrial enlargement, grade 2 diastolic dysfunction, he has a history of paroxysmal atrial fibrillation status post ablations in the past, diabetes hypertension hyperlipidemia and history of stroke in the past.  He says he is drinking a lot of water every day as well as multiple diet soft drinks which we counseled him to quit extensively.  We discussed fluid and salt restriction, medication adjustments, need for repeat echo in 2 to 3 months along with possible need for repeat heart cath under Norment filling pressures to visualize the distal coronaries which were not well seen with suboptimal views given filling pressures s        Review of systems otherwise negative x14 point review of systems except was mentioned above    EKG is abnormal with paroxysmal atrial tachycardia with otherwise sinus  "rhythm frequent PACs      Historical data copied forward from previous encounters in EMR including the history, exam, and assessment/plan has been reviewed and is unchanged unless noted otherwise.    Cardiac medicines reviewed with risk, benefits, and necessity of each discussed.    Risk and benefit of cardiac testing reviewed including death heart attack stroke pain bleeding infection need for vascular /cardiovascular surgery were discussed and the patient     Objective:         /76 (BP Location: Left arm, Patient Position: Sitting, Cuff Size: Large Adult)   Pulse 101   Ht 180.3 cm (70.98\")   Wt 83.5 kg (184 lb)   SpO2 98%   BMI 25.67 kg/m²     Physical Exam  Tachycardic, irregularity noted, sinus rhythm with frequent PACs and short runs of atrial tachycardia on EKG  No rubs or gallops no heave or lift  Varicosities present distally  Trace edema  Positive JVD  No crackles  Normal radial pulses  Normal cap refill  Warm and dry  Intact grossly  Soft nontender nondistended    Assessment:         Diagnoses and all orders for this visit:    1. Type 2 diabetes mellitus with hyperglycemia, without long-term current use of insulin (HCC) (Primary)    2. Acute systolic congestive heart failure (HCC)    3. Benign essential HTN    4. Coronary artery disease involving native coronary artery of native heart with angina pectoris (HCC)  -     ECG 12 Lead    5. Mixed hyperlipidemia      Acute on chronic systolic and diastolic CHF  What appears to be nonischemic cardiomyopathy with coexisting nonobstructive CAD    Medicine changes today  Add Aldactone 25 daily  Stop potassium supplementation  Add Jardiance 10 mg daily for SGLT2 inhibitor addition  Continue Entresto 49/51 twice daily  Increase sotalol to 80 twice daily with tachycardia and PACs underlying sinus rate was around 90  Twice daily blood pressure logs  BMP in 1 week  Holter monitor for 2 weeks to evaluate rhythm  2D echo to be repeated in 90 days  Fluid and salt " restriction, less than 2 L/day  Metolazone will go to as needed only  Continue Lasix twice daily, may need down titration with fluid restriction compliance and addition of Aldactone and Jardiance as above    Back to clinic in 2 weeks for follow-up      The pleasure to be involved in this patient's cardiovascular care.  Please call with any questions or concerns  Jean Paul Zelaya MD, PhD    Most recent EKG as reviewed and interpreted by me:    ECG 12 Lead    Date/Time: 9/8/2022 5:53 PM  Performed by: Jean Paul Zelaya MD  Authorized by: Jean Paul Zelaya MD   Rhythm: sinus rhythm and supraventricular tachycardia  Ectopy: atrial premature contractions  Rate: tachycardic  QRS axis: normal  Other findings: non-specific ST-T wave changes    Clinical impression: abnormal EKG             Most recent echo as reviewed and interpreted by me:  Results for orders placed during the hospital encounter of 07/18/22    Adult Transthoracic Echo Complete W/ Cont if Necessary Per Protocol    Interpretation Summary  · The left ventricular cavity is moderately dilated.  · Left ventricular ejection fraction appears to be 36 - 40%.  · Moderate tricuspid valve regurgitation is present.  · Estimated right ventricular systolic pressure from tricuspid regurgitation is moderately elevated (45-55 mmHg).  · Mildly reduced right ventricular systolic function noted.  · Left ventricular diastolic function is consistent with (grade II w/high LAP) pseudonormalization.  · There is a left pleural effusion.      Most recent stress test as reviewed and interpreted by me:  Results for orders placed during the hospital encounter of 01/31/22    Stress Test With Myocardial Perfusion One Day    Interpretation Summary  · Left ventricular ejection fraction is mildly reduced. (Calculated EF = 47%).  · Myocardial perfusion imaging indicates a medium-sized, moderately severe area of ischemia located in the anterior wall, apex and lateral wall.  ·  Impressions are consistent with a high risk study.      Most recent cardiac catheterization as reviewed interpreted by me:  Results for orders placed during the hospital encounter of 22    Cardiac Catheterization/Vascular Study    Narrative  2022      Heart Cath Report    NAME:              Preet Jones  :                1965  AGE/SEX:        57 y.o. male  MRN:                [id number]        Procedures Performed    1. Left heart catheterization  2. Selective coronary angiography  3. Left ventriculography  4. Mynx closure device    :   Kamlesh Richardson MD    Vascular Access Site: Femoral    Indication for procedure: Acute HFrEF, CAD, PCI, shortness of breath      Procedure Note    After discussing the risks, benefits, and alternatives of the procedure, informed consent was obtained.  Timeout was done before the procedure.  Moderate conscious sedation was given utilizing IV Versed and fentanyl administered by RN with continuous EKG oximetry and hemodynamic monitoring supervised by me throughout the entire case, conscious sedation time was 30 minutes.  I was present with the patient for the duration of moderate sedation and supervised staff who had no other duties and monitored the patient for the entire procedure patient had Li 2-3 sedation scale. the vascular access site was prepped and draped in the usual sterile fashion.  2% lidocaine was used for local anesthesia. Appropriate landmarks were assessed.  A 6 [place] short sheath was inserted in the artery using the modified Seldinger technique.    Selective coronary angiography was performed with JL4 and JR4 diagnostic catheters. Left ventriculogram  was performed with an angled pigtail catheter.  All exchanges were performed over the wire.  No specimens were removed.  There were no apparent acute or early complications.  The patient tolerated the procedure well and was transferred to the recovery area in stable  condition.      Closure device: Mynx device was deployed successfully after right iliofemoral angiogram was performed. Good hemostasis was achieved.    Complications:  None  Blood Loss: minimal    Hemodynamics    Pressures    Ao:    115/73 mmHg  LV:    113/16 mmHg  End-diastolic pressure:  16 mmHg  No significant aortic valvular gradient on pullback    Coronary Angiography    Left Main :  The left main is without disease    Left Anterior Descending : Starts of is a good caliber vessel, gives rise to large diagonal and become smaller with diffuse tapering and distal LAD.  Does not reach all the way to the apex.    Left Circumflex : Dominant LCx, OM1 has a stent with 40 to 50% in-stent stenosis proximally.  Right after the marginal mid circumflex has calcified 50 to 60% narrowing, unchanged from previous.  Distally gives rise to PDA and another marginal without disease    Right Coronary Artery :  The right coronary artery   codominant with 50% mid    Dominance:  []  Left  []  Right  [x]  Co-Dominant        Left Ventriculography:    Estimated Ejection Fraction: 30 %  Wall motion abnormalities: LV with global hypokinesis  Mitral Regurgitation: 1+    Impression:    1. Unchanged coronary anatomy  2. Severe LV dysfunction    Recommendations:    1. Medical management and risk factor modification        I sincerely appreciate the opportunity to participate in your patient's care. Please feel free to contact me anytime if I can be of assistance in this or any other way.      Pertinent History    Past Medical History:  Diagnosis Date  • Abnormal cardiovascular stress test 01/19/2017  • Acute myocardial infarction ([facility]) 2019  • Arterial ischemic stroke, MCA (middle cerebral artery), left, acute ([facility]) 10/29/2019  • Atrial flutter with rapid ventricular response ([facility]) 02/04/2021  • Bicuspid aortic valve 02/16/2017  • Coronary artery disease involving native coronary artery of native heart  • Depression 12/30/2020  • History of  coronary angioplasty with insertion of stent  • Hyperlipidemia  • Hypertension  • Hyperthyroidism  • Obesity 09/02/2011  • Paroxysmal SVT (supraventricular tachycardia) (HCC) 01/08/2021  • RUE weakness 10/29/2019  Previous residual from Stroke, but was not work prohibiting.  • Seasonal allergic rhinitis 12/23/2020  • Sinus arrhythmia  • Sustained SVT (HCC)  • Type 2 diabetes mellitus (HCC)  • Vitamin D deficiency 11/05/2020    Past Surgical History:  Procedure Laterality Date  • APPENDECTOMY  • BACK SURGERY  • CARDIAC CATHETERIZATION  2010  • CARDIAC CATHETERIZATION  2019  • CARDIAC CATHETERIZATION N/A 12/31/2020  Procedure: Cardiac Catheterization/Vascular Study;  Surgeon: Moris Pedro MD;  Location:  [facility];  Service: Cardiovascular;  Laterality: N/A;  • CARDIAC CATHETERIZATION N/A 2/1/2022  Procedure: Left Heart Cath;  Surgeon: Kamlesh Richardson MD;  Location:  [facility];  Service: Cardiovascular;  Laterality: N/A;  • CARDIAC ELECTROPHYSIOLOGY PROCEDURE N/A 1/20/2021  Procedure: EP/Ablation;  Surgeon: Nathen Olmstead MD;  Location:  [facility];  Service: Cardiovascular;  Laterality: N/A;  • CARDIAC ELECTROPHYSIOLOGY PROCEDURE N/A 2/24/2022  Procedure: EP/Ablation-SVT [place] aware;  Surgeon: Candido Montana MD;  Location:  [facility];  Service: Cardiology;  Laterality: N/A;  • JOINT REPLACEMENT  • KNEE ARTHROSCOPY  • THYROIDECTOMY, PARTIAL  2015    Prior to Admission medications  Medication Sig Start Date End Date Taking? Authorizing Provider  Acetaminophen (Tylenol) 325 MG capsule Take 650 mg by mouth Every 6 (Six) Hours As Needed.   Yes Ramsey Nye MD  apixaban (ELIQUIS) 5 MG tablet tablet Take 1 tablet by mouth Every 12 (Twelve) Hours. Indications: Atrial Fibrillation 3/8/22  Yes Latonya Quezada APRN  aspirin 81 MG chewable tablet Chew 81 mg Daily.   Yes Ramsey Nye MD  atorvastatin  (LIPITOR) 80 MG tablet Take 1 tablet by mouth Every Night. 2/4/21  Yes Moris Pedro MD  azithromycin (ZITHROMAX) 250 MG tablet Take 250 mg by mouth Daily. 5 days of therapy, Started 7/15 and end 7/19 7/15/22 7/19/22 Yes Ramsey Nye MD  Cholecalciferol (VITAMIN D-3) 25 MCG (1000 UT) capsule Take 3 capsules by mouth Daily. 6/22/17  Yes Ramsey Nye MD  dilTIAZem CD (Cardizem CD) 180 MG 24 hr capsule Take 1 capsule by mouth Daily. 4/13/22  Yes Candido Montana MD  guaiFENesin (MUCINEX) 600 MG 12 hr tablet Take 1,200 mg by mouth 2 (Two) Times a Day As Needed for Cough. 7/15/22  Yes Ramsey Nye MD  lisinopril (PRINIVIL,ZESTRIL) 40 MG tablet Take 40 mg by mouth Daily. 12/23/20  Yes Ramsey Nye MD  metFORMIN (GLUCOPHAGE) 1000 MG tablet 2 (Two) Times a Day With Meals. 10/26/21  Yes Ramsey Nye MD  Pediatric Multiple Vitamins (Multivitamin Childrens) chewable tablet Chew 1 tablet Daily.   Yes Ramsey Nye MD  sotalol (BETAPACE) 80 MG tablet Take 0.5 tablets by mouth Every 12 (Twelve) Hours. 4/28/22  Yes Candido Montana MD  nitroglycerin (NITROSTAT) 0.4 MG SL tablet Place 1 tablet under the tongue Every 5 (Five) Minutes As Needed for Chest Pain. 1 under the tongue as needed for angina, may repeat q5mins for up three doses 2/2/22 3/8/22  Yoli Baumann APRN      Pre-Procedure Notes  H&P Performed  [x]  Yes []  No       []  N/A    Indications:  []  ACS <= 24 HRS  []  ACS >24 HRS  []  New Onset Angina <= 2 mos  []  Worsening Angina  []  Resuscitated Cardiac Arrest  []  Angina on Exertion:  []  Suspected CAD  []  Valvular Disease  []  Pericardial Disease  []  Cardiac Arrythmia  []  Cardiomyopathy  []  LV Dysfunction  []  Syncope  []  Post Cardiac Transplant  []  Eval. For Exercise Clearance  []  Other  []  Pre-Operative Evaluation  If Pre-Op Eval:  Evaluation for Surgery Type:  []  Cardiac Surgery   []  Non-Cardiac Surgery  Functional Capacity:  []  <4  METS  []  >=4 METS w/o symptoms  []  >= 4 METS with symptoms  []  Unknown  Surgical Risk:  []  Low  []  Intermediate  []  High Risk: Vascular  []  High Risk Non-Vascular    Risks, Benefits, & Complications Discussed:  [x]  Yes  []  No  []  N/A    Questions Answered:  [x]  Yes  []  No  []  N/A    Consent Obtained:  [x]  Yes  []  No  []  N/A    CHF: [x]  Yes  []  No  If Yes:  Newly Diagnosed?  []  Yes  [x]  No  If Yes:  HF Type:  []  Diastolic  [x]  Systolic  []  Unknown      Kamlesh Richardson MD  7/20/2022  16:30 EDT  Electronically signed by Kamlesh Richardson MD, 07/20/22, 4:30 PM EDT.    The following portions of the patient's history were reviewed and updated as appropriate: allergies, current medications, past family history, past medical history, past social history, past surgical history and problem list.      ROS:  14 point review of systems negative except as mentioned above    Current Outpatient Medications:   •  Acetaminophen (Tylenol) 325 MG capsule, Take 650 mg by mouth Every 6 (Six) Hours As Needed., Disp: , Rfl:   •  apixaban (ELIQUIS) 5 MG tablet tablet, Take 1 tablet by mouth Every 12 (Twelve) Hours. Indications: Atrial Fibrillation, Disp: 60 tablet, Rfl: 1  •  aspirin 81 MG chewable tablet, Chew 81 mg Daily., Disp: , Rfl:   •  atorvastatin (LIPITOR) 80 MG tablet, Take 1 tablet by mouth Every Night., Disp: 90 tablet, Rfl: 1  •  Cholecalciferol (VITAMIN D-3) 25 MCG (1000 UT) capsule, Take 3 capsules by mouth Daily., Disp: , Rfl:   •  ciprofloxacin (CIPRO) 500 MG tablet, Take 500 mg by mouth 2 (Two) Times a Day., Disp: , Rfl:   •  furosemide (LASIX) 40 MG tablet, Take 1 tablet by mouth 2 (Two) Times a Day for 30 days., Disp: 180 tablet, Rfl: 2  •  guaiFENesin (MUCINEX) 600 MG 12 hr tablet, Take 1,200 mg by mouth 2 (Two) Times a Day As Needed for Cough., Disp: , Rfl:   •  Insulin Glargine (BASAGLAR KWIKPEN) 100 UNIT/ML injection pen, Inject 10 Units under the skin into the appropriate area  as directed Every Night for 30 days., Disp: 5 pen, Rfl: 0  •  metFORMIN (GLUCOPHAGE) 1000 MG tablet, 2 (Two) Times a Day With Meals., Disp: , Rfl:   •  metOLazone (ZAROXOLYN) 2.5 MG tablet, Take 1 tablet by mouth 3 (Three) Times a Week if Needed (edema)., Disp: 30 tablet, Rfl: 3  •  Pediatric Multiple Vitamins (Multivitamin Childrens) chewable tablet, Chew 1 tablet Daily., Disp: , Rfl:   •  sacubitril-valsartan (Entresto) 49-51 MG tablet, Take 1 tablet by mouth 2 (Two) Times a Day., Disp: 180 tablet, Rfl: 3  •  sulfamethoxazole-trimethoprim (BACTRIM DS,SEPTRA DS) 800-160 MG per tablet, Take 1 tablet by mouth 2 (Two) Times a Day., Disp: , Rfl:   •  empagliflozin (Jardiance) 10 MG tablet tablet, Take 1 tablet by mouth Daily., Disp: 90 tablet, Rfl: 1  •  Sotalol HCl AF 80 MG tablet, Take 1 tablet by mouth 2 (Two) Times a Day., Disp: 180 tablet, Rfl: 3  •  spironolactone (ALDACTONE) 25 MG tablet, Take 1 tablet by mouth Daily., Disp: 30 tablet, Rfl: 1    Problem List:  Patient Active Problem List   Diagnosis   • Mixed hyperlipidemia   • Overweight (BMI 25.0-29.9)   • Type 2 diabetes mellitus with hyperglycemia, without long-term current use of insulin ([facility])   • RUE weakness   • Facial droop, left sided   • Vitamin D deficiency   • Snoring   • History of cerebrovascular accident   • Seasonal allergic rhinitis   • Coronary artery disease involving native coronary artery of native heart   • Atrial arrhythmia   • Depression   • Abnormal nuclear stress test   • Acute chest pain   • Palpitations   • Acute CHF (congestive heart failure) ([facility])   • Dyspnea on exertion   • Bilateral pleural effusion   • Acute on chronic congestive heart failure, unspecified heart failure type ([facility])   • Acute on chronic HFrEF (heart failure with reduced ejection fraction) ([facility])   • CAD S/P percutaneous coronary angioplasty   • Acute postoperative pain of right groin   • AF (paroxysmal atrial fibrillation) ([facility])   • Benign essential HTN     Past  Medical History:  Past Medical History:   Diagnosis Date   • Abnormal cardiovascular stress test 01/19/2017   • Acute myocardial infarction ([facility]) 2019   • Arterial ischemic stroke, MCA (middle cerebral artery), left, acute ([facility]) 10/29/2019   • Atrial fibrillation ([facility])    • Atrial flutter with rapid ventricular response ([facility]) 02/04/2021   • Bicuspid aortic valve 02/16/2017   • CHF (congestive heart failure) ([facility])    • Coronary artery disease involving native coronary artery of native heart    • Depression 12/30/2020   • History of coronary angioplasty with insertion of stent    • Hyperlipidemia    • Hypertension    • Hyperthyroidism    • Obesity 09/02/2011   • Paroxysmal SVT (supraventricular tachycardia) ([facility]) 01/08/2021   • RUE weakness 10/29/2019    Previous residual from Stroke, but was not work prohibiting.   • Seasonal allergic rhinitis 12/23/2020   • Sinus arrhythmia    • Sustained SVT ([facility])    • Type 2 diabetes mellitus ([facility])    • Vitamin D deficiency 11/05/2020     Past Surgical History:  Past Surgical History:   Procedure Laterality Date   • ABLATION OF DYSRHYTHMIC FOCUS     • APPENDECTOMY     • BACK SURGERY     • CARDIAC CATHETERIZATION  2010   • CARDIAC CATHETERIZATION  2019   • CARDIAC CATHETERIZATION N/A 12/31/2020    Procedure: Cardiac Catheterization/Vascular Study;  Surgeon: Moris Pedro MD;  Location: [facility] CATH INVASIVE LOCATION;  Service: Cardiovascular;  Laterality: N/A;   • CARDIAC CATHETERIZATION N/A 02/01/2022    Procedure: Left Heart Cath;  Surgeon: Kamlesh Richardson MD;  Location:  [facility];  Service: Cardiovascular;  Laterality: N/A;   • CARDIAC CATHETERIZATION N/A 07/20/2022    Procedure: Left Heart Cath;  Surgeon: Kamlesh Richardson MD;  Location:  [facility] INVASIVE [facility];  Service: Cardiovascular;  Laterality: N/A;   • CARDIAC ELECTROPHYSIOLOGY PROCEDURE N/A 01/20/2021    Procedure: EP/Ablation;  Surgeon: Nathen Olmstead MD;   Location: [facility] CATH INVASIVE LOCATION;  Service: Cardiovascular;  Laterality: N/A;   • CARDIAC ELECTROPHYSIOLOGY PROCEDURE N/A 02/24/2022    Procedure: EP/Ablation-SVT [place] aware;  Surgeon: Candido Montana MD;  Location: [facility] CATH INVASIVE LOCATION;  Service: Cardiology;  Laterality: N/A;   • CORONARY STENT PLACEMENT     • JOINT REPLACEMENT     • KNEE ARTHROSCOPY     • THYROIDECTOMY, PARTIAL  2015     Social History:  Social History     Socioeconomic History   • Marital status:    Tobacco Use   • Smoking status: Never Smoker   • Smokeless tobacco: Never Used   Vaping Use   • Vaping Use: Never used   Substance and Sexual Activity   • Alcohol use: Not Currently   • Drug use: No   • Sexual activity: Defer     Allergies:  Allergies   Allergen Reactions   • Morphine GI Intolerance   • Ozempic (0.25 Or 0.5 Mg-Dose) [Semaglutide(0.25 Or 0.5mg-Dos)] Diarrhea   • Isosorbide Headache   • Nitrofuran Derivatives Headache   • Nitroglycerin Headache     Immunizations:  Immunization History   Administered Date(s) Administered   • COVID-19 (PFIZER) PURPLE CAP 03/15/2021, 04/05/2021, 10/04/2021   • FluLaval/Fluzone >6mos 11/06/2020            In-Office Procedure(s):  ECG 12 Lead    (Results Pending)        ASCVD RIsk Score::  The ASCVD Risk score (Rachna DC Jr., et al., 2013) failed to calculate for the following reasons:    The patient has a prior MI or stroke diagnosis    Imaging:    Results for orders placed during the hospital encounter of 07/18/22    XR Chest 1 View    Narrative  PROCEDURE:   XR CHEST 1 VW    HISTORY:   Shortness of breath    COMPARISONS: 4/25/2022    FINDINGS:  Bibasal hazy opacities. Low lung volumes.    Smooth cardiomediastinal contours. Cardiomegaly.    Regional bones and soft tissues show no acute abnormality.    Impression  1. Limited by low lung volumes.  2. Bibasal atelectasis, edema, and/or airspace disease.  3. Cardiomegaly.    Electronically signed by:  Erick Carey D.O.  7/18/2022  4:38 AM       Results for orders placed during the hospital encounter of 07/18/22    CT Angiogram Chest Pulmonary Embolism    Narrative  EXAMINATION:  CTA CHEST WITH IV CONTRAST, CT PULMONARY ANGIOGRAM    DATE OF EXAM: 7/18/2022 6:17 AM    HISTORY: Shortness of breath, chest pain    TECHNIQUE: CTA examination of the chest was performed following the intravenous administration of 100 mL Isovue-370. Sagittal, coronal and 3-D reformatted images were provided. CT dose lowering techniques were used, to include: automated exposure  control, adjustment for patient size, and or use of iterative reconstruction.    COMPARISON: 7/18/2022. 4/25/2022. 3/7/2022.    FINDINGS:    Lungs: Bibasal reticular and groundglass opacities are likely areas of dependent atelectasis with superimposed edema. Additionally, patchy ground glass opacities and interlobular septal prominence seen throughout both lungs.    Pleura: Bilateral large pleural effusions tracking posteriorly into the apices.    Mediastinum and Anne: Redemonstration of prominent lymph nodes throughout the right paratracheal space and prevascular region, largest measures 1.1 cm in short axis and is within the right paratracheal area.    Pulmonary Arteries: No filling defects are identified.    Cardiovascular: Cardiomegaly. No pericardial effusion. Nonspecific reflux of IV contrast into the hepatic vasculature.    Upper Abdomen: No acute abnormality.    Chest Wall: No acute abnormality.    Musculoskeletal: No acute abnormality.    Impression  1.  Acute congestive heart failure given the presence of pleural effusions, pulmonary edema, and cardiomegaly.  2.  No pulmonary embolism.  3.  Redemonstration of nonspecific mediastinal lymph nodes.      Electronically signed by:  Erick Carey D.O.  7/18/2022 4:43 AM      Results for orders placed during the hospital encounter of 07/18/22    CT Angiogram Chest Pulmonary Embolism    Narrative  EXAMINATION:  CTA CHEST WITH IV CONTRAST,  CT PULMONARY ANGIOGRAM    DATE OF EXAM: 7/18/2022 6:17 AM    HISTORY: Shortness of breath, chest pain    TECHNIQUE: CTA examination of the chest was performed following the intravenous administration of 100 mL Isovue-370. Sagittal, coronal and 3-D reformatted images were provided. CT dose lowering techniques were used, to include: automated exposure  control, adjustment for patient size, and or use of iterative reconstruction.    COMPARISON: 7/18/2022. 4/25/2022. 3/7/2022.    FINDINGS:    Lungs: Bibasal reticular and groundglass opacities are likely areas of dependent atelectasis with superimposed edema. Additionally, patchy ground glass opacities and interlobular septal prominence seen throughout both lungs.    Pleura: Bilateral large pleural effusions tracking posteriorly into the apices.    Mediastinum and Anne: Redemonstration of prominent lymph nodes throughout the right paratracheal space and prevascular region, largest measures 1.1 cm in short axis and is within the right paratracheal area.    Pulmonary Arteries: No filling defects are identified.    Cardiovascular: Cardiomegaly. No pericardial effusion. Nonspecific reflux of IV contrast into the hepatic vasculature.    Upper Abdomen: No acute abnormality.    Chest Wall: No acute abnormality.    Musculoskeletal: No acute abnormality.    Impression  1.  Acute congestive heart failure given the presence of pleural effusions, pulmonary edema, and cardiomegaly.  2.  No pulmonary embolism.  3.  Redemonstration of nonspecific mediastinal lymph nodes.      Electronically signed by:  Erick Carey D.O.  7/18/2022 4:43 AM      Lab Review:   Admission on 07/31/2022, Discharged on 08/01/2022   Component Date Value   • Glucose 07/31/2022 435 (A)   • BUN 07/31/2022 17    • Creatinine 07/31/2022 1.00    • Sodium 07/31/2022 134 (A)   • Potassium 07/31/2022 3.9    • Chloride 07/31/2022 98    • CO2 07/31/2022 26.0    • Calcium 07/31/2022 9.1    • BUN/Creatinine Ratio  07/31/2022 17.0    • Anion Gap 07/31/2022 10.0    • eGFR 07/31/2022 87.2    • Protime 07/31/2022 10.2    • INR 07/31/2022 0.99    • PTT 07/31/2022 27.6 (A)   • WBC 07/31/2022 5.60    • RBC 07/31/2022 5.17    • Hemoglobin 07/31/2022 14.3    • Hematocrit 07/31/2022 42.1    • MCV 07/31/2022 81.3    • MCH 07/31/2022 27.6    • MCHC 07/31/2022 33.9    • RDW 07/31/2022 13.9    • RDW-SD 07/31/2022 39.8    • MPV 07/31/2022 9.4    • Platelets 07/31/2022 220    • Neutrophil % 07/31/2022 46.4    • Lymphocyte % 07/31/2022 37.1    • Monocyte % 07/31/2022 7.4    • Eosinophil % 07/31/2022 7.0 (A)   • Basophil % 07/31/2022 2.1 (A)   • Neutrophils, Absolute 07/31/2022 2.60    • Lymphocytes, Absolute 07/31/2022 2.10    • Monocytes, Absolute 07/31/2022 0.40    • Eosinophils, Absolute 07/31/2022 0.40    • Basophils, Absolute 07/31/2022 0.10    • nRBC 07/31/2022 0.1    • COVID19 07/31/2022 Not Detected    • Glucose 08/01/2022 308 (A)   • BUN 08/01/2022 15    • Creatinine 08/01/2022 0.95    • Sodium 08/01/2022 140    • Potassium 08/01/2022 4.2    • Chloride 08/01/2022 102    • CO2 08/01/2022 26.0    • Calcium 08/01/2022 9.0    • BUN/Creatinine Ratio 08/01/2022 15.8    • Anion Gap 08/01/2022 12.0    • eGFR 08/01/2022 92.8    • WBC 08/01/2022 6.50    • RBC 08/01/2022 5.38    • Hemoglobin 08/01/2022 15.1    • Hematocrit 08/01/2022 43.6    • MCV 08/01/2022 81.0    • MCH 08/01/2022 28.1    • MCHC 08/01/2022 34.7    • RDW 08/01/2022 14.4    • RDW-SD 08/01/2022 41.1    • MPV 08/01/2022 9.5    • Platelets 08/01/2022 213    • Neutrophil % 08/01/2022 50.3    • Lymphocyte % 08/01/2022 34.0    • Monocyte % 08/01/2022 8.5    • Eosinophil % 08/01/2022 6.9 (A)   • Basophil % 08/01/2022 0.3    • Neutrophils, Absolute 08/01/2022 3.30    • Lymphocytes, Absolute 08/01/2022 2.20    • Monocytes, Absolute 08/01/2022 0.60    • Eosinophils, Absolute 08/01/2022 0.40    • Basophils, Absolute 08/01/2022 0.00    • nRBC 08/01/2022 0.1    • Glucose 08/01/2022 282 (A)    • Target HR (85%) 08/01/2022 138    • Max. Pred. HR (100%) 08/01/2022 162    • PROX SFA PSV RIGHT 08/01/2022 67    • PROX PFA PSV RIGHT 08/01/2022 77    • BH CV RIGHT GROIN PSA AL* 08/01/2022 1.00    • Right groin CFA sys 08/01/2022 71.3    • SFA Prox PSV-Right 08/01/2022 -67.4    • Glucose 08/01/2022 411 (A)   • BUN 08/01/2022 13    • Creatinine 08/01/2022 0.80    • Sodium 08/01/2022 134 (A)   • Potassium 08/01/2022 3.8    • Chloride 08/01/2022 98    • CO2 08/01/2022 26.0    • Calcium 08/01/2022 8.6    • BUN/Creatinine Ratio 08/01/2022 16.3    • Anion Gap 08/01/2022 10.0    • eGFR 08/01/2022 102.6    • WBC 08/01/2022 6.50    • RBC 08/01/2022 5.23    • Hemoglobin 08/01/2022 14.5    • Hematocrit 08/01/2022 43.1    • MCV 08/01/2022 82.4    • MCH 08/01/2022 27.8    • MCHC 08/01/2022 33.7    • RDW 08/01/2022 14.0    • RDW-SD 08/01/2022 40.7    • MPV 08/01/2022 10.2    • Platelets 08/01/2022 211    • Neutrophil % 08/01/2022 58.7    • Lymphocyte % 08/01/2022 24.6    • Monocyte % 08/01/2022 8.2    • Eosinophil % 08/01/2022 6.6 (A)   • Basophil % 08/01/2022 1.9 (A)   • Neutrophils, Absolute 08/01/2022 3.80    • Lymphocytes, Absolute 08/01/2022 1.60    • Monocytes, Absolute 08/01/2022 0.50    • Eosinophils, Absolute 08/01/2022 0.40    • Basophils, Absolute 08/01/2022 0.10    • nRBC 08/01/2022 0.1    • Glucose 08/01/2022 278 (A)   • Glucose 08/01/2022 421 (A)   • Glucose 08/01/2022 365 (A)   • Glucose 08/01/2022 238 (A)   No results displayed because visit has over 200 results.      Admission on 04/25/2022, Discharged on 04/26/2022   Component Date Value   • QT Interval 04/25/2022 438    • Extra Tube 04/25/2022 Hold for add-ons.    • Extra Tube 04/25/2022 Hold for add-ons.    • Extra Tube 04/25/2022 hold for add-on    • Glucose 04/25/2022 182 (A)   • BUN 04/25/2022 16    • Creatinine 04/25/2022 0.81    • Sodium 04/25/2022 136    • Potassium 04/25/2022 3.9    • Chloride 04/25/2022 105    • CO2 04/25/2022 22.0    • Calcium  04/25/2022 8.8    • Total Protein 04/25/2022 6.5    • Albumin 04/25/2022 3.40 (A)   • ALT (SGPT) 04/25/2022 17    • AST (SGOT) 04/25/2022 14    • Alkaline Phosphatase 04/25/2022 95    • Total Bilirubin 04/25/2022 0.3    • Globulin 04/25/2022 3.1    • A/G Ratio 04/25/2022 1.1    • BUN/Creatinine Ratio 04/25/2022 19.8    • Anion Gap 04/25/2022 9.0    • eGFR 04/25/2022 102.2    • Magnesium 04/25/2022 1.8    • TSH 04/25/2022 2.030    • Troponin T 04/25/2022 0.021    • WBC 04/25/2022 6.30    • RBC 04/25/2022 4.55    • Hemoglobin 04/25/2022 12.7 (A)   • Hematocrit 04/25/2022 38.0    • MCV 04/25/2022 83.5    • MCH 04/25/2022 27.9    • MCHC 04/25/2022 33.5    • RDW 04/25/2022 14.6    • RDW-SD 04/25/2022 42.9    • MPV 04/25/2022 9.8    • Platelets 04/25/2022 239    • Neutrophil % 04/25/2022 53.1    • Lymphocyte % 04/25/2022 22.2    • Monocyte % 04/25/2022 7.0    • Eosinophil % 04/25/2022 15.7 (A)   • Basophil % 04/25/2022 2.0 (A)   • Neutrophils, Absolute 04/25/2022 3.40    • Lymphocytes, Absolute 04/25/2022 1.40    • Monocytes, Absolute 04/25/2022 0.40    • Eosinophils, Absolute 04/25/2022 1.00 (A)   • Basophils, Absolute 04/25/2022 0.10    • nRBC 04/25/2022 0.1    • COVID19 04/25/2022 Not Detected    • Troponin T 04/25/2022 0.010    • Glucose 04/25/2022 185 (A)   • QT Interval 04/26/2022 446    • Glucose 04/26/2022 230 (A)   • Glucose 04/26/2022 198 (A)     Recent labs reviewed and interpreted for clinical significance and application            Level of Care:           Jean Paul Zelaya MD  09/08/22  .

## 2022-09-15 ENCOUNTER — TELEPHONE (OUTPATIENT)
Dept: CARDIOLOGY | Facility: CLINIC | Age: 57
End: 2022-09-15

## 2022-09-15 ENCOUNTER — PATIENT ROUNDING (BHMG ONLY) (OUTPATIENT)
Dept: CARDIOLOGY | Facility: CLINIC | Age: 57
End: 2022-09-15

## 2022-09-15 NOTE — PROGRESS NOTES
September 15, 2022    Hello, may I speak with Preet Jones?    My name is Lam      I am  with [facility]  [address] [place] [address].    Before we get started may I verify your date of birth? 1965    I am calling to officially welcome you to our practice and ask about your recent visit. Is this a good time to talk? yes    Tell me about your visit with us. What things went well? It went well. Provider was nice.       We're always looking for ways to make our patients' experiences even better. Do you have recommendations on ways we may improve?  Not at this time.    Overall were you satisfied with your first visit to our practice? yes       I appreciate you taking the time to speak with me today. Is there anything else I can do for you? no      Thank you, and have a great day.

## 2022-09-15 NOTE — TELEPHONE ENCOUNTER
Caller: Preet Jones    Relationship: Self    Best call back number: [phone]    What form or medical record are you requesting: ANY CARDIAC     Who is requesting this form or medical record from you: SELF    How would you like to receive the form or medical records (pick-up, mail, fax):     Timeframe paperwork needed: NEXT Thursday     Additional notes: PATIENT IS REQUESTING ALL CARDIAC RECORDS FOR THE LAST YEAR OR TWO

## 2022-09-19 ENCOUNTER — LAB (OUTPATIENT)
Dept: LAB | Facility: HOSPITAL | Age: 57
End: 2022-09-19

## 2022-09-19 DIAGNOSIS — I48.0 AF (PAROXYSMAL ATRIAL FIBRILLATION): ICD-10-CM

## 2022-09-19 LAB
ALBUMIN SERPL-MCNC: 3.9 G/DL (ref 3.5–5.2)
ALBUMIN/GLOB SERPL: 1 G/DL
ALP SERPL-CCNC: 158 U/L (ref 39–117)
ALT SERPL W P-5'-P-CCNC: 14 U/L (ref 1–41)
ANION GAP SERPL CALCULATED.3IONS-SCNC: 13.2 MMOL/L (ref 5–15)
AST SERPL-CCNC: 13 U/L (ref 1–40)
BILIRUB SERPL-MCNC: 0.3 MG/DL (ref 0–1.2)
BUN SERPL-MCNC: 30 MG/DL (ref 6–20)
BUN/CREAT SERPL: 22.6 (ref 7–25)
CALCIUM SPEC-SCNC: 10.3 MG/DL (ref 8.6–10.5)
CHLORIDE SERPL-SCNC: 96 MMOL/L (ref 98–107)
CHOLEST SERPL-MCNC: 246 MG/DL (ref 0–200)
CO2 SERPL-SCNC: 24.8 MMOL/L (ref 22–29)
CREAT SERPL-MCNC: 1.33 MG/DL (ref 0.76–1.27)
EGFRCR SERPLBLD CKD-EPI 2021: 62 ML/MIN/1.73
GLOBULIN UR ELPH-MCNC: 4.1 GM/DL
GLUCOSE SERPL-MCNC: 348 MG/DL (ref 65–99)
HDLC SERPL-MCNC: 26 MG/DL (ref 40–60)
LDLC SERPL CALC-MCNC: 104 MG/DL (ref 0–100)
LDLC/HDLC SERPL: 3.32 {RATIO}
POTASSIUM SERPL-SCNC: 5.1 MMOL/L (ref 3.5–5.2)
PROT SERPL-MCNC: 8 G/DL (ref 6–8.5)
SODIUM SERPL-SCNC: 134 MMOL/L (ref 136–145)
TRIGL SERPL-MCNC: 669 MG/DL (ref 0–150)
VLDLC SERPL-MCNC: 116 MG/DL (ref 5–40)

## 2022-09-19 PROCEDURE — 80061 LIPID PANEL: CPT

## 2022-09-19 PROCEDURE — 36415 COLL VENOUS BLD VENIPUNCTURE: CPT

## 2022-09-19 PROCEDURE — 80053 COMPREHEN METABOLIC PANEL: CPT

## 2022-09-21 NOTE — TELEPHONE ENCOUNTER
Spoke with Jennifer and she is printing the records for the patient. I let the patient know he will have to contact [facility]'s office to get those medical records from them.

## 2022-09-22 ENCOUNTER — HOSPITAL ENCOUNTER (OUTPATIENT)
Dept: RESPIRATORY THERAPY | Facility: HOSPITAL | Age: 57
Discharge: HOME | End: 2022-09-22
Admitting: INTERNAL MEDICINE

## 2022-09-22 ENCOUNTER — OFFICE VISIT (OUTPATIENT)
Dept: CARDIOLOGY | Facility: CLINIC | Age: 57
End: 2022-09-22

## 2022-09-22 VITALS
HEIGHT: 71 IN | HEART RATE: 76 BPM | DIASTOLIC BLOOD PRESSURE: 77 MMHG | SYSTOLIC BLOOD PRESSURE: 119 MMHG | BODY MASS INDEX: 25.9 KG/M2 | WEIGHT: 185 LBS | OXYGEN SATURATION: 99 %

## 2022-09-22 DIAGNOSIS — I48.0 AF (PAROXYSMAL ATRIAL FIBRILLATION): ICD-10-CM

## 2022-09-22 DIAGNOSIS — Z79.899 LONG-TERM USE OF HIGH-RISK MEDICATION: Primary | ICD-10-CM

## 2022-09-22 PROCEDURE — 99214 OFFICE O/P EST MOD 30 MIN: CPT | Performed by: INTERNAL MEDICINE

## 2022-09-22 PROCEDURE — 93246 EXT ECG>7D<15D RECORDING: CPT

## 2022-09-22 PROCEDURE — 93005 ELECTROCARDIOGRAM TRACING: CPT | Performed by: INTERNAL MEDICINE

## 2022-09-22 NOTE — PROGRESS NOTES
Cardiology Clinic Note  Jean Paul Zelaya MD, PhD    Subjective:     Encounter Date:09/22/2022      Patient ID: Preet Jones is a 57 y.o. male.    Chief Complaint:  Chief Complaint   Patient presents with   • Follow-up   • Congestive Heart Failure       HPI:         HPI:  I had the pleasure to see this 57-year-old gentleman in clinic today as a secondary referral with history of what appears to be nonischemic cardiomyopathy on top of nonobstructive coronary artery disease, he is a small stent in obtuse marginal branch.  I reviewed his left heart catheterization films which reveals mild in-stent restenosis at 40 to 50% and a small obtuse marginal branch, nonobstructive disease in the main body of the circumflex, small undersized LAD with reduced filling possibly secondary to high filling pressures at that time back in July.  RCA is small and nondominant with left dominant circulation.  His echo was reviewed and interpreted by me demonstrates an EF of around 35% with global LV hypokinesis possibly more hypokinesis at the apex regionally, left atrial enlargement, grade 2 diastolic dysfunction, he has a history of paroxysmal atrial fibrillation status post ablations in the past, diabetes hypertension hyperlipidemia and history of stroke in the past.     In follow-up today he reports drinking much less water.  He is off metolazone only to as needed with continuation of his Lasix twice a day, now on Jardiance, he is on moderate dose of Entresto with good blood pressures at 1 10-1 20 systolic and overall at rest he feels much improved.  He has no peripheral edema.  He recently had his right foot surgery with limited amputation of a single toe.  He had a hammertoe release on the other foot.  His last EF was around 35% but at that time he had high filling pressures with pleural effusions seen on that study as we reviewed it again today with his imaging.  We pulled up his Cath Lab films demonstrating a patent stent in obtuse  "marginal branch but nonobstructive disease in the LAD diagonal RCA that was small and nondominant as well as a dominant circumflex.  I believe he is doing great on his current medical therapy.  We will continue this and reecho in 4 to 6 weeks and see him back in 60 days for further modifications of his goal-directed medical therapy.  I believe he has a high likelihood of improving significantly    NYHA class II-III symptoms, CCS class I with no exertional angina  He appears euvolemic today well compensated with good blood pressures  QT is okay by EKG which is normal with normal sinus rhythm        Review of systems otherwise negative x14 point review of systems except was mentioned above     EKG is abnormal with paroxysmal atrial tachycardia with otherwise sinus rhythm frequent PACs        Historical data copied forward from previous encounters in EMR including the history, exam, and assessment/plan has been reviewed and is unchanged unless noted otherwise.     Cardiac medicines reviewed with risk, benefits, and necessity of each discussed.     Risk and benefit of cardiac testing reviewed including death heart attack stroke pain bleeding infection need for vascular /cardiovascular surgery were discussed and the patient      Objective:         Objective          /76 (BP Location: Left arm, Patient Position: Sitting, Cuff Size: Large Adult)   Pulse 101   Ht 180.3 cm (70.98\")   Wt 83.5 kg (184 lb)   SpO2 98%   BMI 25.67 kg/m²      Physical Exam  Tachycardic, irregularity noted, sinus rhythm with frequent PACs and short runs of atrial tachycardia on EKG  No rubs or gallops no heave or lift  Varicosities present distally  Trace edema  Positive JVD  No crackles  Normal radial pulses  Normal cap refill  Warm and dry  Intact grossly  Soft nontender nondistended     Assessment:         Assessment          Diagnoses and all orders for this visit:     1. Type 2 diabetes mellitus with hyperglycemia, without long-term " "current use of insulin (HCC) (Primary)     2. Acute systolic congestive heart failure (HCC)     3. Benign essential HTN     4. Coronary artery disease involving native coronary artery of native heart with angina pectoris (HCC)  -     ECG 12 Lead     5. Mixed hyperlipidemia        Acute on chronic systolic and diastolic CHF  What appears to be nonischemic cardiomyopathy with coexisting nonobstructive CAD     Medicine changes today  Continue Aldactone 25 daily  Remain off potassium supplementation  Continue Jardiance 10 mg daily for SGLT2 inhibitor addition  Continue Entresto 49/51 twice daily  Continue sotalol to 80 twice daily with tachycardia and PACs underlying sinus rate was around 90, presently at 70 with normal QT  Twice daily blood pressure logs  Repeat 2D echo first week in November, follow-up in clinic thereafter in 60 days  Fluid and salt restriction, less than 2 L/day  Metolazone only as needed  Continue Lasix twice daily, may need down titration with fluid restriction compliance and addition of Aldactone and Jardiance as above     60-day follow-up, echo prior to clinic        The pleasure to be involved in this patient's cardiovascular care.  Please call with any questions or concerns  Jean Paul Zelaya MD, PhD      Historical data copied forward from previous encounters in EMR including the history, exam, and assessment/plan has been reviewed and is unchanged unless noted otherwise.    Cardiac medicines reviewed with risk, benefits, and necessity of each discussed.    Risk and benefit of cardiac testing reviewed including death heart attack stroke pain bleeding infection need for vascular /cardiovascular surgery were discussed and the patient     Objective:         /77   Pulse 76   Ht 180.3 cm (70.98\")   Wt 83.9 kg (185 lb)   SpO2 99%   BMI 25.81 kg/m²     As above extensively discussed      The pleasure to be involved in this patient's cardiovascular care.  Please call with any questions or " concerns  Jean Paul Zelaya MD, PhD    Most recent EKG as reviewed and interpreted by me:    ECG 12 Lead    Date/Time: 2022 2:56 PM  Performed by: Jaen Paul Zelaya MD  Authorized by: Jean Paul Zelaya MD   Rhythm: sinus rhythm  Rate: normal  ST Segments: ST segments normal  QRS axis: normal  Other findings: non-specific ST-T wave changes    Clinical impression: non-specific ECG        Normal QT on sotalol    Most recent echo as reviewed and interpreted by me:  Results for orders placed during the hospital encounter of 22    Adult Transthoracic Echo Complete W/ Cont if Necessary Per Protocol    Interpretation Summary  · The left ventricular cavity is moderately dilated.  · Left ventricular ejection fraction appears to be 36 - 40%.  · Moderate tricuspid valve regurgitation is present.  · Estimated right ventricular systolic pressure from tricuspid regurgitation is moderately elevated (45-55 mmHg).  · Mildly reduced right ventricular systolic function noted.  · Left ventricular diastolic function is consistent with (grade II w/high LAP) pseudonormalization.  · There is a left pleural effusion.      Most recent stress test as reviewed and interpreted by me:  Results for orders placed during the hospital encounter of 22    Stress Test With Myocardial Perfusion One Day    Interpretation Summary  · Left ventricular ejection fraction is mildly reduced. (Calculated EF = 47%).  · Myocardial perfusion imaging indicates a medium-sized, moderately severe area of ischemia located in the anterior wall, apex and lateral wall.  · Impressions are consistent with a high risk study.      Most recent cardiac catheterization as reviewed interpreted by me:  Results for orders placed during the hospital encounter of 22    Cardiac Catheterization/Vascular Study    Narrative  2022      Heart Cath Report    NAME:              Preet Jones  :                1965  AGE/SEX:        57 y.o.  male  MRN:                [id number]        Procedures Performed    1. Left heart catheterization  2. Selective coronary angiography  3. Left ventriculography  4. Mynx closure device    :   Kamlesh Richardson MD    Vascular Access Site: Femoral    Indication for procedure: Acute HFrEF, CAD, PCI, shortness of breath      Procedure Note    After discussing the risks, benefits, and alternatives of the procedure, informed consent was obtained.  Timeout was done before the procedure.  Moderate conscious sedation was given utilizing IV Versed and fentanyl administered by RN with continuous EKG oximetry and hemodynamic monitoring supervised by me throughout the entire case, conscious sedation time was 30 minutes.  I was present with the patient for the duration of moderate sedation and supervised staff who had no other duties and monitored the patient for the entire procedure patient had Li 2-3 sedation scale. the vascular access site was prepped and draped in the usual sterile fashion.  2% lidocaine was used for local anesthesia. Appropriate landmarks were assessed.  A 6 [place] short sheath was inserted in the artery using the modified Seldinger technique.    Selective coronary angiography was performed with JL4 and JR4 diagnostic catheters. Left ventriculogram  was performed with an angled pigtail catheter.  All exchanges were performed over the wire.  No specimens were removed.  There were no apparent acute or early complications.  The patient tolerated the procedure well and was transferred to the recovery area in stable condition.      Closure device: Mynx device was deployed successfully after right iliofemoral angiogram was performed. Good hemostasis was achieved.    Complications:  None  Blood Loss: minimal    Hemodynamics    Pressures    Ao:    115/73 mmHg  LV:    113/16 mmHg  End-diastolic pressure:  16 mmHg  No significant aortic valvular gradient on pullback    Coronary Angiography    Left  Main :  The left main is without disease    Left Anterior Descending : Starts of is a good caliber vessel, gives rise to large diagonal and become smaller with diffuse tapering and distal LAD.  Does not reach all the way to the apex.    Left Circumflex : Dominant LCx, OM1 has a stent with 40 to 50% in-stent stenosis proximally.  Right after the marginal mid circumflex has calcified 50 to 60% narrowing, unchanged from previous.  Distally gives rise to PDA and another marginal without disease    Right Coronary Artery :  The right coronary artery   codominant with 50% mid    Dominance:  []  Left  []  Right  [x]  Co-Dominant        Left Ventriculography:    Estimated Ejection Fraction: 30 %  Wall motion abnormalities: LV with global hypokinesis  Mitral Regurgitation: 1+    Impression:    1. Unchanged coronary anatomy  2. Severe LV dysfunction    Recommendations:    1. Medical management and risk factor modification        I sincerely appreciate the opportunity to participate in your patient's care. Please feel free to contact me anytime if I can be of assistance in this or any other way.      Pertinent History    Past Medical History:  Diagnosis Date  • Abnormal cardiovascular stress test 01/19/2017  • Acute myocardial infarction ([facility]) 2019  • Arterial ischemic stroke, MCA (middle cerebral artery), left, acute ([facility]) 10/29/2019  • Atrial flutter with rapid ventricular response ([facility]) 02/04/2021  • Bicuspid aortic valve 02/16/2017  • Coronary artery disease involving native coronary artery of native heart  • Depression 12/30/2020  • History of coronary angioplasty with insertion of stent  • Hyperlipidemia  • Hypertension  • Hyperthyroidism  • Obesity 09/02/2011  • Paroxysmal SVT (supraventricular tachycardia) ([facility]) 01/08/2021  • RUE weakness 10/29/2019  Previous residual from Stroke, but was not work prohibiting.  • Seasonal allergic rhinitis 12/23/2020  • Sinus arrhythmia  • Sustained SVT ([facility])  • Type 2 diabetes mellitus  ([facility])  • Vitamin D deficiency 11/05/2020    Past Surgical History:  Procedure Laterality Date  • APPENDECTOMY  • BACK SURGERY  • CARDIAC CATHETERIZATION  2010  • CARDIAC CATHETERIZATION  2019  • CARDIAC CATHETERIZATION N/A 12/31/2020  Procedure: Cardiac Catheterization/Vascular Study;  Surgeon: Moris Pedro MD;  Location:  [facility];  Service: Cardiovascular;  Laterality: N/A;  • CARDIAC CATHETERIZATION N/A 2/1/2022  Procedure: Left Heart Cath;  Surgeon: Kamlesh Richardson MD;  Location:  [facility];  Service: Cardiovascular;  Laterality: N/A;  • CARDIAC ELECTROPHYSIOLOGY PROCEDURE N/A 1/20/2021  Procedure: EP/Ablation;  Surgeon: Nathen Olmstead MD;  Location:  [facility]AS[facility];  Service: Cardiovascular;  Laterality: N/A;  • CARDIAC ELECTROPHYSIOLOGY PROCEDURE N/A 2/24/2022  Procedure: EP/Ablation-SVT [place] aware;  Surgeon: Candido Montana MD;  Location:  [facility]AS[facility];  Service: Cardiology;  Laterality: N/A;  • JOINT REPLACEMENT  • KNEE ARTHROSCOPY  • THYROIDECTOMY, PARTIAL  2015    Prior to Admission medications  Medication Sig Start Date End Date Taking? Authorizing Provider  Acetaminophen (Tylenol) 325 MG capsule Take 650 mg by mouth Every 6 (Six) Hours As Needed.   Yes Ramsey Nye MD  apixaban (ELIQUIS) 5 MG tablet tablet Take 1 tablet by mouth Every 12 (Twelve) Hours. Indications: Atrial Fibrillation 3/8/22  Yes Latonya Quezada APRN  aspirin 81 MG chewable tablet Chew 81 mg Daily.   Yes Ramsey Nye MD  atorvastatin (LIPITOR) 80 MG tablet Take 1 tablet by mouth Every Night. 2/4/21  Yes Moris Pedro MD  azithromycin (ZITHROMAX) 250 MG tablet Take 250 mg by mouth Daily. 5 days of therapy, Started 7/15 and end 7/19 7/15/22 7/19/22 Yes Ramsey Nye MD  Cholecalciferol (VITAMIN D-3) 25 MCG (1000 UT) capsule Take 3 capsules by mouth Daily. 6/22/17  Yes Ramsey Nye  MD  dilTIAZem CD (Cardizem CD) 180 MG 24 hr capsule Take 1 capsule by mouth Daily. 4/13/22  Yes Candido Montana MD  guaiFENesin (MUCINEX) 600 MG 12 hr tablet Take 1,200 mg by mouth 2 (Two) Times a Day As Needed for Cough. 7/15/22  Yes ProviderRamsey MD  lisinopril (PRINIVIL,ZESTRIL) 40 MG tablet Take 40 mg by mouth Daily. 12/23/20  Yes ProviderRamsey MD  metFORMIN (GLUCOPHAGE) 1000 MG tablet 2 (Two) Times a Day With Meals. 10/26/21  Yes ProviderRamsey MD  Pediatric Multiple Vitamins (Multivitamin Childrens) chewable tablet Chew 1 tablet Daily.   Yes ProviderRamsey MD  sotalol (BETAPACE) 80 MG tablet Take 0.5 tablets by mouth Every 12 (Twelve) Hours. 4/28/22  Yes Candido Montana MD  nitroglycerin (NITROSTAT) 0.4 MG SL tablet Place 1 tablet under the tongue Every 5 (Five) Minutes As Needed for Chest Pain. 1 under the tongue as needed for angina, may repeat q5mins for up three doses 2/2/22 3/8/22  Yoli Baumann APRN      Pre-Procedure Notes  H&P Performed  [x]  Yes []  No       []  N/A    Indications:  []  ACS <= 24 HRS  []  ACS >24 HRS  []  New Onset Angina <= 2 mos  []  Worsening Angina  []  Resuscitated Cardiac Arrest  []  Angina on Exertion:  []  Suspected CAD  []  Valvular Disease  []  Pericardial Disease  []  Cardiac Arrythmia  []  Cardiomyopathy  []  LV Dysfunction  []  Syncope  []  Post Cardiac Transplant  []  Eval. For Exercise Clearance  []  Other  []  Pre-Operative Evaluation  If Pre-Op Eval:  Evaluation for Surgery Type:  []  Cardiac Surgery   []  Non-Cardiac Surgery  Functional Capacity:  []  <4 METS  []  >=4 METS w/o symptoms  []  >= 4 METS with symptoms  []  Unknown  Surgical Risk:  []  Low  []  Intermediate  []  High Risk: Vascular  []  High Risk Non-Vascular    Risks, Benefits, & Complications Discussed:  [x]  Yes  []  No  []  N/A    Questions Answered:  [x]  Yes  []  No  []  N/A    Consent Obtained:  [x]  Yes  []  No  []  N/A    CHF: [x]  Yes  []  No  If Yes:  Newly  Diagnosed?  []  Yes  [x]  No  If Yes:  HF Type:  []  Diastolic  [x]  Systolic  []  Unknown      Kamlesh Richardson MD  7/20/2022  16:30 EDT  Electronically signed by Kamlesh Richardson MD, 07/20/22, 4:30 PM EDT.    The following portions of the patient's history were reviewed and updated as appropriate: allergies, current medications, past family history, past medical history, past social history, past surgical history and problem list.      ROS:  14 point review of systems negative except as mentioned above    Current Outpatient Medications:   •  Acetaminophen (Tylenol) 325 MG capsule, Take 650 mg by mouth Every 6 (Six) Hours As Needed., Disp: , Rfl:   •  apixaban (ELIQUIS) 5 MG tablet tablet, Take 1 tablet by mouth Every 12 (Twelve) Hours. Indications: Atrial Fibrillation, Disp: 60 tablet, Rfl: 1  •  aspirin 81 MG chewable tablet, Chew 81 mg Daily., Disp: , Rfl:   •  atorvastatin (LIPITOR) 80 MG tablet, Take 1 tablet by mouth Every Night., Disp: 90 tablet, Rfl: 1  •  Cholecalciferol (VITAMIN D-3) 25 MCG (1000 UT) capsule, Take 3 capsules by mouth Daily., Disp: , Rfl:   •  ciprofloxacin (CIPRO) 500 MG tablet, Take 500 mg by mouth 2 (Two) Times a Day., Disp: , Rfl:   •  empagliflozin (Jardiance) 10 MG tablet tablet, Take 1 tablet by mouth Daily., Disp: 90 tablet, Rfl: 1  •  furosemide (LASIX) 40 MG tablet, Take 1 tablet by mouth 2 (Two) Times a Day for 30 days., Disp: 180 tablet, Rfl: 2  •  guaiFENesin (MUCINEX) 600 MG 12 hr tablet, Take 1,200 mg by mouth 2 (Two) Times a Day As Needed for Cough., Disp: , Rfl:   •  Insulin Glargine (BASAGLAR KWIKPEN) 100 UNIT/ML injection pen, Inject 10 Units under the skin into the appropriate area as directed Every Night for 30 days., Disp: 5 pen, Rfl: 0  •  metFORMIN (GLUCOPHAGE) 1000 MG tablet, 2 (Two) Times a Day With Meals., Disp: , Rfl:   •  metOLazone (ZAROXOLYN) 2.5 MG tablet, Take 1 tablet by mouth 3 (Three) Times a Week if Needed (edema)., Disp: 30 tablet, Rfl:  3  •  mupirocin (BACTROBAN) 2 % ointment, APPLY OINTMENT TOPICALLY ONCE DAILY, Disp: , Rfl:   •  Pediatric Multiple Vitamins (Multivitamin Childrens) chewable tablet, Chew 1 tablet Daily., Disp: , Rfl:   •  sacubitril-valsartan (Entresto) 49-51 MG tablet, Take 1 tablet by mouth 2 (Two) Times a Day., Disp: 180 tablet, Rfl: 3  •  Sotalol HCl AF 80 MG tablet, Take 1 tablet by mouth 2 (Two) Times a Day., Disp: 180 tablet, Rfl: 3  •  spironolactone (ALDACTONE) 25 MG tablet, Take 1 tablet by mouth Daily., Disp: 30 tablet, Rfl: 1  •  sulfamethoxazole-trimethoprim (BACTRIM DS,SEPTRA DS) 800-160 MG per tablet, Take 1 tablet by mouth 2 (Two) Times a Day., Disp: , Rfl:     Problem List:  Patient Active Problem List   Diagnosis   • Mixed hyperlipidemia   • Overweight (BMI 25.0-29.9)   • Type 2 diabetes mellitus with hyperglycemia, without long-term current use of insulin ([facility])   • RUE weakness   • Facial droop, left sided   • Vitamin D deficiency   • Snoring   • History of cerebrovascular accident   • Seasonal allergic rhinitis   • Coronary artery disease involving native coronary artery of native heart   • Atrial arrhythmia   • Depression   • Abnormal nuclear stress test   • Acute chest pain   • Palpitations   • Acute CHF (congestive heart failure) ([facility])   • Dyspnea on exertion   • Bilateral pleural effusion   • Acute on chronic congestive heart failure, unspecified heart failure type ([facility])   • Acute on chronic HFrEF (heart failure with reduced ejection fraction) ([facility])   • CAD S/P percutaneous coronary angioplasty   • Acute postoperative pain of right groin   • AF (paroxysmal atrial fibrillation) ([facility])   • Benign essential HTN     Past Medical History:  Past Medical History:   Diagnosis Date   • Abnormal cardiovascular stress test 01/19/2017   • Acute myocardial infarction ([facility]) 2019   • Arterial ischemic stroke, MCA (middle cerebral artery), left, acute ([facility]) 10/29/2019   • Atrial fibrillation ([facility])    • Atrial flutter with  rapid ventricular response (HCC) 02/04/2021   • Bicuspid aortic valve 02/16/2017   • CHF (congestive heart failure) ([facility])    • Coronary artery disease involving native coronary artery of native heart    • Depression 12/30/2020   • History of coronary angioplasty with insertion of stent    • Hyperlipidemia    • Hypertension    • Hyperthyroidism    • Obesity 09/02/2011   • Paroxysmal SVT (supraventricular tachycardia) ([facility]) 01/08/2021   • RUE weakness 10/29/2019    Previous residual from Stroke, but was not work prohibiting.   • Seasonal allergic rhinitis 12/23/2020   • Sinus arrhythmia    • Sustained SVT ([facility])    • Type 2 diabetes mellitus ([facility])    • Vitamin D deficiency 11/05/2020     Past Surgical History:  Past Surgical History:   Procedure Laterality Date   • ABLATION OF DYSRHYTHMIC FOCUS     • APPENDECTOMY     • BACK SURGERY     • CARDIAC CATHETERIZATION  2010   • CARDIAC CATHETERIZATION  2019   • CARDIAC CATHETERIZATION N/A 12/31/2020    Procedure: Cardiac Catheterization/Vascular Study;  Surgeon: Moris Pedro MD;  Location: [facility] CATH INVASIVE LOCATION;  Service: Cardiovascular;  Laterality: N/A;   • CARDIAC CATHETERIZATION N/A 02/01/2022    Procedure: Left Heart Cath;  Surgeon: Kamlesh Richardson MD;  Location: [facility] CATH INVASIVE LOCATION;  Service: Cardiovascular;  Laterality: N/A;   • CARDIAC CATHETERIZATION N/A 07/20/2022    Procedure: Left Heart Cath;  Surgeon: Kamlesh Richardson MD;  Location:  [facility] INVASIVE [facility];  Service: Cardiovascular;  Laterality: N/A;   • CARDIAC ELECTROPHYSIOLOGY PROCEDURE N/A 01/20/2021    Procedure: EP/Ablation;  Surgeon: Nathen Olmstead MD;  Location:  [facility] CATH INVASIVE LOC[facility];  Service: Cardiovascular;  Laterality: N/A;   • CARDIAC ELECTROPHYSIOLOGY PROCEDURE N/A 02/24/2022    Procedure: EP/Ablation-SVT Ut[place] aware;  Surgeon: Candido Montana MD;  Location:  [facility] CATH INVASIVE LOCATION;  Service: Cardiology;  Laterality:  N/A;   • CORONARY STENT PLACEMENT     • JOINT REPLACEMENT     • KNEE ARTHROSCOPY     • THYROIDECTOMY, PARTIAL  2015     Social History:  Social History     Socioeconomic History   • Marital status:    Tobacco Use   • Smoking status: Never Smoker   • Smokeless tobacco: Never Used   Vaping Use   • Vaping Use: Never used   Substance and Sexual Activity   • Alcohol use: Not Currently   • Drug use: No   • Sexual activity: Defer     Allergies:  Allergies   Allergen Reactions   • Morphine GI Intolerance   • Ozempic (0.25 Or 0.5 Mg-Dose) [Semaglutide(0.25 Or 0.5mg-Dos)] Diarrhea   • Isosorbide Headache   • Nitrofuran Derivatives Headache   • Nitroglycerin Headache     Immunizations:  Immunization History   Administered Date(s) Administered   • COVID-19 (PFIZER) PURPLE CAP 03/15/2021, 04/05/2021, 10/04/2021   • FluLaval/Fluzone >6mos 11/06/2020            In-Office Procedure(s):  No orders to display        ASCVD RIsk Score::  The ASCVD Risk score (Rachna MELODY Jr., et al., 2013) failed to calculate for the following reasons:    The patient has a prior MI or stroke diagnosis    Imaging:    Results for orders placed during the hospital encounter of 07/18/22    XR Chest 1 View    Narrative  PROCEDURE:   XR CHEST 1 VW    HISTORY:   Shortness of breath    COMPARISONS: 4/25/2022    FINDINGS:  Bibasal hazy opacities. Low lung volumes.    Smooth cardiomediastinal contours. Cardiomegaly.    Regional bones and soft tissues show no acute abnormality.    Impression  1. Limited by low lung volumes.  2. Bibasal atelectasis, edema, and/or airspace disease.  3. Cardiomegaly.    Electronically signed by:  Erick Carey D.O.  7/18/2022 4:38 AM       Results for orders placed during the hospital encounter of 07/18/22    CT Angiogram Chest Pulmonary Embolism    Narrative  EXAMINATION:  CTA CHEST WITH IV CONTRAST, CT PULMONARY ANGIOGRAM    DATE OF EXAM: 7/18/2022 6:17 AM    HISTORY: Shortness of breath, chest pain    TECHNIQUE: CTA  examination of the chest was performed following the intravenous administration of 100 mL Isovue-370. Sagittal, coronal and 3-D reformatted images were provided. CT dose lowering techniques were used, to include: automated exposure  control, adjustment for patient size, and or use of iterative reconstruction.    COMPARISON: 7/18/2022. 4/25/2022. 3/7/2022.    FINDINGS:    Lungs: Bibasal reticular and groundglass opacities are likely areas of dependent atelectasis with superimposed edema. Additionally, patchy ground glass opacities and interlobular septal prominence seen throughout both lungs.    Pleura: Bilateral large pleural effusions tracking posteriorly into the apices.    Mediastinum and Anne: Redemonstration of prominent lymph nodes throughout the right paratracheal space and prevascular region, largest measures 1.1 cm in short axis and is within the right paratracheal area.    Pulmonary Arteries: No filling defects are identified.    Cardiovascular: Cardiomegaly. No pericardial effusion. Nonspecific reflux of IV contrast into the hepatic vasculature.    Upper Abdomen: No acute abnormality.    Chest Wall: No acute abnormality.    Musculoskeletal: No acute abnormality.    Impression  1.  Acute congestive heart failure given the presence of pleural effusions, pulmonary edema, and cardiomegaly.  2.  No pulmonary embolism.  3.  Redemonstration of nonspecific mediastinal lymph nodes.      Electronically signed by:  Erick Carey D.O.  7/18/2022 4:43 AM      Results for orders placed during the hospital encounter of 07/18/22    CT Angiogram Chest Pulmonary Embolism    Narrative  EXAMINATION:  CTA CHEST WITH IV CONTRAST, CT PULMONARY ANGIOGRAM    DATE OF EXAM: 7/18/2022 6:17 AM    HISTORY: Shortness of breath, chest pain    TECHNIQUE: CTA examination of the chest was performed following the intravenous administration of 100 mL Isovue-370. Sagittal, coronal and 3-D reformatted images were provided. CT dose lowering  techniques were used, to include: automated exposure  control, adjustment for patient size, and or use of iterative reconstruction.    COMPARISON: 7/18/2022. 4/25/2022. 3/7/2022.    FINDINGS:    Lungs: Bibasal reticular and groundglass opacities are likely areas of dependent atelectasis with superimposed edema. Additionally, patchy ground glass opacities and interlobular septal prominence seen throughout both lungs.    Pleura: Bilateral large pleural effusions tracking posteriorly into the apices.    Mediastinum and Anne: Redemonstration of prominent lymph nodes throughout the right paratracheal space and prevascular region, largest measures 1.1 cm in short axis and is within the right paratracheal area.    Pulmonary Arteries: No filling defects are identified.    Cardiovascular: Cardiomegaly. No pericardial effusion. Nonspecific reflux of IV contrast into the hepatic vasculature.    Upper Abdomen: No acute abnormality.    Chest Wall: No acute abnormality.    Musculoskeletal: No acute abnormality.    Impression  1.  Acute congestive heart failure given the presence of pleural effusions, pulmonary edema, and cardiomegaly.  2.  No pulmonary embolism.  3.  Redemonstration of nonspecific mediastinal lymph nodes.      Electronically signed by:  Erick Carey D.O.  7/18/2022 4:43 AM      Lab Review:   Lab on 09/19/2022   Component Date Value   • Total Cholesterol 09/19/2022 246 (A)   • Triglycerides 09/19/2022 669 (A)   • HDL Cholesterol 09/19/2022 26 (A)   • LDL Cholesterol  09/19/2022 104 (A)   • VLDL Cholesterol 09/19/2022 116 (A)   • LDL/HDL Ratio 09/19/2022 3.32    • Glucose 09/19/2022 348 (A)   • BUN 09/19/2022 30 (A)   • Creatinine 09/19/2022 1.33 (A)   • Sodium 09/19/2022 134 (A)   • Potassium 09/19/2022 5.1    • Chloride 09/19/2022 96 (A)   • CO2 09/19/2022 24.8    • Calcium 09/19/2022 10.3    • Total Protein 09/19/2022 8.0    • Albumin 09/19/2022 3.90    • ALT (SGPT) 09/19/2022 14    • AST (SGOT) 09/19/2022  13    • Alkaline Phosphatase 09/19/2022 158 (A)   • Total Bilirubin 09/19/2022 0.3    • Globulin 09/19/2022 4.1    • A/G Ratio 09/19/2022 1.0    • BUN/Creatinine Ratio 09/19/2022 22.6    • Anion Gap 09/19/2022 13.2    • eGFR 09/19/2022 62.0    Admission on 07/31/2022, Discharged on 08/01/2022   Component Date Value   • Glucose 07/31/2022 435 (A)   • BUN 07/31/2022 17    • Creatinine 07/31/2022 1.00    • Sodium 07/31/2022 134 (A)   • Potassium 07/31/2022 3.9    • Chloride 07/31/2022 98    • CO2 07/31/2022 26.0    • Calcium 07/31/2022 9.1    • BUN/Creatinine Ratio 07/31/2022 17.0    • Anion Gap 07/31/2022 10.0    • eGFR 07/31/2022 87.2    • Protime 07/31/2022 10.2    • INR 07/31/2022 0.99    • PTT 07/31/2022 27.6 (A)   • WBC 07/31/2022 5.60    • RBC 07/31/2022 5.17    • Hemoglobin 07/31/2022 14.3    • Hematocrit 07/31/2022 42.1    • MCV 07/31/2022 81.3    • MCH 07/31/2022 27.6    • MCHC 07/31/2022 33.9    • RDW 07/31/2022 13.9    • RDW-SD 07/31/2022 39.8    • MPV 07/31/2022 9.4    • Platelets 07/31/2022 220    • Neutrophil % 07/31/2022 46.4    • Lymphocyte % 07/31/2022 37.1    • Monocyte % 07/31/2022 7.4    • Eosinophil % 07/31/2022 7.0 (A)   • Basophil % 07/31/2022 2.1 (A)   • Neutrophils, Absolute 07/31/2022 2.60    • Lymphocytes, Absolute 07/31/2022 2.10    • Monocytes, Absolute 07/31/2022 0.40    • Eosinophils, Absolute 07/31/2022 0.40    • Basophils, Absolute 07/31/2022 0.10    • nRBC 07/31/2022 0.1    • COVID19 07/31/2022 Not Detected    • Glucose 08/01/2022 308 (A)   • BUN 08/01/2022 15    • Creatinine 08/01/2022 0.95    • Sodium 08/01/2022 140    • Potassium 08/01/2022 4.2    • Chloride 08/01/2022 102    • CO2 08/01/2022 26.0    • Calcium 08/01/2022 9.0    • BUN/Creatinine Ratio 08/01/2022 15.8    • Anion Gap 08/01/2022 12.0    • eGFR 08/01/2022 92.8    • WBC 08/01/2022 6.50    • RBC 08/01/2022 5.38    • Hemoglobin 08/01/2022 15.1    • Hematocrit 08/01/2022 43.6    • MCV 08/01/2022 81.0    • MCH 08/01/2022  28.1    • MCHC 08/01/2022 34.7    • RDW 08/01/2022 14.4    • RDW-SD 08/01/2022 41.1    • MPV 08/01/2022 9.5    • Platelets 08/01/2022 213    • Neutrophil % 08/01/2022 50.3    • Lymphocyte % 08/01/2022 34.0    • Monocyte % 08/01/2022 8.5    • Eosinophil % 08/01/2022 6.9 (A)   • Basophil % 08/01/2022 0.3    • Neutrophils, Absolute 08/01/2022 3.30    • Lymphocytes, Absolute 08/01/2022 2.20    • Monocytes, Absolute 08/01/2022 0.60    • Eosinophils, Absolute 08/01/2022 0.40    • Basophils, Absolute 08/01/2022 0.00    • nRBC 08/01/2022 0.1    • Glucose 08/01/2022 282 (A)   • Target HR (85%) 08/01/2022 138    • Max. Pred. HR (100%) 08/01/2022 162    • PROX SFA PSV RIGHT 08/01/2022 67    • PROX PFA PSV RIGHT 08/01/2022 77    • BH CV RIGHT GROIN PSA KS* 08/01/2022 1.00    • Right groin CFA sys 08/01/2022 71.3    • SFA Prox PSV-Right 08/01/2022 -67.4    • Glucose 08/01/2022 411 (A)   • BUN 08/01/2022 13    • Creatinine 08/01/2022 0.80    • Sodium 08/01/2022 134 (A)   • Potassium 08/01/2022 3.8    • Chloride 08/01/2022 98    • CO2 08/01/2022 26.0    • Calcium 08/01/2022 8.6    • BUN/Creatinine Ratio 08/01/2022 16.3    • Anion Gap 08/01/2022 10.0    • eGFR 08/01/2022 102.6    • WBC 08/01/2022 6.50    • RBC 08/01/2022 5.23    • Hemoglobin 08/01/2022 14.5    • Hematocrit 08/01/2022 43.1    • MCV 08/01/2022 82.4    • MCH 08/01/2022 27.8    • MCHC 08/01/2022 33.7    • RDW 08/01/2022 14.0    • RDW-SD 08/01/2022 40.7    • MPV 08/01/2022 10.2    • Platelets 08/01/2022 211    • Neutrophil % 08/01/2022 58.7    • Lymphocyte % 08/01/2022 24.6    • Monocyte % 08/01/2022 8.2    • Eosinophil % 08/01/2022 6.6 (A)   • Basophil % 08/01/2022 1.9 (A)   • Neutrophils, Absolute 08/01/2022 3.80    • Lymphocytes, Absolute 08/01/2022 1.60    • Monocytes, Absolute 08/01/2022 0.50    • Eosinophils, Absolute 08/01/2022 0.40    • Basophils, Absolute 08/01/2022 0.10    • nRBC 08/01/2022 0.1    • Glucose 08/01/2022 278 (A)   • Glucose 08/01/2022 421 (A)   •  Glucose 08/01/2022 365 (A)   • Glucose 08/01/2022 238 (A)   No results displayed because visit has over 200 results.      Admission on 04/25/2022, Discharged on 04/26/2022   Component Date Value   • QT Interval 04/25/2022 438    • Extra Tube 04/25/2022 Hold for add-ons.    • Extra Tube 04/25/2022 Hold for add-ons.    • Extra Tube 04/25/2022 hold for add-on    • Glucose 04/25/2022 182 (A)   • BUN 04/25/2022 16    • Creatinine 04/25/2022 0.81    • Sodium 04/25/2022 136    • Potassium 04/25/2022 3.9    • Chloride 04/25/2022 105    • CO2 04/25/2022 22.0    • Calcium 04/25/2022 8.8    • Total Protein 04/25/2022 6.5    • Albumin 04/25/2022 3.40 (A)   • ALT (SGPT) 04/25/2022 17    • AST (SGOT) 04/25/2022 14    • Alkaline Phosphatase 04/25/2022 95    • Total Bilirubin 04/25/2022 0.3    • Globulin 04/25/2022 3.1    • A/G Ratio 04/25/2022 1.1    • BUN/Creatinine Ratio 04/25/2022 19.8    • Anion Gap 04/25/2022 9.0    • eGFR 04/25/2022 102.2    • Magnesium 04/25/2022 1.8    • TSH 04/25/2022 2.030    • Troponin T 04/25/2022 0.021    • WBC 04/25/2022 6.30    • RBC 04/25/2022 4.55    • Hemoglobin 04/25/2022 12.7 (A)   • Hematocrit 04/25/2022 38.0    • MCV 04/25/2022 83.5    • MCH 04/25/2022 27.9    • MCHC 04/25/2022 33.5    • RDW 04/25/2022 14.6    • RDW-SD 04/25/2022 42.9    • MPV 04/25/2022 9.8    • Platelets 04/25/2022 239    • Neutrophil % 04/25/2022 53.1    • Lymphocyte % 04/25/2022 22.2    • Monocyte % 04/25/2022 7.0    • Eosinophil % 04/25/2022 15.7 (A)   • Basophil % 04/25/2022 2.0 (A)   • Neutrophils, Absolute 04/25/2022 3.40    • Lymphocytes, Absolute 04/25/2022 1.40    • Monocytes, Absolute 04/25/2022 0.40    • Eosinophils, Absolute 04/25/2022 1.00 (A)   • Basophils, Absolute 04/25/2022 0.10    • nRBC 04/25/2022 0.1    • COVID19 04/25/2022 Not Detected    • Troponin T 04/25/2022 0.010    • Glucose 04/25/2022 185 (A)   • QT Interval 04/26/2022 446    • Glucose 04/26/2022 230 (A)   • Glucose 04/26/2022 198 (A)     Recent  labs reviewed and interpreted for clinical significance and application            Level of Care:           Jean Paul Zelaya MD  09/22/22  .

## 2022-10-11 ENCOUNTER — APPOINTMENT (OUTPATIENT)
Dept: CARDIOLOGY | Facility: HOSPITAL | Age: 57
End: 2022-10-11

## 2022-10-15 PROCEDURE — 93248 EXT ECG>7D<15D REV&INTERPJ: CPT | Performed by: INTERNAL MEDICINE

## 2022-10-17 ENCOUNTER — TELEPHONE (OUTPATIENT)
Dept: CARDIOLOGY | Facility: CLINIC | Age: 57
End: 2022-10-17

## 2022-10-17 NOTE — TELEPHONE ENCOUNTER
Patient needs to keep his follow up appt to discuss holter in detail  L/m for patient to call back      Hub can release this information

## 2022-12-27 ENCOUNTER — TELEPHONE (OUTPATIENT)
Dept: CARDIOLOGY | Facility: CLINIC | Age: 57
End: 2022-12-27

## 2022-12-27 NOTE — TELEPHONE ENCOUNTER
RECVD LETTER FROM [facility] REGARDING ADVERSE REACTION TO ENTRESTO 49-51MG HYPERSENSITIVITY AND COUGH. PT RECVD MEDS FROM PHARMACY, DO NOT HAVE A LOT NUMBER TO PROVIDE SINCE WAS PICKED UP FROM [facility]. PT NEVER REPORTED SYMPTOMS TO US. CALLED PT AND LMOM TO FIND OUT IF STILL TAKING MEDS. MADE COPIES OF PAPERWORK AND PLACED TO BE SCANNED. MAILED ORIGINAL FORMS TO   [facility]  [facility] [employer]  [facility]  [address]  [facility] [id number]

## 2023-01-16 RX ORDER — SPIRONOLACTONE 25 MG/1
TABLET ORAL
Qty: 30 TABLET | Refills: 3 | Status: SHIPPED | OUTPATIENT
Start: 2023-01-16

## 2023-12-07 RX ORDER — SOTALOL HYDROCHLORIDE 80 MG/1
80 TABLET ORAL 2 TIMES DAILY
Qty: 180 TABLET | Refills: 0 | OUTPATIENT
Start: 2023-12-07

## 2023-12-29 NOTE — TELEPHONE ENCOUNTER
Rx Refill Note  Requested Prescriptions     Pending Prescriptions Disp Refills    Entresto 49-51 MG tablet [Pharmacy Med Name: Entresto 49-51 MG Oral Tablet] 60 tablet 0     Sig: Take 1 tablet by mouth twice daily      Last office visit with prescribing clinician: 8/9/2022   Last telemedicine visit with prescribing clinician: Visit date not found   Next office visit with prescribing clinician: Visit date not found                         Would you like a call back once the refill request has been completed: [] Yes [] No    If the office needs to give you a call back, can they leave a voicemail: [] Yes [] No    Parvin Banegas MA  12/29/23, 11:42 EST

## 2024-01-02 RX ORDER — SACUBITRIL AND VALSARTAN 49; 51 MG/1; MG/1
1 TABLET, FILM COATED ORAL 2 TIMES DAILY
Qty: 60 TABLET | Refills: 0 | OUTPATIENT
Start: 2024-01-02

## 2024-01-02 NOTE — TELEPHONE ENCOUNTER
Can we please call patient to get him scheduled to see Dr Montana.    -We will refill medication once he comes into the office and verify that the patient is still taking Entresto and make sure Dr Montana would like for patient to keep taking Entresto.

## (undated) DEVICE — CATH ABL ACHIEVE MP 3.3F20MM 165CM

## (undated) DEVICE — CATH DIAG IMPULSE PIG .056 6F 110CM

## (undated) DEVICE — SKIN PREP TRAY W/CHG: Brand: MEDLINE INDUSTRIES, INC.

## (undated) DEVICE — CATH DIAG IMPULSE FL4 5F 100CM

## (undated) DEVICE — Device: Brand: REFERENCE PATCH CARTO 3

## (undated) DEVICE — Device: Brand: WEBSTER

## (undated) DEVICE — Device: Brand: WEBSTER CS

## (undated) DEVICE — GW DIAG EMERALD HEPCOAT MOVE JTIP STD .035 3MM 150CM

## (undated) DEVICE — Device: Brand: EZ STEER NAV

## (undated) DEVICE — PINNACLE INTRODUCER SHEATH: Brand: PINNACLE

## (undated) DEVICE — CABL CONN CATH EP COAXL UMB 72IN

## (undated) DEVICE — RADIFOCUS OBTURATOR: Brand: RADIFOCUS

## (undated) DEVICE — SHEATH FLXCATH STEER 12FR

## (undated) DEVICE — ELECTRD DEFIB M/FUNC PROPADZ RADIOL 2PK

## (undated) DEVICE — Device: Brand: RFP-100A CONNECTOR CABLE

## (undated) DEVICE — CABL CATH ABLAT ACHIEVE 196CM 1P/U

## (undated) DEVICE — Device: Brand: NRG TRANSSEPTAL NEEDLE

## (undated) DEVICE — CATH ABL ARCTIC FRNT ADV 10.5F3.5X28MM

## (undated) DEVICE — Device: Brand: SMARTABLATE

## (undated) DEVICE — CATH DIAG IMPULSE FR4 6F 100CM

## (undated) DEVICE — Device: Brand: CARTO 3

## (undated) DEVICE — KT PK ANGIOPLASTY ACC 9 MERIT

## (undated) DEVICE — MYNXGRIP 6F/7F: Brand: MYNXGRIP

## (undated) DEVICE — PK TRY HEART CATH 50

## (undated) DEVICE — PAD E/S GRND SGL/FOIL 9FT/CORD DISP

## (undated) DEVICE — Device: Brand: THERMOCOOL SMARTTOUCH SF

## (undated) DEVICE — ST ACC MICROPUNCTURE STFF/CANN PLAT/TP 4F 21G 40CM

## (undated) DEVICE — CATH DIAG IMPULSE PIG 5F 100CM

## (undated) DEVICE — PROVE COVER: Brand: UNBRANDED

## (undated) DEVICE — Device: Brand: SOUNDSTAR

## (undated) DEVICE — PREF.GUIDING SHEATH W/MULT.CRV: Brand: PREFACE

## (undated) DEVICE — GW EMR FIX EXCHG J STD .035 3MM 260CM

## (undated) DEVICE — ST INTRO PERFORMER W/GW J/TP .038IN 14FR

## (undated) DEVICE — CATH DIAG IMPULSE FR4 5F 100CM

## (undated) DEVICE — CATH DIAG IMPULSE FL4 6F 100CM

## (undated) DEVICE — GW XCHG AMPLTZ XSTIF PTFE CRV .035IN 3X180CM

## (undated) DEVICE — CABL CONN CATH EP UMB 48IN